# Patient Record
Sex: FEMALE | Race: WHITE | NOT HISPANIC OR LATINO | Employment: FULL TIME | URBAN - METROPOLITAN AREA
[De-identification: names, ages, dates, MRNs, and addresses within clinical notes are randomized per-mention and may not be internally consistent; named-entity substitution may affect disease eponyms.]

---

## 2017-01-07 ENCOUNTER — HOSPITAL ENCOUNTER (OUTPATIENT)
Dept: MAMMOGRAPHY | Facility: HOSPITAL | Age: 52
Discharge: HOME | End: 2017-01-07
Payer: COMMERCIAL

## 2017-01-07 DIAGNOSIS — Z12.31 ENCOUNTER FOR SCREENING MAMMOGRAM FOR MALIGNANT NEOPLASM OF BREAST: ICD-10-CM

## 2017-01-07 PROCEDURE — G0202 SCR MAMMO BI INCL CAD: HCPCS

## 2017-01-24 ENCOUNTER — ALLSCRIPTS OFFICE VISIT (OUTPATIENT)
Dept: OTHER | Facility: OTHER | Age: 52
End: 2017-01-24

## 2017-04-05 ENCOUNTER — ALLSCRIPTS OFFICE VISIT (OUTPATIENT)
Dept: OTHER | Facility: OTHER | Age: 52
End: 2017-04-05

## 2017-08-17 ENCOUNTER — OFFICE VISIT (OUTPATIENT)
Dept: URGENT CARE | Age: 52
End: 2017-08-17
Payer: COMMERCIAL

## 2017-08-17 PROCEDURE — 99213 OFFICE O/P EST LOW 20 MIN: CPT | Performed by: FAMILY MEDICINE

## 2017-08-21 ENCOUNTER — ALLSCRIPTS OFFICE VISIT (OUTPATIENT)
Dept: OTHER | Facility: OTHER | Age: 52
End: 2017-08-21

## 2017-09-18 ENCOUNTER — ALLSCRIPTS OFFICE VISIT (OUTPATIENT)
Dept: OTHER | Facility: OTHER | Age: 52
End: 2017-09-18

## 2017-11-09 ENCOUNTER — ALLSCRIPTS OFFICE VISIT (OUTPATIENT)
Dept: OTHER | Facility: OTHER | Age: 52
End: 2017-11-09

## 2017-12-19 ENCOUNTER — GENERIC CONVERSION - ENCOUNTER (OUTPATIENT)
Dept: OTHER | Facility: OTHER | Age: 52
End: 2017-12-19

## 2017-12-19 ENCOUNTER — ALLSCRIPTS OFFICE VISIT (OUTPATIENT)
Dept: OTHER | Facility: OTHER | Age: 52
End: 2017-12-19

## 2017-12-19 DIAGNOSIS — E78.2 MIXED HYPERLIPIDEMIA: ICD-10-CM

## 2017-12-19 DIAGNOSIS — Z11.59 ENCOUNTER FOR SCREENING FOR OTHER VIRAL DISEASES (CODE): ICD-10-CM

## 2017-12-26 ENCOUNTER — LAB CONVERSION - ENCOUNTER (OUTPATIENT)
Dept: OTHER | Facility: OTHER | Age: 52
End: 2017-12-26

## 2017-12-26 LAB
A/G RATIO (HISTORICAL): 1.9 (CALC) (ref 1–2.5)
ALBUMIN SERPL BCP-MCNC: 4 G/DL (ref 3.6–5.1)
ALP SERPL-CCNC: 96 U/L (ref 33–130)
ALT SERPL W P-5'-P-CCNC: 11 U/L (ref 6–29)
AST SERPL W P-5'-P-CCNC: 17 U/L (ref 10–35)
BILIRUB SERPL-MCNC: 0.5 MG/DL (ref 0.2–1.2)
BUN SERPL-MCNC: 15 MG/DL (ref 7–25)
BUN/CREA RATIO (HISTORICAL): NORMAL (CALC) (ref 6–22)
CALCIUM SERPL-MCNC: 9.4 MG/DL (ref 8.6–10.4)
CHLORIDE SERPL-SCNC: 106 MMOL/L (ref 98–110)
CHOLEST SERPL-MCNC: 186 MG/DL
CHOLEST/HDLC SERPL: 2.4 (CALC)
CO2 SERPL-SCNC: 30 MMOL/L (ref 20–31)
CREAT SERPL-MCNC: 0.9 MG/DL (ref 0.5–1.05)
DEPRECATED RDW RBC AUTO: 12 % (ref 11–15)
EGFR AFRICAN AMERICAN (HISTORICAL): 84 ML/MIN/1.73M2
EGFR-AMERICAN CALC (HISTORICAL): 72 ML/MIN/1.73M2
GAMMA GLOBULIN (HISTORICAL): 2.1 G/DL (CALC) (ref 1.9–3.7)
GLUCOSE (HISTORICAL): 90 MG/DL (ref 65–99)
HCT VFR BLD AUTO: 39.7 % (ref 35–45)
HDLC SERPL-MCNC: 78 MG/DL
HEPATITIS C ANTIBODY (HISTORICAL): NORMAL
HGB BLD-MCNC: 13 G/DL (ref 11.7–15.5)
LDL CHOLESTEROL (HISTORICAL): 91 MG/DL (CALC)
MCH RBC QN AUTO: 29.7 PG (ref 27–33)
MCHC RBC AUTO-ENTMCNC: 32.7 G/DL (ref 32–36)
MCV RBC AUTO: 90.8 FL (ref 80–100)
NON-HDL-CHOL (CHOL-HDL) (HISTORICAL): 108 MG/DL (CALC)
PLATELET # BLD AUTO: 303 THOUSAND/UL (ref 140–400)
PMV BLD AUTO: 11.2 FL (ref 7.5–12.5)
POTASSIUM SERPL-SCNC: 4.5 MMOL/L (ref 3.5–5.3)
RBC # BLD AUTO: 4.37 MILLION/UL (ref 3.8–5.1)
SIGNAL TO CUT-OFF (HISTORICAL): 0.02
SODIUM SERPL-SCNC: 142 MMOL/L (ref 135–146)
TOTAL PROTEIN (HISTORICAL): 6.1 G/DL (ref 6.1–8.1)
TRIGL SERPL-MCNC: 78 MG/DL
WBC # BLD AUTO: 5.2 THOUSAND/UL (ref 3.8–10.8)

## 2017-12-28 ENCOUNTER — GENERIC CONVERSION - ENCOUNTER (OUTPATIENT)
Dept: OTHER | Facility: OTHER | Age: 52
End: 2017-12-28

## 2018-01-08 ENCOUNTER — ALLSCRIPTS OFFICE VISIT (OUTPATIENT)
Dept: OTHER | Facility: OTHER | Age: 53
End: 2018-01-08

## 2018-01-08 PROCEDURE — G0145 SCR C/V CYTO,THINLAYER,RESCR: HCPCS | Performed by: FAMILY MEDICINE

## 2018-01-09 ENCOUNTER — LAB REQUISITION (OUTPATIENT)
Dept: LAB | Facility: HOSPITAL | Age: 53
End: 2018-01-09
Payer: COMMERCIAL

## 2018-01-09 DIAGNOSIS — Z01.419 ENCOUNTER FOR GYNECOLOGICAL EXAMINATION WITHOUT ABNORMAL FINDING: ICD-10-CM

## 2018-01-09 NOTE — PROGRESS NOTES
Assessment   1  Visit for routine gyn exam (V72 31) (Z01 419)   2  Encounter for screening mammogram for malignant neoplasm of breast (V76 12)     (Z12 31)   3  Acute bronchitis, unspecified organism (466 0) (J20 9)    Plan    · Benzonatate 200 MG Oral Capsule; TAKE 1 CAPSULE 3 TIMES DAILY AS    NEEDED   Rx By: Shelli Tomas; Dispense: 10 Days ; #:30 Capsule; Refill: 0;For: Acute bronchitis, unspecified organism; RASHAUN = N; Verified Transmission to [facility] #[id number]; Last Updated By: System, SureScripts; 1/8/2018 9:30:48 AM   · Cefuroxime Axetil 500 MG Oral Tablet; TAKE 1 TABLET EVERY 12 HOURS DAILY   Rx By: Shelli Tomas; Dispense: 10 Days ; #:20 Tablet; Refill: 0;For: Acute bronchitis, unspecified organism; RASHAUN = N; Verified Transmission to [facility] #[id number]; Last Updated By: System, SureScripts; 1/8/2018 9:30:49 AM   · MAMMO SCREENING BILATERAL W 3D & CAD; Status:Hold For - Scheduling; Requested    HGB:52BEZ9933; Perform:[facility]; AGS:62YXI4331; Last Updated By:Tahmina Edge; 1/8/2018 9:27:29 AM;Ordered;For:Encounter for screening mammogram for malignant neoplasm of breast; Ordered By:Tahmina Edge;   · (1) THIN PREP PAP WITH IMAGING; Status: In Progress - Specimen/Data Collected;      Done: 69MIE8708   [address] [place] [address] Lab In Office Collection; YWH:25MMX7863; Last Updated By:Tahmina Edge; 1/8/2018 9:29:43 AM;Ordered; For:Visit for routine gyn exam; Ordered By:Tahmina Edge; Maturation index required? : No  : postmenopausal  HPV? : if ASCUS    Discussion/Summary   health maintenance visit Currently, she eats an adequate diet and has an adequate exercise regimen  Pap test with reflex HPV testing was done today Breast cancer screening: monthly self breast exam was advised and mammogram has been ordered  Colorectal cancer screening: colorectal cancer screening is current   Advice and education were given regarding weight bearing exercise, calcium supplements and reproductive health  Patient discussion: discussed with the patient  Patient is to take Tessalon and antibiotic for her bronchitis  She will follow-up if she worsens  Mammogram is ordered Pap with reflex HPV if ASCUS is done  The patent has the current Barriers: None  Patient is able to Self-Care  Possible side effects of new medications were reviewed with the patient/guardian today  The treatment plan was reviewed with the patient/guardian  The patient/guardian understands and agrees with the treatment plan       Self Referrals: No      Chief Complaint   Pt in office today for a pap and breast exam       History of Present Illness   HPI: 23-year-old female in today for her gyn exam  Her last Pap smear was in 2015  She is also due for mammogram  Patient has a cough and chest congestion for over a week now  She has noted a fever off and on during this time also  GYN HM, Adult Female [facility] [place]: The patient is being seen for a gynecology evaluation  The last health maintenance visit was 2 year(s) ago  Social History: She is   Work status: working full time  The patient is a former cigarette smoker  She reports occasional alcohol use  She has never used illicit drugs  General Health: The patient's health since the last visit is described as good  She has regular dental visits  -- She complains of vision problems  Vision care includes wearing soft contact lenses-- and-- having regular eye examinations  Lifestyle:  She consumes a diverse and healthy diet  -- She does not have any weight concerns  -- She exercises regularly  -- She does not use tobacco -- She consumes alcohol -- She denies drug use        Reproductive health: the patient is postmenopausal     Screening:      Review of Systems   no pelvic pain,-- no pelvic pressure,-- no postmenopausal bleeding,-- no dysuria,-- no change in urinary frequency,-- no feelings of urinary urgency,-- urine not cloudy-- and-- no urinary loss of control  Constitutional: as noted in HPI       ENT: as noted in HPI  Cardiovascular: no complaints of slow or fast heart rate, no chest pain, no palpitations, no leg claudication or lower extremity edema  Respiratory: as noted in HPI  Breasts: no complaints of breast pain, breast lump or nipple discharge  Gastrointestinal: no complaints of abdominal pain, no constipation, no nausea or diarrhea, no vomiting, no bloody stools  Genitourinary: no complaints of dysuria, no incontinence, no pelvic pain, no dysmenorrhea, no vaginal discharge or abnormal vaginal bleeding  Musculoskeletal: no complaints of arthralgia, no myalgia, no joint swelling or stiffness, no limb pain or swelling  Integumentary: no complaints of skin rash or lesion, no itching or dry skin, no skin wounds  Neurological: no complaints of headache, no confusion, no numbness or tingling, no dizziness or fainting  ROS reviewed  OB History   Pregnancy History (Brief):      Prior pregnancies: : 0  Para: Active Problems   1  History of Anxiety (300 00) (F41 9)   2  Cervical spinal stenosis (723 0) (M48 02)   3  Degenerative disc disease, lumbar (722 52) (M51 36)   4  Depression (311) (F32 9)   5  Hyperlipidemia, mixed (272 2) (E78 2)   6  Lumbar radiculopathy (724 4) (M54 16)   7   Sacroiliitis (720 2) (M46 1)    Past Medical History    · History of Abdominal pain, generalized (789 07) (R10 84)   · History of Abnormal abdominal CT scan (793 6) (R93 5)   · History of Acute frontal sinusitis, recurrence not specified (461 1) (J01 10)   · History of Acute rhinosinusitis (461 9) (J01 90)   · History of Acute serous otitis media, recurrence not specified, unspecified laterality    (381 01) (H65 00)   · History of Anxiety (300 00) (F41 9)   · History of Constipation, unspecified constipation type (564 00) (K59 00)   · History of Encounter for preventive health examination (V70 0) (Z00 00)   · History of Gastroesophageal reflux disease without esophagitis (530 81) (K21 9)   · History of Herpetic lesions (054 9) (B00 9)   · History of back pain (V13 59) (Z87 39)   · History of dizziness (V13 89) (V12 532)   · History of epigastric pain (V12 79) (Z87 19)   · History of gastroesophageal reflux (GERD) (V12 79) (Z87 19)   · History of Helicobacter pylori infection (V12 09) (Z86 19)   · History of Leg pain, diffuse, right (729 5) (M79 604)   · History of Menopausal syndrome (hot flashes) (627 2) (N95 1)   · History of Motion sickness, initial encounter (994 6) (T75 3XXA)   · History of Need for diphtheria-tetanus-pertussis (Tdap) vaccine (V06 1) (Z23)   · History of Need for hepatitis C screening test (V73 89) (Z11 59)   · History of Nerve pain (729 2) (M79 2)   · History of Other infective chronic otitis externa of left ear (380 16) (H60 392)   · History of Post-op pain (338 18) (G89 18)   · History of Postoperative examination (V67 00) (Z09)   · History of Pre-op evaluation (V72 84) (Z01 818)   · History of Preoperative testing (V72 84) (Z01 818)   · History of Right lumbar radiculopathy (724 4) (M54 16)   · History of Right-sided low back pain with right-sided sciatica (724 3) (M54 41)   · History of Squamous blepharitis of right lower eyelid (373 02) (H01 022)   · History of Visit for screening mammogram (V76 12) (Z12 31)     The active problems and past medical history were reviewed and updated today  Surgical History    · History of Appendectomy   · History of Cholecystectomy Laparoscopic   · History of Knee Surgery   · History of Lower Back Surgery     The surgical history was reviewed and updated today         Family History    · Denied: Family history of substance abuse   · Family history of Alcoholism (303 90) (F10 20)   · Family history of Back disorder   · Denied: Family history of mental disorder   · Family history of Cancer (199 1) (C80 1)   · Family history of Diabetes (250 00) (E11 9)   · Family history of Hypertension (401 9) (I10)     The family history was reviewed and updated today  Social History    · Consumes alcohol occasionally (V49 89) (Z78 9)   · Dental care, regularly   · Former smoker (V15 82) (S37 201)   · Lives with spouse   ·    · No drug use   · Unemployed (V62 0) (Z56 0)  The social history was reviewed and is unchanged  Current Meds    1  Allegra Allergy 180 MG Oral Tablet; take 1 tablet daily as needed Recorded   2  Atorvastatin Calcium 10 MG Oral Tablet; Take 1 tablet daily; Therapy: 29TLI4383 to ([phone])  Requested for: 91MVF7819; Last     Rx:02Hjz0298 Ordered   3  FLUoxetine HCl - 20 MG Oral Capsule; TAKE 3 CAPSULES BY MOUTH EVERY DAY      Requested for: 55ZZP4681; Last Rx:49Sup2561 Ordered   4  Gabapentin 300 MG Oral Capsule; TAKE ONE CAPSULE BY MOUTH TWICE DAILY; Therapy: 18MGK9152 to (Evaluate:36Pzk9080)  Requested for: 10Oyk0077; Last     Rx:00Isq9002 Ordered   5  Prempro 0 3-1 5 MG Oral Tablet; Take 1 tablet daily; Therapy: 84EGA1623 to (Last Rx:27Odi9148)  Requested for: 29Wkt6730 Ordered   6  PriLOSEC OTC 20 MG Oral Tablet Delayed Release; Take 1 tablet daily Recorded    Allergies   1  Deltasone TABS   2  erythromycin    Vitals    Recorded: 53SYZ8492 09:06AM   Heart Rate 70   Systolic 120   Diastolic 84   Height 5 ft    Weight 149 lb    BMI Calculated 29 1   BSA Calculated 1 65   O2 Saturation 97     Physical Exam        Constitutional      General appearance: No acute distress, well appearing and well nourished  Neck      Neck: Normal, supple, trachea midline, no masses  Thyroid: Normal, no thyromegaly  Pulmonary      Respiratory effort: No increased work of breathing or signs of respiratory distress  Auscultation of lungs: Abnormal  -- Scattered wheezes  Cardiovascular      Auscultation of heart: Normal rate and rhythm, normal S1 and S2, no murmurs         Peripheral vascular exam: Normal pulses Throughout  Genitourinary      External genitalia: Normal and no lesions appreciated  Vagina: Normal, no lesions or dryness appreciated  Urethra: Normal        Urethral meatus: Normal        Bladder: Normal, soft, non-tender and no prolapse or masses appreciated  Cervix: Normal, no palpable masses  Uterus: Normal, non-tender, not enlarged, and no palpable masses  Adnexa/parametria: Normal, non-tender and no fullness or masses appreciated  Anus, perineum, and rectum: Normal sphincter tone, no masses, and no prolapse  Chest      Breasts: Normal and no dimpling or skin changes noted  Abdomen      Abdomen: Normal, non-tender, and no organomegaly noted  Liver and spleen: No hepatomegaly or splenomegaly  Examination for hernias: No hernias appreciated  Stool sample for occult blood: Negative  Lymphatic      Palpation of lymph nodes in neck, axillae, groin and/or other locations: No lymphadenopathy or masses noted  Skin      Skin and subcutaneous tissue: Normal skin turgor and no rashes         Palpation of skin and subcutaneous tissue: Normal        Psychiatric      Orientation to person, place, and time: Normal        Mood and affect: Normal        Signatures    Electronically signed by : BRAXTON Rowley; Jan 8 2018  9:33AM EST                       (Author)     Electronically signed by : KAMERON Greenberg ; Jan 8 2018 12:37PM EST

## 2018-01-10 NOTE — MISCELLANEOUS
Message   Recorded as Task   Date: 07/07/2016 09:48 AM, Created By: Chelsie Guardado   Task Name: Call Back   Assigned To: Susan Plummer   Regarding Patient: She Prather, Status: Active   Comment:    Susan Plummer - 07 Jul 2016 9:48 AM     TASK CREATED  Returned pts call  She has already spoken with [facility] and questions answered  Denies N/V c/o minimal pain  She will call with any further concerns or questions          Signatures   Electronically signed by : Janet Matthews, ; Jul 7 2016  9:49AM EST                       (Author)

## 2018-01-11 LAB
LAB AP GYN PRIMARY INTERPRETATION: NORMAL
Lab: NORMAL
PATH INTERP SPEC-IMP: NORMAL

## 2018-01-12 NOTE — MISCELLANEOUS
Message  S/w pt on telephone  Verified allergies/pharmacy with pt and went over pre-op instructions  Pt currently denies taking any blood thinning medications  Answered any questions the pt may have had at this time        Signatures   Electronically signed by : Jamie Clarke RN; Jul 5 2016 10:20AM EST                       (Author)

## 2018-01-13 VITALS
WEIGHT: 140 LBS | HEART RATE: 74 BPM | TEMPERATURE: 98.1 F | HEIGHT: 61 IN | OXYGEN SATURATION: 98 % | DIASTOLIC BLOOD PRESSURE: 90 MMHG | SYSTOLIC BLOOD PRESSURE: 120 MMHG | BODY MASS INDEX: 26.43 KG/M2

## 2018-01-13 NOTE — MISCELLANEOUS
Message  Return to work or school:        Please excuse patient from March 23rd until April 11th due to illness  Thang Antony DO        Signatures   Electronically signed by : Thang Antony DO; Mar 28 2016  4:26PM EST                       (Author)

## 2018-01-14 VITALS
DIASTOLIC BLOOD PRESSURE: 72 MMHG | BODY MASS INDEX: 27.4 KG/M2 | RESPIRATION RATE: 14 BRPM | HEART RATE: 70 BPM | SYSTOLIC BLOOD PRESSURE: 118 MMHG | WEIGHT: 145.01 LBS | TEMPERATURE: 99.1 F | OXYGEN SATURATION: 98 %

## 2018-01-14 VITALS
OXYGEN SATURATION: 98 % | SYSTOLIC BLOOD PRESSURE: 124 MMHG | DIASTOLIC BLOOD PRESSURE: 90 MMHG | HEIGHT: 61 IN | BODY MASS INDEX: 27 KG/M2 | TEMPERATURE: 98.5 F | HEART RATE: 61 BPM | WEIGHT: 143 LBS

## 2018-01-14 NOTE — MISCELLANEOUS
Message  Return to work or school:   Kaci Francis is under my professional care   She was seen in my office on 03/23/2016   She is able to return to work on  03/31/2016            Signatures   Electronically signed by : Vale Pereira DO; Mar 23 2016 11:52AM EST                       (Author)

## 2018-01-15 VITALS
SYSTOLIC BLOOD PRESSURE: 132 MMHG | HEIGHT: 61 IN | TEMPERATURE: 97.7 F | WEIGHT: 142 LBS | DIASTOLIC BLOOD PRESSURE: 90 MMHG | BODY MASS INDEX: 26.81 KG/M2 | HEART RATE: 74 BPM | OXYGEN SATURATION: 99 %

## 2018-01-15 NOTE — MISCELLANEOUS
Assessment    1  Right-sided low back pain with right-sided sciatica (724 3) (M54 41)    Plan  Right-sided low back pain with right-sided sciatica    · 1 Jose Hernandez MD, Lisa Gibson (Anesthesiology) Physician Referral  Consult  Status: Active   Requested for: 26VPJ5781   Ordered; For: Right-sided low back pain with right-sided sciatica; Ordered By: Luz Rivas Performed:  Due: 57PDK5733  Care Summary provided  : Yes    Discussion/Summary  Counseling Documentation With Imm: The patient, patient's family was counseled regarding diagnostic results, instructions for management, prognosis  Chief Complaint  Chief Complaint Free Text Note Form: HERNIATED DISK      History of Present Illness  TCM Communication [facility]: The patient is being contacted for follow-up after hospitalization and 3/12/16-3/17/16  She was hospitalized at [facility]  The date of admission: 3/12/16, date of discharge: 3/17/16  She was discharged to home  She scheduled a follow up appointment  Communication performed and completed by      Review of Systems  Complete-Female:   Constitutional: No fever, no chills, feels well, no tiredness, no recent weight gain or weight loss  Eyes: No complaints of eye pain, no red eyes, no eyesight problems, no discharge, no dry eyes, no itching of eyes  ENT: no complaints of earache, no loss of hearing, no nose bleeds, no nasal discharge, no sore throat, no hoarseness  Cardiovascular: No complaints of slow heart rate, no fast heart rate, no chest pain, no palpitations, no leg claudication, no lower extremity edema  Respiratory: No complaints of shortness of breath, no wheezing, no cough, no SOB on exertion, no orthopnea, no PND  Gastrointestinal: No complaints of abdominal pain, no constipation, no nausea or vomiting, no diarrhea, no bloody stools  Genitourinary: No complaints of dysuria, no incontinence, no pelvic pain, no dysmenorrhea, no vaginal discharge or bleeding  Musculoskeletal: back and right leg pain  Integumentary: No complaints of skin rash or lesions, no itching, no skin wounds, no breast pain or lump  Neurological: No complaints of headache, no confusion, no convulsions, no numbness, no dizziness or fainting, no tingling, no limb weakness, no difficulty walking  Psychiatric: Not suicidal, no sleep disturbance, no anxiety or depression, no change in personality, no emotional problems  Endocrine: No complaints of proptosis, no hot flashes, no muscle weakness, no deepening of the voice, no feelings of weakness  Hematologic/Lymphatic: No complaints of swollen glands, no swollen glands in the neck, does not bleed easily, does not bruise easily  Active Problems     1  Abdominal pain, generalized (789 07) (R10 84)   2  Abnormal abdominal CT scan (793 6) (R93 5)   3  Acid reflux (530 81) (K21 9)   4  Anxiety (300 00) (F41 9)   5  Back pain (724 5) (M54 9)   6  Constipation, unspecified constipation type (564 00) (K59 00)   7  Depression (311) (F32 9)   8  Encounter for preventive health examination (V70 0) (Z00 00)   9  Epigastric pain (789 06) (R10 13)   10  Helicobacter pylori (H  pylori) infection (041 86) (A04 8)   11  Herpetic lesions (054 9) (B00 9)   12  Hyperlipidemia, mixed (272 2) (E78 2)   13  Menopausal syndrome (hot flashes) (627 2) (N95 1)   14  Visit for routine gyn exam (V72 31) (Z01 419)   15  Visit for screening mammogram (V76 12) (Z12 31)    Right-sided low back pain with right-sided sciatica (724 3) (M54 41)          Surgical History    1  History of Appendectomy   2  History of Cholecystectomy Laparoscopic   3  History of Knee Surgery    Family History    1  Family history of Alcoholism (303 90) (F10 20)    2  Family history of Cancer (199 1) (C80 1)    3  Family history of Diabetes (250 00) (E11 9)   4   Family history of Hypertension (401 9) (I10)    Social History    · Consumes alcohol occasionally (V49 89) (Z78 9)   · Former smoker (V15 82) (Z87 979)   · No drug use    Current Meds   1  Acetaminophen-Codeine #3 300-30 MG Oral Tablet; TAKE 1 TABLET EVERY 4 TO 6   HOURS AS NEEDED FOR PAIN;   Therapy: 26OSE9990 to (Evaluate:12Mar2016); Last Rx:08Mar2016 Ordered   2  Allegra Allergy 180 MG Oral Tablet; take 1 tablet daily as needed Recorded   3  Atorvastatin Calcium 10 MG Oral Tablet; Take 1 tablet daily; Therapy: 31VQW5512 to (Evaluate:10Nov2015)  Requested for: 70LLB9186; Last   Rx:64Ooa4823 Ordered   4  FLUoxetine HCl - 40 MG Oral Capsule; TAKE 1 CAPSULE Daily  Requested for:   56TVU4202; Last Rx:91Wuj0898 Ordered   5  Gabapentin 300 MG Oral Capsule; take 1 capsule 3 times a day; Therapy: 30JVG9549 to (Evaluate:14Apr2016)  Requested for: 99VME0364; Last   Rx:15Mar2016 Ordered   6  Lactulose 10 GM/15ML Oral Solution; TAKE 5 ML BY MOUTH DAILY; Therapy: 71ZOR9172 to (Evaluate:26Jun2015)  Requested for: 11NSY9154; Last   Rx:09Jun2015 Ordered   7  Ondansetron 4 MG Oral Tablet Dispersible; Therapy: 45GDV0809 to Recorded   8  Prempro 0 3-1 5 MG Oral Tablet; Take 1 tablet daily; Therapy: 28WEK4841 to (Last Rx:25Abq3069)  Requested for: 18Nxt3901 Ordered   9  PriLOSEC OTC 20 MG Oral Tablet Delayed Release; Take 1 tablet daily Recorded   10  Pylera 140-125-125 MG Oral Capsule; 3 tabs by mouth 4 times a day p c  and at bedtime    x10 days; Therapy: 03FVG7123 to (Last Rx:15Zbc9597)  Requested for: 96Hig7737 Ordered   11  ValACYclovir HCl - 1 GM Oral Tablet; take 1 tablet by mouth daily; Therapy: 44FSH8591 to ([facility])  Requested for: 72Bbr1042; Last    Rx:89Hmn5746 Ordered    Allergies    1  erythromycin    Physical Exam    Constitutional   General appearance: No acute distress, well appearing and well nourished  Pulmonary   Auscultation of lungs: Clear to auscultation  Cardiovascular   Auscultation of heart: Normal rate and rhythm, normal S1 and S2, without murmurs      Examination of extremities for edema and/or varicosities: Normal     Abdomen   Abdomen: Non-tender, no masses  Musculoskeletal   Gait and station: Normal     Inspection/palpation of joints, bones, and muscles: Abnormal   tenderness over the right lumbar paraspinal muscles  Health Management  Constipation, unspecified constipation type   COLONOSCOPY; every 10 years; Last 22LZQ9381; Next Due: 13QLR9602;  Active    Signatures   Electronically signed by : Tiffany Richardson DO; Mar 23 2016 11:39AM EST                       (Author)

## 2018-01-15 NOTE — PROGRESS NOTES
Chief Complaint  Pt presents to the office for routine follow up after undergoing L3/4 foraminotomy  History of Present Illness  HPI: Pt presents for her 2 week post-op visit  She is in the company of her   She reports to be doing very well  Medrol Dose Junior prescribed for post-op nerve pain down R leg was very effective  She reports that her pre-op symptoms have resolved  She c/o some residual incision pain but no longer requires prescription pain meds  She denies any drainage or other s/s of infection  No c/o bowel or bladder  She is compliant with activity limitations  Active Problems    1  Abdominal pain, generalized (789 07) (R10 84)   2  Abnormal abdominal CT scan (793 6) (R93 5)   3  Acid reflux (530 81) (K21 9)   4  Anxiety (300 00) (F41 9)   5  Back pain (724 5) (M54 9)   6  Cervical spinal stenosis (723 0) (M48 02)   7  Constipation, unspecified constipation type (564 00) (K59 00)   8  Degenerative disc disease, lumbar (722 52) (M51 36)   9  Depression (311) (F32 9)   10  Encounter for preventive health examination (V70 0) (Z00 00)   11  Epigastric pain (789 06) (R10 13)   12  Helicobacter pylori (H  pylori) infection (041 86) (A04 8)   13  Herpetic lesions (054 9) (B00 9)   14  Hyperlipidemia, mixed (272 2) (E78 2)   15  Leg pain, diffuse, right (729 5) (M79 604)   16  Lumbar radiculopathy (724 4) (M54 16)   17  Menopausal syndrome (hot flashes) (627 2) (N95 1)   18  Need for diphtheria-tetanus-pertussis (Tdap) vaccine (V06 1) (Z23)   19  Nerve pain (729 2) (M79 2)   20  Post-op pain (338 18) (G89 18)   21  Pre-op evaluation (V72 84) (Z01 818)   22  Preoperative testing (V72 84) (Z01 818)   23  Right lumbar radiculopathy (724 4) (M54 16)   24  Right-sided low back pain with right-sided sciatica (724 3) (M54 41)   25  Sacroiliitis (720 2) (M46 1)   26  Visit for routine gyn exam (V72 31) (Z01 419)   27  Visit for screening mammogram (V76 12) (Z12 31)    Current Meds   1  Acetaminophen-Codeine #3 300-30 MG Oral Tablet; take 1 tablet every 4 to 6 hours as   needed for pain; Therapy: 92LEC5568 to (Evaluate:11Jun2016)  Requested for: 97ZKY8325; Last   Rx:06Jun2016 Ordered   2  Allegra Allergy 180 MG Oral Tablet; take 1 tablet daily as needed Recorded   3  Atorvastatin Calcium 10 MG Oral Tablet; Take 1 tablet daily; Therapy: 07IFG5472 to (Evaluate:96Acm4294)  Requested for: 61JZK0549; Last   Rx:87Fyu2562 Ordered   4  FLUoxetine HCl - 40 MG Oral Capsule; TAKE 1 CAPSULE Daily  Requested for:   94EKV5232; Last Rx:11Gex4528 Ordered   5  MethylPREDNISolone 4 MG Oral Tablet Therapy Pack; Take as directed on pack; Therapy: 09IXH0087 to (Last Rx:08Pbq0213)  Requested for: 32ULX3007 Ordered   6  Oxycodone-Acetaminophen 5-325 MG Oral Tablet; TAKE 1 TABLET EVERY 4 HOURS   AS NEEDED FOR PAIN;   Therapy: 19EVM0316 to (Evaluate:02Eyj5587); Last Rx:20Nzt9733 Ordered   7  Prempro 0 3-1 5 MG Oral Tablet; Take 1 tablet daily; Therapy: 41HDA3746 to (Last Rx:41Umv2884)  Requested for: 47VFT4351 Ordered   8  PriLOSEC OTC 20 MG Oral Tablet Delayed Release; Take 1 tablet daily Recorded   9  ValACYclovir HCl - 1 GM Oral Tablet; take 1 tablet by mouth daily; Therapy: 62EGA4421 to (Evaluate:11Aug2016)  Requested for: 27Qoj6553; Last   Rx:97Kcz2582 Ordered    Allergies    1  erythromycin    Procedure  The wound was located on the (Midline lumbar)  Wound Exam: well healed with no sign of infection, Incision is clean, dry, and intact  There was mild erythema around the wound edges  Procedure Note:   Patient Status:  the patient tolerated the procedure well  Complications:  there were no complications  Assessment    1  Postoperative examination (V67 00) (Z09)    Plan   Follow-up Visit in 4 Weeks Evaluation and Treatment  Follow-up  Status: Complete - Retrospective By Protocol Authorization  Done: 13AJN0990  Ordered;    For: Postoperative examination;  Ordered By: Mariah Rowe  Performed:   Due: 42KYR5582; Last Updated By: Nelly Torres; 7/21/2016 2:13:02 PM     Discussion/Summary    Incision care reviewed  Wash area with soap and water and pat dry  No creams, lotions, or ointments / do no immerse in water such as a tub, hot tub, or pool  Watch for s/s of infection such as increased redness, drainage, gapping of incision, chills, or fever greater than 101 and call the office or present to the ED  No twisting, turning , or bending at the waist  Limit lifting  increase ambulation as tolerated  F/U as previously scheduled  Future Appointments    Date/Time Provider Specialty Site   08/18/2016 10:00 AM KAMERON Shirley   Neurosurgery [address] [place] [address] [facility]     Signatures   Electronically signed by : Vandana Rondon, ; Jul 21 2016  1:58PM EST                       (Author)    Electronically signed by : KAMERON Grullon ; Jul 21 2016  3:06PM EST                       (Author)

## 2018-01-15 NOTE — RESULT NOTES
Verified Results  * CT ABDOMEN PELVIS WO CONTRAST [id number] 05:34PM Jero Martinez     Test Name Result Flag Reference   CT ABDOMEN PELVIS WO CONTRAST (Report)     CT ABDOMEN AND PELVIS WITHOUT IV CONTRAST     INDICATION: Follow-up evaluation of mesenteric stranding     COMPARISON: July 14, 2015 and Alondra 10, 2015     TECHNIQUE: CT examination of the abdomen and pelvis was performed without intravenous contrast  Examination was performed utilizing techniques to minimize radiation dose, including the use of dose reduction software  Axial, sagittal, and coronal    reformatted images were submitted for interpretation  Intravenous contrast was not administered as part of this examination  Enteric contrast was administered  FINDINGS:     ABDOMEN     LOWER CHEST: There is no acute or significant abnormality in the base of the chest      LIVER/BILIARY TREE: There is a round 17 mm hypodense focus in the lower portion of the right hepatic lobe  This was present on prior studies and appears stable since June , 2015  GALLBLADDER: Cholecystectomy     SPLEEN: Unremarkable  PANCREAS: Unremarkable  ADRENAL GLANDS: Unremarkable  KIDNEYS/URETERS: Unremarkable  No hydronephrosis  STOMACH AND BOWEL: Unremarkable  APPENDIX: No findings to suggest appendicitis  ABDOMINOPELVIC CAVITY: There is persistent fatty stranding within the mesentery  Compared with the prior study there has been no significant change  Numerous small lymph nodes are present in this region which are also stable in appearance  There is    no dominant mass  VESSELS: Unremarkable for patient's age  PELVIS     REPRODUCTIVE ORGANS: Unremarkable for patient's age  URINARY BLADDER: Unremarkable  ABDOMINAL WALL/INGUINAL REGIONS: Unremarkable  OSSEOUS STRUCTURES: No acute fracture or destructive osseous lesion         IMPRESSION:     No significant interval change in the appearance of the mesenteric fatty stranding and numerous small lymph nodes  The stability suggests that it is probably benign in etiology               Signed by:   Harsh Suarez MD   1/15/16

## 2018-01-15 NOTE — RESULT NOTES
Message   Recorded as Task   Date: 04/27/2016 09:00 AM, Created By: Gorman Burkitt   Task Name: Miscellaneous   Assigned To: [facility],Team   Regarding Patient: Miriam Perez, Status: Active   CommentMiguel Pi - 27 Apr 2016 9:00 AM     TASK CREATED  Patient left a message with the answering service to please call her regarding her injection 4/27/16 @ 10:45 AM   She wants to know if the pre-cert was obtained and if she should come for her injection  Please call her @ [phone]  Thank you  Eirc Potts - 21 Jun 2016 9:30 AM     TASK REPLIED TO: Previously Assigned To [facility] clinical,Team  Pt had procedure on 5-19-16  Signatures   Electronically signed by :  Waverly Bence, ; Jun 21 2016  9:30AM EST                       (Author)

## 2018-01-16 NOTE — MISCELLANEOUS
Message   Recorded as Task   Date: 07/07/2016 05:22 PM, Created By: Amy Acuna   Task Name: Medical Complaint Callback   Assigned To: Susan Plummer   Regarding Patient: Cb Ferguson, Status: Active   Comment:    Susan Plummer - 07 Jul 2016 5:22 PM     TASK CREATED  Spoke with pt's  who reports that she is experiencing quite a bit of pain  She was ordered Tramadol 50 mg 1 q 6hr prn and admits to taking 2 for her pain which she described as 10/10  It was discussed that post-op pain is not unusual and sometimes they can experience pain that is different than prior to surgery  Pt will be called in the AM to assess pain and at that time if needed will discuss with surgeon for possible change in med  Susan Plummer - 08 Jul 2016 8:58 AM     TASK EDITED  spoke with pt who reports pain persists  Previously discussed this situation with HOANG NIELSON an offered her Percocet 5/325 1 q 4-6 hrs prn  She stated she has percocet at home previously ordered by PCP and has started taking that which offers her more relief  She has no one to come for a new script until Tues and feels she has enough at home until then  will print a script on 7/12 for p/u at this office  Susan Plummer - 11 Jul 2016 9:21 AM     TASK EDITED  Pt reports terrible nerve pain down her legs satrting at her buttocks  Percocet is not helping this pain  Discussed with KDM and medrol dose meryl sent to phar  Pt will still p/u percocet script tomorrow in the office          Signatures   Electronically signed by : Britni Russell, ; Jul 12 2016  8:07AM EST                       (Author)

## 2018-01-16 NOTE — MISCELLANEOUS
Message   Recorded as Task   Date: 06/16/2016 11:40 AM, Created By: Siena Moore   Task Name: Follow Up   Assigned To: [facility],Team   Regarding Patient: Alan Storey, Status: In Progress   Comment:    Gustavo Mclean - 16 Jun 2016 11:40 AM     TASK CREATED  F/U  Pt s/p REID & R SI JOINT INJECTION performed by Dr Deirdre Nunez on 6/9/16  Pt scheduled for POVS on 7/18/16  Pt report 70% improvement post injection with no S/S of infection  pt reports an increased ability to perform ADL's  Pt states she will be in for her f/u [place] Mon - 16 Jun 2016 3:59 PM     TASK REPLIED TO: Previously Assigned To [facility],Team  ok md aware   Gustavo Mclean - 17 Jun 2016 10:20 AM     TASK IN PROGRESS        Active Problems    1  Abdominal pain, generalized (789 07) (R10 84)   2  Abnormal abdominal CT scan (793 6) (R93 5)   3  Acid reflux (530 81) (K21 9)   4  Anxiety (300 00) (F41 9)   5  Back pain (724 5) (M54 9)   6  Cervical spinal stenosis (723 0) (M48 02)   7  Constipation, unspecified constipation type (564 00) (K59 00)   8  Degenerative disc disease, lumbar (722 52) (M51 36)   9  Depression (311) (F32 9)   10  Encounter for preventive health examination (V70 0) (Z00 00)   11  Epigastric pain (789 06) (R10 13)   12  Helicobacter pylori (H  pylori) infection (041 86) (A04 8)   13  Herpetic lesions (054 9) (B00 9)   14  Hyperlipidemia, mixed (272 2) (E78 2)   15  Leg pain, diffuse, right (729 5) (M79 604)   16  Lumbar radiculopathy (724 4) (M54 16)   17  Menopausal syndrome (hot flashes) (627 2) (N95 1)   18  Preoperative testing (V72 84) (Z01 818)   19  Right lumbar radiculopathy (724 4) (M54 16)   20  Right-sided low back pain with right-sided sciatica (724 3) (M54 41)   21  Sacroiliitis (720 2) (M46 1)   22  Visit for routine gyn exam (V72 31) (Z01 419)   23  Visit for screening mammogram (V76 12) (Z12 31)    Current Meds   1   Acetaminophen-Codeine #3 300-30 MG Oral Tablet; take 1 tablet every 4 to 6 hours as   needed for pain; Therapy: 62SGP3000 to (Evaluate:11Jun2016)  Requested for: 96WSG9463; Last   Rx:06Jun2016 Ordered   2  Advil 200 MG Oral Capsule; TAKE 1 CAPSULE EVERY 4 TO 6 HOURS; Therapy: (Recorded:05May2016) to Recorded   3  Allegra Allergy 180 MG Oral Tablet (Fexofenadine HCl); take 1 tablet daily as needed   Recorded   4  Atorvastatin Calcium 10 MG Oral Tablet; Take 1 tablet daily; Therapy: 50QEF2096 to (Evaluate:10Nov2015)  Requested for: 56JJA8578; Last   Rx:38Adu7097 Ordered   5  FLUoxetine HCl - 40 MG Oral Capsule; TAKE 1 CAPSULE Daily  Requested for:   29EJQ7371; Last Rx:23Lqr9668 Ordered   6  Gabapentin 300 MG Oral Capsule; take 1 capsule 3 times a day; Therapy: 99PFY5196 to (Evaluate:11May2016)  Requested for: 59Dao3704; Last   Rx:31Zli6975 Ordered   7  Prempro 0 3-1 5 MG Oral Tablet; Take 1 tablet daily; Therapy: 53ZAA3493 to (Last Rx:13Jul2015)  Requested for: 32Aqt9508 Ordered   8  PriLOSEC OTC 20 MG Oral Tablet Delayed Release; Take 1 tablet daily Recorded   9  Tylenol 500 MG CAPS; TAKE 2 CAPSULE  PRN; Therapy: (Recorded:05May2016) to Recorded   10  ValACYclovir HCl - 1 GM Oral Tablet; take 1 tablet by mouth daily;     Therapy: 48HRP9629 to (Evaluate:03Jul2016)  Requested for: 30VIM9089; Last    NU:91TCY9587 Ordered    Allergies    1  erythromycin    Signatures   Electronically signed by : Rhonda Archuleta, ; Jun 17 2016 10:21AM EST                       (Author)

## 2018-01-16 NOTE — MISCELLANEOUS
Message  Return to work or school:   Pierce Brunner is under my professional care  She was seen in my office on   She is able to return to work on  11/10/17       Cathy Chavarria, [address]        Signatures   Electronically signed by : AMAURI Mcgraw; Nov 9 2017  9:12AM EST                       (Author)

## 2018-01-17 NOTE — MISCELLANEOUS
Message   Recorded as Task   Date: 05/16/2016 09:35 AM, Created By: Pérez Banks   Task Name: Call Back   Assigned To: Susan Plummer   Regarding Patient: Abbey Jin, Status: Active   Comment:    Susan Plummer - 16 May 2016 9:35 AM     TASK CREATED  Pt reports that last week she noted discoloration that resembles bruising, coolness to touch and pain in her R leg  She had a doppler done by PCP and reports no clot noted  Discoloration is from hip to knee, and pain is not new  She denies any recent trauma  Reviewed with Ghada Moralez and suggested pt continue f/u with PCP for further eval   [pt stated an understanding          Signatures   Electronically signed by : Ronak Bauer, ; May 16 2016  9:36AM EST                       (Author)

## 2018-01-17 NOTE — MISCELLANEOUS
Message   Recorded as Task   Date: 07/21/2016 02:02 PM, Created By: Juliet Campbell   Task Name: Miscellaneous   Assigned To: Susan Plummer   Regarding Patient: Dunia Womack, Status: Active   Comment:    Susan Plummer - 21 Jul 2016 2:02 PM     TASK CREATED  This pt was seen today for her 2 week check  She is doing well  Her pre-op symptoms have resolved  She has tenderness at incision site  No longer taking prescription pain meds  May she start PT for core and lower extremity strengthening? Thank You   Merary Lugo - 21 Jul 2016 3:09 PM     TASK REPLIED TO: Previously Assigned To Kai Lorenzana  i'd give it another 4 weeks  I have had the odd patient who developed recurrent herniation/radiculopathy w early PT   Susan Plummer - 21 Jul 2016 3:59 PM     TASK REPLIED TO: Previously Assigned To Juliet Campbell  Pt notified and stated an understanding          Signatures   Electronically signed by : Amrit Minaya, ; Jul 21 2016  3:59PM EST                       (Author)

## 2018-01-18 NOTE — PROGRESS NOTES
Assessment    1  Encounter for preventive health examination (V70 0) (Z00 00)   2  Lumbar radiculopathy (724 4) (M54 16)   3  Hyperlipidemia, mixed (272 2) (E78 2)    Plan  Health Maintenance    · Follow-up visit in 1 year Evaluation and Treatment  Follow-up  Status: Hold For -  Scheduling  Requested for: 04VNE0618  Hyperlipidemia, mixed    · (1) COMPREHENSIVE METABOLIC PANEL; Status:Active; Requested for:26Ruy3737;    · (1) LIPID PANEL, FASTING; Status:Active; Requested for:80Mxe9243;   Lumbar radiculopathy    · Gabapentin 300 MG Oral Capsule; TAKE ONE CAPSULE BY MOUTH TWICE  DAILY  Motion sickness, initial encounter    · Transderm-Scop (1 5 MG) 1 MG/3DAYS Transdermal Patch 72 Hour; APPLY 1  PATCH EVERY 3 DAYS  PMH: Visit for screening mammogram    · * MAMMO SCREENING BILATERAL W CAD; Status:Complete - Retrospective By  Protocol Authorization;   Done: 11AQK8749 12:00AM  Visit for screening mammogram    · * MAMMO SCREENING BILATERAL W CAD; Status:Hold For - Scheduling; Requested  for:48Pfq0294;     Discussion/Summary  health maintenance visit Currently, she eats a healthy diet  cervical cancer screening is current Breast cancer screening: mammogram has been ordered  Colorectal cancer screening: colorectal cancer screening is current  Osteoporosis screening: bone mineral density testing is not indicated  Advice and education were given regarding calcium supplements and vitamin D supplements  Self Referrals: No      Chief Complaint  Pt  in office today for a check up  She is going on a cruise and is requesting sea sick patches  History of Present Illness  HM, Adult Female: The patient is being seen for a health maintenance evaluation  General Health: The patient's health since the last visit is described as good  She has regular dental visits  She denies vision problems  She denies hearing loss  Immunizations status: up to date  Lifestyle:  She consumes a diverse and healthy diet   She does not have any weight concerns  She exercises regularly  She does not use tobacco  She denies alcohol use  She denies drug use  Reproductive health:  she reports normal menses  Screening: cancer screening reviewed and current  metabolic screening reviewed and current  risk screening reviewed and current  Review of Systems    Constitutional: No fever, no chills, feels well, no tiredness, no recent weight gain or weight loss  Eyes: No complaints of eye pain, no red eyes, no eyesight problems, no discharge, no dry eyes, no itching of eyes  ENT: no complaints of earache, no loss of hearing, no nose bleeds, no nasal discharge, no sore throat, no hoarseness  Cardiovascular: No complaints of slow heart rate, no fast heart rate, no chest pain, no palpitations, no leg claudication, no lower extremity edema  Respiratory: No complaints of shortness of breath, no wheezing, no cough, no SOB on exertion, no orthopnea, no PND  Gastrointestinal: No complaints of abdominal pain, no constipation, no nausea or vomiting, no diarrhea, no bloody stools  Genitourinary: No complaints of dysuria, no incontinence, no pelvic pain, no dysmenorrhea, no vaginal discharge or bleeding  Musculoskeletal: No complaints of arthralgias, no myalgias, no joint swelling or stiffness, no limb pain or swelling  Integumentary: No complaints of skin rash or lesions, no itching, no skin wounds, no breast pain or lump  Neurological: right leg shooting pain  Psychiatric: Not suicidal, no sleep disturbance, no anxiety or depression, no change in personality, no emotional problems  Endocrine: No complaints of proptosis, no hot flashes, no muscle weakness, no deepening of the voice, no feelings of weakness  Hematologic/Lymphatic: No complaints of swollen glands, no swollen glands in the neck, does not bleed easily, does not bruise easily  Active Problems    1  Abdominal pain, generalized (809 11) (R19 10)   2   Abnormal abdominal CT scan (793  6) (R93 5)   3  Acid reflux (530 81) (K21 9)   4  Anxiety (300 00) (F41 9)   5  Back pain (724 5) (M54 9)   6  Cervical spinal stenosis (723 0) (M48 02)   7  Constipation, unspecified constipation type (564 00) (K59 00)   8  Degenerative disc disease, lumbar (722 52) (M51 36)   9  Depression (311) (F32 9)   10  Encounter for preventive health examination (V70 0) (Z00 00)   11  Epigastric pain (789 06) (R10 13)   12  Helicobacter pylori (H  pylori) infection (041 86) (A04 8)   13  Herpetic lesions (054 9) (B00 9)   14  Hyperlipidemia, mixed (272 2) (E78 2)   15  Leg pain, diffuse, right (729 5) (M79 604)   16  Lumbar radiculopathy (724 4) (M54 16)   17  Menopausal syndrome (hot flashes) (627 2) (N95 1)   18  Need for diphtheria-tetanus-pertussis (Tdap) vaccine (V06 1) (Z23)   19  Nerve pain (729 2) (M79 2)   20  Post-op pain (338 18) (G89 18)   21  Postoperative examination (V67 00) (Z09)   22  Pre-op evaluation (V72 84) (Z01 818)   23  Preoperative testing (V72 84) (Z01 818)   24  Right lumbar radiculopathy (724 4) (M54 16)   25  Right-sided low back pain with right-sided sciatica (724 3) (M54 41)   26  Sacroiliitis (720 2) (M46 1)   27   Visit for routine gyn exam (V72 31) (Z01 419)    Past Medical History    · Anxiety (300 00) (F41 9)   · History of Gastroesophageal reflux disease without esophagitis (530 81) (K21 9)   · History of Visit for screening mammogram (V76 12) (Z12 31)    Surgical History    · History of Appendectomy   · History of Cholecystectomy Laparoscopic   · History of Knee Surgery   · History of Lower Back Surgery    Family History  Mother    · Denied: Family history of substance abuse  Father    · Family history of Alcoholism (303 90) (F10 20)   · Family history of Back disorder   · Denied: Family history of mental disorder  Grandmother    · Family history of Cancer (199 1) (C80 1)  Paternal Grandmother    · Family history of Diabetes (250 00) (E11 9)   · Family history of Hypertension (401  9) (I10)    Social History    · Consumes alcohol occasionally (V49 89) (Z78 9)   · Dental care, regularly   · Former smoker (V15 82) (B23 995)   · Lives with spouse   ·    · No drug use   · Unemployed (V62 0) (Z56 0)    Current Meds   1  Allegra Allergy 180 MG Oral Tablet; take 1 tablet daily as needed Recorded   2  Atorvastatin Calcium 10 MG Oral Tablet; Take 1 tablet daily; Therapy: 60UVA8037 to (Evaluate:78Joe1609)  Requested for: 88MAW5238; Last   Rx:77Obt9876 Ordered   3  FLUoxetine HCl - 40 MG Oral Capsule; TAKE 1 CAPSULE Daily  Requested for:   31GVH4331; Last Rx:32Lct2420 Ordered   4  Prempro 0 3-1 5 MG Oral Tablet; Take 1 tablet daily; Therapy: 60MUG2374 to (Last Rx:57Heb1753)  Requested for: 16IFC1763 Ordered   5  PriLOSEC OTC 20 MG Oral Tablet Delayed Release; Take 1 tablet daily Recorded   6  ValACYclovir HCl - 1 GM Oral Tablet; take 1 tablet by mouth daily; Therapy: 77USD3446 to (Evaluate:13Mar2017)  Requested for: 38Ygi7177; Last   Rx:80Rzn8692 Ordered    Allergies    1  erythromycin    Vitals   Recorded: 18XUZ0270 09:51AM   Heart Rate 82   Systolic 589   Diastolic 82   Height 5 ft 1 in   Weight 147 lb    BMI Calculated 27 78   BSA Calculated 1 65   O2 Saturation 98     Physical Exam    Constitutional   General appearance: No acute distress, well appearing and well nourished  Eyes   Conjunctiva and lids: No swelling, erythema or discharge  Pupils and irises: Equal, round, reactive to light  Ophthalmoscopic examination: Normal fundi and optic discs  Ears, Nose, Mouth, and Throat   External inspection of ears and nose: Normal     Otoscopic examination: Tympanic membranes translucent with normal light reflex  Canals patent without erythema  Hearing: Normal     Nasal mucosa, septum, and turbinates: Normal without edema or erythema  Lips, teeth, and gums: Normal, good dentition  Oropharynx: Normal with no erythema, edema, exudate or lesions      Neck   Neck: Supple, symmetric, trachea midline, no masses  Thyroid: Normal, no thyromegaly  Pulmonary   Respiratory effort: No increased work of breathing or signs of respiratory distress  Percussion of chest: Normal     Palpation of chest: Normal     Auscultation of lungs: Clear to auscultation  Cardiovascular   Palpation of heart: Normal PMI, no thrills  Auscultation of heart: Normal rate and rhythm, normal S1 and S2, no murmurs  Carotid pulses: 2+ bilaterally  Abdominal aorta: Normal     Femoral pulses: 2+ bilaterally  Pedal pulses: 2+ bilaterally  Examination of extremities for edema and/or varicosities: Normal     Chest   Breasts: Normal, no dimpling or skin changes appreciated  Palpation of breasts and axillae: Normal, no masses palpated  Abdomen   Abdomen: Non-tender, no masses  Liver and spleen: No hepatomegaly or splenomegaly  Lymphatic   Palpation of lymph nodes in neck: No lymphadenopathy  Musculoskeletal   Gait and station: Normal     Digits and nails: Normal without clubbing or cyanosis  Joints, bones, and muscles: Normal     Range of motion: Normal     Stability: Normal     Muscle strength/tone: Normal     Skin   Skin and subcutaneous tissue: Normal without rashes or lesions  Palpation of skin and subcutaneous tissue: Normal turgor  Neurologic   Cranial nerves: Cranial nerves II-XII intact  Reflexes: 2+ and symmetric  Sensation: No sensory loss  Psychiatric   Judgment and insight: Normal     Orientation to person, place, and time: Normal     Recent and remote memory: Intact      Mood and affect: Normal        Results/Data  * MAMMO SCREENING BILATERAL W CAD [id number] 12:00AM Benjamín Obando     Test Name Result Flag Reference   MAMMO SCREENING BILATERAL W CAD 01/14/2015       Summary / No summary entered :      No summary entered  Documents attached :      Brittany Monke [place]: [id number] - Image Encounter - Benjamín Obando - (Family      Medicine) (Result Document)    Health Management  Constipation, unspecified constipation type   COLONOSCOPY; every 10 years; Last 29NLB4567; Next Due: 18OLP1236;  Active    Signatures   Electronically signed by : Madie Aponte DO; Dec 19 2016 10:18AM EST                       (Author)

## 2018-01-22 VITALS
WEIGHT: 149 LBS | HEART RATE: 70 BPM | OXYGEN SATURATION: 97 % | HEIGHT: 60 IN | SYSTOLIC BLOOD PRESSURE: 116 MMHG | BODY MASS INDEX: 29.25 KG/M2 | DIASTOLIC BLOOD PRESSURE: 84 MMHG

## 2018-01-22 VITALS
DIASTOLIC BLOOD PRESSURE: 70 MMHG | BODY MASS INDEX: 27.56 KG/M2 | OXYGEN SATURATION: 98 % | HEIGHT: 61 IN | HEART RATE: 70 BPM | WEIGHT: 146 LBS | SYSTOLIC BLOOD PRESSURE: 100 MMHG | TEMPERATURE: 97.8 F

## 2018-01-23 NOTE — RESULT NOTES
Verified Results  (1) LIPID PANEL, FASTING 79Ldp3270 08:36AM [facility]     Test Name Result Flag Reference   CHOLESTEROL, TOTAL 186 mg/dL  <200   HDL CHOLESTEROL 78 mg/dL  >21   TRIGLICERIDES 78 mg/dL  <624   LDL-CHOLESTEROL 91 mg/dL (calc)     Reference range: <100     Desirable range <100 mg/dL for patients with CHD or  diabetes and <70 mg/dL for diabetic patients with  known heart disease  LDL-C is now calculated using the Nicholas-Serrano   calculation, which is a validated novel method providing   better accuracy than the Friedewald equation in the   estimation of LDL-C  Jess Jordan  Zeina Padilla  1477;756(85): 1090-6365   (http://[employer]/faq/FTL355)   CHOL/HDLC RATIO 2 4 (calc)  <5 0   NON HDL CHOLESTEROL 108 mg/dL (calc)  <130   For patients with diabetes plus 1 major ASCVD risk   factor, treating to a non-HDL-C goal of <100 mg/dL   (LDL-C of <70 mg/dL) is considered a therapeutic   option  (1) COMPREHENSIVE METABOLIC PANEL 55LDP4103 19:87PM [facility]     Test Name Result Flag Reference   GLUCOSE 90 mg/dL  65-99   Fasting reference interval   UREA NITROGEN (BUN) 15 mg/dL  7-25   CREATININE 0 90 mg/dL  0 50-1 05   For patients >52years of age, the reference limit  for Creatinine is approximately 13% higher for people  identified as -American  eGFR NON-AFR   AMERICAN 72 mL/min/1 73m2  > OR = 60   eGFR AFRICAN AMERICAN 84 mL/min/1 73m2  > OR = 60   BUN/CREATININE RATIO   7-01   NOT APPLICABLE (calc)   SODIUM 142 mmol/L  135-146   POTASSIUM 4 5 mmol/L  3 5-5 3   CHLORIDE 106 mmol/L     CARBON DIOXIDE 30 mmol/L  20-31   CALCIUM 9 4 mg/dL  8 6-10 4   PROTEIN, TOTAL 6 1 g/dL  6 1-8 1   ALBUMIN 4 0 g/dL  3 6-5 1   GLOBULIN 2 1 g/dL (calc)  1 9-3 7   ALBUMIN/GLOBULIN RATIO 1 9 (calc)  1 0-2 5   BILIRUBIN, TOTAL 0 5 mg/dL  0 2-1 2   ALKALINE PHOSPHATASE 96 U/L     AST 17 U/L  10-35   ALT 11 U/L  6-29     (Q) CBC (H/H, RBC, INDICES, WBC, PLT) [id number] 08:36AM Maninder [place]     Test Name Result Flag Reference   WHITE BLOOD CELL COUNT 5 2 Thousand/uL  3 8-10 8   RED BLOOD CELL COUNT 4 37 Million/uL  3 80-5 10   HEMOGLOBIN 13 0 g/dL  11 7-15 5   HEMATOCRIT 39 7 %  35 0-45 0   MCV 90 8 fL  80 0-100 0   MCH 29 7 pg  27 0-33 0   MCHC 32 7 g/dL  32 0-36 0   RDW 12 0 %  11 0-15 0   PLATELET COUNT 953 Thousand/uL  140-400   MPV 11 2 fL  7 5-12 5     (Q) HEPATITIS C ANTIBODY [id number] 08:36AM Rudy Edge   REPORT COMMENT:  FASTING:YES     Test Name Result Flag Reference   HEPATITIS C ANTIBODY NON-REACTIVE  NON-REACTIVE   SIGNAL TO CUT-OFF 0 02  <1 00

## 2018-01-23 NOTE — PROGRESS NOTES
Assessment    1  Encounter for preventive health examination (V70 0) (Z00 00)   2  Hyperlipidemia, mixed (272 2) (E78 2)   3  Squamous blepharitis of right lower eyelid (373 02) (H01 022)    Plan  Hyperlipidemia, mixed    · (1) CBC/ PLT (NO DIFF); Status:Active; Requested for:50Jcs0919;    · (1) COMPREHENSIVE METABOLIC PANEL; Status:Active; Requested for:89Hls6327;    · (1) LIPID PANEL, FASTING; Status:Active; Requested for:12Uhv5719;   Need for hepatitis C screening test    · (1) HEP C ANTIBODY; Status:Active; Requested for:76Deg5430;   PMH: Anxiety    · From  FLUoxetine HCl - 40 MG Oral Capsule TAKE 1 CAPSULE Daily To  FLUoxetine HCl - 20 MG Oral Capsule TAKE 3 CAPSULES BY MOUTH EVERY DAY  Squamous blepharitis of right lower eyelid    · Bacitra-Neomycin-Polymyxin-HC 1 % Ophthalmic Ointment; APPLY A THIN FILM  TO AFFECTED EYE(S) EVERY 3 TO 4 HOURS AS DIRECTED    Discussion/Summary  health maintenance visit Currently, she eats a healthy diet  next cervical cancer screening is due this year Breast cancer screening: mammogram is current  Colorectal cancer screening: colorectal cancer screening is current  Osteoporosis screening: bone mineral density testing is not indicated  Screening lab work includes hemoglobin, glucose and lipid profile  Advice and education were given regarding aerobic exercise, weight bearing exercise and cardiovascular risk reduction  Patient discussion: discussed with the patient  Hepatitis C Screening: the patient was counseled on Hepatitis C screening  The patient agrees to Hepatitis C screening  Given contact information for [facility] for further evaluation of her right eye  The patient was counseled regarding  Possible side effects of new medications were reviewed with the patient/guardian today  The treatment plan was reviewed with the patient/guardian  The patient/guardian understands and agrees with the treatment plan      History of Present Illness  HM, Adult Female:  The patient is being seen for a health maintenance evaluation  General Health: The patient's health since the last visit is described as good  She has regular dental visits  She denies vision problems  She denies hearing loss  Immunizations status: up to date  Lifestyle:  She consumes a diverse and healthy diet  She does not have any weight concerns  She exercises regularly  She does not use tobacco  She denies alcohol use  She denies drug use  Reproductive health:  she reports normal menses  Screening: cancer screening reviewed and current  metabolic screening reviewed and current  risk screening reviewed and current  Review of Systems    Constitutional: No fever, no chills, feels well, no tiredness, no recent weight gain or weight loss  Eyes: itching of the eyes  ENT: no complaints of earache, no loss of hearing, no nose bleeds, no nasal discharge, no sore throat, no hoarseness  Cardiovascular: No complaints of slow heart rate, no fast heart rate, no chest pain, no palpitations, no leg claudication, no lower extremity edema  Respiratory: No complaints of shortness of breath, no wheezing, no cough, no SOB on exertion, no orthopnea, no PND  Gastrointestinal: No complaints of abdominal pain, no constipation, no nausea or vomiting, no diarrhea, no bloody stools  Genitourinary: No complaints of dysuria, no incontinence, no pelvic pain, no dysmenorrhea, no vaginal discharge or bleeding  Musculoskeletal: No complaints of arthralgias, no myalgias, no joint swelling or stiffness, no limb pain or swelling  Integumentary: No complaints of skin rash or lesions, no itching, no skin wounds, no breast pain or lump  Neurological: No complaints of headache, no confusion, no convulsions, no numbness, no dizziness or fainting, no tingling, no limb weakness, no difficulty walking     Psychiatric: Not suicidal, no sleep disturbance, no anxiety or depression, no change in personality, no emotional problems  Endocrine: No complaints of proptosis, no hot flashes, no muscle weakness, no deepening of the voice, no feelings of weakness  Hematologic/Lymphatic: No complaints of swollen glands, no swollen glands in the neck, does not bleed easily, does not bruise easily  Active Problems    1  Acute rhinosinusitis (461 9) (J01 90)   2  History of Anxiety (300 00) (F41 9)   3  Cervical spinal stenosis (723 0) (M48 02)   4  Degenerative disc disease, lumbar (722 52) (M51 36)   5  Depression (311) (F32 9)   6  Encounter for preventive health examination (V70 0) (Z00 00)   7  Hyperlipidemia, mixed (272 2) (E78 2)   8  Lumbar radiculopathy (724 4) (M54 16)   9   Sacroiliitis (720 2) (M46 1)    Past Medical History    · History of Abdominal pain, generalized (789 07) (R10 84)   · History of Abnormal abdominal CT scan (793 6) (R93 5)   · History of Acute frontal sinusitis, recurrence not specified (461 1) (J01 10)   · History of Acute serous otitis media, recurrence not specified, unspecified laterality  (381 01) (H65 00)   · History of Anxiety (300 00) (F41 9)   · History of Constipation, unspecified constipation type (564 00) (K59 00)   · History of Gastroesophageal reflux disease without esophagitis (530 81) (K21 9)   · History of Herpetic lesions (054 9) (B00 9)   · History of back pain (V13 59) (Z87 39)   · History of dizziness (V13 89) (Q85 533)   · History of epigastric pain (V12 79) (Z87 19)   · History of gastroesophageal reflux (GERD) (V12 79) (Z87 19)   · History of Helicobacter pylori infection (V12 09) (Z86 19)   · History of screening mammography (V15 89) (Z92 89)   · History of Leg pain, diffuse, right (729 5) (M79 604)   · History of Menopausal syndrome (hot flashes) (627 2) (N95 1)   · History of Motion sickness, initial encounter (994 6) (T75 3XXA)   · History of Need for diphtheria-tetanus-pertussis (Tdap) vaccine (V06 1) (Z23)   · History of Nerve pain (729 2) (M79 2)   · History of Other infective chronic otitis externa of left ear (380 16) (H60 392)   · History of Post-op pain (338 18) (G89 18)   · History of Postoperative examination (V67 00) (Z09)   · History of Pre-op evaluation (V72 84) (Z01 818)   · History of Preoperative testing (V72 84) (Z01 818)   · History of Right lumbar radiculopathy (724 4) (M54 16)   · History of Right-sided low back pain with right-sided sciatica (724 3) (M54 41)   · History of Visit for routine gyn exam (V72 31) (Z01 419)   · History of Visit for screening mammogram (V76 12) (Z12 31)    Surgical History    · History of Appendectomy   · History of Cholecystectomy Laparoscopic   · History of Knee Surgery   · History of Lower Back Surgery    Family History  Mother    · Denied: Family history of substance abuse  Father    · Family history of Alcoholism (303 90) (F10 20)   · Family history of Back disorder   · Denied: Family history of mental disorder  Grandmother    · Family history of Cancer (199 1) (C80 1)  Paternal Grandmother    · Family history of Diabetes (250 00) (E11 9)   · Family history of Hypertension (401 9) (I10)    Social History    · Consumes alcohol occasionally (V49 89) (Z78 9)   · Dental care, regularly   · Former smoker (V15 82) (H54 046)   · Lives with spouse   ·    · No drug use   · Unemployed (V62 0) (Z56 0)    Current Meds   1  Allegra Allergy 180 MG Oral Tablet (Fexofenadine HCl); take 1 tablet daily as needed   Recorded   2  Atorvastatin Calcium 10 MG Oral Tablet; Take 1 tablet daily; Therapy: 28OBX4833 to ([id number])  Requested for: 97QOV1310; Last   Rx:29Psl6330 Ordered   3  FLUoxetine HCl - 40 MG Oral Capsule; TAKE 1 CAPSULE Daily  Requested for:   58Kkb2631; Last Rx:89Xra1870 Ordered   4  Fluticasone Propionate 50 MCG/ACT Nasal Suspension; USE 2 SPRAYS IN EACH   NOSTRIL ONCE DAILY; Therapy: 82Pmf8872 to (Last Rx:95Wqh7207) Ordered   5  Gabapentin 300 MG Oral Capsule; TAKE ONE CAPSULE BY MOUTH TWICE DAILY;    Therapy: 58IYF7376 to (Evaluate:51Maj5062)  Requested for: 87Gvz7520; Last   Rx:44Hct5036 Ordered   6  Prempro 0 3-1 5 MG Oral Tablet; Take 1 tablet daily; Therapy: 47SYT8922 to (Last Rx:06Bvd2502)  Requested for: 52Wpx7151 Ordered   7  PriLOSEC OTC 20 MG Oral Tablet Delayed Release; Take 1 tablet daily Recorded    Allergies    1  Deltasone TABS   2  erythromycin    Vitals   Recorded: 08CFD7282 09:11AM   Heart Rate 73   Systolic 863   Diastolic 60   Height 5 ft    Weight 149 lb    BMI Calculated 29 1   BSA Calculated 1 65   O2 Saturation 99     Physical Exam    Constitutional   General appearance: No acute distress, well appearing and well nourished  Eyes   Conjunctiva and lids: No swelling, erythema or discharge  Pupils and irises: Equal, round, reactive to light  Ophthalmoscopic examination: Normal fundi and optic discs  Ears, Nose, Mouth, and Throat   External inspection of ears and nose: Normal     Otoscopic examination: Tympanic membranes translucent with normal light reflex  Canals patent without erythema  Hearing: Normal     Nasal mucosa, septum, and turbinates: Normal without edema or erythema  Lips, teeth, and gums: Normal, good dentition  Oropharynx: Normal with no erythema, edema, exudate or lesions  Neck   Neck: Supple, symmetric, trachea midline, no masses  Thyroid: Normal, no thyromegaly  Pulmonary   Respiratory effort: No increased work of breathing or signs of respiratory distress  Percussion of chest: Normal     Palpation of chest: Normal     Auscultation of lungs: Clear to auscultation  Cardiovascular   Palpation of heart: Normal PMI, no thrills  Auscultation of heart: Normal rate and rhythm, normal S1 and S2, no murmurs  Carotid pulses: 2+ bilaterally  Abdominal aorta: Normal     Femoral pulses: 2+ bilaterally  Pedal pulses: 2+ bilaterally      Examination of extremities for edema and/or varicosities: Normal     Chest   Breasts: Normal, no dimpling or skin changes appreciated  Palpation of breasts and axillae: Normal, no masses palpated  Abdomen   Abdomen: Non-tender, no masses  Liver and spleen: No hepatomegaly or splenomegaly  Lymphatic   Palpation of lymph nodes in neck: No lymphadenopathy  Palpation of lymph nodes in axillae: No lymphadenopathy  Palpation of lymph nodes in groin: No lymphadenopathy  Palpation of lymph nodes in other areas: No lymphadenopathy  Musculoskeletal   Gait and station: Normal     Digits and nails: Normal without clubbing or cyanosis  Joints, bones, and muscles: Normal     Range of motion: Normal     Stability: Normal     Muscle strength/tone: Normal     Skin   Skin and subcutaneous tissue: Normal without rashes or lesions  Palpation of skin and subcutaneous tissue: Normal turgor  Neurologic   Cranial nerves: Cranial nerves II-XII intact  Reflexes: 2+ and symmetric  Sensation: No sensory loss  Psychiatric   Judgment and insight: Normal     Orientation to person, place, and time: Normal     Recent and remote memory: Intact  Mood and affect: Normal        Health Management  History of Constipation, unspecified constipation type   COLONOSCOPY; every 10 years; Last 45EKV2685; Next Due: 97ZBN2725;  Active    Signatures   Electronically signed by : Sean Pratt DO; Dec 19 2017  9:31AM EST                       (Author)

## 2018-01-23 NOTE — MISCELLANEOUS
Message  Return to work or school:   Alfredo Coronel is under my professional care  She was seen in my office on   She is able to return to work on  01/09/18       BRAXTON Paul        Signatures   Electronically signed by : BRAXTON Paul; Jan 8 2018  9:54AM EST                       (Author)

## 2018-01-24 VITALS
BODY MASS INDEX: 29.25 KG/M2 | DIASTOLIC BLOOD PRESSURE: 60 MMHG | SYSTOLIC BLOOD PRESSURE: 102 MMHG | OXYGEN SATURATION: 99 % | HEART RATE: 73 BPM | WEIGHT: 149 LBS | HEIGHT: 60 IN

## 2018-02-02 DIAGNOSIS — B00.9 HERPES INFECTION: Primary | ICD-10-CM

## 2018-02-02 RX ORDER — VALACYCLOVIR HYDROCHLORIDE 1 G/1
TABLET, FILM COATED ORAL
Qty: 30 TABLET | Refills: 5 | Status: SHIPPED | OUTPATIENT
Start: 2018-02-02 | End: 2018-06-05 | Stop reason: SDUPTHER

## 2018-06-05 DIAGNOSIS — N95.1 POST MENOPAUSAL SYNDROME: ICD-10-CM

## 2018-06-05 DIAGNOSIS — F41.9 ANXIETY: Primary | ICD-10-CM

## 2018-06-05 DIAGNOSIS — M54.16 LUMBAR RADICULOPATHY: ICD-10-CM

## 2018-06-05 DIAGNOSIS — B00.9 HERPES INFECTION: ICD-10-CM

## 2018-06-05 RX ORDER — GABAPENTIN 300 MG/1
300 CAPSULE ORAL 2 TIMES DAILY
Qty: 60 CAPSULE | Refills: 5 | Status: SHIPPED | OUTPATIENT
Start: 2018-06-05 | End: 2021-04-15 | Stop reason: ALTCHOICE

## 2018-06-05 RX ORDER — GABAPENTIN 300 MG/1
1 CAPSULE ORAL 2 TIMES DAILY
COMMUNITY
Start: 2016-12-19 | End: 2018-06-05 | Stop reason: SDUPTHER

## 2018-06-05 RX ORDER — VALACYCLOVIR HYDROCHLORIDE 1 G/1
1000 TABLET, FILM COATED ORAL DAILY
Qty: 30 TABLET | Refills: 5 | Status: SHIPPED | OUTPATIENT
Start: 2018-06-05 | End: 2019-04-13 | Stop reason: SDUPTHER

## 2018-06-05 RX ORDER — FLUOXETINE HYDROCHLORIDE 40 MG/1
40 CAPSULE ORAL DAILY
Qty: 30 CAPSULE | Refills: 5 | Status: SHIPPED | OUTPATIENT
Start: 2018-06-05 | End: 2019-04-12 | Stop reason: SDUPTHER

## 2018-06-05 NOTE — TELEPHONE ENCOUNTER
rf  fluxetine #270  valcyclovir   prempro -asking for generic  Gabapentin 300 mg  Twice day   [facility]

## 2018-06-21 ENCOUNTER — OFFICE VISIT (OUTPATIENT)
Dept: URGENT CARE | Facility: MEDICAL CENTER | Age: 53
End: 2018-06-21
Payer: COMMERCIAL

## 2018-06-21 ENCOUNTER — APPOINTMENT (OUTPATIENT)
Dept: RADIOLOGY | Facility: MEDICAL CENTER | Age: 53
End: 2018-06-21
Attending: PHYSICIAN ASSISTANT
Payer: COMMERCIAL

## 2018-06-21 VITALS
OXYGEN SATURATION: 99 % | BODY MASS INDEX: 30.15 KG/M2 | DIASTOLIC BLOOD PRESSURE: 80 MMHG | TEMPERATURE: 98.5 F | RESPIRATION RATE: 20 BRPM | SYSTOLIC BLOOD PRESSURE: 130 MMHG | HEART RATE: 70 BPM | WEIGHT: 154.4 LBS

## 2018-06-21 DIAGNOSIS — S00.33XA CONTUSION OF NOSE, INITIAL ENCOUNTER: ICD-10-CM

## 2018-06-21 DIAGNOSIS — S00.33XA CONTUSION OF NOSE, INITIAL ENCOUNTER: Primary | ICD-10-CM

## 2018-06-21 PROCEDURE — 99203 OFFICE O/P NEW LOW 30 MIN: CPT | Performed by: PHYSICIAN ASSISTANT

## 2018-06-21 PROCEDURE — 70160 X-RAY EXAM OF NASAL BONES: CPT

## 2018-06-22 NOTE — PATIENT INSTRUCTIONS
1  ICE to area 10-15min 3-4x daily  2  Motrin as needed for pain/swelling  3  Follow up with ENT if pain persists

## 2018-06-22 NOTE — PROGRESS NOTES
[facility]        NAME: Dick Nuñez is a 45 y o  female  : 1965    MRN: [id number]  DATE: 2018  TIME: 8:18 PM    Assessment and Plan   Contusion of nose, initial encounter [S00 33XA]  1  Contusion of nose, initial encounter  XR nasal bones         Patient Instructions     1  ICE to area 10-15min 3-4x daily  2  Motrin as needed for pain/swelling  3  Follow up with ENT if pain persists  Chief Complaint     Chief Complaint   Patient presents with    Facial Injury         History of Present Illness       Patient is a 49-year-old female who presents with pain and swelling of her nose after incident that occurred earlier today  She states she was bending to  an optic when she struck her nose off a wooden board  Patient states she had immediate pain in her nose, she had a small amount of bleeding on the tip of the nose but nothing internal   Patient does complain of a headache but denies dizziness, sensitivity to light or sound or difficulty with concentration/memory  Review of Systems   Review of Systems   Constitutional: Negative  HENT: Positive for congestion  Negative for postnasal drip, rhinorrhea, sinus pain and sinus pressure  Skin: Positive for wound  Current Medications       Current Outpatient Prescriptions:     estrogen, conjugated,-medroxyprogesterone (PREMPRO) 0 3-1 5 MG per tablet, Take 1 tablet by mouth daily, Disp: 30 tablet, Rfl: 5    FLUoxetine (PROzac) 40 MG capsule, Take 1 capsule (40 mg total) by mouth daily, Disp: 30 capsule, Rfl: 5    gabapentin (NEURONTIN) 300 mg capsule, Take 1 capsule (300 mg total) by mouth 2 (two) times a day, Disp: 60 capsule, Rfl: 5    valACYclovir (VALTREX) 1,000 mg tablet, Take 1 tablet (1,000 mg total) by mouth daily, Disp: 30 tablet, Rfl: 5    atorvastatin (LIPITOR) 10 mg tablet, Take by mouth daily Indications: Pt unsure of dosage  , Disp: , Rfl:     UNKNOWN TO PATIENT, Indications: Prembro 0 3-1 5   , Disp: , Rfl:     valGANciclovir (VALCYTE) 450 mg tablet, Take 1,000 mg by mouth daily  , Disp: , Rfl:     Current Allergies     Allergies as of 06/21/2018 - Reviewed 06/21/2018   Allergen Reaction Noted    Erythromycin  03/13/2016            The following portions of the patient's history were reviewed and updated as appropriate: allergies, current medications, past family history, past medical history, past social history, past surgical history and problem list      Past Medical History:   Diagnosis Date    Sciatic leg pain        Past Surgical History:   Procedure Laterality Date    APPENDECTOMY      CHOLECYSTECTOMY      KNEE ARTHROSCOPY      torn menicus    MA LAMNOTMY INCL W/DCMPRSN NRV ROOT 1 INTRSPC LUMBR Right 7/6/2016    Procedure: L3/4 FORAMINOTOMY;  Surgeon: Billy Soto MD;  Location: [facility];  Service: Neurosurgery    TONSILLECTOMY         Family History   Problem Relation Age of Onset    No Known Problems Mother     No Known Problems Father          Medications have been verified  Objective   /80 (BP Location: Right arm, Patient Position: Sitting, Cuff Size: Standard)   Pulse 70   Temp 98 5 °F (36 9 °C) (Temporal)   Resp 20   Wt 70 kg (154 lb 6 4 oz)   SpO2 99%   BMI 30 15 kg/m²        Physical Exam     Physical Exam   Constitutional: She appears well-developed and well-nourished  No distress  HENT:   Head: Normocephalic and atraumatic  Right Ear: Tympanic membrane normal    Left Ear: Tympanic membrane normal    Nose: Mucosal edema present  No septal deviation or nasal septal hematoma  No epistaxis  Cardiovascular: Normal rate, regular rhythm and normal heart sounds  No murmur heard    Pulmonary/Chest: Effort normal and breath sounds normal

## 2019-04-12 DIAGNOSIS — F41.9 ANXIETY: ICD-10-CM

## 2019-04-12 RX ORDER — FLUOXETINE HYDROCHLORIDE 40 MG/1
CAPSULE ORAL
Qty: 30 CAPSULE | Refills: 5 | Status: SHIPPED | OUTPATIENT
Start: 2019-04-12 | End: 2019-11-18 | Stop reason: SDUPTHER

## 2019-04-13 ENCOUNTER — TELEPHONE (OUTPATIENT)
Dept: FAMILY MEDICINE CLINIC | Facility: CLINIC | Age: 54
End: 2019-04-13

## 2019-04-13 DIAGNOSIS — B00.9 HERPES INFECTION: ICD-10-CM

## 2019-04-13 RX ORDER — VALACYCLOVIR HYDROCHLORIDE 1 G/1
1000 TABLET, FILM COATED ORAL DAILY
Qty: 30 TABLET | Refills: 5 | Status: SHIPPED | OUTPATIENT
Start: 2019-04-13 | End: 2019-11-18 | Stop reason: SDUPTHER

## 2019-06-27 ENCOUNTER — APPOINTMENT (EMERGENCY)
Dept: CT IMAGING | Facility: HOSPITAL | Age: 54
End: 2019-06-27
Payer: COMMERCIAL

## 2019-06-27 ENCOUNTER — HOSPITAL ENCOUNTER (EMERGENCY)
Facility: HOSPITAL | Age: 54
Discharge: HOME | End: 2019-06-27
Attending: EMERGENCY MEDICINE | Admitting: EMERGENCY MEDICINE
Payer: COMMERCIAL

## 2019-06-27 ENCOUNTER — TELEPHONE (OUTPATIENT)
Dept: FAMILY MEDICINE CLINIC | Facility: CLINIC | Age: 54
End: 2019-06-27

## 2019-06-27 VITALS
TEMPERATURE: 97.6 F | HEART RATE: 72 BPM | DIASTOLIC BLOOD PRESSURE: 65 MMHG | BODY MASS INDEX: 29.29 KG/M2 | WEIGHT: 150 LBS | RESPIRATION RATE: 20 BRPM | OXYGEN SATURATION: 96 % | SYSTOLIC BLOOD PRESSURE: 139 MMHG

## 2019-06-27 DIAGNOSIS — R10.9 LEFT FLANK PAIN: Primary | ICD-10-CM

## 2019-06-27 DIAGNOSIS — L03.213 PERIORBITAL CELLULITIS OF RIGHT EYE: ICD-10-CM

## 2019-06-27 DIAGNOSIS — H10.9 RIGHT CONJUNCTIVITIS: ICD-10-CM

## 2019-06-27 LAB
ALBUMIN SERPL BCP-MCNC: 3.9 G/DL (ref 3.5–5)
ALP SERPL-CCNC: 132 U/L (ref 46–116)
ALT SERPL W P-5'-P-CCNC: 20 U/L (ref 12–78)
ANION GAP SERPL CALCULATED.3IONS-SCNC: 6 MMOL/L (ref 4–13)
AST SERPL W P-5'-P-CCNC: 16 U/L (ref 5–45)
BACTERIA UR QL AUTO: NORMAL /HPF
BASOPHILS # BLD AUTO: 0.02 THOUSANDS/ΜL (ref 0–0.1)
BASOPHILS NFR BLD AUTO: 0 % (ref 0–1)
BILIRUB SERPL-MCNC: 0.2 MG/DL (ref 0.2–1)
BILIRUB UR QL STRIP: NEGATIVE
BUN SERPL-MCNC: 13 MG/DL (ref 5–25)
CALCIUM SERPL-MCNC: 9.8 MG/DL (ref 8.3–10.1)
CHLORIDE SERPL-SCNC: 104 MMOL/L (ref 100–108)
CLARITY UR: CLEAR
CO2 SERPL-SCNC: 30 MMOL/L (ref 21–32)
COLOR UR: YELLOW
CREAT SERPL-MCNC: 0.96 MG/DL (ref 0.6–1.3)
EOSINOPHIL # BLD AUTO: 0.14 THOUSAND/ΜL (ref 0–0.61)
EOSINOPHIL NFR BLD AUTO: 2 % (ref 0–6)
ERYTHROCYTE [DISTWIDTH] IN BLOOD BY AUTOMATED COUNT: 12.9 % (ref 11.6–15.1)
GFR SERPL CREATININE-BSD FRML MDRD: 67 ML/MIN/1.73SQ M
GLUCOSE SERPL-MCNC: 87 MG/DL (ref 65–140)
GLUCOSE UR STRIP-MCNC: NEGATIVE MG/DL
HCT VFR BLD AUTO: 39.6 % (ref 34.8–46.1)
HGB BLD-MCNC: 13.3 G/DL (ref 11.5–15.4)
HGB UR QL STRIP.AUTO: NEGATIVE
KETONES UR STRIP-MCNC: NEGATIVE MG/DL
LEUKOCYTE ESTERASE UR QL STRIP: ABNORMAL
LIPASE SERPL-CCNC: 132 U/L (ref 73–393)
LYMPHOCYTES # BLD AUTO: 1.7 THOUSANDS/ΜL (ref 0.6–4.47)
LYMPHOCYTES NFR BLD AUTO: 27 % (ref 14–44)
MCH RBC QN AUTO: 29.8 PG (ref 26.8–34.3)
MCHC RBC AUTO-ENTMCNC: 33.6 G/DL (ref 31.4–37.4)
MCV RBC AUTO: 89 FL (ref 82–98)
MONOCYTES # BLD AUTO: 0.6 THOUSAND/ΜL (ref 0.17–1.22)
MONOCYTES NFR BLD AUTO: 10 % (ref 4–12)
NEUTROPHILS # BLD AUTO: 3.88 THOUSANDS/ΜL (ref 1.85–7.62)
NEUTS SEG NFR BLD AUTO: 61 % (ref 43–75)
NITRITE UR QL STRIP: NEGATIVE
NON-SQ EPI CELLS URNS QL MICRO: NORMAL /HPF
PH UR STRIP.AUTO: 6.5 [PH]
PLATELET # BLD AUTO: 272 THOUSANDS/UL (ref 149–390)
PMV BLD AUTO: 11.7 FL (ref 8.9–12.7)
POTASSIUM SERPL-SCNC: 3.5 MMOL/L (ref 3.5–5.3)
PROT SERPL-MCNC: 7.2 G/DL (ref 6.4–8.2)
PROT UR STRIP-MCNC: NEGATIVE MG/DL
RBC # BLD AUTO: 4.46 MILLION/UL (ref 3.81–5.12)
RBC #/AREA URNS AUTO: NORMAL /HPF
SODIUM SERPL-SCNC: 140 MMOL/L (ref 136–145)
SP GR UR STRIP.AUTO: 1.01 (ref 1–1.03)
UROBILINOGEN UR QL STRIP.AUTO: 0.2 E.U./DL
WBC # BLD AUTO: 6.34 THOUSAND/UL (ref 4.31–10.16)
WBC #/AREA URNS AUTO: NORMAL /HPF

## 2019-06-27 PROCEDURE — 80053 COMPREHEN METABOLIC PANEL: CPT | Performed by: EMERGENCY MEDICINE

## 2019-06-27 PROCEDURE — 99284 EMERGENCY DEPT VISIT MOD MDM: CPT | Performed by: PHYSICIAN ASSISTANT

## 2019-06-27 PROCEDURE — 96375 TX/PRO/DX INJ NEW DRUG ADDON: CPT

## 2019-06-27 PROCEDURE — 81001 URINALYSIS AUTO W/SCOPE: CPT | Performed by: EMERGENCY MEDICINE

## 2019-06-27 PROCEDURE — 99284 EMERGENCY DEPT VISIT MOD MDM: CPT

## 2019-06-27 PROCEDURE — 74176 CT ABD & PELVIS W/O CONTRAST: CPT

## 2019-06-27 PROCEDURE — 96374 THER/PROPH/DIAG INJ IV PUSH: CPT

## 2019-06-27 PROCEDURE — 85025 COMPLETE CBC W/AUTO DIFF WBC: CPT | Performed by: EMERGENCY MEDICINE

## 2019-06-27 PROCEDURE — 36415 COLL VENOUS BLD VENIPUNCTURE: CPT

## 2019-06-27 PROCEDURE — 83690 ASSAY OF LIPASE: CPT | Performed by: EMERGENCY MEDICINE

## 2019-06-27 PROCEDURE — 96361 HYDRATE IV INFUSION ADD-ON: CPT

## 2019-06-27 RX ORDER — SUCRALFATE 1 G/1
1 TABLET ORAL 4 TIMES DAILY
Qty: 20 TABLET | Refills: 0 | Status: SHIPPED | OUTPATIENT
Start: 2019-06-27 | End: 2021-04-15 | Stop reason: ALTCHOICE

## 2019-06-27 RX ORDER — KETOROLAC TROMETHAMINE 30 MG/ML
15 INJECTION, SOLUTION INTRAMUSCULAR; INTRAVENOUS ONCE
Status: COMPLETED | OUTPATIENT
Start: 2019-06-27 | End: 2019-06-27

## 2019-06-27 RX ORDER — MORPHINE SULFATE 4 MG/ML
4 INJECTION, SOLUTION INTRAMUSCULAR; INTRAVENOUS ONCE
Status: COMPLETED | OUTPATIENT
Start: 2019-06-27 | End: 2019-06-27

## 2019-06-27 RX ORDER — CEPHALEXIN 500 MG/1
500 CAPSULE ORAL 4 TIMES DAILY
Qty: 28 CAPSULE | Refills: 0 | Status: SHIPPED | OUTPATIENT
Start: 2019-06-27 | End: 2019-07-04

## 2019-06-27 RX ORDER — LIDOCAINE HYDROCHLORIDE 20 MG/ML
15 SOLUTION OROPHARYNGEAL ONCE
Status: COMPLETED | OUTPATIENT
Start: 2019-06-27 | End: 2019-06-27

## 2019-06-27 RX ORDER — ONDANSETRON 2 MG/ML
4 INJECTION INTRAMUSCULAR; INTRAVENOUS ONCE
Status: COMPLETED | OUTPATIENT
Start: 2019-06-27 | End: 2019-06-27

## 2019-06-27 RX ORDER — GENTAMICIN SULFATE 3 MG/ML
1 SOLUTION/ DROPS OPHTHALMIC
Qty: 5 ML | Refills: 0 | Status: SHIPPED | OUTPATIENT
Start: 2019-06-27 | End: 2019-08-19 | Stop reason: ALTCHOICE

## 2019-06-27 RX ORDER — SUCRALFATE ORAL 1 G/10ML
1000 SUSPENSION ORAL ONCE
Status: COMPLETED | OUTPATIENT
Start: 2019-06-27 | End: 2019-06-27

## 2019-06-27 RX ADMIN — ONDANSETRON 4 MG: 2 INJECTION INTRAMUSCULAR; INTRAVENOUS at 15:35

## 2019-06-27 RX ADMIN — SODIUM CHLORIDE 1000 ML: 0.9 INJECTION, SOLUTION INTRAVENOUS at 15:20

## 2019-06-27 RX ADMIN — SUCRALFATE 1000 MG: 1 SUSPENSION ORAL at 17:12

## 2019-06-27 RX ADMIN — LIDOCAINE HYDROCHLORIDE 15 ML: 20 SOLUTION ORAL; TOPICAL at 17:12

## 2019-06-27 RX ADMIN — KETOROLAC TROMETHAMINE 15 MG: 30 INJECTION, SOLUTION INTRAMUSCULAR at 15:35

## 2019-06-27 RX ADMIN — MORPHINE SULFATE 4 MG: 4 INJECTION INTRAVENOUS at 17:11

## 2019-06-28 ENCOUNTER — OFFICE VISIT (OUTPATIENT)
Dept: FAMILY MEDICINE CLINIC | Facility: CLINIC | Age: 54
End: 2019-06-28
Payer: COMMERCIAL

## 2019-06-28 VITALS
WEIGHT: 150 LBS | SYSTOLIC BLOOD PRESSURE: 134 MMHG | HEART RATE: 64 BPM | TEMPERATURE: 97.6 F | DIASTOLIC BLOOD PRESSURE: 70 MMHG | BODY MASS INDEX: 29.29 KG/M2 | OXYGEN SATURATION: 99 %

## 2019-06-28 DIAGNOSIS — R74.8 ELEVATED LIVER ENZYMES: ICD-10-CM

## 2019-06-28 DIAGNOSIS — Z13.220 SCREENING FOR LIPID DISORDERS: ICD-10-CM

## 2019-06-28 DIAGNOSIS — R10.11 RUQ PAIN: ICD-10-CM

## 2019-06-28 DIAGNOSIS — R10.13 EPIGASTRIC PAIN: Primary | ICD-10-CM

## 2019-06-28 DIAGNOSIS — R10.12 LEFT UPPER QUADRANT PAIN: ICD-10-CM

## 2019-06-28 DIAGNOSIS — Z13.1 SCREENING FOR DIABETES MELLITUS: ICD-10-CM

## 2019-06-28 PROCEDURE — 99213 OFFICE O/P EST LOW 20 MIN: CPT | Performed by: PHYSICIAN ASSISTANT

## 2019-06-28 PROCEDURE — 1036F TOBACCO NON-USER: CPT | Performed by: PHYSICIAN ASSISTANT

## 2019-07-01 DIAGNOSIS — R74.8 ELEVATED LIVER ENZYMES: ICD-10-CM

## 2019-07-01 DIAGNOSIS — R10.12 LEFT UPPER QUADRANT PAIN: Primary | ICD-10-CM

## 2019-07-01 DIAGNOSIS — R10.11 RUQ PAIN: ICD-10-CM

## 2019-07-08 ENCOUNTER — HOSPITAL ENCOUNTER (OUTPATIENT)
Dept: CT IMAGING | Facility: HOSPITAL | Age: 54
Discharge: HOME | End: 2019-07-08
Payer: COMMERCIAL

## 2019-07-08 DIAGNOSIS — R10.11 RUQ PAIN: ICD-10-CM

## 2019-07-08 DIAGNOSIS — R74.8 ELEVATED LIVER ENZYMES: ICD-10-CM

## 2019-07-08 DIAGNOSIS — R10.12 LEFT UPPER QUADRANT PAIN: ICD-10-CM

## 2019-07-08 PROCEDURE — 74177 CT ABD & PELVIS W/CONTRAST: CPT

## 2019-07-08 RX ADMIN — IOHEXOL 100 ML: 350 INJECTION, SOLUTION INTRAVENOUS at 15:58

## 2019-07-09 LAB
CHOLEST SERPL-MCNC: 297 MG/DL (ref 100–199)
GLUCOSE SERPL-MCNC: 85 MG/DL (ref 65–99)
HDLC SERPL-MCNC: 62 MG/DL
LABCORP COMMENT: NORMAL
LDLC SERPL CALC-MCNC: 205 MG/DL (ref 0–99)
SL AMB VLDL CHOLESTEROL CALC: 30 MG/DL (ref 5–40)
TRIGL SERPL-MCNC: 151 MG/DL (ref 0–149)

## 2019-07-10 ENCOUNTER — TELEPHONE (OUTPATIENT)
Dept: FAMILY MEDICINE CLINIC | Facility: CLINIC | Age: 54
End: 2019-07-10

## 2019-07-10 NOTE — TELEPHONE ENCOUNTER
Patient has no acute problems seen on her CT scan of the abdomen and pelvis  She again has an area that shows the mesenteric root fat in the abdomen with scattered nonenlarged lymph nodes which is suggestive of chronic panniculitis  This is the bottom of the omentum that seems to be chronically inflamed  We can try some prednisone and see if that decreases some of the inflammation and pain

## 2019-07-10 NOTE — TELEPHONE ENCOUNTER
----- Message from Elizabeth Veloz PA-C sent at 7/10/2019 10:39 AM EDT -----  Regarding: prednisone   Tell patient I did not send the prednisone  The area of the chart I was in did not have her allergies and I see she is allergic to it  I will have to continue to see what I can use to help her pain if she is still having it

## 2019-07-10 NOTE — TELEPHONE ENCOUNTER
Spoke with pt  I am sorry Rebekah  But terminology used in CT results message is a little confusing  Would you mind to give me a more explicit explanation so I can tell the pt? Thank you

## 2019-07-11 NOTE — TELEPHONE ENCOUNTER
Spoke with pt and gave info  And wants to know if there is any other type of inflammatory she can take

## 2019-07-11 NOTE — TELEPHONE ENCOUNTER
It is a chronic inflammation of the omentum where it is attached to the body  It has lymph nodes in the area so it looks like it is a chronic problem  The usual treatment for this is to try prednisone  Patient is allergic to prednisone so we cannot do that  Would have patient continue with ibuprofen or Aleve

## 2019-07-15 ENCOUNTER — OFFICE VISIT (OUTPATIENT)
Dept: FAMILY MEDICINE CLINIC | Facility: CLINIC | Age: 54
End: 2019-07-15
Payer: COMMERCIAL

## 2019-07-15 VITALS
HEIGHT: 61 IN | TEMPERATURE: 98.1 F | SYSTOLIC BLOOD PRESSURE: 122 MMHG | DIASTOLIC BLOOD PRESSURE: 78 MMHG | OXYGEN SATURATION: 98 % | WEIGHT: 149 LBS | BODY MASS INDEX: 28.13 KG/M2 | HEART RATE: 71 BPM

## 2019-07-15 DIAGNOSIS — Z00.00 ANNUAL PHYSICAL EXAM: Primary | ICD-10-CM

## 2019-07-15 DIAGNOSIS — K65.4 MESENTERIC PANNICULITIS (HCC): ICD-10-CM

## 2019-07-15 DIAGNOSIS — E78.2 HYPERLIPIDEMIA, MIXED: ICD-10-CM

## 2019-07-15 PROCEDURE — 99396 PREV VISIT EST AGE 40-64: CPT | Performed by: PHYSICIAN ASSISTANT

## 2019-07-15 RX ORDER — KETOROLAC TROMETHAMINE 10 MG/1
10 TABLET, FILM COATED ORAL EVERY 6 HOURS PRN
Qty: 20 TABLET | Refills: 0 | Status: SHIPPED | OUTPATIENT
Start: 2019-07-15 | End: 2021-04-15 | Stop reason: ALTCHOICE

## 2019-07-15 NOTE — PROGRESS NOTES
ADULT ANNUAL PHYSICAL  [facility] - Zayda Mauricio [id number]    NAME: Linda L Booker  AGE: 62 y o  SEX: female  : 1965     DATE: 7/15/2019     Assessment and Plan:     Problem List Items Addressed This Visit        Other    Hyperlipidemia, mixed    Relevant Orders    Lipid Panel with Direct LDL reflex    Comprehensive metabolic panel    Annual physical exam - Primary    Mesenteric panniculitis ([address] )    Relevant Medications    ketorolac (TORADOL) 10 mg tablet          Immunizations and preventive care screenings were discussed with patient today  Appropriate education was printed on patient's after visit summary  Counseling:  · Injury prevention: discussed safety/seat belts, safety helmets, smoke detectors, carbon dioxide detectors, and smoking near bedding or upholstery  Return in about 6 months (around 1/15/2020) for Recheck  Chief Complaint:     Chief Complaint   Patient presents with    Physical Exam     patient needs form completed     Results     need to discuss blood test and CT scan results    Back Pain     mild pain in lower back since last office visit      History of Present Illness:     Adult Annual Physical   Patient here for a comprehensive physical exam  The patient reports problems - pain still present as before, not as bad  Diet and Physical Activity  · Diet/Nutrition: well balanced diet  · Exercise: no formal exercise  Depression Screening  PHQ-9 Depression Screening    PHQ-9:    Frequency of the following problems over the past two weeks:            General Health  · Sleep: sleeps well  · Hearing: requires use of hearing aids  · Vision: goes for regular eye exams  · Dental: regular dental visits  /GYN Health  · Patient is: postmenopausal  · Last menstrual period: NA  · Contraceptive method: none     Review of Systems:     Review of Systems   Constitutional: Negative for activity change, appetite change, chills and fever  HENT: Negative for ear pain, mouth sores, sinus pressure, sneezing, sore throat, tinnitus and trouble swallowing  Eyes: Negative for pain and redness  Respiratory: Negative for cough, chest tightness and shortness of breath  Cardiovascular: Negative for chest pain, palpitations and leg swelling  Gastrointestinal: Positive for abdominal pain  Negative for constipation, diarrhea, nausea and vomiting  Endocrine: Negative for cold intolerance and heat intolerance  Genitourinary: Negative for decreased urine volume, difficulty urinating, dysuria, flank pain, pelvic pain and urgency  Musculoskeletal: Positive for arthralgias and back pain  Skin: Negative for rash  Neurological: Negative for dizziness, tremors, syncope, light-headedness and headaches  Hematological: Negative for adenopathy  Does not bruise/bleed easily  Psychiatric/Behavioral: Positive for dysphoric mood  Negative for decreased concentration, self-injury, sleep disturbance and suicidal ideas  The patient is not nervous/anxious         Past Medical History:     Past Medical History:   Diagnosis Date    Anxiety 06/20/2016    Last assessed    Chronic infective otitis externa of left ear 09/18/2017    Last assessed    GERD (gastroesophageal reflux disease)     Right lumbar radiculopathy 05/05/2016    Last assessed    Sciatic leg pain       Past Surgical History:     Past Surgical History:   Procedure Laterality Date    APPENDECTOMY      CHOLECYSTECTOMY      KNEE ARTHROSCOPY      torn menicus    MI LAMNOTMY INCL W/DCMPRSN NRV ROOT 1 INTRSPC LUMBR Right 7/6/2016    Procedure: L3/4 FORAMINOTOMY;  Surgeon: Alec Rae MD;  Location: [facility];  Service: Neurosurgery    TONSILLECTOMY        Social History:     Social History     Socioeconomic History    Marital status: /Civil Union     Spouse name: None    Number of children: None    Years of education: None    Highest education level: None Occupational History    None   Social Needs    Financial resource strain: None    Food insecurity:     Worry: None     Inability: None    Transportation needs:     Medical: None     Non-medical: None   Tobacco Use    Smoking status: Never Smoker    Smokeless tobacco: Never Used    Tobacco comment: Per allscripts - former smoker   Substance and Sexual Activity    Alcohol use: Yes    Drug use: No    Sexual activity: None   Lifestyle    Physical activity:     Days per week: None     Minutes per session: None    Stress: None   Relationships    Social connections:     Talks on phone: None     Gets together: None     Attends Temple service: None     Active member of club or organization: None     Attends meetings of clubs or organizations: None     Relationship status: None    Intimate partner violence:     Fear of current or ex partner: None     Emotionally abused: None     Physically abused: None     Forced sexual activity: None   Other Topics Concern    None   Social History Narrative    Dental care, reg    Lives with spouse      Family History:     Family History   Problem Relation Age of Onset    No Known Problems Mother     Alcohol abuse Father     Diabetes Paternal Grandmother     Hypertension Paternal Grandmother     Cancer Family       Current Medications:     Current Outpatient Medications   Medication Sig Dispense Refill    atorvastatin (LIPITOR) 10 mg tablet Take by mouth daily Indications: Pt unsure of dosage        estrogen, conjugated,-medroxyprogesterone (PREMPRO) 0 3-1 5 MG per tablet Take 1 tablet by mouth daily 30 tablet 5    FLUoxetine (PROzac) 40 MG capsule TAKE 1 CAPSULE BY MOUTH EVERY DAY 30 capsule 5    gabapentin (NEURONTIN) 300 mg capsule Take 1 capsule (300 mg total) by mouth 2 (two) times a day 60 capsule 5    gentamicin (GARAMYCIN) 0 3 % ophthalmic solution Administer 1 drop to the right eye every 4 (four) hours while awake 5 mL 0    ketorolac (TORADOL) 10 mg tablet Take 1 tablet (10 mg total) by mouth every 6 (six) hours as needed for moderate pain 20 tablet 0    sucralfate (CARAFATE) 1 g tablet Take 1 tablet (1 g total) by mouth 4 (four) times a day 20 tablet 0    UNKNOWN TO PATIENT Indications: Prembro 0 3-1 5         valACYclovir (VALTREX) 1,000 mg tablet Take 1 tablet (1,000 mg total) by mouth daily 30 tablet 5     No current facility-administered medications for this visit  Allergies: Allergies   Allergen Reactions    Erythromycin     Prednisone      Annotation - [id number]: reaction within 2 hours: shaking, sweats, near syncope      Physical Exam:     /78   Pulse 71   Temp 98 1 °F (36 7 °C)   Ht 5' 1" (1 549 m)   Wt 67 6 kg (149 lb)   SpO2 98%   BMI 28 15 kg/m²     Physical Exam   Constitutional: She is oriented to person, place, and time  She appears well-developed and well-nourished  HENT:   Head: Normocephalic  Right Ear: Tympanic membrane, external ear and ear canal normal    Left Ear: Tympanic membrane, external ear and ear canal normal    Nose: Nose normal    Mouth/Throat: Uvula is midline, oropharynx is clear and moist and mucous membranes are normal    Eyes: Conjunctivae and EOM are normal    Ptosis right upper lid     Neck: Normal range of motion  Carotid bruit is not present  No thyromegaly present  Cardiovascular: Normal rate, regular rhythm, normal heart sounds and intact distal pulses  Pulmonary/Chest: Effort normal and breath sounds normal    Abdominal: Soft  Bowel sounds are normal  She exhibits no distension and no mass  There is no hepatosplenomegaly  There is tenderness in the epigastric area and left upper quadrant  There is no CVA tenderness  Musculoskeletal: She exhibits no edema or tenderness  Lymphadenopathy:     She has no cervical adenopathy  Neurological: She is alert and oriented to person, place, and time  Skin: Skin is warm and dry  Psychiatric: She has a normal mood and affect   Her behavior is normal  Judgment and thought content normal    Nursing note and vitals reviewed  Ilda Ballesteros, CECILE ROSAS [address] BMI Counseling: Body mass index is 28 15 kg/m²  Discussed the patient's BMI with her  The BMI is above average  BMI counseling and education was provided to the patient  Nutrition recommendations include reducing portion sizes, 3-5 servings of fruits/vegetables daily, reducing fast food intake, decreasing soda and/or juice intake, moderation in carbohydrate intake, increasing intake of lean protein, reducing intake of saturated fat and trans fat and reducing intake of cholesterol  Exercise recommendations include exercising 3-5 times per week

## 2019-07-15 NOTE — PATIENT INSTRUCTIONS
Wellness Visit for Adults   AMBULATORY CARE:   A wellness visit  is when you see your healthcare provider to get screened for health problems  You can also get advice on how to stay healthy  Write down your questions so you remember to ask them  Ask your healthcare provider how often you should have a wellness visit  What happens at a wellness visit:  Your healthcare provider will ask about your health, and your family history of health problems  This includes high blood pressure, heart disease, and cancer  He or she will ask if you have symptoms that concern you, if you smoke, and about your mood  You may also be asked about your intake of medicines, supplements, food, and alcohol  Any of the following may be done:  · Your weight  will be checked  Your height may also be checked so your body mass index (BMI) can be calculated  Your BMI shows if you are at a healthy weight  · Your blood pressure  and heart rate will be checked  Your temperature may also be checked  · Blood and urine tests  may be done  Blood tests may be done to check your cholesterol levels  Abnormal cholesterol levels increase your risk for heart disease and stroke  You may also need a blood or urine test to check for diabetes if you are at increased risk  Urine tests may be done to look for signs of an infection or kidney disease  · A physical exam  includes checking your heartbeat and lungs with a stethoscope  Your healthcare provider may also check your skin to look for sun damage  · Screening tests  may be recommended  A screening test is done to check for diseases that may not cause symptoms  The screening tests you may need depend on your age, gender, family history, and lifestyle habits  For example, colorectal screening may be recommended if you are 48years old or older  Screening tests you need if you are a woman:   · A Pap smear  is used to screen for cervical cancer   Pap smears are usually done every 3 to 5 years depending on your age  You may need them more often if you have had abnormal Pap smear test results in the past  Ask your healthcare provider how often you should have a Pap smear  · A mammogram  is an x-ray of your breasts to screen for breast cancer  Experts recommend mammograms every 2 years starting at age 48 years  You may need a mammogram at age 52 years or younger if you have an increased risk for breast cancer  Talk to your healthcare provider about when you should start having mammograms and how often you need them  Vaccines you may need:   · Get an influenza vaccine  every year  The influenza vaccine protects you from the flu  Several types of viruses cause the flu  The viruses change over time, so new vaccines are made each year  · Get a tetanus-diphtheria (Td) booster vaccine  every 10 years  This vaccine protects you against tetanus and diphtheria  Tetanus is a severe infection that may cause painful muscle spasms and lockjaw  Diphtheria is a severe bacterial infection that causes a thick covering in the back of your mouth and throat  · Get a human papillomavirus (HPV) vaccine  if you are female and aged 23 to 32 or male 23 to 24 and never received it  This vaccine protects you from HPV infection  HPV is the most common infection spread by sexual contact  HPV may also cause vaginal, penile, and anal cancers  · Get a pneumococcal vaccine  if you are aged 72 years or older  The pneumococcal vaccine is an injection given to protect you from pneumococcal disease  Pneumococcal disease is an infection caused by pneumococcal bacteria  The infection may cause pneumonia, meningitis, or an ear infection  · Get a shingles vaccine  if you are aged 61 or older, even if you have had shingles before  The shingles vaccine is an injection to protect you from the varicella-zoster virus  This is the same virus that causes chickenpox   Shingles is a painful rash that develops in people who had chickenpox or have been exposed to the virus  How to eat healthy:  My Plate is a model for planning healthy meals  It shows the types and amounts of foods that should go on your plate  Fruits and vegetables make up about half of your plate, and grains and protein make up the other half  A serving of dairy is included on the side of your plate  The amount of calories and serving sizes you need depends on your age, gender, weight, and height  Examples of healthy foods are listed below:  · Eat a variety of vegetables  such as dark green, red, and orange vegetables  You can also include canned vegetables low in sodium (salt) and frozen vegetables without added butter or sauces  · Eat a variety of fresh fruits , canned fruit in 100% juice, frozen fruit, and dried fruit  · Include whole grains  At least half of the grains you eat should be whole grains  Examples include whole-wheat bread, wheat pasta, brown rice, and whole-grain cereals such as oatmeal     · Eat a variety of protein foods such as seafood (fish and shellfish), lean meat, and poultry without skin (turkey and chicken)  Examples of lean meats include pork leg, shoulder, or tenderloin, and beef round, sirloin, tenderloin, and extra lean ground beef  Other protein foods include eggs and egg substitutes, beans, peas, soy products, nuts, and seeds  · Choose low-fat dairy products such as skim or 1% milk or low-fat yogurt, cheese, and cottage cheese  · Limit unhealthy fats  such as butter, hard margarine, and shortening  Exercise:  Exercise at least 30 minutes per day on most days of the week  Some examples of exercise include walking, biking, dancing, and swimming  You can also fit in more physical activity by taking the stairs instead of the elevator or parking farther away from stores  Include muscle strengthening activities 2 days each week  Regular exercise provides many health benefits   It helps you manage your weight, and decreases your risk for type 2 diabetes, heart disease, stroke, and high blood pressure  Exercise can also help improve your mood  Ask your healthcare provider about the best exercise plan for you  General health and safety guidelines:   · Do not smoke  Nicotine and other chemicals in cigarettes and cigars can cause lung damage  Ask your healthcare provider for information if you currently smoke and need help to quit  E-cigarettes or smokeless tobacco still contain nicotine  Talk to your healthcare provider before you use these products  · Limit alcohol  A drink of alcohol is 12 ounces of beer, 5 ounces of wine, or 1½ ounces of liquor  · Lose weight, if needed  Being overweight increases your risk of certain health conditions  These include heart disease, high blood pressure, type 2 diabetes, and certain types of cancer  · Protect your skin  Do not sunbathe or use tanning beds  Use sunscreen with a SPF 15 or higher  Apply sunscreen at least 15 minutes before you go outside  Reapply sunscreen every 2 hours  Wear protective clothing, hats, and sunglasses when you are outside  · Drive safely  Always wear your seatbelt  Make sure everyone in your car wears a seatbelt  A seatbelt can save your life if you are in an accident  Do not use your cell phone when you are driving  This could distract you and cause an accident  Pull over if you need to make a call or send a text message  · Practice safe sex  Use latex condoms if are sexually active and have more than one partner  Your healthcare provider may recommend screening tests for sexually transmitted infections (STIs)  · Wear helmets, lifejackets, and protective gear  Always wear a helmet when you ride a bike or motorcycle, go skiing, or play sports that could cause a head injury  Wear protective equipment when you play sports  Wear a lifejacket when you are on a boat or doing water sports    © 2017 [employer] Information is for End User's use only and may not be sold, redistributed or otherwise used for commercial purposes  All illustrations and images included in CareNotes® are the copyrighted property of [employer]  or [employer]  The above information is an  only  It is not intended as medical advice for individual conditions or treatments  Talk to your doctor, nurse or pharmacist before following any medical regimen to see if it is safe and effective for you  Cholesterol and Your Health   AMBULATORY CARE:   Cholesterol  is a waxy, fat-like substance  Cholesterol is made by your body, but also comes from certain foods you eat  Your body uses cholesterol to make hormones and new cells  Your body also uses cholesterol to protect nerves  Cholesterol comes from foods such as meat and dairy products  Your total cholesterol level is made up by LDL cholesterol, HDL cholesterol, and triglycerides:  · LDL cholesterol  is called bad cholesterol  because it forms plaque in your arteries  As plaque builds up, your arteries become narrow, and less blood flows through  When plaque decreases blood flow to your heart, you may have chest pain  If plaque completely blocks an artery that bring blood to your heart, you may have a heart attack  Plaque can break off and form blood clots  Blood clots may block arteries in your brain and cause a stroke  · HDL cholesterol  is called good cholesterol  because it helps remove LDL cholesterol from your arteries  It does this by attaching to LDL cholesterol and carrying it to your liver  Your liver breaks down LDL cholesterol so your body can get rid of it  High levels of HDL cholesterol can help prevent a heart attack and stroke  Low levels of HDL cholesterol can increase your risk for heart disease, heart attack, and stroke  · Triglycerides  are a type of fat that store energy from foods you eat  High levels of triglycerides also cause plaque buildup   This can increase your risk for a heart attack or stroke  If your triglyceride level is high, your LDL cholesterol level may also be high  How food affects your cholesterol levels:   · Unhealthy fats  increase LDL cholesterol and triglyceride levels in your blood  They are found in foods high in cholesterol, saturated fat, and trans fat:     ¨ Cholesterol  is found in eggs, dairy, and meat  ¨ Saturated fat  is found in butter, cheese, ice cream, whole milk, and coconut oil  Saturated fat is also found in meat, such as sausage, hot dogs, and bologna  ¨ Trans fat  is found in liquid oils and is used in fried and baked foods  Foods that contain trans fats include chips, crackers, muffins, sweet rolls, microwave popcorn, and cookies  · Healthy fats,  also called unsaturated fats, help lower LDL cholesterol and triglyceride levels  Healthy fats include the following:     ¨ Monounsaturated fats  are found in foods such as olive oil, canola oil, avocado, nuts, and olives  ¨ Polyunsaturated fats,  such as omega 3 fats, are found in fish, such as salmon, trout, and tuna  They can also be found in plant foods such as flaxseed, walnuts, and soybeans  Other things that affect your cholesterol levels:   · Smoking cigarettes    · Being overweight or obese     · Drinking large amounts of alcohol    · Not enough exercise or no exercise    · Certain genes passed from your parents to you  What you need to know about having your cholesterol levels checked: Adults 21to 39years of age should have their cholesterol levels checked every 4 to 6 years  Adults 45 years and older should have their cholesterol checked every 1 to 2 years  You may need your cholesterol checked more often, or at a younger age, if you have risk factors for heart disease  You may also need to have your cholesterol checked more often if you have other health conditions, such as diabetes  Blood tests are used to check cholesterol levels   Blood tests measure your levels of triglycerides, LDL cholesterol, and HDL cholesterol  Cholesterol level goals: Your cholesterol level goal may depend on your risk for heart disease  It may also depend on your age and other health conditions  Ask your healthcare provider if the following goals are right for you:  · Your total cholesterol level  should be less than 200 mg/dL  This number may also depend on your HDL and LDL cholesterol goals  · Your LDL cholesterol level  should be less than 130 mg/dL  · Your HDL cholesterol level  should be 60 mg/dL or higher  · Your triglyceride level  should be less than 150 mg/dL  Treatment for high cholesterol:  Treatment for high cholesterol will also decrease your risk of heart disease, heart attack, and stroke  Treatment may include any of the following:  · Medicines  may be given to lower your LDL cholesterol, triglyceride levels, or total cholesterol level  You may need medicines to lower your cholesterol if any of the following is true:     ¨ You have a history of stroke, TIA, unstable angina, or a heart attack    ¨ Your LDL cholesterol level is 190 mg/dL or higher    ¨ You are age 36to 76years of age, have diabetes, and your LDL cholesterol is 70 mg/dL or higher    ¨ You are age 36to 76years of age, have risk factors for heart disease, and your LDL cholesterol is 70 mg/dL or higher    · Lifestyle changes  include changes to your diet, exercise, weight loss, and quitting smoking  It also includes decreasing the amount of alcohol you drink  · Supplements  include fish oil, red yeast rice, and garlic  Fish oil may help lower your triglyceride and LDL cholesterol levels  It may also increase your HDL cholesterol level  Red yeast rice may help decrease your total cholesterol level and LDL cholesterol level  Garlic may help lower your total cholesterol level  Do not take these supplements without talking to your healthcare provider     Nutrition to help lower your cholesterol levels:  A registered dietitian can help you create a healthy eating plan  Read food labels and choose foods low in saturated fat, trans fats, and cholesterol  · Decrease the total amount of fat you eat  Choose lean meats, fat-free or 1% fat milk, and low-fat dairy products, such as yogurt and cheese  Try to limit or avoid red meats  Limit or do not eat fried foods or baked goods such as cookies  · Replace unhealthy fats with healthy fats  Cook foods in olive oil or canola oil  Choose soft margarines that are low in saturated fat and trans fat  Seeds, nuts, and avocados are other examples of healthy fats  · Eat foods with omega-3 fats  Examples include salmon, tuna, mackerel, walnuts, and flaxseed  Eat fish 2 times per week  Children and pregnant women should not eat fish that have high levels of mercury, such as shark, swordfish, and rani mackerel  · Increase the amount of plant-based foods you eat  Plant-based foods are low in cholesterol and fat  Eating more of these foods may help lower your cholesterol and help you lose weight  Examples of plant-based foods includes fruits, vegetables, legumes, and whole grains  Replace milk that contains dairy with almond, soy, or coconut milk  Eat beans and foods with soy for protein instead of meat  Ask your healthcare provider or dietitian for more information on plant-based foods  · Increase the amount of fiber you eat  High-fiber foods can help lower your LDL cholesterol  You should eat between 20 and 30 grams of fiber each day  Eat at least 5 servings of fruits and vegetables each day  Other examples of high-fiber foods include whole-grain or whole-wheat breads, pastas, or cereals, and brown rice  Eat 3 ounces of whole-grain foods each day  Increase fiber slowly  You may have abdominal discomfort, bloating, and gas if you add fiber to your diet too quickly  Lifestyle changes you can make to help lower your cholesterol levels:   · Maintain a healthy weight  Ask your healthcare provider how much you should weigh  Ask him or her to help you create a weight loss plan if you are overweight  Weight loss can decrease your total cholesterol and triglyceride levels  · Exercise regularly  Exercise can help lower your total cholesterol level and maintain a healthy weight  Exercise can also help increase your HDL cholesterol level  Work with your healthcare provider to create an exercise program that is right for you  Get at least 30 minutes of moderate exercise most days of the week  Examples of exercise include brisk walking, swimming, or biking  · Do not smoke  Nicotine and other chemicals in cigarettes and cigars can damage your lungs, heart, and blood vessels  They can also raise your triglyceride levels  Ask your healthcare provider for information if you currently smoke and need help to quit  E-cigarettes or smokeless tobacco still contain nicotine  Talk to your healthcare provider before you use these products  · Limit or do not drink alcohol  Alcohol can increase your triglyceride levels  Ask your healthcare provider if it is safe for you to drink alcohol  Also ask how much is safe for you to drink each day  © 2017 [facility] Information is for End User's use only and may not be sold, redistributed or otherwise used for commercial purposes  All illustrations and images included in CareNotes® are the copyrighted property of [employer]  or [employer]ipe Duron  The above information is an  only  It is not intended as medical advice for individual conditions or treatments  Talk to your doctor, nurse or pharmacist before following any medical regimen to see if it is safe and effective for you

## 2019-08-19 ENCOUNTER — OFFICE VISIT (OUTPATIENT)
Dept: FAMILY MEDICINE CLINIC | Facility: CLINIC | Age: 54
End: 2019-08-19
Payer: COMMERCIAL

## 2019-08-19 VITALS
SYSTOLIC BLOOD PRESSURE: 124 MMHG | WEIGHT: 146 LBS | HEART RATE: 66 BPM | HEIGHT: 61 IN | TEMPERATURE: 98.2 F | DIASTOLIC BLOOD PRESSURE: 82 MMHG | BODY MASS INDEX: 27.56 KG/M2 | OXYGEN SATURATION: 94 %

## 2019-08-19 DIAGNOSIS — H10.33 ACUTE CONJUNCTIVITIS OF BOTH EYES, UNSPECIFIED ACUTE CONJUNCTIVITIS TYPE: Primary | ICD-10-CM

## 2019-08-19 PROCEDURE — 1036F TOBACCO NON-USER: CPT | Performed by: PHYSICIAN ASSISTANT

## 2019-08-19 PROCEDURE — 99213 OFFICE O/P EST LOW 20 MIN: CPT | Performed by: PHYSICIAN ASSISTANT

## 2019-08-19 PROCEDURE — 3008F BODY MASS INDEX DOCD: CPT | Performed by: PHYSICIAN ASSISTANT

## 2019-08-19 RX ORDER — CIPROFLOXACIN HYDROCHLORIDE 3.5 MG/ML
2 SOLUTION/ DROPS TOPICAL 4 TIMES DAILY
Qty: 5 ML | Refills: 0 | Status: SHIPPED | OUTPATIENT
Start: 2019-08-19 | End: 2019-09-03 | Stop reason: SDUPTHER

## 2019-08-19 NOTE — PROGRESS NOTES
Assessment/Plan:       Diagnoses and all orders for this visit:    Acute conjunctivitis of both eyes, unspecified acute conjunctivitis type  -     ciprofloxacin (CILOXAN) 0 3 % ophthalmic solution; Administer 2 drops to both eyes 4 (four) times a day for 7 days        Subjective:      Patient ID: Nazario Garcia is a 62 y o  female  Conjunctivitis    The current episode started 2 days ago  The onset was sudden  The problem occurs frequently  The problem has been gradually worsening  The problem is moderate  Nothing relieves the symptoms  Nothing aggravates the symptoms  Associated symptoms include eye itching, photophobia, eye discharge, eye pain and eye redness  Pertinent negatives include no orthopnea, no fever, no abdominal pain, no constipation, no diarrhea, no nausea, no vomiting, no congestion, no ear discharge, no ear pain, no headaches, no hearing loss, no mouth sores, no rhinorrhea, no sore throat, no stridor, no swollen glands, no muscle aches, no neck pain, no neck stiffness, no cough, no URI, no wheezing, no rash and no diaper rash  The eye pain is mild  Both eyes are affected  The eye pain is not associated with movement  The eyelid exhibits redness  The following portions of the patient's history were reviewed and updated as appropriate:   She has a past medical history of Anxiety (06/20/2016), Chronic infective otitis externa of left ear (09/18/2017), GERD (gastroesophageal reflux disease), Right lumbar radiculopathy (05/05/2016), and Sciatic leg pain  ,  does not have any pertinent problems on file  ,   has a past surgical history that includes Appendectomy; Cholecystectomy; Tonsillectomy; Knee arthroscopy; and pr lamnotmy incl w/dcmprsn nrv root 1 intrspc lumbr (Right, 7/6/2016)  ,  family history includes Alcohol abuse in her father; Cancer in her family; Diabetes in her paternal grandmother; Hypertension in her paternal grandmother; No Known Problems in her mother  ,   reports that she has never smoked  She has never used smokeless tobacco  She reports that she drinks alcohol  She reports that she does not use drugs  ,  is allergic to erythromycin and prednisone     Current Outpatient Medications   Medication Sig Dispense Refill    atorvastatin (LIPITOR) 10 mg tablet Take by mouth daily Indications: Pt unsure of dosage   estrogen, conjugated,-medroxyprogesterone (PREMPRO) 0 3-1 5 MG per tablet Take 1 tablet by mouth daily 30 tablet 5    FLUoxetine (PROzac) 40 MG capsule TAKE 1 CAPSULE BY MOUTH EVERY DAY 30 capsule 5    gabapentin (NEURONTIN) 300 mg capsule Take 1 capsule (300 mg total) by mouth 2 (two) times a day 60 capsule 5    ketorolac (TORADOL) 10 mg tablet Take 1 tablet (10 mg total) by mouth every 6 (six) hours as needed for moderate pain 20 tablet 0    sucralfate (CARAFATE) 1 g tablet Take 1 tablet (1 g total) by mouth 4 (four) times a day 20 tablet 0    UNKNOWN TO PATIENT Indications: Prembro 0 3-1 5         valACYclovir (VALTREX) 1,000 mg tablet Take 1 tablet (1,000 mg total) by mouth daily 30 tablet 5    ciprofloxacin (CILOXAN) 0 3 % ophthalmic solution Administer 2 drops to both eyes 4 (four) times a day for 7 days 5 mL 0     No current facility-administered medications for this visit  Review of Systems   Constitutional: Negative for activity change, appetite change, chills and fever  HENT: Negative for congestion, ear discharge, ear pain, hearing loss, mouth sores, rhinorrhea and sore throat  Eyes: Positive for photophobia, pain, discharge, redness and itching  Respiratory: Negative for cough, wheezing and stridor  Cardiovascular: Negative for orthopnea  Gastrointestinal: Negative for abdominal pain, constipation, diarrhea, nausea and vomiting  Musculoskeletal: Negative for neck pain  Skin: Negative for rash  Allergic/Immunologic: Negative for environmental allergies  Neurological: Negative for headaches           Objective:  Vitals:    08/19/19 1536   BP: 124/82   Pulse: 66   Temp: 98 2 °F (36 8 °C)   SpO2: 94%   Weight: 66 2 kg (146 lb)   Height: 5' 1" (1 549 m)     Body mass index is 27 59 kg/m²  Physical Exam   Constitutional: She is oriented to person, place, and time  She appears well-developed and well-nourished  HENT:   Head: Normocephalic  Eyes: Right eye exhibits discharge  Left eye exhibits no discharge  Right conjunctiva is injected  Left conjunctiva is injected  Neurological: She is oriented to person, place, and time  Skin: Skin is warm and dry  Psychiatric: She has a normal mood and affect  Her behavior is normal  Judgment and thought content normal    Nursing note and vitals reviewed

## 2019-09-03 DIAGNOSIS — H10.33 ACUTE CONJUNCTIVITIS OF BOTH EYES, UNSPECIFIED ACUTE CONJUNCTIVITIS TYPE: ICD-10-CM

## 2019-09-03 RX ORDER — CIPROFLOXACIN HYDROCHLORIDE 3.5 MG/ML
2 SOLUTION/ DROPS TOPICAL 4 TIMES DAILY
Qty: 5 ML | Refills: 0 | Status: SHIPPED | OUTPATIENT
Start: 2019-09-03 | End: 2019-09-10

## 2019-10-23 ENCOUNTER — OFFICE VISIT (OUTPATIENT)
Dept: FAMILY MEDICINE CLINIC | Facility: CLINIC | Age: 54
End: 2019-10-23
Payer: COMMERCIAL

## 2019-10-23 VITALS
BODY MASS INDEX: 27 KG/M2 | HEIGHT: 61 IN | TEMPERATURE: 98.9 F | OXYGEN SATURATION: 98 % | WEIGHT: 143 LBS | HEART RATE: 73 BPM

## 2019-10-23 DIAGNOSIS — H10.9 CONJUNCTIVITIS OF RIGHT EYE, UNSPECIFIED CONJUNCTIVITIS TYPE: Primary | ICD-10-CM

## 2019-10-23 PROCEDURE — 3008F BODY MASS INDEX DOCD: CPT | Performed by: FAMILY MEDICINE

## 2019-10-23 PROCEDURE — 99213 OFFICE O/P EST LOW 20 MIN: CPT | Performed by: FAMILY MEDICINE

## 2019-10-23 RX ORDER — TOBRAMYCIN 3 MG/ML
1 SOLUTION/ DROPS OPHTHALMIC
Qty: 1 BOTTLE | Refills: 0 | Status: SHIPPED | OUTPATIENT
Start: 2019-10-23 | End: 2021-04-15 | Stop reason: ALTCHOICE

## 2019-10-23 NOTE — PROGRESS NOTES
Assessment/Plan:     Chronic Problems:  No problem-specific Assessment & Plan notes found for this encounter  Visit Diagnosis:  Diagnoses and all orders for this visit:    Conjunctivitis of right eye, unspecified conjunctivitis type  -     tobramycin (TOBREX) 0 3 % SOLN; Administer 1 drop into the left eye every 4 (four) hours while awake    discussed plan of care , rec warm moist soaks to eye , tid ,   instructe don theuse of driops , contacts to remain ou t  Any changes or failure to resolve = opth eval   Pt agrees and understands       Subjective:    Patient ID: Rosalie Haley is a 62 y o  female  Right eye 2 day   Discharge , sensation of something in conrner of the eye , clogged ducted? Neg pain , neg vison changes   Has been wearing contacts , no changes   Neg sinus , fever , chills , ear   Has had issue in the past   Last appt with opth ? The following portions of the patient's history were reviewed and updated as appropriate: allergies, current medications, past family history, past medical history, past social history, past surgical history and problem list     Review of Systems   Eyes: Positive for discharge and redness  All other systems reviewed and are negative          Pulse 73   Temp 98 9 °F (37 2 °C)   Ht 5' 1" (1 549 m)   Wt 64 9 kg (143 lb)   SpO2 98%   BMI 27 02 kg/m²   Social History     Socioeconomic History    Marital status: /Civil Union     Spouse name: Not on file    Number of children: Not on file    Years of education: Not on file    Highest education level: Not on file   Occupational History    Not on file   Social Needs    Financial resource strain: Not on file    Food insecurity:     Worry: Not on file     Inability: Not on file    Transportation needs:     Medical: Not on file     Non-medical: Not on file   Tobacco Use    Smoking status: Never Smoker    Smokeless tobacco: Never Used    Tobacco comment: Per allscripts - former smoker   Substance and Sexual Activity    Alcohol use: Yes    Drug use: No    Sexual activity: Not on file   Lifestyle    Physical activity:     Days per week: Not on file     Minutes per session: Not on file    Stress: Not on file   Relationships    Social connections:     Talks on phone: Not on file     Gets together: Not on file     Attends Che[employer] service: Not on file     Active member of club or organization: Not on file     Attends meetings of clubs or organizations: Not on file     Relationship status: Not on file    Intimate partner violence:     Fear of current or ex partner: Not on file     Emotionally abused: Not on file     Physically abused: Not on file     Forced sexual activity: Not on file   Other Topics Concern    Not on file   Social History Narrative    Dental care, reg    Lives with spouse     Past Medical History:   Diagnosis Date    Anxiety 06/20/2016    Last assessed    Chronic infective otitis externa of left ear 09/18/2017    Last assessed    GERD (gastroesophageal reflux disease)     Right lumbar radiculopathy 05/05/2016    Last assessed    Sciatic leg pain      Family History   Problem Relation Age of Onset    No Known Problems Mother     Alcohol abuse Father     Diabetes Paternal Grandmother     Hypertension Paternal Grandmother     Cancer Family      Past Surgical History:   Procedure Laterality Date    APPENDECTOMY      CHOLECYSTECTOMY      KNEE ARTHROSCOPY      torn menicus    WI LAMNOTMY INCL W/DCMPRSN NRV ROOT 1 INTRSPC LUMBR Right 7/6/2016    Procedure: L3/4 FORAMINOTOMY;  Surgeon: Faviola Elam MD;  Location: [facility];  Service: Neurosurgery    TONSILLECTOMY         Current Outpatient Medications:     atorvastatin (LIPITOR) 10 mg tablet, Take by mouth daily Indications: Pt unsure of dosage  , Disp: , Rfl:     estrogen, conjugated,-medroxyprogesterone (PREMPRO) 0 3-1 5 MG per tablet, Take 1 tablet by mouth daily, Disp: 30 tablet, Rfl: 5    FLUoxetine (PROzac) 40 MG capsule, TAKE 1 CAPSULE BY MOUTH EVERY DAY, Disp: 30 capsule, Rfl: 5    gabapentin (NEURONTIN) 300 mg capsule, Take 1 capsule (300 mg total) by mouth 2 (two) times a day, Disp: 60 capsule, Rfl: 5    ketorolac (TORADOL) 10 mg tablet, Take 1 tablet (10 mg total) by mouth every 6 (six) hours as needed for moderate pain, Disp: 20 tablet, Rfl: 0    sucralfate (CARAFATE) 1 g tablet, Take 1 tablet (1 g total) by mouth 4 (four) times a day, Disp: 20 tablet, Rfl: 0    tobramycin (TOBREX) 0 3 % SOLN, Administer 1 drop into the left eye every 4 (four) hours while awake, Disp: 1 Bottle, Rfl: 0    UNKNOWN TO PATIENT, Indications: Prembro 0 3-1 5   , Disp: , Rfl:     valACYclovir (VALTREX) 1,000 mg tablet, Take 1 tablet (1,000 mg total) by mouth daily, Disp: 30 tablet, Rfl: 5    Allergies   Allergen Reactions    Erythromycin     Prednisone      Annotation - [id number]: reaction within 2 hours: shaking, sweats, near syncope          Lab Review   not applicable     Imaging: No results found  Objective:     Physical Exam   Constitutional: She appears well-developed and well-nourished  No distress  HENT:   Head: Normocephalic and atraumatic  Right Ear: External ear normal    Left Ear: External ear normal    Eyes: Pupils are equal, round, and reactive to light  Left eye exhibits hordeolum  Cardiovascular: Normal rate  Pulmonary/Chest: Effort normal          There are no Patient Instructions on file for this visit  AMAURI Vicente    Portions of the record may have been created with voice recognition software  Occasional wrong word or "sound a like" substitutions may have occurred due to the inherent limitations of voice recognition software  Read the chart carefully and recognize, using context, where substitutions have occurred

## 2019-11-18 DIAGNOSIS — F41.9 ANXIETY: ICD-10-CM

## 2019-11-18 DIAGNOSIS — B00.9 HERPES INFECTION: ICD-10-CM

## 2019-11-18 RX ORDER — VALACYCLOVIR HYDROCHLORIDE 1 G/1
1000 TABLET, FILM COATED ORAL DAILY
Qty: 90 TABLET | Refills: 5 | Status: SHIPPED | OUTPATIENT
Start: 2019-11-18 | End: 2020-06-15 | Stop reason: SDUPTHER

## 2019-11-18 RX ORDER — FLUOXETINE HYDROCHLORIDE 40 MG/1
40 CAPSULE ORAL DAILY
Qty: 90 CAPSULE | Refills: 5 | Status: SHIPPED | OUTPATIENT
Start: 2019-11-18 | End: 2020-06-15 | Stop reason: SDUPTHER

## 2020-01-14 ENCOUNTER — OFFICE VISIT (OUTPATIENT)
Dept: FAMILY MEDICINE CLINIC | Facility: CLINIC | Age: 55
End: 2020-01-14
Payer: COMMERCIAL

## 2020-01-14 VITALS
WEIGHT: 150 LBS | TEMPERATURE: 98.3 F | HEART RATE: 82 BPM | OXYGEN SATURATION: 96 % | BODY MASS INDEX: 28.34 KG/M2 | DIASTOLIC BLOOD PRESSURE: 86 MMHG | SYSTOLIC BLOOD PRESSURE: 126 MMHG

## 2020-01-14 DIAGNOSIS — J01.40 ACUTE PANSINUSITIS, RECURRENCE NOT SPECIFIED: Primary | ICD-10-CM

## 2020-01-14 DIAGNOSIS — R11.2 SEVERE NAUSEA AND VOMITING: ICD-10-CM

## 2020-01-14 DIAGNOSIS — H65.112 ACUTE MUCOID OTITIS MEDIA OF LEFT EAR: ICD-10-CM

## 2020-01-14 PROBLEM — H65.192 ACUTE MUCOID OTITIS MEDIA OF LEFT EAR: Status: ACTIVE | Noted: 2020-01-14

## 2020-01-14 PROCEDURE — 1036F TOBACCO NON-USER: CPT | Performed by: PHYSICIAN ASSISTANT

## 2020-01-14 PROCEDURE — 99213 OFFICE O/P EST LOW 20 MIN: CPT | Performed by: PHYSICIAN ASSISTANT

## 2020-01-14 RX ORDER — ONDANSETRON HYDROCHLORIDE 8 MG/1
8 TABLET, FILM COATED ORAL EVERY 8 HOURS PRN
Qty: 20 TABLET | Refills: 0 | Status: SHIPPED | OUTPATIENT
Start: 2020-01-14 | End: 2021-04-15 | Stop reason: ALTCHOICE

## 2020-01-14 RX ORDER — AMOXICILLIN AND CLAVULANATE POTASSIUM 875; 125 MG/1; MG/1
1 TABLET, FILM COATED ORAL EVERY 12 HOURS SCHEDULED
Qty: 20 TABLET | Refills: 0 | Status: SHIPPED | OUTPATIENT
Start: 2020-01-14 | End: 2020-01-24

## 2020-01-14 NOTE — PROGRESS NOTES
Assessment/Plan:  BMI Counseling: Body mass index is 28 34 kg/m²  The BMI is above normal  Nutrition recommendations include decreasing portion sizes, encouraging healthy choices of fruits and vegetables, consuming healthier snacks, limiting drinks that contain sugar, moderation in carbohydrate intake, increasing intake of lean protein, reducing intake of saturated and trans fat and reducing intake of cholesterol  Exercise recommendations include exercising 3-5 times per week and strength training exercises  No pharmacotherapy was ordered  Diagnoses and all orders for this visit:    Acute pansinusitis, recurrence not specified  -     amoxicillin-clavulanate (AUGMENTIN) 875-125 mg per tablet; Take 1 tablet by mouth every 12 (twelve) hours for 10 days    Acute mucoid otitis media of left ear  -     amoxicillin-clavulanate (AUGMENTIN) 875-125 mg per tablet; Take 1 tablet by mouth every 12 (twelve) hours for 10 days    Severe nausea and vomiting  -     ondansetron (ZOFRAN) 8 mg tablet; Take 1 tablet (8 mg total) by mouth every 8 (eight) hours as needed for nausea or vomiting            Subjective:      Patient ID: Yara Sims is a 62 y o  female  Headache    This is a new problem  The current episode started in the past 7 days  The problem occurs constantly  The problem has been unchanged  The pain is located in the frontal region  The pain radiates to the face  The quality of the pain is described as throbbing  The pain is severe  Associated symptoms include anorexia, back pain, ear pain, nausea, sinus pressure, vomiting and weakness  Pertinent negatives include no abdominal pain, blurred vision, coughing, dizziness, eye pain, eye redness, eye watering, fever, neck pain, rhinorrhea or sore throat  Fatigue   Associated symptoms include anorexia, chills, congestion, fatigue, headaches, nausea, vomiting and weakness   Pertinent negatives include no abdominal pain, chest pain, coughing, fever, neck pain or sore throat  The following portions of the patient's history were reviewed and updated as appropriate:   She has a past medical history of Anxiety (06/20/2016), Chronic infective otitis externa of left ear (09/18/2017), GERD (gastroesophageal reflux disease), Right lumbar radiculopathy (05/05/2016), and Sciatic leg pain  ,  does not have any pertinent problems on file  ,   has a past surgical history that includes Appendectomy; Cholecystectomy; Tonsillectomy; Knee arthroscopy; and pr lamnotmy incl w/dcmprsn nrv root 1 intrspc lumbr (Right, 7/6/2016)  ,  family history includes Alcohol abuse in her father; Cancer in her family; Diabetes in her paternal grandmother; Hypertension in her paternal grandmother; No Known Problems in her mother  ,   reports that she has never smoked  She has never used smokeless tobacco  She reports that she drinks alcohol  She reports that she does not use drugs  ,  is allergic to erythromycin and prednisone     Current Outpatient Medications   Medication Sig Dispense Refill    atorvastatin (LIPITOR) 10 mg tablet Take by mouth daily Indications: Pt unsure of dosage        estrogen, conjugated,-medroxyprogesterone (PREMPRO) 0 3-1 5 MG per tablet Take 1 tablet by mouth daily 30 tablet 5    FLUoxetine (PROzac) 40 MG capsule Take 1 capsule (40 mg total) by mouth daily 90 capsule 5    gabapentin (NEURONTIN) 300 mg capsule Take 1 capsule (300 mg total) by mouth 2 (two) times a day 60 capsule 5    ketorolac (TORADOL) 10 mg tablet Take 1 tablet (10 mg total) by mouth every 6 (six) hours as needed for moderate pain 20 tablet 0    sucralfate (CARAFATE) 1 g tablet Take 1 tablet (1 g total) by mouth 4 (four) times a day 20 tablet 0    tobramycin (TOBREX) 0 3 % SOLN Administer 1 drop into the left eye every 4 (four) hours while awake 1 Bottle 0    UNKNOWN TO PATIENT Indications: Prembro 0 3-1 5         valACYclovir (VALTREX) 1,000 mg tablet Take 1 tablet (1,000 mg total) by mouth daily 90 tablet 5    amoxicillin-clavulanate (AUGMENTIN) 875-125 mg per tablet Take 1 tablet by mouth every 12 (twelve) hours for 10 days 20 tablet 0    ondansetron (ZOFRAN) 8 mg tablet Take 1 tablet (8 mg total) by mouth every 8 (eight) hours as needed for nausea or vomiting 20 tablet 0     No current facility-administered medications for this visit  Review of Systems   Constitutional: Positive for activity change, appetite change, chills and fatigue  Negative for fever  HENT: Positive for congestion, ear pain, sinus pressure and sinus pain  Negative for postnasal drip, rhinorrhea, sore throat and trouble swallowing  Eyes: Negative for blurred vision, pain and redness  Respiratory: Negative for cough, chest tightness and shortness of breath  Cardiovascular: Negative for chest pain  Gastrointestinal: Positive for anorexia, nausea and vomiting  Negative for abdominal pain and diarrhea  Genitourinary: Negative for dysuria  Musculoskeletal: Positive for back pain  Negative for neck pain  Neurological: Positive for weakness and headaches  Negative for dizziness and light-headedness  Objective:  Vitals:    01/14/20 1359   BP: 126/86   Pulse: 82   Temp: 98 3 °F (36 8 °C)   SpO2: 96%   Weight: 68 kg (150 lb)     Body mass index is 28 34 kg/m²  Physical Exam   Constitutional: She is oriented to person, place, and time  Vital signs are normal  She appears well-developed and well-nourished  She appears ill  HENT:   Head: Normocephalic  Right Ear: Tympanic membrane, external ear and ear canal normal    Left Ear: External ear and ear canal normal  Tympanic membrane is erythematous  A middle ear effusion is present  Nose: Mucosal edema present  Right sinus exhibits maxillary sinus tenderness and frontal sinus tenderness  Left sinus exhibits maxillary sinus tenderness and frontal sinus tenderness     Mouth/Throat: Uvula is midline and oropharynx is clear and moist    Eyes: Conjunctivae are normal    Cardiovascular: Normal rate, regular rhythm and normal heart sounds  No extrasystoles are present  Pulmonary/Chest: Effort normal and breath sounds normal    Musculoskeletal: She exhibits no edema  Lymphadenopathy:     She has no cervical adenopathy  Neurological: She is alert and oriented to person, place, and time  Skin: Skin is warm and dry  Psychiatric: She has a normal mood and affect  Her behavior is normal    Nursing note and vitals reviewed

## 2020-04-03 ENCOUNTER — TELEMEDICINE (OUTPATIENT)
Dept: FAMILY MEDICINE CLINIC | Facility: CLINIC | Age: 55
End: 2020-04-03
Payer: COMMERCIAL

## 2020-04-03 DIAGNOSIS — J06.9 ACUTE URI: Primary | ICD-10-CM

## 2020-04-03 PROCEDURE — 99213 OFFICE O/P EST LOW 20 MIN: CPT | Performed by: FAMILY MEDICINE

## 2020-04-03 RX ORDER — AZITHROMYCIN 250 MG/1
TABLET, FILM COATED ORAL
Qty: 6 TABLET | Refills: 0 | Status: SHIPPED | OUTPATIENT
Start: 2020-04-03 | End: 2020-04-08

## 2020-06-15 DIAGNOSIS — B00.9 HERPES INFECTION: ICD-10-CM

## 2020-06-15 DIAGNOSIS — F41.9 ANXIETY: ICD-10-CM

## 2020-06-15 RX ORDER — FLUOXETINE HYDROCHLORIDE 40 MG/1
40 CAPSULE ORAL DAILY
Qty: 90 CAPSULE | Refills: 5 | Status: SHIPPED | OUTPATIENT
Start: 2020-06-15 | End: 2021-04-15 | Stop reason: DRUGHIGH

## 2020-06-15 RX ORDER — VALACYCLOVIR HYDROCHLORIDE 1 G/1
1000 TABLET, FILM COATED ORAL DAILY
Qty: 90 TABLET | Refills: 5 | Status: SHIPPED | OUTPATIENT
Start: 2020-06-15 | End: 2021-10-05 | Stop reason: SDUPTHER

## 2021-03-09 ENCOUNTER — TELEPHONE (OUTPATIENT)
Dept: FAMILY MEDICINE CLINIC | Facility: CLINIC | Age: 56
End: 2021-03-09

## 2021-03-09 NOTE — TELEPHONE ENCOUNTER
LM for pt RCB  Pt is due for an appt, her last appt was virtual in 04/2020  Please offer her an appt  Thank you

## 2021-04-15 ENCOUNTER — OFFICE VISIT (OUTPATIENT)
Dept: FAMILY MEDICINE CLINIC | Facility: CLINIC | Age: 56
End: 2021-04-15
Payer: COMMERCIAL

## 2021-04-15 VITALS
BODY MASS INDEX: 27.38 KG/M2 | OXYGEN SATURATION: 100 % | HEART RATE: 66 BPM | SYSTOLIC BLOOD PRESSURE: 124 MMHG | RESPIRATION RATE: 16 BRPM | TEMPERATURE: 98.9 F | WEIGHT: 145 LBS | HEIGHT: 61 IN | DIASTOLIC BLOOD PRESSURE: 74 MMHG

## 2021-04-15 DIAGNOSIS — Z00.00 ANNUAL PHYSICAL EXAM: Primary | ICD-10-CM

## 2021-04-15 DIAGNOSIS — Z12.31 ENCOUNTER FOR SCREENING MAMMOGRAM FOR BREAST CANCER: ICD-10-CM

## 2021-04-15 DIAGNOSIS — E78.2 HYPERLIPIDEMIA, MIXED: ICD-10-CM

## 2021-04-15 DIAGNOSIS — N95.1 POST MENOPAUSAL SYNDROME: ICD-10-CM

## 2021-04-15 DIAGNOSIS — F33.1 MODERATE EPISODE OF RECURRENT MAJOR DEPRESSIVE DISORDER (HCC): ICD-10-CM

## 2021-04-15 DIAGNOSIS — Z12.31 ENCOUNTER FOR SCREENING MAMMOGRAM FOR MALIGNANT NEOPLASM OF BREAST: ICD-10-CM

## 2021-04-15 PROBLEM — R10.11 RUQ PAIN: Status: RESOLVED | Noted: 2019-06-28 | Resolved: 2021-04-15

## 2021-04-15 PROBLEM — R11.2 SEVERE NAUSEA AND VOMITING: Status: RESOLVED | Noted: 2020-01-14 | Resolved: 2021-04-15

## 2021-04-15 PROBLEM — H65.112 ACUTE MUCOID OTITIS MEDIA OF LEFT EAR: Status: RESOLVED | Noted: 2020-01-14 | Resolved: 2021-04-15

## 2021-04-15 PROBLEM — J06.9 ACUTE URI: Status: RESOLVED | Noted: 2020-04-03 | Resolved: 2021-04-15

## 2021-04-15 PROBLEM — J01.40 ACUTE PANSINUSITIS: Status: RESOLVED | Noted: 2020-01-14 | Resolved: 2021-04-15

## 2021-04-15 PROBLEM — R10.13 EPIGASTRIC PAIN: Status: RESOLVED | Noted: 2019-06-28 | Resolved: 2021-04-15

## 2021-04-15 PROBLEM — H65.192 ACUTE MUCOID OTITIS MEDIA OF LEFT EAR: Status: RESOLVED | Noted: 2020-01-14 | Resolved: 2021-04-15

## 2021-04-15 PROBLEM — R10.12 LEFT UPPER QUADRANT PAIN: Status: RESOLVED | Noted: 2019-06-28 | Resolved: 2021-04-15

## 2021-04-15 PROCEDURE — 3725F SCREEN DEPRESSION PERFORMED: CPT | Performed by: NURSE PRACTITIONER

## 2021-04-15 PROCEDURE — 99396 PREV VISIT EST AGE 40-64: CPT | Performed by: NURSE PRACTITIONER

## 2021-04-15 PROCEDURE — 3008F BODY MASS INDEX DOCD: CPT | Performed by: NURSE PRACTITIONER

## 2021-04-15 PROCEDURE — 1036F TOBACCO NON-USER: CPT | Performed by: NURSE PRACTITIONER

## 2021-04-15 RX ORDER — ESTROGEN,CON/M-PROGEST ACET 0.3-1.5MG
1 TABLET ORAL DAILY
Qty: 30 TABLET | Refills: 0 | Status: SHIPPED | OUTPATIENT
Start: 2021-04-15 | End: 2021-05-10

## 2021-04-15 RX ORDER — FLUOXETINE HYDROCHLORIDE 60 MG/1
60 TABLET, FILM COATED ORAL; ORAL DAILY
Qty: 30 TABLET | Refills: 2 | Status: SHIPPED | OUTPATIENT
Start: 2021-04-15 | End: 2021-04-20

## 2021-04-15 NOTE — ASSESSMENT & PLAN NOTE
Patient was previously on atorvastatin but not currently  Will obtain lipid panel and call with results  Counseled the importance of beginning a heart healthy diet and daily physical activity

## 2021-04-15 NOTE — ASSESSMENT & PLAN NOTE
Will increase fluoxetine to 60 mg daily  Counseled the importance of beginning daily physical activity  Follow-up in 1 month if symptoms have not improved or sooner if symptoms worsen

## 2021-04-15 NOTE — PATIENT INSTRUCTIONS
Wellness Visit for Adults   AMBULATORY CARE:   A wellness visit  is when you see your healthcare provider to get screened for health problems  Your healthcare provider will also give you advice on how to stay healthy  Write down your questions so you remember to ask them  Ask your healthcare provider how often you should have a wellness visit  What happens at a wellness visit:  Your healthcare provider will ask about your health, and your family history of health problems  This includes high blood pressure, heart disease, and cancer  He or she will ask if you have symptoms that concern you, if you smoke, and about your mood  You may also be asked about your intake of medicines, supplements, food, and alcohol  Any of the following may be done:  · Your weight  will be checked  Your height may also be checked so your body mass index (BMI) can be calculated  Your BMI shows if you are at a healthy weight  · Your blood pressure  and heart rate will be checked  Your temperature may also be checked  · Blood and urine tests  may be done  Blood tests may be done to check your cholesterol levels  Abnormal cholesterol levels increase your risk for heart disease and stroke  You may also need a blood or urine test to check for diabetes if you are at increased risk  Urine tests may be done to look for signs of an infection or kidney disease  · A physical exam  includes checking your heartbeat and lungs with a stethoscope  Your healthcare provider may also check your skin to look for sun damage  · Screening tests  may be recommended  A screening test is done to check for diseases that may not cause symptoms  The screening tests you may need depend on your age, gender, family history, and lifestyle habits  For example, colorectal screening may be recommended if you are 48years old or older  Screening tests you need if you are a woman:   · A Pap smear  is used to screen for cervical cancer   Pap smears are usually done every 3 to 5 years depending on your age  You may need them more often if you have had abnormal Pap smear test results in the past  Ask your healthcare provider how often you should have a Pap smear  · A mammogram  is an x-ray of your breasts to screen for breast cancer  Experts recommend mammograms every 2 years starting at age 48 years  You may need a mammogram at age 52 years or younger if you have an increased risk for breast cancer  Talk to your healthcare provider about when you should start having mammograms and how often you need them  Vaccines you may need:   · Get an influenza vaccine  every year  The influenza vaccine protects you from the flu  Several types of viruses cause the flu  The viruses change over time, so new vaccines are made each year  · Get a tetanus-diphtheria (Td) booster vaccine  every 10 years  This vaccine protects you against tetanus and diphtheria  Tetanus is a severe infection that may cause painful muscle spasms and lockjaw  Diphtheria is a severe bacterial infection that causes a thick covering in the back of your mouth and throat  · Get a human papillomavirus (HPV) vaccine  if you are female and aged 23 to 32 or male 23 to 24 and never received it  This vaccine protects you from HPV infection  HPV is the most common infection spread by sexual contact  HPV may also cause vaginal, penile, and anal cancers  · Get a pneumococcal vaccine  if you are aged 72 years or older  The pneumococcal vaccine is an injection given to protect you from pneumococcal disease  Pneumococcal disease is an infection caused by pneumococcal bacteria  The infection may cause pneumonia, meningitis, or an ear infection  · Get a shingles vaccine  if you are 60 or older, even if you have had shingles before  The shingles vaccine is an injection to protect you from the varicella-zoster virus  This is the same virus that causes chickenpox   Shingles is a painful rash that develops in people who had chickenpox or have been exposed to the virus  How to eat healthy:  My Plate is a model for planning healthy meals  It shows the types and amounts of foods that should go on your plate  Fruits and vegetables make up about half of your plate, and grains and protein make up the other half  A serving of dairy is included on the side of your plate  The amount of calories and serving sizes you need depends on your age, gender, weight, and height  Examples of healthy foods are listed below:  · Eat a variety of vegetables  such as dark green, red, and orange vegetables  You can also include canned vegetables low in sodium (salt) and frozen vegetables without added butter or sauces  · Eat a variety of fresh fruits , canned fruit in 100% juice, frozen fruit, and dried fruit  · Include whole grains  At least half of the grains you eat should be whole grains  Examples include whole-wheat bread, wheat pasta, brown rice, and whole-grain cereals such as oatmeal     · Eat a variety of protein foods such as seafood (fish and shellfish), lean meat, and poultry without skin (turkey and chicken)  Examples of lean meats include pork leg, shoulder, or tenderloin, and beef round, sirloin, tenderloin, and extra lean ground beef  Other protein foods include eggs and egg substitutes, beans, peas, soy products, nuts, and seeds  · Choose low-fat dairy products such as skim or 1% milk or low-fat yogurt, cheese, and cottage cheese  · Limit unhealthy fats  such as butter, hard margarine, and shortening  Exercise:  Exercise at least 30 minutes per day on most days of the week  Some examples of exercise include walking, biking, dancing, and swimming  You can also fit in more physical activity by taking the stairs instead of the elevator or parking farther away from stores  Include muscle strengthening activities 2 days each week  Regular exercise provides many health benefits   It helps you manage your weight, and decreases your risk for type 2 diabetes, heart disease, stroke, and high blood pressure  Exercise can also help improve your mood  Ask your healthcare provider about the best exercise plan for you  General health and safety guidelines:   · Do not smoke  Nicotine and other chemicals in cigarettes and cigars can cause lung damage  Ask your healthcare provider for information if you currently smoke and need help to quit  E-cigarettes or smokeless tobacco still contain nicotine  Talk to your healthcare provider before you use these products  · Limit alcohol  A drink of alcohol is 12 ounces of beer, 5 ounces of wine, or 1½ ounces of liquor  · Lose weight, if needed  Being overweight increases your risk of certain health conditions  These include heart disease, high blood pressure, type 2 diabetes, and certain types of cancer  · Protect your skin  Do not sunbathe or use tanning beds  Use sunscreen with a SPF 15 or higher  Apply sunscreen at least 15 minutes before you go outside  Reapply sunscreen every 2 hours  Wear protective clothing, hats, and sunglasses when you are outside  · Drive safely  Always wear your seatbelt  Make sure everyone in your car wears a seatbelt  A seatbelt can save your life if you are in an accident  Do not use your cell phone when you are driving  This could distract you and cause an accident  Pull over if you need to make a call or send a text message  · Practice safe sex  Use latex condoms if are sexually active and have more than one partner  Your healthcare provider may recommend screening tests for sexually transmitted infections (STIs)  · Wear helmets, lifejackets, and protective gear  Always wear a helmet when you ride a bike or motorcycle, go skiing, or play sports that could cause a head injury  Wear protective equipment when you play sports  Wear a lifejacket when you are on a boat or doing water sports      © Copyright [employer] 2020 Information is for End User's use only and may not be sold, redistributed or otherwise used for commercial purposes  All illustrations and images included in CareNotes® are the copyrighted property of [employer]  or Iker Ramirez  The above information is an  only  It is not intended as medical advice for individual conditions or treatments  Talk to your doctor, nurse or pharmacist before following any medical regimen to see if it is safe and effective for you  Weight Management   AMBULATORY CARE:   Why it is important to manage your weight:  Being overweight increases your risk of health conditions such as heart disease, high blood pressure, type 2 diabetes, and certain types of cancer  It can also increase your risk for osteoarthritis, sleep apnea, and other respiratory problems  Aim for a slow, steady weight loss  Even a small amount of weight loss can lower your risk of health problems  How to lose weight safely:  A safe and healthy way to lose weight is to eat fewer calories and get regular exercise  · You can lose up about 1 pound a week by decreasing the number of calories you eat by 500 calories each day  You can decrease calories by eating smaller portion sizes or by cutting out high-calorie foods  Read labels to find out how many calories are in the foods you eat  · You can also burn calories with exercise such as walking, swimming, or biking  You will be more likely to keep weight off if you make these changes part of your lifestyle  Exercise at least 30 minutes per day on most days of the week  You can also fit in more physical activity by taking the stairs instead of the elevator or parking farther away from stores  Ask your healthcare provider about the best exercise plan for you  Healthy meal plan for weight management:  A healthy meal plan includes a variety of foods, contains fewer calories, and helps you stay healthy   A healthy meal plan includes the following:     · Eat whole-grain foods more often  A healthy meal plan should contain fiber  Fiber is the part of grains, fruits, and vegetables that is not broken down by your body  Whole-grain foods are healthy and provide extra fiber in your diet  Some examples of whole-grain foods are whole-wheat breads and pastas, oatmeal, brown rice, and bulgur  · Eat a variety of vegetables every day  Include dark, leafy greens such as spinach, kale, ryann greens, and mustard greens  Eat yellow and orange vegetables such as carrots, sweet potatoes, and winter squash  · Eat a variety of fruits every day  Choose fresh or canned fruit (canned in its own juice or light syrup) instead of juice  Fruit juice has very little or no fiber  · Eat low-fat dairy foods  Drink fat-free (skim) milk or 1% milk  Eat fat-free yogurt and low-fat cottage cheese  Try low-fat cheeses such as mozzarella and other reduced-fat cheeses  · Choose meat and other protein foods that are low in fat  Choose beans or other legumes such as split peas or lentils  Choose fish, skinless poultry (chicken or turkey), or lean cuts of red meat (beef or pork)  Before you cook meat or poultry, cut off any visible fat  · Use less fat and oil  Try baking foods instead of frying them  Add less fat, such as margarine, sour cream, regular salad dressing and mayonnaise to foods  Eat fewer high-fat foods  Some examples of high-fat foods include french fries, doughnuts, ice cream, and cakes  · Eat fewer sweets  Limit foods and drinks that are high in sugar  This includes candy, cookies, regular soda, and sweetened drinks  Ways to decrease calories:   · Eat smaller portions  ? Use a small plate with smaller servings  ? Do not eat second helpings  ? When you eat at a restaurant, ask for a box and place half of your meal in the box before you eat  ? Share an entrée with someone else  · Replace high-calorie snacks with healthy, low-calorie snacks  ? Choose fresh fruit, vegetables, fat-free rice cakes, or air-popped popcorn instead of potato chips, nuts, or chocolate  ? Choose water or calorie-free drinks instead of soda or sweetened drinks  · Do not shop for groceries when you are hungry  You may be more likely to make unhealthy food choices  Take a grocery list of healthy foods and shop after you have eaten  · Eat regular meals  Do not skip meals  Skipping meals can lead to overeating later in the day  This can make it harder for you to lose weight  Eat a healthy snack in place of a meal if you do not have time to eat a regular meal  Talk with a dietitian to help you create a meal plan and schedule that is right for you  Other things to consider as you try to lose weight:   · Be aware of situations that may give you the urge to overeat, such as eating while watching television  Find ways to avoid these situations  For example, read a book, go for a walk, or do crafts  · Meet with a weight loss support group or friends who are also trying to lose weight  This may help you stay motivated to continue working on your weight loss goals  © Copyright [address] Information is for End User's use only and may not be sold, redistributed or otherwise used for commercial purposes  All illustrations and images included in CareNotes® are the copyrighted property of [employer]  or [facility] Erica Maldonado   The above information is an  only  It is not intended as medical advice for individual conditions or treatments  Talk to your doctor, nurse or pharmacist before following any medical regimen to see if it is safe and effective for you  Heart Healthy Diet   AMBULATORY CARE:   A heart healthy diet  is an eating plan low in unhealthy fats and sodium (salt)  The plan is high in healthy fats and fiber  A heart healthy diet helps improve your cholesterol levels and lowers your risk for heart disease and stroke   A dietitian will teach you how to read and understand food labels  Heart healthy diet guidelines to follow:   · Choose foods that contain healthy fats  ? Unsaturated fats  include monounsaturated and polyunsaturated fats  Unsaturated fat is found in foods such as soybean, canola, olive, corn, and safflower oils  It is also found in soft tub margarine that is made with liquid vegetable oil  ? Omega-3 fat  is found in certain fish, such as salmon, tuna, and trout, and in walnuts and flaxseed  Eat fish high in omega-3 fats at least 2 times a week  · Get 20 to 30 grams of fiber each day  Fruits, vegetables, whole-grain foods, and legumes (cooked beans) are good sources of fiber  · Limit or do not have unhealthy fats  ? Cholesterol  is found in animal foods, such as eggs and lobster, and in dairy products made from whole milk  Limit cholesterol to less than 200 mg each day  ? Saturated fat  is found in meats, such as pollock and hamburger  It is also found in chicken or turkey skin, whole milk, and butter  Limit saturated fat to less than 7% of your total daily calories  ? Trans fat  is found in packaged foods, such as potato chips and cookies  It is also in hard margarine, some fried foods, and shortening  Do not eat foods that contain trans fats  · Limit sodium as directed  You may be told to limit sodium to 2,000 to 2,300 mg each day  Choose low-sodium or no-salt-added foods  Add little or no salt to food you prepare  Use herbs and spices in place of salt  Include the following in your heart healthy plan:  Ask your dietitian or healthcare provider how many servings to have from each of the following food groups:  · Grains:      ? Whole-wheat breads, cereals, and pastas, and brown rice    ? Low-fat, low-sodium crackers and chips    · Vegetables:      ? Broccoli, green beans, green peas, and spinach    ? Collards, kale, and lima beans    ?  Carrots, sweet potatoes, tomatoes, and peppers    ? Canned vegetables with no salt added    · Fruits:      ? Bananas, peaches, pears, and pineapple    ? Grapes, raisins, and dates    ? Oranges, tangerines, grapefruit, orange juice, and grapefruit juice    ? Apricots, mangoes, melons, and papaya    ? Raspberries and strawberries    ? Canned fruit with no added sugar    · Low-fat dairy:      ? Nonfat (skim) milk, 1% milk, and low-fat almond, cashew, or soy milks fortified with calcium    ? Low-fat cheese, regular or frozen yogurt, and cottage cheese    · Meats and proteins:      ? Lean cuts of beef and pork (loin, leg, round), skinless chicken and turkey    ? Legumes, soy products, egg whites, or nuts    Limit or do not include the following in your heart healthy plan:   · Unhealthy fats and oils:      ? Whole or 2% milk, cream cheese, sour cream, or cheese    ? High-fat cuts of beef (T-bone steaks, ribs), chicken or turkey with skin, and organ meats such as liver    ? Butter, stick margarine, shortening, and cooking oils such as coconut or palm oil    · Foods and liquids high in sodium:      ? Packaged foods, such as frozen dinners, cookies, macaroni and cheese, and cereals with more than 300 mg of sodium per serving    ? Vegetables with added sodium, such as instant potatoes, vegetables with added sauces, or regular canned vegetables    ? Cured or smoked meats, such as hot dogs, pollock, and sausage    ? High-sodium ketchup, barbecue sauce, salad dressing, pickles, olives, soy sauce, or miso    · Foods and liquids high in sugar:      ? Candy, cake, cookies, pies, or doughnuts    ? Soft drinks (soda), sports drinks, or sweetened tea    ? Canned or dry mixes for cakes, soups, sauces, or gravies    Other healthy heart guidelines:   · Do not smoke  Nicotine and other chemicals in cigarettes and cigars can cause lung and heart damage  Ask your healthcare provider for information if you currently smoke and need help to quit   E-cigarettes or smokeless tobacco still contain nicotine  Talk to your healthcare provider before you use these products  · Limit or do not drink alcohol as directed  Alcohol can damage your heart and raise your blood pressure  Your healthcare provider may give you specific daily and weekly limits  The general recommended limit is 1 drink a day for women 21 or older and for men 72 or older  Do not have more than 3 drinks in a day or 7 in a week  The recommended limit is 2 drinks a day for men 24to 59years of age  Do not have more than 4 drinks in a day or 14 in a week  A drink of alcohol is 12 ounces of beer, 5 ounces of wine, or 1½ ounces of liquor  · Exercise regularly  Exercise can help you maintain a healthy weight and improve your blood pressure and cholesterol levels  Regular exercise can also decrease your risk for heart problems  Ask your healthcare provider about the best exercise plan for you  Do not start an exercise program without asking your healthcare provider  Follow up with your doctor or cardiologist as directed:  Write down your questions so you remember to ask them during your visits  © Copyright [address] Information is for End User's use only and may not be sold, redistributed or otherwise used for commercial purposes  All illustrations and images included in CareNotes® are the copyrighted property of [employer]  or [address] [place] [address]  The above information is an  only  It is not intended as medical advice for individual conditions or treatments  Talk to your doctor, nurse or pharmacist before following any medical regimen to see if it is safe and effective for you

## 2021-04-15 NOTE — PROGRESS NOTES
[facility] [address] [facility]    NAME: Linda Celeste  AGE: 60 y o  SEX: female  : 1965     DATE: 4/15/2021     Assessment and Plan:     Problem List Items Addressed This Visit        Other    Depression     Will increase fluoxetine to 60 mg daily  Counseled the importance of beginning daily physical activity  Follow-up in 1 month if symptoms have not improved or sooner if symptoms worsen  Relevant Medications    FLUoxetine (PROzac) 60 MG TABS    Hyperlipidemia, mixed     Patient was previously on atorvastatin but not currently  Will obtain lipid panel and call with results  Counseled the importance of beginning a heart healthy diet and daily physical activity  Annual physical exam - Primary     To make an appointment for a Pap test   Advised to keep upcoming appointments for eye exam and dental cleaning  Other Visit Diagnoses     Encounter for screening mammogram for breast cancer        Relevant Orders    Mammo screening bilateral w 3d & cad    Post menopausal syndrome        Relevant Medications    estrogen, conjugated,-medroxyprogesterone (Prempro) 0 3-1 5 MG per tablet    Other Relevant Orders    Ambulatory referral to Gynecology    Encounter for screening mammogram for malignant neoplasm of breast        Relevant Orders    Mammo screening bilateral w 3d & cad    BMI 27 0-27 9,adult        Relevant Orders    CBC and differential    Comprehensive metabolic panel    Hemoglobin A1C    Lipid Panel with Direct LDL reflex    TSH, 3rd generation with Free T4 reflex          Immunizations and preventive care screenings were discussed with patient today  Appropriate education was printed on patient's after visit summary  Counseling:  Alcohol/drug use: discussed moderation in alcohol intake, the recommendations for healthy alcohol use, and avoidance of illicit drug use    Dental Health: discussed importance of regular tooth brushing, flossing, and dental visits  Injury prevention: discussed safety/seat belts, safety helmets, smoke detectors, carbon dioxide detectors, and smoking near bedding or upholstery  Sexual health: discussed sexually transmitted diseases, partner selection, use of condoms, avoidance of unintended pregnancy, and contraceptive alternatives  · Exercise: the importance of regular exercise/physical activity was discussed  Recommend exercise 3-5 times per week for at least 30 minutes  No follow-ups on file  Chief Complaint:     Chief Complaint   Patient presents with    Physical Exam     Need PAP      History of Present Illness:     Adult Annual Physical   Patient here for a comprehensive physical exam  The patient reports that worsening up depression  John Stark feels anxious, on edge, has difficulty sleeping, and decreased motivation  She has been taking her fluoxetine 40 mg daily  She attributes this to her 's chronic health issues as well as work stress  Diet and Physical Activity  · Diet/Nutrition: poor diet  · Exercise: no formal exercise  Depression Screening  PHQ-9 Depression Screening    PHQ-9:   Frequency of the following problems over the past two weeks:      Little interest or pleasure in doing things: 2 - more than half the days  Feeling down, depressed, or hopeless: 2 - more than half the days  Trouble falling or staying asleep, or sleeping too much: 3 - nearly every day  Feeling tired or having little energy: 2 - more than half the days  Poor appetite or overeatin - more than half the days  Feeling bad about yourself - or that you are a failure or have let yourself or your family down: 3 - nearly every day  Trouble concentrating on things, such as reading the newspaper or watching television: 3 - nearly every day  Moving or speaking so slowly that other people could have noticed   Or the opposite - being so fidgety or restless that you have been moving around a lot more than usual: 0 - not at all  Thoughts that you would be better off dead, or of hurting yourself in some way: 0 - not at all  PHQ-2 Score: 4  PHQ-9 Score: 17       General Health  · Sleep: sleeps well and gets more than 8 hours of sleep on average  · Hearing: decreased - bilateral and requires use of hearing aids  · Vision: most recent eye exam <1 year ago and wears contacts  · Dental: no dental visits for >1 year  /GYN Health  · Patient is: postmenopausal  · Last menstrual period: LMP 10-15 years ago  · Contraceptive method: none  Review of Systems:     Review of Systems   Constitutional: Positive for fatigue  HENT: Positive for hearing loss  Eyes: Negative  Respiratory: Negative  Cardiovascular: Negative  Gastrointestinal: Positive for constipation (chronic-takes 2 senna daily )  Endocrine:        Hot flashes    Genitourinary: Negative  Musculoskeletal: Negative  Skin: Negative  Allergic/Immunologic: Negative  Neurological: Negative  Hematological: Negative  Psychiatric/Behavioral: Positive for dysphoric mood and sleep disturbance  The patient is nervous/anxious         Past Medical History:     Past Medical History:   Diagnosis Date    Anxiety 06/20/2016    Last assessed    Chronic infective otitis externa of left ear 09/18/2017    Last assessed    GERD (gastroesophageal reflux disease)     Right lumbar radiculopathy 05/05/2016    Last assessed    Sciatic leg pain       Past Surgical History:     Past Surgical History:   Procedure Laterality Date    APPENDECTOMY      CHOLECYSTECTOMY      KNEE ARTHROSCOPY      torn menicus    [place] LAMNOTMY INCL W/DCMPRSN NRV ROOT 1 INTRSPC LUMBR Right 7/6/2016    Procedure: L3/4 FORAMINOTOMY;  Surgeon: Chanelle Rodriguez MD;  [facility];  Service: Neurosurgery    TONSILLECTOMY        Social History:        Social History     Socioeconomic History    Marital status: /Civil Union     Spouse name: None    Number of children: None    Years of education: None    Highest education level: None   Occupational History    None   Social Needs    Financial resource strain: None    Food insecurity     Worry: None     Inability: None    Transportation needs     Medical: None     Non-medical: None   Tobacco Use    Smoking status: Never Smoker    Smokeless tobacco: Never Used    Tobacco comment: Per allscripts - former smoker   Substance and Sexual Activity    Alcohol use: Yes    Drug use: No    Sexual activity: None   Lifestyle    Physical activity     Days per week: None     Minutes per session: None    Stress: None   Relationships    Social connections     Talks on phone: None     Gets together: None     Attends Congregational service: None     Active member of club or organization: None     Attends meetings of clubs or organizations: None     Relationship status: None    Intimate partner violence     Fear of current or ex partner: None     Emotionally abused: None     Physically abused: None     Forced sexual activity: None   Other Topics Concern    None   Social History Narrative    Dental care, reg    Lives with spouse      Family History:     Family History   Problem Relation Age of Onset    No Known Problems Mother     Alcohol abuse Father     Diabetes Paternal Grandmother     Hypertension Paternal Grandmother     Cancer Family       Current Medications:     Current Outpatient Medications   Medication Sig Dispense Refill    estrogen, conjugated,-medroxyprogesterone (Prempro) 0 3-1 5 MG per tablet Take 1 tablet by mouth daily 30 tablet 0    valACYclovir (VALTREX) 1,000 mg tablet Take 1 tablet (1,000 mg total) by mouth daily 90 tablet 5    FLUoxetine (PROzac) 60 MG TABS Take 1 tablet (60 mg total) by mouth daily 30 tablet 2     No current facility-administered medications for this visit  Allergies:      Allergies   Allergen Reactions    Erythromycin     Prednisone Annotation - [id number]: reaction within 2 hours: shaking, sweats, near syncope      Physical Exam:     /74   Pulse 66   Temp 98 9 °F (37 2 °C) (Tympanic)   Resp 16   Ht 5' 1" (1 549 m)   Wt 65 8 kg (145 lb)   SpO2 100%   BMI 27 40 kg/m²     Physical Exam  Vitals signs and nursing note reviewed  Constitutional:       General: She is not in acute distress  Appearance: Normal appearance  She is well-developed  She is not ill-appearing, toxic-appearing or diaphoretic  HENT:      Head: Normocephalic and atraumatic  Right Ear: Tympanic membrane, ear canal and external ear normal  Decreased hearing noted  Left Ear: Tympanic membrane, ear canal and external ear normal  Decreased hearing noted  Ears:      Comments: B/L hearing aids      Nose: Nose normal       Mouth/Throat:      Mouth: Mucous membranes are moist       Pharynx: Oropharynx is clear  No oropharyngeal exudate  Eyes:      Conjunctiva/sclera: Conjunctivae normal       Pupils: Pupils are equal, round, and reactive to light  Neck:      Musculoskeletal: Normal range of motion and neck supple  Thyroid: No thyromegaly  Cardiovascular:      Rate and Rhythm: Normal rate and regular rhythm  Heart sounds: Normal heart sounds  No murmur  Pulmonary:      Effort: Pulmonary effort is normal  No respiratory distress  Breath sounds: Normal breath sounds  No stridor  No wheezing or rales  Chest:      Chest wall: No tenderness  Abdominal:      General: Abdomen is flat  Bowel sounds are normal  There is no distension  Palpations: Abdomen is soft  There is no mass  Tenderness: There is no abdominal tenderness  There is no guarding or rebound  Hernia: No hernia is present  Musculoskeletal: Normal range of motion  Skin:     Capillary Refill: Capillary refill takes less than 2 seconds  Neurological:      General: No focal deficit present        Mental Status: She is alert and oriented to person, place, and time  Mental status is at baseline  Cranial Nerves: No cranial nerve deficit  Psychiatric:         Mood and Affect: Mood normal          Behavior: Behavior normal          Thought Content: Thought content normal          Judgment: Judgment normal           AMAURI Cordon [place] [address] [facility]      BMI Counseling: Body mass index is 27 4 kg/m²  The BMI is above normal  Nutrition recommendations include reducing portion sizes, decreasing overall calorie intake, 3-5 servings of fruits/vegetables daily, reducing fast food intake, consuming healthier snacks, decreasing soda and/or juice intake, moderation in carbohydrate intake, increasing intake of lean protein, reducing intake of saturated fat and trans fat and reducing intake of cholesterol  Exercise recommendations include exercising 3-5 times per week

## 2021-04-15 NOTE — ASSESSMENT & PLAN NOTE
To make an appointment for a Pap test   Advised to keep upcoming appointments for eye exam and dental cleaning

## 2021-04-20 DIAGNOSIS — F33.1 MODERATE EPISODE OF RECURRENT MAJOR DEPRESSIVE DISORDER (HCC): Primary | ICD-10-CM

## 2021-04-20 RX ORDER — FLUOXETINE 20 MG/1
TABLET, FILM COATED ORAL
Qty: 120 TABLET | Refills: 1 | Status: SHIPPED | OUTPATIENT
Start: 2021-04-20 | End: 2021-10-05 | Stop reason: SDUPTHER

## 2021-04-24 ENCOUNTER — APPOINTMENT (OUTPATIENT)
Dept: LAB | Facility: HOSPITAL | Age: 56
End: 2021-04-24
Payer: COMMERCIAL

## 2021-04-24 LAB
ALBUMIN SERPL BCP-MCNC: 3.6 G/DL (ref 3.5–5)
ALP SERPL-CCNC: 121 U/L (ref 46–116)
ALT SERPL W P-5'-P-CCNC: 20 U/L (ref 12–78)
ANION GAP SERPL CALCULATED.3IONS-SCNC: 7 MMOL/L (ref 4–13)
AST SERPL W P-5'-P-CCNC: 16 U/L (ref 5–45)
BASOPHILS # BLD AUTO: 0.04 THOUSANDS/ΜL (ref 0–0.1)
BASOPHILS NFR BLD AUTO: 1 % (ref 0–1)
BILIRUB SERPL-MCNC: 0.45 MG/DL (ref 0.2–1)
BUN SERPL-MCNC: 15 MG/DL (ref 5–25)
CALCIUM SERPL-MCNC: 8.7 MG/DL (ref 8.3–10.1)
CHLORIDE SERPL-SCNC: 107 MMOL/L (ref 100–108)
CHOLEST SERPL-MCNC: 255 MG/DL (ref 50–200)
CO2 SERPL-SCNC: 29 MMOL/L (ref 21–32)
CREAT SERPL-MCNC: 0.78 MG/DL (ref 0.6–1.3)
EOSINOPHIL # BLD AUTO: 0.12 THOUSAND/ΜL (ref 0–0.61)
EOSINOPHIL NFR BLD AUTO: 2 % (ref 0–6)
ERYTHROCYTE [DISTWIDTH] IN BLOOD BY AUTOMATED COUNT: 13 % (ref 11.6–15.1)
EST. AVERAGE GLUCOSE BLD GHB EST-MCNC: 103 MG/DL
GFR SERPL CREATININE-BSD FRML MDRD: 85 ML/MIN/1.73SQ M
GLUCOSE P FAST SERPL-MCNC: 90 MG/DL (ref 65–99)
HBA1C MFR BLD: 5.2 %
HCT VFR BLD AUTO: 42 % (ref 34.8–46.1)
HDLC SERPL-MCNC: 66 MG/DL
HGB BLD-MCNC: 13.6 G/DL (ref 11.5–15.4)
IMM GRANULOCYTES # BLD AUTO: 0.01 THOUSAND/UL (ref 0–0.2)
IMM GRANULOCYTES NFR BLD AUTO: 0 % (ref 0–2)
LDLC SERPL CALC-MCNC: 172 MG/DL (ref 0–100)
LYMPHOCYTES # BLD AUTO: 1.31 THOUSANDS/ΜL (ref 0.6–4.47)
LYMPHOCYTES NFR BLD AUTO: 26 % (ref 14–44)
MCH RBC QN AUTO: 29.4 PG (ref 26.8–34.3)
MCHC RBC AUTO-ENTMCNC: 32.4 G/DL (ref 31.4–37.4)
MCV RBC AUTO: 91 FL (ref 82–98)
MONOCYTES # BLD AUTO: 0.47 THOUSAND/ΜL (ref 0.17–1.22)
MONOCYTES NFR BLD AUTO: 9 % (ref 4–12)
NEUTROPHILS # BLD AUTO: 3.12 THOUSANDS/ΜL (ref 1.85–7.62)
NEUTS SEG NFR BLD AUTO: 62 % (ref 43–75)
NRBC BLD AUTO-RTO: 0 /100 WBCS
PLATELET # BLD AUTO: 317 THOUSANDS/UL (ref 149–390)
PMV BLD AUTO: 10.7 FL (ref 8.9–12.7)
POTASSIUM SERPL-SCNC: 4.1 MMOL/L (ref 3.5–5.3)
PROT SERPL-MCNC: 6.9 G/DL (ref 6.4–8.2)
RBC # BLD AUTO: 4.62 MILLION/UL (ref 3.81–5.12)
SODIUM SERPL-SCNC: 143 MMOL/L (ref 136–145)
TRIGL SERPL-MCNC: 83 MG/DL
TSH SERPL DL<=0.05 MIU/L-ACNC: 3.02 UIU/ML (ref 0.36–3.74)
WBC # BLD AUTO: 5.07 THOUSAND/UL (ref 4.31–10.16)

## 2021-04-24 PROCEDURE — 80061 LIPID PANEL: CPT

## 2021-04-24 PROCEDURE — 83036 HEMOGLOBIN GLYCOSYLATED A1C: CPT

## 2021-04-24 PROCEDURE — 85025 COMPLETE CBC W/AUTO DIFF WBC: CPT

## 2021-04-24 PROCEDURE — 80053 COMPREHEN METABOLIC PANEL: CPT

## 2021-04-24 PROCEDURE — 36415 COLL VENOUS BLD VENIPUNCTURE: CPT

## 2021-04-24 PROCEDURE — 84443 ASSAY THYROID STIM HORMONE: CPT

## 2021-04-26 DIAGNOSIS — E78.2 HYPERLIPIDEMIA, MIXED: Primary | ICD-10-CM

## 2021-05-06 ENCOUNTER — TELEMEDICINE (OUTPATIENT)
Dept: FAMILY MEDICINE CLINIC | Facility: CLINIC | Age: 56
End: 2021-05-06
Payer: COMMERCIAL

## 2021-05-06 VITALS — TEMPERATURE: 98.7 F

## 2021-05-06 DIAGNOSIS — B34.9 VIRAL INFECTION, UNSPECIFIED: Primary | ICD-10-CM

## 2021-05-06 DIAGNOSIS — N95.1 POST MENOPAUSAL SYNDROME: ICD-10-CM

## 2021-05-06 PROCEDURE — 1036F TOBACCO NON-USER: CPT | Performed by: NURSE PRACTITIONER

## 2021-05-06 PROCEDURE — 99213 OFFICE O/P EST LOW 20 MIN: CPT | Performed by: NURSE PRACTITIONER

## 2021-05-06 PROCEDURE — U0005 INFEC AGEN DETEC AMPLI PROBE: HCPCS | Performed by: NURSE PRACTITIONER

## 2021-05-06 PROCEDURE — U0003 INFECTIOUS AGENT DETECTION BY NUCLEIC ACID (DNA OR RNA); SEVERE ACUTE RESPIRATORY SYNDROME CORONAVIRUS 2 (SARS-COV-2) (CORONAVIRUS DISEASE [COVID-19]), AMPLIFIED PROBE TECHNIQUE, MAKING USE OF HIGH THROUGHPUT TECHNOLOGIES AS DESCRIBED BY CMS-2020-01-R: HCPCS | Performed by: NURSE PRACTITIONER

## 2021-05-06 RX ORDER — ONDANSETRON 4 MG/1
4 TABLET, FILM COATED ORAL EVERY 8 HOURS PRN
Qty: 6 TABLET | Refills: 0 | Status: SHIPPED | OUTPATIENT
Start: 2021-05-06

## 2021-05-06 NOTE — PROGRESS NOTES
COVID-19 Outpatient Progress Note    Assessment/Plan:    Problem List Items Addressed This Visit     None      Visit Diagnoses     Viral infection, unspecified    -  Primary    Relevant Medications    ondansetron (ZOFRAN) 4 mg tablet    Other Relevant Orders    Novel Coronavirus (Covid-19),PCR [facility] - Collected in Office         Disposition:     After clarifying the patient's history, my suspicion for COVID-19 infection is very low  I recommended the patient to come to our office to perform PCR testing for COVID-19  Advised patient to come to office for COVID test however my clinical suspicion is low  To begin ondansetron 4 mg every 8 hours as needed for nausea and vomiting  Counseled the importance of staying well hydrated and beginning bland diet  Follow-up if no improvement in 1 week or sooner if symptoms worsen  I have spent 8 minutes directly with the patient  Greater than 50% of this time was spent in counseling/coordination of care regarding: prognosis, risks and benefits of treatment options, instructions for management, patient and family education, importance of treatment compliance, risk factor reductions and impressions  Encounter provider AMAURI Blank    Provider located at [facility] [address]  [place] [address] [address] [phone]    Recent Visits  No visits were found meeting these conditions  Showing recent visits within past 7 days and meeting all other requirements     Today's Visits  Date Type Provider Dept   05/06/21 Telemedicine AMAURI Blank P[address] 8 today's visits and meeting all other requirements     Future Appointments  No visits were found meeting these conditions     Showing future appointments within next 150 days and meeting all other requirements      This virtual check-in was done via [employer] and patient was informed that this is a secure, HIPAA-compliant platform  She agrees to proceed  Patient agrees to participate in a virtual check in via telephone or video visit instead of presenting to the office to address urgent/immediate medical needs  Patient is aware this is a billable service  After connecting through [employer], the patient was identified by name and date of birth  Federica Ronquillo was informed that this was a telemedicine visit and that the exam was being conducted confidentially over secure lines  My office door was closed  No one else was in the room  Federica Ronquillo acknowledged consent and understanding of privacy and security of the telemedicine visit  I informed the patient that I have reviewed her record in Epic and presented the opportunity for her to ask any questions regarding the visit today  The patient agreed to participate  Subjective:   Federica Ronquillo is a 60 y o  female who is concerned about COVID-19  Patient's symptoms include malaise, nausea, vomiting, myalgias and headache  Patient denies fever, chills, fatigue, congestion, rhinorrhea, sore throat, anosmia, loss of taste, cough, shortness of breath, chest tightness, abdominal pain and diarrhea  Date of symptom onset: 5/5/2021    Exposure:   Contact with a person who is under investigation (PUI) for or who is positive for COVID-19 within the last 14 days?: No    Linda received her 2nd COVID shot yesterday  Around 6:00 p m  Last evening she developed a headache, chills, nausea, and vomiting  Denies abdominal pain, fever, cough, shortness of breath, wheezing or sick contacts      No results found for: Lore Amezquita, [address] [facility], [place] [address]  Past Medical History:   Diagnosis Date    Anxiety 06/20/2016    Last assessed    Chronic infective otitis externa of left ear 09/18/2017    Last assessed    GERD (gastroesophageal reflux disease)     Right lumbar radiculopathy 05/05/2016    Last assessed    Sciatic leg pain      Past Surgical History: Procedure Laterality Date    APPENDECTOMY      CHOLECYSTECTOMY      KNEE ARTHROSCOPY      torn menicus    [place] LAMNOTMY INCL W/DCMPRSN NRV ROOT 1 INTRSPC LUMBR Right 7/6/2016    Procedure: L3/4 FORAMINOTOMY;  Surgeon: Janine Pearson MD;  [facility];  Service: Neurosurgery    TONSILLECTOMY       Current Outpatient Medications   Medication Sig Dispense Refill    estrogen, conjugated,-medroxyprogesterone (Prempro) 0 3-1 5 MG per tablet Take 1 tablet by mouth daily 30 tablet 0    FLUoxetine (PROzac) 20 MG tablet Take 3 tabs daily for a total of 60 mg daily 120 tablet 1    ondansetron (ZOFRAN) 4 mg tablet Take 1 tablet (4 mg total) by mouth every 8 (eight) hours as needed for nausea or vomiting 6 tablet 0    valACYclovir (VALTREX) 1,000 mg tablet Take 1 tablet (1,000 mg total) by mouth daily 90 tablet 5     No current facility-administered medications for this visit  Allergies   Allergen Reactions    Erythromycin     Prednisone      Annotation - [id number]: reaction within 2 hours: shaking, sweats, near syncope       Review of Systems   Constitutional: Negative for chills, fatigue and fever  HENT: Negative for congestion, rhinorrhea and sore throat  Respiratory: Negative for cough, chest tightness and shortness of breath  Gastrointestinal: Positive for nausea and vomiting  Negative for abdominal pain and diarrhea  Musculoskeletal: Positive for myalgias  Neurological: Positive for headaches  Objective:    Vitals:    05/06/21 1143   Temp: 98 7 °F (37 1 °C)       Physical Exam  Constitutional:       General: She is not in acute distress  Appearance: Normal appearance  She is ill-appearing  She is not toxic-appearing or diaphoretic  HENT:      Head: Normocephalic and atraumatic  Eyes:      Conjunctiva/sclera: Conjunctivae normal    Neck:      Musculoskeletal: Neck supple  Pulmonary:      Effort: Pulmonary effort is normal    Neurological:      General: No focal deficit present  Mental Status: She is alert and oriented to person, place, and time  Psychiatric:         Mood and Affect: Mood normal          Behavior: Behavior normal          Thought Content: Thought content normal          Judgment: Judgment normal        VIRTUAL VISIT DISCLAIMER    Linda Celeste acknowledges that she has consented to an online visit or consultation  She understands that the online visit is based solely on information provided by her, and that, in the absence of a face-to-face physical evaluation by the physician, the diagnosis she receives is both limited and provisional in terms of accuracy and completeness  This is not intended to replace a full medical face-to-face evaluation by the physician  Faby Martinez understands and accepts these terms

## 2021-05-08 LAB — SARS-COV-2 RNA RESP QL NAA+PROBE: NEGATIVE

## 2021-05-10 ENCOUNTER — TELEPHONE (OUTPATIENT)
Dept: FAMILY MEDICINE CLINIC | Facility: CLINIC | Age: 56
End: 2021-05-10

## 2021-05-10 RX ORDER — ESTROGEN,CON/M-PROGEST ACET 0.3-1.5MG
TABLET ORAL
Qty: 28 TABLET | Refills: 1 | Status: SHIPPED | OUTPATIENT
Start: 2021-05-10 | End: 2022-01-27

## 2021-05-10 NOTE — TELEPHONE ENCOUNTER
Nelida Person and her job need more clarification on what you mean by her covid result when you say she needs to continue social distancing procedures  Do you want her to still quarantine or just maintain 6 feet from people when she is out and working?

## 2021-05-10 NOTE — RESULT ENCOUNTER NOTE
Please call Tom Chu and let her know that her COVID-19 swab was negative  To continue social distancing procedures

## 2021-05-17 ENCOUNTER — HOSPITAL ENCOUNTER (OUTPATIENT)
Dept: MAMMOGRAPHY | Facility: CLINIC | Age: 56
Discharge: HOME | End: 2021-05-17
Payer: COMMERCIAL

## 2021-05-17 VITALS — HEIGHT: 61 IN | BODY MASS INDEX: 27.38 KG/M2 | WEIGHT: 145 LBS

## 2021-05-17 DIAGNOSIS — Z12.31 ENCOUNTER FOR SCREENING MAMMOGRAM FOR MALIGNANT NEOPLASM OF BREAST: ICD-10-CM

## 2021-05-17 PROCEDURE — 77067 SCR MAMMO BI INCL CAD: CPT

## 2021-05-17 PROCEDURE — 77063 BREAST TOMOSYNTHESIS BI: CPT

## 2021-05-18 ENCOUNTER — TELEPHONE (OUTPATIENT)
Dept: FAMILY MEDICINE CLINIC | Facility: CLINIC | Age: 56
End: 2021-05-18

## 2021-05-18 NOTE — TELEPHONE ENCOUNTER
----- Message from [id number] sent at 5/18/2021  9:20 AM EDT -----  Please call patient to make aware that mammogram was normal, to repeat in 1 year

## 2021-08-11 DIAGNOSIS — F33.1 MODERATE EPISODE OF RECURRENT MAJOR DEPRESSIVE DISORDER (HCC): Primary | ICD-10-CM

## 2021-08-11 RX ORDER — FLUOXETINE HYDROCHLORIDE 40 MG/1
CAPSULE ORAL
Qty: 90 CAPSULE | Refills: 3 | Status: SHIPPED | OUTPATIENT
Start: 2021-08-11 | End: 2021-09-20

## 2021-09-15 ENCOUNTER — APPOINTMENT (OUTPATIENT)
Dept: MRI IMAGING | Facility: HOSPITAL | Age: 56
End: 2021-09-15
Payer: COMMERCIAL

## 2021-09-15 ENCOUNTER — APPOINTMENT (EMERGENCY)
Dept: RADIOLOGY | Facility: HOSPITAL | Age: 56
End: 2021-09-15
Payer: COMMERCIAL

## 2021-09-15 ENCOUNTER — APPOINTMENT (EMERGENCY)
Dept: CT IMAGING | Facility: HOSPITAL | Age: 56
End: 2021-09-15
Payer: COMMERCIAL

## 2021-09-15 ENCOUNTER — HOSPITAL ENCOUNTER (OUTPATIENT)
Facility: HOSPITAL | Age: 56
Setting detail: OBSERVATION
Discharge: HOME | End: 2021-09-16
Attending: EMERGENCY MEDICINE | Admitting: INTERNAL MEDICINE
Payer: COMMERCIAL

## 2021-09-15 DIAGNOSIS — R42 VERTIGO: Primary | ICD-10-CM

## 2021-09-15 PROBLEM — R74.8 ELEVATED ALKALINE PHOSPHATASE LEVEL: Status: ACTIVE | Noted: 2021-09-15

## 2021-09-15 LAB
ALBUMIN SERPL BCP-MCNC: 3.4 G/DL (ref 3.5–5)
ALP SERPL-CCNC: 134 U/L (ref 46–116)
ALT SERPL W P-5'-P-CCNC: 14 U/L (ref 12–78)
ANION GAP SERPL CALCULATED.3IONS-SCNC: 6 MMOL/L (ref 4–13)
APTT PPP: 27 SECONDS (ref 23–37)
AST SERPL W P-5'-P-CCNC: 12 U/L (ref 5–45)
ATRIAL RATE: 59 BPM
BACTERIA UR QL AUTO: NORMAL /HPF
BASOPHILS # BLD AUTO: 0.04 THOUSANDS/ΜL (ref 0–0.1)
BASOPHILS NFR BLD AUTO: 1 % (ref 0–1)
BILIRUB SERPL-MCNC: 0.3 MG/DL (ref 0.2–1)
BILIRUB UR QL STRIP: NEGATIVE
BUN SERPL-MCNC: 11 MG/DL (ref 5–25)
CALCIUM ALBUM COR SERPL-MCNC: 8.8 MG/DL (ref 8.3–10.1)
CALCIUM SERPL-MCNC: 8.3 MG/DL (ref 8.3–10.1)
CHLORIDE SERPL-SCNC: 107 MMOL/L (ref 100–108)
CLARITY UR: CLEAR
CO2 SERPL-SCNC: 30 MMOL/L (ref 21–32)
COLOR UR: ABNORMAL
CREAT SERPL-MCNC: 0.82 MG/DL (ref 0.6–1.3)
EOSINOPHIL # BLD AUTO: 0.07 THOUSAND/ΜL (ref 0–0.61)
EOSINOPHIL NFR BLD AUTO: 1 % (ref 0–6)
ERYTHROCYTE [DISTWIDTH] IN BLOOD BY AUTOMATED COUNT: 13.2 % (ref 11.6–15.1)
GFR SERPL CREATININE-BSD FRML MDRD: 79 ML/MIN/1.73SQ M
GLUCOSE SERPL-MCNC: 67 MG/DL (ref 65–140)
GLUCOSE UR STRIP-MCNC: NEGATIVE MG/DL
HCT VFR BLD AUTO: 40 % (ref 34.8–46.1)
HGB BLD-MCNC: 13.1 G/DL (ref 11.5–15.4)
HGB UR QL STRIP.AUTO: ABNORMAL
IMM GRANULOCYTES # BLD AUTO: 0.02 THOUSAND/UL (ref 0–0.2)
IMM GRANULOCYTES NFR BLD AUTO: 0 % (ref 0–2)
INR PPP: 1 (ref 0.84–1.19)
KETONES UR STRIP-MCNC: NEGATIVE MG/DL
LEUKOCYTE ESTERASE UR QL STRIP: NEGATIVE
LYMPHOCYTES # BLD AUTO: 1.54 THOUSANDS/ΜL (ref 0.6–4.47)
LYMPHOCYTES NFR BLD AUTO: 23 % (ref 14–44)
MCH RBC QN AUTO: 30.6 PG (ref 26.8–34.3)
MCHC RBC AUTO-ENTMCNC: 32.8 G/DL (ref 31.4–37.4)
MCV RBC AUTO: 94 FL (ref 82–98)
MONOCYTES # BLD AUTO: 0.67 THOUSAND/ΜL (ref 0.17–1.22)
MONOCYTES NFR BLD AUTO: 10 % (ref 4–12)
NEUTROPHILS # BLD AUTO: 4.35 THOUSANDS/ΜL (ref 1.85–7.62)
NEUTS SEG NFR BLD AUTO: 65 % (ref 43–75)
NITRITE UR QL STRIP: NEGATIVE
NON-SQ EPI CELLS URNS QL MICRO: NORMAL /HPF
NRBC BLD AUTO-RTO: 0 /100 WBCS
P AXIS: 73 DEGREES
PH UR STRIP.AUTO: 7 [PH]
PLATELET # BLD AUTO: 298 THOUSANDS/UL (ref 149–390)
PMV BLD AUTO: 10 FL (ref 8.9–12.7)
POTASSIUM SERPL-SCNC: 3.8 MMOL/L (ref 3.5–5.3)
PR INTERVAL: 138 MS
PROT SERPL-MCNC: 6.6 G/DL (ref 6.4–8.2)
PROT UR STRIP-MCNC: NEGATIVE MG/DL
PROTHROMBIN TIME: 12.7 SECONDS (ref 11.6–14.5)
QRS AXIS: 91 DEGREES
QRSD INTERVAL: 86 MS
QT INTERVAL: 416 MS
QTC INTERVAL: 411 MS
RBC # BLD AUTO: 4.28 MILLION/UL (ref 3.81–5.12)
RBC #/AREA URNS AUTO: NORMAL /HPF
SODIUM SERPL-SCNC: 143 MMOL/L (ref 136–145)
SP GR UR STRIP.AUTO: <=1.005 (ref 1–1.03)
T WAVE AXIS: 83 DEGREES
TROPONIN I SERPL-MCNC: <0.02 NG/ML
UROBILINOGEN UR QL STRIP.AUTO: 0.2 E.U./DL
VENTRICULAR RATE: 59 BPM
WBC # BLD AUTO: 6.69 THOUSAND/UL (ref 4.31–10.16)
WBC #/AREA URNS AUTO: NORMAL /HPF

## 2021-09-15 PROCEDURE — 81001 URINALYSIS AUTO W/SCOPE: CPT | Performed by: PHYSICIAN ASSISTANT

## 2021-09-15 PROCEDURE — 85730 THROMBOPLASTIN TIME PARTIAL: CPT | Performed by: PHYSICIAN ASSISTANT

## 2021-09-15 PROCEDURE — 70496 CT ANGIOGRAPHY HEAD: CPT

## 2021-09-15 PROCEDURE — 80053 COMPREHEN METABOLIC PANEL: CPT | Performed by: PHYSICIAN ASSISTANT

## 2021-09-15 PROCEDURE — 85610 PROTHROMBIN TIME: CPT | Performed by: PHYSICIAN ASSISTANT

## 2021-09-15 PROCEDURE — 93005 ELECTROCARDIOGRAM TRACING: CPT

## 2021-09-15 PROCEDURE — 84484 ASSAY OF TROPONIN QUANT: CPT | Performed by: PHYSICIAN ASSISTANT

## 2021-09-15 PROCEDURE — 85025 COMPLETE CBC W/AUTO DIFF WBC: CPT | Performed by: PHYSICIAN ASSISTANT

## 2021-09-15 PROCEDURE — 96360 HYDRATION IV INFUSION INIT: CPT

## 2021-09-15 PROCEDURE — G1004 CDSM NDSC: HCPCS

## 2021-09-15 PROCEDURE — 71045 X-RAY EXAM CHEST 1 VIEW: CPT

## 2021-09-15 PROCEDURE — 99285 EMERGENCY DEPT VISIT HI MDM: CPT | Performed by: NURSE PRACTITIONER

## 2021-09-15 PROCEDURE — 99285 EMERGENCY DEPT VISIT HI MDM: CPT

## 2021-09-15 PROCEDURE — 36415 COLL VENOUS BLD VENIPUNCTURE: CPT | Performed by: PHYSICIAN ASSISTANT

## 2021-09-15 PROCEDURE — 70498 CT ANGIOGRAPHY NECK: CPT

## 2021-09-15 PROCEDURE — 93010 ELECTROCARDIOGRAM REPORT: CPT | Performed by: INTERNAL MEDICINE

## 2021-09-15 PROCEDURE — 99220 PR INITIAL OBSERVATION CARE/DAY 70 MINUTES: CPT | Performed by: STUDENT IN AN ORGANIZED HEALTH CARE EDUCATION/TRAINING PROGRAM

## 2021-09-15 PROCEDURE — 70551 MRI BRAIN STEM W/O DYE: CPT

## 2021-09-15 PROCEDURE — 99285 EMERGENCY DEPT VISIT HI MDM: CPT | Performed by: PHYSICIAN ASSISTANT

## 2021-09-15 RX ORDER — ATORVASTATIN CALCIUM 10 MG/1
10 TABLET, FILM COATED ORAL
Status: DISCONTINUED | OUTPATIENT
Start: 2021-09-15 | End: 2021-09-16 | Stop reason: HOSPADM

## 2021-09-15 RX ORDER — MECLIZINE HYDROCHLORIDE 25 MG/1
25 TABLET ORAL ONCE
Status: COMPLETED | OUTPATIENT
Start: 2021-09-15 | End: 2021-09-15

## 2021-09-15 RX ORDER — VALACYCLOVIR HYDROCHLORIDE 500 MG/1
1000 TABLET, FILM COATED ORAL DAILY
Status: DISCONTINUED | OUTPATIENT
Start: 2021-09-15 | End: 2021-09-16 | Stop reason: HOSPADM

## 2021-09-15 RX ORDER — MECLIZINE HYDROCHLORIDE 25 MG/1
25 TABLET ORAL EVERY 8 HOURS SCHEDULED
Status: DISCONTINUED | OUTPATIENT
Start: 2021-09-15 | End: 2021-09-16 | Stop reason: HOSPADM

## 2021-09-15 RX ORDER — SODIUM CHLORIDE, SODIUM LACTATE, POTASSIUM CHLORIDE, CALCIUM CHLORIDE 600; 310; 30; 20 MG/100ML; MG/100ML; MG/100ML; MG/100ML
100 INJECTION, SOLUTION INTRAVENOUS CONTINUOUS
Status: DISCONTINUED | OUTPATIENT
Start: 2021-09-15 | End: 2021-09-16 | Stop reason: HOSPADM

## 2021-09-15 RX ORDER — ONDANSETRON 2 MG/ML
4 INJECTION INTRAMUSCULAR; INTRAVENOUS EVERY 6 HOURS PRN
Status: DISCONTINUED | OUTPATIENT
Start: 2021-09-15 | End: 2021-09-16 | Stop reason: HOSPADM

## 2021-09-15 RX ORDER — ACETAMINOPHEN 325 MG/1
650 TABLET ORAL EVERY 6 HOURS PRN
Status: DISCONTINUED | OUTPATIENT
Start: 2021-09-15 | End: 2021-09-16 | Stop reason: HOSPADM

## 2021-09-15 RX ADMIN — MECLIZINE HYDROCHLORIDE 25 MG: 25 TABLET ORAL at 11:28

## 2021-09-15 RX ADMIN — SODIUM CHLORIDE 1000 ML: 0.9 INJECTION, SOLUTION INTRAVENOUS at 13:37

## 2021-09-15 RX ADMIN — IOHEXOL 100 ML: 350 INJECTION, SOLUTION INTRAVENOUS at 10:16

## 2021-09-15 RX ADMIN — MECLIZINE HYDROCHLORIDE 25 MG: 25 TABLET ORAL at 22:21

## 2021-09-15 RX ADMIN — SODIUM CHLORIDE, SODIUM LACTATE, POTASSIUM CHLORIDE, AND CALCIUM CHLORIDE 100 ML/HR: .6; .31; .03; .02 INJECTION, SOLUTION INTRAVENOUS at 18:45

## 2021-09-15 RX ADMIN — VALACYCLOVIR HYDROCHLORIDE 1000 MG: 500 TABLET, FILM COATED ORAL at 18:45

## 2021-09-15 RX ADMIN — ATORVASTATIN CALCIUM 10 MG: 10 TABLET, FILM COATED ORAL at 18:47

## 2021-09-15 NOTE — ASSESSMENT & PLAN NOTE
Present admission history of depression  Denies suicidal, homicidal ideation  Resume home dose of Prozac 20 mg b i d   Per Patient

## 2021-09-15 NOTE — ED NOTES
Pt reports persistent dizziness upon sitting up, slight nausea  Vital signs stable, provider made aware  Will continue to monitor        Dagmar Jain RN  09/15/21 0595

## 2021-09-15 NOTE — ASSESSMENT & PLAN NOTE
Noted with chronically elevated ALP around 130's range  Patient denies any abdominal pain, right upper quadrant symptoms  Recommended Outpatient follow-up with primary care provider

## 2021-09-15 NOTE — ED PROVIDER NOTES
History  Chief Complaint   Patient presents with    Dizziness     x's 3 days, feels like shes drunk per pt      Patient is a 25-year-old female presents emergency department complaints of dizziness that has been present for last 3 days  States her symptoms have been getting progressively worse  Patient states that she feels as though she is drunk  She states that she feels very unsteady while walking  She states that she does feel nauseous with no vomiting  She denies any fever, chills, chest pain, shortness of breath, abdominal pain  Patient states she has not had vertigo before  Prior to Admission Medications   Prescriptions Last Dose Informant Patient Reported? Taking?    FLUoxetine (PROzac) 20 MG tablet   No No   Sig: Take 3 tabs daily for a total of 60 mg daily   FLUoxetine (PROzac) 40 MG capsule   No No   Sig: TAKE 1 CAPSULE BY MOUTH EVERY DAY   Prempro 0 3-1 5 MG per tablet   No No   Sig: TAKE 1 TABLET BY MOUTH EVERY DAY   ondansetron (ZOFRAN) 4 mg tablet   No No   Sig: Take 1 tablet (4 mg total) by mouth every 8 (eight) hours as needed for nausea or vomiting   valACYclovir (VALTREX) 1,000 mg tablet   No No   Sig: Take 1 tablet (1,000 mg total) by mouth daily      Facility-Administered Medications: None       Past Medical History:   Diagnosis Date    Anxiety 06/20/2016    Last assessed    Chronic infective otitis externa of left ear 09/18/2017    Last assessed    GERD (gastroesophageal reflux disease)     Right lumbar radiculopathy 05/05/2016    Last assessed    Sciatic leg pain        Past Surgical History:   Procedure Laterality Date    APPENDECTOMY      CHOLECYSTECTOMY      KNEE ARTHROSCOPY      torn menicus    GA LAMNOTMY INCL W/DCMPRSN NRV ROOT 1 INTRSPC LUMBR Right 7/6/2016    Procedure: L3/4 FORAMINOTOMY;  Surgeon: Darlin Anthony MD;  Location[facility];  Service: Neurosurgery    TONSILLECTOMY         Family History   Problem Relation Age of Onset    No Known Problems Mother  Alcohol abuse Father     Diabetes Paternal Grandmother     Hypertension Paternal Grandmother     Cancer Family     Breast cancer Maternal Aunt      I have reviewed and agree with the history as documented  E-Cigarette/Vaping     E-Cigarette/Vaping Substances     Social History     Tobacco Use    Smoking status: Never Smoker    Smokeless tobacco: Never Used    Tobacco comment: Per allscripts - former smoker   Substance Use Topics    Alcohol use: Yes    Drug use: No       Review of Systems   Constitutional: Negative for fever  Respiratory: Negative for shortness of breath  Cardiovascular: Negative for chest pain  Gastrointestinal: Positive for nausea  Negative for vomiting  Neurological: Positive for dizziness and light-headedness  All other systems reviewed and are negative  Physical Exam  Physical Exam  Vitals reviewed  Constitutional:       Appearance: Normal appearance  HENT:      Head: Normocephalic and atraumatic  Right Ear: Tympanic membrane, ear canal and external ear normal       Left Ear: Tympanic membrane, ear canal and external ear normal       Nose: Nose normal       Mouth/Throat:      Mouth: Mucous membranes are moist    Eyes:      General: No visual field deficit  Extraocular Movements: Extraocular movements intact  Conjunctiva/sclera: Conjunctivae normal       Pupils: Pupils are equal, round, and reactive to light  Cardiovascular:      Rate and Rhythm: Normal rate and regular rhythm  Pulses: Normal pulses  Heart sounds: Normal heart sounds  Pulmonary:      Effort: Pulmonary effort is normal       Breath sounds: Normal breath sounds  Abdominal:      General: Bowel sounds are normal       Palpations: Abdomen is soft  Skin:     General: Skin is warm  Capillary Refill: Capillary refill takes less than 2 seconds  Neurological:      General: No focal deficit present        Mental Status: She is alert and oriented to person, place, and time       GCS: GCS eye subscore is 4  GCS verbal subscore is 5  GCS motor subscore is 6  Cranial Nerves: Cranial nerves are intact  No cranial nerve deficit or facial asymmetry  Sensory: No sensory deficit  Motor: Motor function is intact  No weakness  Coordination: Romberg sign negative  Coordination abnormal  Finger-Nose-Finger Test normal       Gait: Gait abnormal and tandem walk abnormal    Psychiatric:         Mood and Affect: Mood normal          Behavior: Behavior normal          Thought Content:  Thought content normal          Judgment: Judgment normal          Vital Signs  ED Triage Vitals [09/15/21 0848]   Temperature Pulse Respirations Blood Pressure SpO2   98 1 °F (36 7 °C) 62 18 130/68 100 %      Temp Source Heart Rate Source Patient Position - Orthostatic VS BP Location FiO2 (%)   Oral Monitor Sitting Left arm --      Pain Score       --           Vitals:    09/15/21 0930 09/15/21 1030 09/15/21 1145 09/15/21 1337   BP: 132/63 147/58 140/62 139/68   Pulse: 57 57 59 57   Patient Position - Orthostatic VS: Lying Lying Lying Lying         Visual Acuity  Visual Acuity      Most Recent Value   L Pupil Size (mm)  3   R Pupil Size (mm)  4          ED Medications  Medications   sodium chloride 0 9 % bolus 1,000 mL (1,000 mL Intravenous New Bag 9/15/21 1337)   iohexol (OMNIPAQUE) 350 MG/ML injection (SINGLE-DOSE) 100 mL (100 mL Intravenous Given 9/15/21 1016)   meclizine (ANTIVERT) tablet 25 mg (25 mg Oral Given 9/15/21 1128)       Diagnostic Studies  Results Reviewed     Procedure Component Value Units Date/Time    Urine Microscopic [[id number]]  (Normal) Collected: 09/15/21 1134    Lab Status: Final result Specimen: Urine, Clean Catch Updated: 09/15/21 1156     RBC, UA 0-1 /hpf      WBC, UA None Seen /hpf      Epithelial Cells None Seen /hpf      Bacteria, UA None Seen /hpf     UA w Reflex to Microscopic w Reflex to Culture [[id number]]  (Abnormal) Collected: 09/15/21 1134    Lab Status: Final result Specimen: Urine, Clean Catch Updated: 09/15/21 1147     Color, UA Light Yellow     Clarity, UA Clear     Specific Gravity, UA <=1 005     pH, UA 7 0     Leukocytes, UA Negative     Nitrite, UA Negative     Protein, UA Negative mg/dl      Glucose, UA Negative mg/dl      Ketones, UA Negative mg/dl      Urobilinogen, UA 0 2 E U /dl      Bilirubin, UA Negative     Blood, UA Trace-Intact    Troponin I [[id number]]  (Normal) Collected: 09/15/21 0903    Lab Status: Final result Specimen: Blood from Arm, Right Updated: 09/15/21 0929     Troponin I <0 02 ng/mL     Comprehensive metabolic panel [[id number]]  (Abnormal) Collected: 09/15/21 0903    Lab Status: Final result Specimen: Blood from Arm, Right Updated: 09/15/21 0926     Sodium 143 mmol/L      Potassium 3 8 mmol/L      Chloride 107 mmol/L      CO2 30 mmol/L      ANION GAP 6 mmol/L      BUN 11 mg/dL      Creatinine 0 82 mg/dL      Glucose 67 mg/dL      Calcium 8 3 mg/dL      Corrected Calcium 8 8 mg/dL      AST 12 U/L      ALT 14 U/L      Alkaline Phosphatase 134 U/L      Total Protein 6 6 g/dL      Albumin 3 4 g/dL      Total Bilirubin 0 30 mg/dL      eGFR 79 ml/min/1 73sq m     Narrative:      Cassi guidelines for Chronic Kidney Disease (CKD):     Stage 1 with normal or high GFR (GFR > 90 mL/min/1 73 square meters)    Stage 2 Mild CKD (GFR = 60-89 mL/min/1 73 square meters)    Stage 3A Moderate CKD (GFR = 45-59 mL/min/1 73 square meters)    Stage 3B Moderate CKD (GFR = 30-44 mL/min/1 73 square meters)    Stage 4 Severe CKD (GFR = 15-29 mL/min/1 73 square meters)    Stage 5 End Stage CKD (GFR <15 mL/min/1 73 square meters)  Note: GFR calculation is accurate only with a steady state creatinine    Protime-INR [[id number]]  (Normal) Collected: 09/15/21 0903    Lab Status: Final result Specimen: Blood from Arm, Right Updated: 09/15/21 0922     Protime 12 7 seconds      INR 1 00    APTT [[id number]]  (Normal) Collected: 09/15/21 2441    Lab Status: Final result Specimen: Blood from Arm, Right Updated: 09/15/21 0922     PTT 27 seconds     CBC and differential [[id number]] Collected: 09/15/21 0903    Lab Status: Final result Specimen: Blood from Arm, Right Updated: 09/15/21 0910     WBC 6 69 Thousand/uL      RBC 4 28 Million/uL      Hemoglobin 13 1 g/dL      Hematocrit 40 0 %      MCV 94 fL      MCH 30 6 pg      MCHC 32 8 g/dL      RDW 13 2 %      MPV 10 0 fL      Platelets 482 Thousands/uL      nRBC 0 /100 WBCs      Neutrophils Relative 65 %      Immat GRANS % 0 %      Lymphocytes Relative 23 %      Monocytes Relative 10 %      Eosinophils Relative 1 %      Basophils Relative 1 %      Neutrophils Absolute 4 35 Thousands/µL      Immature Grans Absolute 0 02 Thousand/uL      Lymphocytes Absolute 1 54 Thousands/µL      Monocytes Absolute 0 67 Thousand/µL      Eosinophils Absolute 0 07 Thousand/µL      Basophils Absolute 0 04 Thousands/µL                  CTA head and neck with and without contrast   Final Result by Viviane Shea MD (09/15 1106)      No mass effect, acute intracranial hemorrhage or CT evidence for large acute vascular distribution infarct  No evidence for high-grade stenosis, focal occlusion or vascular aneurysm of the cervical or intracranial vasculature  Workstation performed: [id number]         XR chest 1 view portable   Final Result by Sylvester Agustin MD (23/14 9559)      No acute cardiopulmonary disease                    Workstation performed: [id number]                    Procedures  ECG 12 Lead Documentation Only    Date/Time: 9/15/2021 2:59 PM  Performed by: Griselda Carl, PA-C  Authorized by: Griselda Carl, PA-C     Indications / Diagnosis:  Dizziness  ECG reviewed by me, the ED Provider: yes    Patient location:  ED  Previous ECG:     Previous ECG:  Unavailable  Interpretation:     Interpretation: normal    Rate:     ECG rate:  59    ECG rate assessment: normal    Rhythm:     Rhythm: sinus rhythm               ED Course                             SBIRT 22yo+      Most Recent Value   SBIRT (24 yo +)   In order to provide better care to our patients, we are screening all of our patients for alcohol and drug use  Would it be okay to ask you these screening questions? Yes Filed at: 09/15/2021 0913   Initial Alcohol Screen: US AUDIT-C    1  How often do you have a drink containing alcohol? 1 Filed at: 09/15/2021 0913   2  How many drinks containing alcohol do you have on a typical day you are drinking? 0 Filed at: 09/15/2021 0913   3a  Male UNDER 65: How often do you have five or more drinks on one occasion? 0 Filed at: 09/15/2021 0913   3b  FEMALE Any Age, or MALE 65+: How often do you have 4 or more drinks on one occassion? 0 Filed at: 09/15/2021 0913   Audit-C Score  1 Filed at: 09/15/2021 6032   ABIGAIL: How many times in the past year have you    Used an illegal drug or used a prescription medication for non-medical reasons? Never Filed at: 09/15/2021 0913                    MDM  Number of Diagnoses or Management Options  Vertigo  Diagnosis management comments: Patient is a 53-year-old female presents emergency department complaints of dizziness that has been present for last 3 days  States her symptoms have been getting progressively worse  Patient states that she feels as though she is drunk  She states that she feels very unsteady while walking  She states that she does feel nauseous with no vomiting  She denies any fever, chills, chest pain, shortness of breath, abdominal pain  Patient states she has not had vertigo before  On examination, patient does have difficulty with ambulation  She uses assistants to help balance herself while walking  She is not able to do normal tandem walking without assistance  There is no focal neuro deficit noted  Remainder of exam is unremarkable  Lab evaluations performed is unremarkable    CTA head was obtained does not show any acute abnormality  Patient was evaluated by Neurology  She had received IV fluids and meclizine  She was also re-evaluated with serial exams with no improvement after treatment  Patient will be admitted to the hospital for observation  Amount and/or Complexity of Data Reviewed  Clinical lab tests: ordered and reviewed  Tests in the radiology section of CPT®: reviewed and ordered  Review and summarize past medical records: yes  Discuss the patient with other providers: yes  Independent visualization of images, tracings, or specimens: yes    Risk of Complications, Morbidity, and/or Mortality  Presenting problems: moderate  Diagnostic procedures: moderate  Management options: moderate    Patient Progress  Patient progress: stable      Disposition  Final diagnoses:   Vertigo     Time reflects when diagnosis was documented in both MDM as applicable and the Disposition within this note     Time User Action Codes Description Comment    9/15/2021 12:33 PM Vikas Mcnamara Add [R42] Vertigo       ED Disposition     ED Disposition Condition Date/Time Comment    Admit Stable Wed Sep 15, 2021 1439 Case was discussed with SIMON and the patient's admission status was agreed to be Admission Status: observation status to the service of Dr Li Ferrell  Follow-up Information    None         Patient's Medications   Discharge Prescriptions    No medications on file     No discharge procedures on file      PDMP Review     None          ED Provider  Electronically Signed by           Janna Yoo PA-C  09/15/21 3229

## 2021-09-15 NOTE — ED NOTES
Pt ambulatory to restroom with steady gait for urine sample        Eunice Boateng, RN  09/15/21 2018

## 2021-09-15 NOTE — ASSESSMENT & PLAN NOTE
09/16/2021:  New to this examiner for follow-up exam is Yue Abraham, a 49-year-old female with history as outlined in the HPI who presents to [facility] on 09/15 with ongoing dizziness x2 days  CTA head and neck unremarkable  Blood pressure in the emergency department 130/68  Patient reports intermittent room spinning sensation, somewhat more of a vertiginous sensation than a true lightheaded or dizziness, that has been coming and going over the past 2 days  Patient reports that has affected her ability to focus her vision, as well as ambulate  Patient most recently had 25 mg Meclizine this morning at 5:00 a m  Camille Amado She does report some improvement, although she also reports that she is sleepy now though  Plan:  - Continue meclizine 25 mg q 8 hours scheduled, we have discussed that the patient can continue this medication at home p r n  For the next several days  - she has no further complaints of nausea at this time  - ensure adequate hydration, discussed outpatient hydration  - PT/OT, she may benefit from vestibular outpatient PT   - from a neurologic point of view this patient is stable for discharge

## 2021-09-15 NOTE — ASSESSMENT & PLAN NOTE
60year old female past medical history of depression, cervical spinal stenosis, present [facility] Emergency room complaining of dizziness for 2-3  days of duration  Exacerbated by leaning forward, changing position and looking  around quickly, associated with some photophobia, headache  Denies any recent upper respiratory infection  Denies fever, chills  Today's at reviewed and grossly unremarkable for any acute finding except   UA negative for UTI  Chest x-ray report noted negative for acute finding  CTA head and neck report noted negative for acute finding  Patient received overall meclizine 25 mg in ED without much improvement of symptoms  Currently on my encounter patient appears comfortable not in distress  Complaining of having persistent symptoms  Continue telemetry monitoring  Orthostatic vitals monitoring  Continue meclizine 25 mg q 8 hours scheduled, IV antiemetic p r n  IV  cc an hour  Tylenol p r n  For headache  Follow-up with brain MRI  Continue supportive care  Neurology following  Patient and  at bedside agreeable with above plan

## 2021-09-15 NOTE — CONSULTS
Consultation - Neurology   Mariama Castellon 60 y o  female MRN: [id number]  Unit/Bed#: ED 28 Encounter: [id number]      Assessment/Plan     Dizziness    Felice Ray is a 57-year-old female with history as outlined in the HPI who presents to [facility] on 09/15 with ongoing dizziness x2 days  CTA head and neck unremarkable  Blood pressure in the emergency department 130/68  Patient reports intermittent room spinning sensation that has been coming and going over the past 2 days  Patient reports that has affected her ability to focus her vision, as well as ambulate  Patient is status post 1 dose of 25 mg p o  Meclizine without much improvement  Plan:  - Continue meclizine 25 mg q 8 hours scheduled  - Can treat nausea with p r n  Zofran 4 mg IV q 8 hours  - ensure adequate hydration, IV fluids recommended  - PT/OT  - Ongoing fall precautions  - If no improvement of symptoms with meclizine can consider MRI brain  - Continue to monitor neuro examination, please contact Neurology with any changes in exam      Recommendations for outpatient neurological follow up have yet to be determined  History of Present Illness     Reason for Consult / Principal Problem:  Dizziness  HPI: Mariama Castellon is a 60 y o  female with anxiety, GERD, right lumbar radiculopathy, hyperlipidemia, who presents to [facility] on 09/15 with ongoing dizziness x2 days  Patient reports she 1st noticed dizziness with room spinning sensation approximately 2 days ago, her gait felt off, and she was holding onto things in order to ambulate  She also feels like at times she is unable to use her eyes to focus  Earlier this morning when she was at work she bent over and again became very dizzy, she subsequently presented to the emergency department for further workup  Patient was treated with 25 mg p o  Meclizine in the ED with no improvement of symptoms    CTA head and neck was negative for acute intracranial findings, high-grade stenosis, focal occlusion or aneurysm  Blood pressure on arrival 130/68  Patient reports mild headache over her left eye-  rating it a 3/10  Patient reports she is a nonsmoker, and rarely uses alcohol  Patient resides with her   Denies sick contacts  Consult to neurology  Consult performed by: [place] S[place]in, CRNP  Consult ordered by: Bahman Burroughs PA-C          Review of Systems   HENT: Negative for dental problem  Respiratory: Negative for chest tightness and shortness of breath  Cardiovascular: Negative for chest pain, palpitations and leg swelling  Genitourinary: Negative for difficulty urinating  Musculoskeletal: Positive for gait problem  Neurological: Positive for dizziness and headaches  Negative for tremors, seizures, syncope, facial asymmetry, speech difficulty, weakness, light-headedness and numbness  Psychiatric/Behavioral: The patient is nervous/anxious          Historical Information   Past Medical History:   Diagnosis Date    Anxiety 06/20/2016    Last assessed    Chronic infective otitis externa of left ear 09/18/2017    Last assessed    GERD (gastroesophageal reflux disease)     Right lumbar radiculopathy 05/05/2016    Last assessed    Sciatic leg pain      Past Surgical History:   Procedure Laterality Date    APPENDECTOMY      CHOLECYSTECTOMY      KNEE ARTHROSCOPY      torn menicus    MA LAMNOTMY INCL W/DCMPRSN NRV ROOT 1 INTRSPC LUMBR Right 7/6/2016    Procedure: L3/4 FORAMINOTOMY;  Surgeon: Renu Jacobs MD;  [facility];  Service: Neurosurgery    TONSILLECTOMY       Social History   Social History     Substance and Sexual Activity   Alcohol Use Yes     Social History     Substance and Sexual Activity   Drug Use No     E-Cigarette/Vaping     E-Cigarette/Vaping Substances     Social History     Tobacco Use   Smoking Status Never Smoker   Smokeless Tobacco Never Used   Tobacco Comment    Per allscripts - former smoker     Family History:   Family History Problem Relation Age of Onset    No Known Problems Mother     Alcohol abuse Father     Diabetes Paternal Grandmother     Hypertension Paternal Grandmother     Cancer Family     Breast cancer Maternal Aunt        Review of previous medical records was  completed  Meds/Allergies   all current active meds have been reviewed, current meds:   Current Facility-Administered Medications   Medication Dose Route Frequency    sodium chloride 0 9 % bolus 1,000 mL  1,000 mL Intravenous Once    and PTA meds:   Prior to Admission Medications   Prescriptions Last Dose Informant Patient Reported? Taking? FLUoxetine (PROzac) 20 MG tablet   No No   Sig: Take 3 tabs daily for a total of 60 mg daily   FLUoxetine (PROzac) 40 MG capsule   No No   Sig: TAKE 1 CAPSULE BY MOUTH EVERY DAY   Prempro 0 3-1 5 MG per tablet   No No   Sig: TAKE 1 TABLET BY MOUTH EVERY DAY   ondansetron (ZOFRAN) 4 mg tablet   No No   Sig: Take 1 tablet (4 mg total) by mouth every 8 (eight) hours as needed for nausea or vomiting   valACYclovir (VALTREX) 1,000 mg tablet   No No   Sig: Take 1 tablet (1,000 mg total) by mouth daily      Facility-Administered Medications: None       Allergies   Allergen Reactions    Erythromycin     Prednisone      Annotation - [id number]: reaction within 2 hours: shaking, sweats, near syncope       Objective   Vitals:Blood pressure 139/68, pulse 57, temperature 98 1 °F (36 7 °C), temperature source Oral, resp  rate 16, height 5' 1" (1 549 m), weight 68 kg (150 lb), SpO2 98 %  ,Body mass index is 28 34 kg/m²  No intake or output data in the 24 hours ending 09/15/21 1520    Invasive Devices: Invasive Devices     Peripheral Intravenous Line            Peripheral IV 09/15/21 Right Antecubital <1 day                Physical Exam  Vitals and nursing note reviewed  Constitutional:       Appearance: Normal appearance  HENT:      Head: Normocephalic        Right Ear: External ear normal       Left Ear: External ear normal  Nose: Nose normal       Mouth/Throat:      Mouth: Mucous membranes are moist    Eyes:      General: No scleral icterus  Extraocular Movements: Extraocular movements intact and EOM normal       Conjunctiva/sclera: Conjunctivae normal       Pupils: Pupils are equal, round, and reactive to light  Pulmonary:      Effort: Pulmonary effort is normal  No respiratory distress  Abdominal:      Tenderness: There is no abdominal tenderness  Skin:     General: Skin is warm and dry  Neurological:      General: No focal deficit present  Mental Status: She is alert and oriented to person, place, and time  Deep Tendon Reflexes: Strength normal       Reflex Scores:       Bicep reflexes are 2+ on the right side and 2+ on the left side  Brachioradialis reflexes are 2+ on the right side and 2+ on the left side  Patellar reflexes are 2+ on the right side and 2+ on the left side  Psychiatric:         Speech: Speech normal        Neurologic Exam     Mental Status   Oriented to person, place, and time  Speech: speech is normal   Level of consciousness: alert    Cranial Nerves     CN II   Visual fields full to confrontation  Visual acuity: (Wears contacts at baseline)    CN III, IV, VI   Pupils are equal, round, and reactive to light  Extraocular motions are normal    Right pupil: Size: 3 mm  Shape: regular  Left pupil: Size: 3 mm  Shape: regular  Nystagmus: left   Nystagmus type: horizontal    CN V   Facial sensation intact  CN VII   Facial expression full, symmetric  CN VIII   Hearing: intact    CN XI   CN XI normal      CN XII   CN XII normal    ptosis on right eye lid ( reports this is chronic)  Evidence of left horizontal nystagmus with modified [place]-Hallpike  Motor Exam   Muscle bulk: normal  Overall muscle tone: normal  Right arm pronator drift: absent  Left arm pronator drift: absent    Strength   Strength 5/5 throughout       Sensory Exam   Light touch normal    Pinprick normal      Gait, Coordination, and Reflexes     Tremor   Resting tremor: absent  Intention tremor: absent    Reflexes   Right brachioradialis: 2+  Left brachioradialis: 2+  Right biceps: 2+  Left biceps: 2+  Right patellar: 2+  Left patellar: 2+Patient reports that she feel off balance with walking       Lab Results:   I have personally reviewed pertinent reports  , CBC:   Results from last 7 days   Lab Units 09/15/21  0903   WBC Thousand/uL 6 69   RBC Million/uL 4 28   HEMOGLOBIN g/dL 13 1   HEMATOCRIT % 40 0   MCV fL 94   PLATELETS Thousands/uL 298   , BMP/CMP:   Results from last 7 days   Lab Units 09/15/21  0903   SODIUM mmol/L 143   POTASSIUM mmol/L 3 8   CHLORIDE mmol/L 107   CO2 mmol/L 30   BUN mg/dL 11   CREATININE mg/dL 0 82   CALCIUM mg/dL 8 3   AST U/L 12   ALT U/L 14   ALK PHOS U/L 134*   EGFR ml/min/1 73sq m 79   , Vitamin B12:   , HgBA1C:   , TSH:   , Coagulation:   Results from last 7 days   Lab Units 09/15/21  0903   INR  1 00   , Lipid Profile:   , Ammonia:   , Urinalysis:   Results from last 7 days   Lab Units 09/15/21  1134   COLOR UA  Light Yellow   CLARITY UA  Clear   SPEC GRAV UA  <=1 005   PH UA  7 0   LEUKOCYTES UA  Negative   NITRITE UA  Negative   GLUCOSE UA mg/dl Negative   KETONES UA mg/dl Negative   BILIRUBIN UA  Negative   BLOOD UA  Trace-Intact*       Imaging Studies: I have personally reviewed pertinent reports  and I have personally reviewed pertinent films in PACS  EKG, Pathology, and Other Studies: I have personally reviewed pertinent reports  and I have personally reviewed pertinent films in PACS  VTE Prophylaxis:  N/A, patient observed in the emergency department    Code Status: Prior    Counseling / Coordination of Care  Total time spent today 50 minutes  Greater than 50% of total time was spent with the patient and/or family counseling and/or coordination of care  A description of the counseling/coordination of care:  Patient was seen and evaluated    Discussed with attending  Chart reviewed thoroughly including laboratory and imaging studies    Plan of care discussed with patient and primary team

## 2021-09-15 NOTE — LETTER
[address]  [address] [facility] [address]  Dept: [phone]    September 16, 2021     Patient: Sabiha Olsen   YOB: 1965   Date of Visit: 9/15/2021       To Whom it May Concern:    Kena House is under my professional care  She was seen in the hospital from 9/15/2021   to 09/16/21  She may return to work on Monday September 20th without limitations  If you have any questions or concerns, please don't hesitate to call           Sincerely,          Selwyn Said, [address]

## 2021-09-15 NOTE — H&P
[facility] 1965, 60 [facility]  female MRN: [id number]  Unit/Bed#: ED 28 Encounter: [id number]  Primary Care Provider: AMAURI Irene   Date and time admitted to hospital: 9/15/2021  8:53 AM    * Dizziness  Assessment & Plan  60year old female past medical history of depression, cervical spinal stenosis, present [facility] Emergency room complaining of dizziness for 2-3  days of duration  Exacerbated by leaning forward, changing position and looking  around quickly, associated with some photophobia, headache  Denies any recent upper respiratory infection  Denies fever, chills  Today's at reviewed and grossly unremarkable for any acute finding except   UA negative for UTI  Chest x-ray report noted negative for acute finding  CTA head and neck report noted negative for acute finding  Patient received overall meclizine 25 mg in ED without much improvement of symptoms  Currently on my encounter patient appears comfortable not in distress  Complaining of having persistent symptoms  Continue telemetry monitoring  Orthostatic vitals monitoring  Continue meclizine 25 mg q 8 hours scheduled, IV antiemetic p r n  IV  cc an hour  Tylenol p r n  For headache  Follow-up with brain MRI  Continue supportive care  Neurology following  Patient and  at bedside agreeable with above plan  Elevated alkaline phosphatase level  Assessment & Plan  Noted with chronically elevated ALP around 130's range  Patient denies any abdominal pain, right upper quadrant symptoms  Recommended Outpatient follow-up with primary care provider  Hyperlipidemia, mixed  Assessment & Plan  Outpatient lipid profile noted with elevated cholesterol, elevated LDL  Patient was previously on Lipitor but currently not on it  Will resume Lipitor 10 mg daily  Recommended healthy diet, counseled on lifestyle modification exercise    Recommended close follow-up with primary care provider  Patient is agreeable with above plan  Depression  Assessment & Plan  Present admission history of depression  Denies suicidal, homicidal ideation  Resume home dose of Prozac 20 mg b i d  Per Patient      VTE Pharmacologic Prophylaxis: VTE Score: 2 Low Risk (Score 0-2) - Encourage Ambulation  Code Status: Level 1 - Full Code   Discussion with family: Updated  () at bedside  Anticipated Length of Stay: Patient will be admitted on an observation basis with an anticipated length of stay of less than 2 midnights secondary to Dizziness  Total Time for Visit, including Counseling / Coordination of Care: 60 minutes Greater than 50% of this total time spent on direct patient counseling and coordination of care  Chief Complaint:  Dizziness    History of Present Illness:  Simon Becerra is a 60 y o  female with a PMH of depression, degenerative disc disease, patient does have history of bilateral hearing loss secondary to work related loud noise exposure and currently has hearing aids, patient does also report having chronic hx of mild tinnitus,  who presents with complaining of dizziness for 2-3 days duration  Reports that her symptoms started approximately 2 days ago but does not remember exactly what she was doing at the time  Reports that yesterday she had prolonged episode of dizziness and again this morning when she was at work she leaned forward to tie her shoe lace and subsequently had episode of feeling dizzy  Associated with some nausea without vomiting  Reports that her symptoms usually gets better with resting but this time is still feeling having dizziness even after getting oral meclizine in ED  Does report that symptoms exacerbated with certain movement  She denies any upper respiratory symptoms, denies any recent illness  Denies any sick contact, denies any recent travel history, denies any ear pain or ear discharge, fever, chills  Denies smoking  Drinks alcohol socially  Denies any substance use  Lives with   Level 1 full code  Review of Systems:  Review of Systems   Constitutional: Negative for chills, diaphoresis, fatigue and fever  HENT: Negative for congestion  Eyes: Positive for photophobia and visual disturbance  Respiratory: Negative for cough and shortness of breath  Cardiovascular: Negative for chest pain, palpitations and leg swelling  Gastrointestinal: Positive for nausea  Negative for abdominal pain, diarrhea and vomiting  Endocrine: Negative for polyuria  Genitourinary: Negative for dysuria  Musculoskeletal: Negative for neck pain and neck stiffness  Neurological: Positive for dizziness and headaches  Negative for tremors, syncope, facial asymmetry, speech difficulty and numbness  Psychiatric/Behavioral: Negative for agitation  Past Medical and Surgical History:   Past Medical History:   Diagnosis Date    Anxiety 06/20/2016    Last assessed    Chronic infective otitis externa of left ear 09/18/2017    Last assessed    GERD (gastroesophageal reflux disease)     Right lumbar radiculopathy 05/05/2016    Last assessed    Sciatic leg pain        Past Surgical History:   Procedure Laterality Date    APPENDECTOMY      CHOLECYSTECTOMY      KNEE ARTHROSCOPY      torn menicus    OH LAMNOTMY INCL W/DCMPRSN NRV ROOT 1 INTRSPC LUMBR Right 7/6/2016    Procedure: L3/4 FORAMINOTOMY;  Surgeon: Renu Jacobs MD;  [facility];  Service: Neurosurgery    TONSILLECTOMY         Meds/Allergies:  Prior to Admission medications    Medication Sig Start Date End Date Taking?  Authorizing Provider   FLUoxetine (PROzac) 20 MG tablet Take 3 tabs daily for a total of 60 mg daily 4/20/21   AMAURI Woodard   FLUoxetine (PROzac) 40 MG capsule TAKE 1 CAPSULE BY MOUTH EVERY DAY 8/11/21   Naye Edge PA-C   ondansetron (ZOFRAN) 4 mg tablet Take 1 tablet (4 mg total) by mouth every 8 (eight) hours as needed for nausea or vomiting 5/6/21   AMAURI Phelan   Prempro 0 3-1 5 MG per tablet TAKE 1 TABLET BY MOUTH EVERY DAY 5/10/21   AMAURI Phelan   valACYclovir (VALTREX) 1,000 mg tablet Take 1 tablet (1,000 mg total) by mouth daily 6/15/20 7/15/21  [place] CECILE Stauffer     I have reviewed home medications with patient personally  Allergies: Allergies   Allergen Reactions    Erythromycin     Prednisone      Annotation - [id number]: reaction within 2 hours: shaking, sweats, near syncope       Social History:  Marital Status: /Civil Union   Patient Pre-hospital Living Situation: With spouse  Patient Pre-hospital Level of Mobility: walks  Patient Pre-hospital Diet Restrictions:  None reported  Substance Use History:   Social History     Substance and Sexual Activity   Alcohol Use Yes     Social History     Tobacco Use   Smoking Status Never Smoker   Smokeless Tobacco Never Used   Tobacco Comment    Per allscripts - former smoker     Social History     Substance and Sexual Activity   Drug Use No       Family History:  Family History   Problem Relation Age of Onset    No Known Problems Mother     Alcohol abuse Father     Diabetes Paternal Grandmother     Hypertension Paternal Grandmother     Cancer Family     Breast cancer Maternal Aunt        Physical Exam:     Vitals:   Blood Pressure: 139/68 (09/15/21 1337)  Pulse: 57 (09/15/21 1337)  Temperature: 98 1 °F (36 7 °C) (09/15/21 0848)  Temp Source: Oral (09/15/21 0848)  Respirations: 16 (09/15/21 1337)  Height: 5' 1" (154 9 cm) (09/15/21 0848)  Weight - Scale: 68 kg (150 lb) (09/15/21 0848)  SpO2: 98 % (09/15/21 1145)    Physical Exam  Constitutional:       General: She is not in acute distress  Appearance: Normal appearance  She is not ill-appearing  HENT:      Head: Normocephalic and atraumatic  Eyes:      Pupils: Pupils are equal, round, and reactive to light  Comments: No nystagmus noted     Cardiovascular:      Rate and Rhythm: Normal rate and regular rhythm  Pulses: Normal pulses  Heart sounds: Normal heart sounds  Pulmonary:      Effort: Pulmonary effort is normal  No respiratory distress  Breath sounds: Normal breath sounds  No stridor  No wheezing or rhonchi  Abdominal:      General: Bowel sounds are normal  There is no distension  Palpations: Abdomen is soft  Tenderness: There is no abdominal tenderness  Musculoskeletal:         General: No swelling  Normal range of motion  Cervical back: Normal range of motion and neck supple  No rigidity  Skin:     Findings: No rash  Neurological:      General: No focal deficit present  Mental Status: She is alert and oriented to person, place, and time  Mental status is at baseline  Comments: No gross focal motor neurological finding on exam    Psychiatric:         Behavior: Behavior normal          Additional Data:     Lab Results:  Results from last 7 days   Lab Units 09/15/21  0903   WBC Thousand/uL 6 69   HEMOGLOBIN g/dL 13 1   HEMATOCRIT % 40 0   PLATELETS Thousands/uL 298   NEUTROS PCT % 65   LYMPHS PCT % 23   MONOS PCT % 10   EOS PCT % 1     Results from last 7 days   Lab Units 09/15/21  0903   SODIUM mmol/L 143   POTASSIUM mmol/L 3 8   CHLORIDE mmol/L 107   CO2 mmol/L 30   BUN mg/dL 11   CREATININE mg/dL 0 82   ANION GAP mmol/L 6   CALCIUM mg/dL 8 3   ALBUMIN g/dL 3 4*   TOTAL BILIRUBIN mg/dL 0 30   ALK PHOS U/L 134*   ALT U/L 14   AST U/L 12   GLUCOSE RANDOM mg/dL 67     Results from last 7 days   Lab Units 09/15/21  0903   INR  1 00                   Imaging: Reviewed radiology reports from this admission including: CT head  CTA head and neck with and without contrast   Final Result by Ja Ramey MD (09/15 1106)      No mass effect, acute intracranial hemorrhage or CT evidence for large acute vascular distribution infarct  No evidence for high-grade stenosis, focal occlusion or vascular aneurysm of the cervical or intracranial vasculature  Workstation performed: [id number]         XR chest 1 view portable   Final Result by Gautam Lau MD (02/04 4384)      No acute cardiopulmonary disease  Workstation performed: [id number]         MRI inpatient order    (Results Pending)       EKG and Other Studies Reviewed on Admission:   · EKG: Sinus Bradycardia  HR 59     ** Please Note: This note has been constructed using a voice recognition system   **

## 2021-09-15 NOTE — ASSESSMENT & PLAN NOTE
Outpatient lipid profile noted with elevated cholesterol, elevated LDL  Patient was previously on Lipitor but currently not on it  Will resume Lipitor 10 mg daily  Recommended healthy diet, counseled on lifestyle modification exercise  Recommended close follow-up with primary care provider  Patient is agreeable with above plan

## 2021-09-16 ENCOUNTER — TELEPHONE (OUTPATIENT)
Dept: FAMILY MEDICINE CLINIC | Facility: CLINIC | Age: 56
End: 2021-09-16

## 2021-09-16 VITALS
HEART RATE: 68 BPM | OXYGEN SATURATION: 98 % | RESPIRATION RATE: 19 BRPM | HEIGHT: 61 IN | DIASTOLIC BLOOD PRESSURE: 75 MMHG | BODY MASS INDEX: 28.32 KG/M2 | TEMPERATURE: 98.4 F | SYSTOLIC BLOOD PRESSURE: 127 MMHG | WEIGHT: 150 LBS

## 2021-09-16 LAB
ALBUMIN SERPL BCP-MCNC: 3.1 G/DL (ref 3.5–5)
ALP SERPL-CCNC: 122 U/L (ref 46–116)
ALT SERPL W P-5'-P-CCNC: 16 U/L (ref 12–78)
ANION GAP SERPL CALCULATED.3IONS-SCNC: 5 MMOL/L (ref 4–13)
AST SERPL W P-5'-P-CCNC: 14 U/L (ref 5–45)
BILIRUB SERPL-MCNC: 0.29 MG/DL (ref 0.2–1)
BUN SERPL-MCNC: 13 MG/DL (ref 5–25)
CALCIUM ALBUM COR SERPL-MCNC: 9.5 MG/DL (ref 8.3–10.1)
CALCIUM SERPL-MCNC: 8.8 MG/DL (ref 8.3–10.1)
CHLORIDE SERPL-SCNC: 109 MMOL/L (ref 100–108)
CO2 SERPL-SCNC: 30 MMOL/L (ref 21–32)
CREAT SERPL-MCNC: 0.76 MG/DL (ref 0.6–1.3)
ERYTHROCYTE [SEDIMENTATION RATE] IN BLOOD: 8 MM/HOUR (ref 0–29)
GFR SERPL CREATININE-BSD FRML MDRD: 87 ML/MIN/1.73SQ M
GLUCOSE SERPL-MCNC: 93 MG/DL (ref 65–140)
POTASSIUM SERPL-SCNC: 4.8 MMOL/L (ref 3.5–5.3)
PROT SERPL-MCNC: 6 G/DL (ref 6.4–8.2)
SODIUM SERPL-SCNC: 144 MMOL/L (ref 136–145)

## 2021-09-16 PROCEDURE — 86038 ANTINUCLEAR ANTIBODIES: CPT | Performed by: PSYCHIATRY & NEUROLOGY

## 2021-09-16 PROCEDURE — 80053 COMPREHEN METABOLIC PANEL: CPT | Performed by: STUDENT IN AN ORGANIZED HEALTH CARE EDUCATION/TRAINING PROGRAM

## 2021-09-16 PROCEDURE — 99217 PR OBSERVATION CARE DISCHARGE MANAGEMENT: CPT | Performed by: NURSE PRACTITIONER

## 2021-09-16 PROCEDURE — 86618 LYME DISEASE ANTIBODY: CPT | Performed by: PSYCHIATRY & NEUROLOGY

## 2021-09-16 PROCEDURE — 85652 RBC SED RATE AUTOMATED: CPT | Performed by: PSYCHIATRY & NEUROLOGY

## 2021-09-16 PROCEDURE — 99213 OFFICE O/P EST LOW 20 MIN: CPT | Performed by: NURSE PRACTITIONER

## 2021-09-16 RX ORDER — MECLIZINE HYDROCHLORIDE 25 MG/1
25 TABLET ORAL EVERY 8 HOURS SCHEDULED
Qty: 30 TABLET | Refills: 0 | Status: SHIPPED | OUTPATIENT
Start: 2021-09-16 | End: 2022-01-27

## 2021-09-16 RX ADMIN — MECLIZINE HYDROCHLORIDE 25 MG: 25 TABLET ORAL at 05:33

## 2021-09-16 RX ADMIN — SODIUM CHLORIDE, SODIUM LACTATE, POTASSIUM CHLORIDE, AND CALCIUM CHLORIDE 100 ML/HR: .6; .31; .03; .02 INJECTION, SOLUTION INTRAVENOUS at 06:51

## 2021-09-16 RX ADMIN — VALACYCLOVIR HYDROCHLORIDE 1000 MG: 500 TABLET, FILM COATED ORAL at 09:08

## 2021-09-16 NOTE — DISCHARGE INSTR - AVS FIRST PAGE
Dear Florinda Parsons,     It was our pleasure to care for you here at [facility]  It is our hope that we were always able to exceed the expected standards for your care during your stay  You were hospitalized due to vertigo  You were cared for on the 4th  floor by AMAURI Cruz under the service of Teresita Dempsey MD with the [facility] who covers for your primary care physician (PCP), AMAURI Dunne, while you were hospitalized  If you have any questions or concerns related to this hospitalization, you may contact us at [phone]  For follow up as well as any medication refills, we recommend that you follow up with your primary care physician  A registered nurse will reach out to you by phone within a few days after your discharge to answer any additional questions that you may have after going home  However, at this time we provide for you here, the most important instructions / recommendations at discharge:     · Notable Medication Adjustments -   · Start taking her meclizine every 8 hours for dizziness can begin to wean down in the next 48 hours to twice daily once daily and then as needed pending her symptoms  · Testing Required after Discharge -   · None  · Important follow up information -   · You can follow up with [facility] for something called vestibular rehab if here dizziness persists  · Other Instructions -   · Please move slowly and carefully to prevent any additional falls or injuries  · Please review this entire after visit summary as additional general instructions including medication list, appointments, activity, diet, any pertinent wound care, and other additional recommendations from your care team that may be provided for you        Sincerely,     Yifan Pelletier, [facility]

## 2021-09-16 NOTE — PLAN OF CARE
Problem: SAFETY ADULT  Goal: Maintain or return to baseline ADL function  Description: INTERVENTIONS:  -  Assess patient's ability to carry out ADLs; assess patient's baseline for ADL function and identify physical deficits which impact ability to perform ADLs (bathing, care of mouth/teeth, toileting, grooming, dressing, etc )  - Assess/evaluate cause of self-care deficits   - Assess range of motion  - Assess patient's mobility; develop plan if impaired  - Assess patient's need for assistive devices and provide as appropriate  - Encourage maximum independence but intervene and supervise when necessary  - Involve family in performance of ADLs  - Assess for home care needs following discharge   - Consider OT consult to assist with ADL evaluation and planning for discharge  - Provide patient education as appropriate  Outcome: Progressing     Problem: DISCHARGE PLANNING  Goal: Discharge to home or other facility with appropriate resources  Description: INTERVENTIONS:  - Identify barriers to discharge w/patient and caregiver  - Arrange for needed discharge resources and transportation as appropriate  - Identify discharge learning needs (meds, wound care, etc )  - Arrange for interpretive services to assist at discharge as needed  - Refer to Case Management Department for coordinating discharge planning if the patient needs post-hospital services based on physician/advanced practitioner order or complex needs related to functional status, cognitive ability, or social support system  Outcome: Progressing     Problem: Knowledge Deficit  Goal: Patient/family/caregiver demonstrates understanding of disease process, treatment plan, medications, and discharge instructions  Description: Complete learning assessment and assess knowledge base    Interventions:  - Provide teaching at level of understanding  - Provide teaching via preferred learning methods  Outcome: Progressing

## 2021-09-16 NOTE — UTILIZATION REVIEW
Initial Clinical Review    Admission: Date/Time/Statement:   Admission Orders (From admission, onward)     Ordered        09/15/21 1440  Place in Observation  Once                   Orders Placed This Encounter   Procedures    Place in Observation     Standing Status:   Standing     Number of Occurrences:   1     Order Specific Question:   Level of Care     Answer:   Med Surg [16]     ED Arrival Information     Expected Arrival Acuity    - 9/15/2021 08:41 Urgent         Means of arrival Escorted by Service Admission type    Walk-In Self Hospitalist Urgent         Arrival complaint    DIZZINESS        Chief Complaint   Patient presents with    Dizziness     x's 3 days, feels like shes drunk per pt        Initial Presentation: 59 yo fem w/hx degen disc dz, depression, bilat hearing loss w/hearing aids, mild tinnitus to ED from home admitted under obs due to dizziness  Presented with a few days of intermittent dizziness, with more prolonged episodes  Felt it when leaning over to tie shoe, and also became nauseated  Usually resolves, but this time has persisted  Worse w/certain movements  No other sx  Exam unremarkable, no nystagmus  CTA head & CXR nothing acute  EKG sinus isreal  Tele, meclizine, orthostatics in progress  Neuro consulted, analgesics available       Date: 9/16    ED Triage Vitals   Temperature Pulse Respirations Blood Pressure SpO2   09/15/21 0848 09/15/21 0848 09/15/21 0848 09/15/21 0848 09/15/21 0848   98 1 °F (36 7 °C) 62 18 130/68 100 %      Temp Source Heart Rate Source Patient Position - Orthostatic VS BP Location FiO2 (%)   09/15/21 0848 09/15/21 0848 09/15/21 0848 09/15/21 0848 --   Oral Monitor Sitting Left arm       Pain Score       09/15/21 1949       No Pain          Wt Readings from Last 1 Encounters:   09/15/21 68 kg (150 lb)     Additional Vital Signs:   Date/Time  Temp  Pulse  Resp  BP  MAP (mmHg)  SpO2  O2 Device  Patient Position - Orthostatic VS   09/16/21 03:07:27  97 9 °F (36 6 °C) 61  18  115/69  84  97 %  --  --   09/16/21 0100  --  --  --  --  --  --  None (Room air)  --   09/15/21 22:16:21  --  70  18  120/67  85  96 %  --  Standing   09/15/21 22:14:56  --  63  18  121/64  83  95 %  --  Sitting   09/15/21 22:13:21  --  61  18  122/64  83  94 %  --  Lying   09/15/21 16:54:32  98 6 °F (37 °C)  64  --  121/68  86  97 %  None (Room air)  --   09/15/21 1337  --  57  16  139/68  --  --  None (Room air)  Lying   09/15/21 1145  --  59  16  140/62  89  98 %  None (Room air)  Lying   09/15/21 1030  --  57  17  147/58  93  98 %  None (Room air)  Lying   09/15/21 1000  --  --  --  --  --  --  None (Room air)  --   09/15/21 0930  --  57  16  132/63  91  98 %  None (Room air)  Lying   09/15/21 0905  --  61  17  138/68  --  100 %  None (Room air)  Lying       Pertinent Labs/Diagnostic Test Results:     ,  MRI brain wo contrast   Final Result by Guido Jacobo MD (09/16 6512)      1  No acute ischemic disease  2   No CP angle mass  Unremarkable noncontrast evaluation of bilateral IACs  Please note that subtle pathologies/tiny lesions cannot be excluded without IV contrast       3   Stable nonspecific mild white matter change  Main differential considerations include complex migraine disease versus precocious microangiopathy if there is cerebrovascular risk factor  Post inflammatory or demyelinating etiologies are less favored    differential considerations  Workstation performed: [id number]         CTA head and neck with and without contrast   Final Result by Flori Lucas MD (09/15 0869)      No mass effect, acute intracranial hemorrhage or CT evidence for large acute vascular distribution infarct  No evidence for high-grade stenosis, focal occlusion or vascular aneurysm of the cervical or intracranial vasculature  Workstation performed: [id number]         XR chest 1 view portable   Final Result by Rosendo Duran MD (63/50 3538)      No acute cardiopulmonary disease  Workstation performed: [id number]           9/15 EKG sinus isrela    Results from last 7 days   Lab Units 09/15/21  0903   WBC Thousand/uL 6 69   HEMOGLOBIN g/dL 13 1   HEMATOCRIT % 40 0   PLATELETS Thousands/uL 298   NEUTROS ABS Thousands/µL 4 35         Results from last 7 days   Lab Units 09/16/21  0457 09/15/21  0903   SODIUM mmol/L 144 143   POTASSIUM mmol/L 4 8 3 8   CHLORIDE mmol/L 109* 107   CO2 mmol/L 30 30   ANION GAP mmol/L 5 6   BUN mg/dL 13 11   CREATININE mg/dL 0 76 0 82   EGFR ml/min/1 73sq m 87 79   CALCIUM mg/dL 8 8 8 3     Results from last 7 days   Lab Units 09/16/21  0457 09/15/21  0903   AST U/L 14 12   ALT U/L 16 14   ALK PHOS U/L 122* 134*   TOTAL PROTEIN g/dL 6 0* 6 6   ALBUMIN g/dL 3 1* 3 4*   TOTAL BILIRUBIN mg/dL 0 29 0 30         Results from last 7 days   Lab Units 09/16/21  0457 09/15/21  0903   GLUCOSE RANDOM mg/dL 93 67             No results found for: BETA-HYDROXYBUTYRATE                   Results from last 7 days   Lab Units 09/15/21  0903   TROPONIN I ng/mL <0 02         Results from last 7 days   Lab Units 09/15/21  0903   PROTIME seconds 12 7   INR  1 00   PTT seconds 27                                                 Results from last 7 days   Lab Units 09/15/21  1134   CLARITY UA  Clear   COLOR UA  Light Yellow   SPEC GRAV UA  <=1 005   PH UA  7 0   GLUCOSE UA mg/dl Negative   KETONES UA mg/dl Negative   BLOOD UA  Trace-Intact*   PROTEIN UA mg/dl Negative   NITRITE UA  Negative   BILIRUBIN UA  Negative   UROBILINOGEN UA E U /dl 0 2   LEUKOCYTES UA  Negative   WBC UA /hpf None Seen   RBC UA /hpf 0-1   BACTERIA UA /hpf None Seen   EPITHELIAL CELLS WET PREP /hpf None Seen                                               ED Treatment:   Medication Administration from 09/15/2021 0841 to 09/15/2021 1632       Date/Time Order Dose Route Action Action by Comments     09/15/2021 1016 iohexol (OMNIPAQUE) 350 MG/ML injection (SINGLE-DOSE) 100 mL 100 mL Intravenous Given Linda Rad Virgil      09/15/2021 1128 meclizine (ANTIVERT) tablet 25 mg 25 mg Oral Given Asaf Gusman RN      09/15/2021 1337 sodium chloride 0 9 % bolus 1,000 mL 1,000 mL Intravenous New Bag Yvette Ingram RN         Past Medical History:   Diagnosis Date    Anxiety 06/20/2016    Last assessed    Chronic infective otitis externa of left ear 09/18/2017    Last assessed    GERD (gastroesophageal reflux disease)     Right lumbar radiculopathy 05/05/2016    Last assessed    Sciatic leg pain      Present on Admission:   Dizziness   Depression   Hyperlipidemia, mixed      Admitting Diagnosis: Dizziness [R42]  Vertigo [R42]  Age/Sex: 60 y o  female  Admission Orders:  Scheduled Medications:  atorvastatin, 10 mg, Oral, Daily With Dinner  meclizine, 25 mg, Oral, Q8H DIANDRA  valACYclovir, 1,000 mg, Oral, Daily      Continuous IV Infusions:  lactated ringers, 100 mL/hr, Intravenous, Continuous      PRN Meds:  acetaminophen, 650 mg, Oral, Q6H PRN  ondansetron, 4 mg, Intravenous, Q6H PRN        IP CONSULT TO NEUROLOGY    Network Utilization Review Department  ATTENTION: Please call with any questions or concerns to [phone] and carefully listen to the prompts so that you are directed to the right person  All voicemails are confidential   Shoshana Marc all requests for admission clinical reviews, approved or denied determinations and any other requests to dedicated fax number below belonging to the campus where the patient is receiving treatment   List of dedicated fax numbers for the Facilities:  [address] DENIALS (Administrative/Medical Necessity) [phone]   [address] (Maternity/NICU/Pediatrics) 261 [facility] [facility] [address] [place] [address] [address] [phone]

## 2021-09-16 NOTE — ASSESSMENT & PLAN NOTE
complaining of dizziness for 2-3  days of duration  Exacerbated by leaning forward, changing position and looking  around quickly, associated with some photophobia, headache  Denies any recent upper respiratory infection  Denies fever, chills  UA negative for UTI  Chest x-ray report noted negative for acute finding  CTA head and neck report noted negative for acute finding  brain MRI No acute ischemic disease  Patient received overall meclizine 25 mg in ED without much improvement of symptoms  No ectopy on telemetry-discontinued  Orthostatic vitals monitoring  Continue meclizine 25 mg q 8 hours scheduled, IV antiemetic p r n  IV  cc an hour  Tylenol p r n  For headache    Neurology consulted-likely vertigo   - Continue meclizine 25 mg q 8 hours scheduled   - ensure adequate hydration,

## 2021-09-16 NOTE — DISCHARGE INSTRUCTIONS
Dizziness, Ambulatory Care   GENERAL INFORMATION:   Dizziness  is a term used to describe a feeling of being off balance or unsteady  Common causes of dizziness are an inner ear fluid imbalance or a lack of oxygen in your blood  Your dizziness may be acute (lasts 3 days or less) or chronic (lasts longer than 3 days)  You may have dizzy spells that last from seconds to a few hours  Common symptoms related to dizziness include the following:   · A feeling that your surroundings are moving even though you are standing still    · Ringing in your ears or hearing loss     · Feeling faint or lightheaded     · Weakness or unsteadiness     · Double vision or eye movements you cannot control    · Nausea or vomiting     · Confusion  Seek immediate care for the following symptoms:   · You have a headache that does not go away with medicine  · You have shaking chills and a fever  · You vomit over and over with no relief  · Your vomit or bowel movements are red or black  · You have pain in your chest, back, or abdomen  · You have numbness, especially in your face, arms, or legs  · You have trouble moving your arms or legs  Treatment for dizziness  depends on the cause of your dizziness  You may need medicines to decrease your dizziness or nausea  You may need to be admitted to the hospital for treatment  Manage your symptoms:  Get up slowly from sitting or lying down  Do not drive or operate machinery when you are dizzy  Follow up with your healthcare provider as directed:  Write down your questions so you remember to ask them during your visits  CARE AGREEMENT:   You have the right to help plan your care  Learn about your health condition and how it may be treated  Discuss treatment options with your caregivers to decide what care you want to receive  You always have the right to refuse treatment  The above information is an  only   It is not intended as medical advice for individual conditions or treatments  Talk to your doctor, nurse or pharmacist before following any medical regimen to see if it is safe and effective for you  © 2014 [address] is for End User's use only and may not be sold, redistributed or otherwise used for commercial purposes  All illustrations and images included in CareNotes® are the copyrighted property of A D A M , Inc  or Felipe Duron

## 2021-09-16 NOTE — PROGRESS NOTES
[address] [facility]  Neurology Progress Note - Fernando Pugh 1965, 60 y o  female   MRN: [id number] Unit/Bed#: -01 Encounter: [id number]    * Vertigo  Assessment & Plan  09/16/2021:  New to this examiner for follow-up exam is Олег Haddad, a 78-year-old female with history as outlined in the HPI who presents to [facility] on 09/15 with ongoing dizziness x2 days  CTA head and neck unremarkable  Blood pressure in the emergency department 130/68  Patient reports intermittent room spinning sensation, somewhat more of a vertiginous sensation than a true lightheaded or dizziness, that has been coming and going over the past 2 days  Patient reports that has affected her ability to focus her vision, as well as ambulate  Patient most recently had 25 mg Meclizine this morning at 5:00 a m  Camila Mort She does report some improvement, although she also reports that she is sleepy now though  Plan:  - Continue meclizine 25 mg q 8 hours scheduled, we have discussed that the patient can continue this medication at home p r n  For the next several days  - she has no further complaints of nausea at this time  - ensure adequate hydration, discussed outpatient hydration  - PT/OT, she may benefit from vestibular outpatient PT   - from a neurologic point of view this patient is stable for discharge  This patient can be seen by Neurology in follow-up in our [facility] office location by any of our providers  Her appointment is non urgent she can be seen in 1-2 months  Subjective/Objective     Subjective:  He yes that vertigo feels somewhat better but I am a little tired I think after I take that medicine  ROS:  The patient reports that she feels generally somewhat improved but she does report fatigued today  She reports that she did sleep well last night  The remainder of her query, all specific, was negative  The staff nurse reports no adverse events      Current Facility-Administered Medications Medication Dose Route Frequency    acetaminophen  650 mg Oral Q6H PRN    atorvastatin  10 mg Oral Daily With Dinner    lactated ringers  100 mL/hr Intravenous Continuous    meclizine  25 mg Oral Q8H [facility]    ondansetron  4 mg Intravenous Q6H PRN    valACYclovir  1,000 mg Oral Daily       acetaminophen    ondansetron    Vitals: Blood pressure 127/75, pulse 68, temperature 98 4 °F (36 9 °C), resp  rate 19, height 5' 1" (1 549 m), weight 68 kg (150 lb), SpO2 98 %  ,Body mass index is 28 34 kg/m²  Physical Exam:     Jessi Betancourt seen in:  In bed  She reports she had just been out of bed to the bathroom  General appearance: alert,   Neck, Lungs, Heart, & abdomen: WNL  Extremities: atraumatic, no cyanosis or edema    Neurologic:   Mental status: Alert, oriented, thought content appropriate, no aphasia or dysarthria  CN:  Benign cranial nerve exam, EOM's I, Gaze conjugate  Non lateralizing sensory & motor exam, (PP not tested on face)  Reminder CNVIII-XII normal    Motor: full power age appropriate x 4 limbs  Sensory: grossly intact  X 4 limbs, PP not tested  Cerebellar: no ataxia or past pointing w pronation from a modified Romberg position  Finger taps age appropriate  Gait:  Staff nurse reports that the patient just came back from the bathroom with a fairly steady gait   DTR's: Age appropriate,  Plantars: downgoing       Lab Results:   I have personally reviewed pertinent reports    , CBC:   Results from last 7 days   Lab Units 09/15/21  0903   WBC Thousand/uL 6 69   RBC Million/uL 4 28   HEMOGLOBIN g/dL 13 1   HEMATOCRIT % 40 0   MCV fL 94   PLATELETS Thousands/uL 298   , BMP/CMP:   Results from last 7 days   Lab Units 09/16/21  0457 09/15/21  0903   SODIUM mmol/L 144 143   POTASSIUM mmol/L 4 8 3 8   CHLORIDE mmol/L 109* 107   CO2 mmol/L 30 30   BUN mg/dL 13 11   CREATININE mg/dL 0 76 0 82   CALCIUM mg/dL 8 8 8 3   AST U/L 14 12   ALT U/L 16 14   ALK PHOS U/L 122* 134*   EGFR ml/min/1 73sq m 87 79   , Vitamin B12:   , HgBA1C:   , TSH:   , Coagulation:   Results from last 7 days   Lab Units 09/15/21  0903   INR  1 00   , Lipid Profile:        Imaging Studies: I have personally reviewed pertinent films in PACS and Her MRI was reviewed personally and also discussed with the patient  Is negative at this time this was reviewed with her  Counseling / Coordination of Care  Total time spent today Twenty-five minutes  Greater than 50% of total time was spent with the patient and / or family counseling and / or coordination of care  A description of the counseling / coordination of care: All of the above was discussed and reviewed with this patient at the bedside  Films were reviewed personally by me in her medical record was also reviewed

## 2021-09-16 NOTE — ASSESSMENT & PLAN NOTE
Outpatient lipid profile noted with elevated cholesterol, elevated LDL  Patient was previously on Lipitor but currently not on it  Will resume Lipitor 10 mg daily  Recommended healthy diet, counseled on lifestyle modification exercise  Recommended close follow-up with primary care provider

## 2021-09-16 NOTE — DISCHARGE SUMMARY
[facility]  Discharge- River Celeste 1965, 60 y o  female MRN: [id number]  Unit/Bed#: -01 Encounter: [id number]  Primary Care Provider: AMAURI Jauregui   Date and time admitted to hospital: 9/15/2021  8:53 AM    * Vertigo  Assessment & Plan  complaining of dizziness for 2-3  days of duration  Exacerbated by leaning forward, changing position and looking  around quickly, associated with some photophobia, headache  Denies any recent upper respiratory infection  Denies fever, chills  UA negative for UTI  Chest x-ray report noted negative for acute finding  CTA head and neck report noted negative for acute finding  brain MRI No acute ischemic disease  Patient received overall meclizine 25 mg in ED without much improvement of symptoms  No ectopy on telemetry-discontinued  Orthostatic vitals monitoring  Continue meclizine 25 mg q 8 hours scheduled, IV antiemetic p r n  IV  cc an hour  Tylenol p r n  For headache  Neurology consulted-likely vertigo   - Continue meclizine 25 mg q 8 hours scheduled   - ensure adequate hydration,    Elevated alkaline phosphatase level  Assessment & Plan  Noted with chronically elevated ALP around 130's range  Patient denies any abdominal pain, right upper quadrant symptoms  Recommended Outpatient follow-up with primary care provider  Hyperlipidemia, mixed  Assessment & Plan  Outpatient lipid profile noted with elevated cholesterol, elevated LDL  Patient was previously on Lipitor but currently not on it  Will resume Lipitor 10 mg daily  Recommended healthy diet, counseled on lifestyle modification exercise  Recommended close follow-up with primary care provider  Depression  Assessment & Plan  Continue home dose of Prozac 20 mg b i d   Per Patient        Medical Problems     Resolved Problems  Date Reviewed: 9/15/2021    None              Discharging Physician / Practitioner: AMAURI Torres  PCP: Guille Jacobs CRNP  Admission Date:   Admission Orders (From admission, onward)     Ordered        09/15/21 1440  Place in Observation  Once                   Discharge Date: 09/16/21    Consultations During Hospital Stay:  ·  IP CONSULT TO NEUROLOGY    Procedures Performed:   · imaging    Significant Findings / Test Results:      MRI brain wo contrast   Final Result by Leonela Michaels MD (09/16 2207)      1  No acute ischemic disease  2   No CP angle mass  Unremarkable noncontrast evaluation of bilateral IACs  Please note that subtle pathologies/tiny lesions cannot be excluded without IV contrast       3   Stable nonspecific mild white matter change  Main differential considerations include complex migraine disease versus precocious microangiopathy if there is cerebrovascular risk factor  Post inflammatory or demyelinating etiologies are less favored    differential considerations  Workstation performed: [id number]         CTA head and neck with and without contrast   Final Result by Lucia Crowder MD (09/15 1047)      No mass effect, acute intracranial hemorrhage or CT evidence for large acute vascular distribution infarct  No evidence for high-grade stenosis, focal occlusion or vascular aneurysm of the cervical or intracranial vasculature  Workstation performed: [id number]         XR chest 1 view portable   Final Result by Debby Zapata MD (53/80 9549)      No acute cardiopulmonary disease                    Workstation performed: [id number]         ·   ·     Incidental Findings:   · none     Test Results Pending at Discharge (will require follow up):   · none     Outpatient Tests Requested:  · none    Complications:  none    Reason for Admission:    Chief Complaint   Patient presents with    Dizziness     x's 3 days, feels like shes drunk per pt          Hospital Course:   Omer Collier is a 60 y o  female patient who originally presented to the hospital on 9/15/2021 due to dizziness after further evaluation patient was found to have vertigo she was evaluated by Neurology no further workup per interventions to be performed inpatient patient's dizziness improved with IV fluids and meclizine encouraged patient to take her meclizine as prescribed and follow up with her primary care provider      Please see above list of diagnoses and related plan for additional information  Condition at Discharge: stable    Discharge Day Visit / Exam:   Subjective:  Patient is feeling better is eager for discharge  Vitals: Blood Pressure: 127/75 (09/16/21 1001)  Pulse: 68 (09/16/21 0845)  Temperature: 98 4 °F (36 9 °C) (09/16/21 0845)  Temp Source: Oral (09/15/21 0848)  Respirations: 19 (09/16/21 0845)  Height: 5' 1" (154 9 cm) (09/15/21 0848)  Weight - Scale: 68 kg (150 lb) (09/15/21 0848)  SpO2: 98 % (09/16/21 0845)  Exam:   Physical Exam  Vitals and nursing note reviewed  Constitutional:       General: She is not in acute distress  Cardiovascular:      Rate and Rhythm: Normal rate  Pulmonary:      Effort: Pulmonary effort is normal    Abdominal:      Palpations: Abdomen is soft  Musculoskeletal:         General: Normal range of motion  Skin:     General: Skin is warm and dry  Neurological:      Mental Status: Mental status is at baseline  Psychiatric:         Mood and Affect: Mood normal          Thought Content: Thought content normal          Judgment: Judgment normal         Discussion with Family: Patient declined call to   Discharge instructions/Information to patient and family:   See after visit summary for information provided to patient and family  Provisions for Follow-Up Care:  See after visit summary for information related to follow-up care and any pertinent home health orders  Disposition:   Home    Planned Readmission:  No     Discharge Statement:  I spent 45 minutes discharging the patient  This time was spent on the day of discharge   I had direct contact with the patient on the day of discharge  Greater than 50% of the total time was spent examining patient, answering all patient questions, arranging and discussing plan of care with patient as well as directly providing post-discharge instructions  Additional time then spent on discharge activities  Discharge Medications:  See after visit summary for reconciled discharge medications provided to patient and/or family        **Please Note: This note may have been constructed using a voice recognition system**

## 2021-09-17 LAB — RYE IGE QN: NEGATIVE

## 2021-09-18 LAB — B BURGDOR IGG+IGM SER-ACNC: 11

## 2021-09-20 ENCOUNTER — TELEPHONE (OUTPATIENT)
Dept: NEUROLOGY | Facility: CLINIC | Age: 56
End: 2021-09-20

## 2021-09-20 ENCOUNTER — OFFICE VISIT (OUTPATIENT)
Dept: FAMILY MEDICINE CLINIC | Facility: CLINIC | Age: 56
End: 2021-09-20
Payer: COMMERCIAL

## 2021-09-20 VITALS
DIASTOLIC BLOOD PRESSURE: 72 MMHG | BODY MASS INDEX: 26.81 KG/M2 | HEIGHT: 61 IN | OXYGEN SATURATION: 98 % | TEMPERATURE: 97.5 F | SYSTOLIC BLOOD PRESSURE: 116 MMHG | WEIGHT: 142 LBS | HEART RATE: 68 BPM

## 2021-09-20 DIAGNOSIS — F33.9 DEPRESSION, RECURRENT (HCC): ICD-10-CM

## 2021-09-20 DIAGNOSIS — R42 VERTIGO: ICD-10-CM

## 2021-09-20 DIAGNOSIS — Z23 NEED FOR INFLUENZA VACCINATION: Primary | ICD-10-CM

## 2021-09-20 DIAGNOSIS — E78.2 HYPERLIPIDEMIA, MIXED: ICD-10-CM

## 2021-09-20 PROCEDURE — 90471 IMMUNIZATION ADMIN: CPT

## 2021-09-20 PROCEDURE — 1111F DSCHRG MED/CURRENT MED MERGE: CPT | Performed by: NURSE PRACTITIONER

## 2021-09-20 PROCEDURE — 99214 OFFICE O/P EST MOD 30 MIN: CPT | Performed by: NURSE PRACTITIONER

## 2021-09-20 PROCEDURE — 1036F TOBACCO NON-USER: CPT | Performed by: NURSE PRACTITIONER

## 2021-09-20 PROCEDURE — 90682 RIV4 VACC RECOMBINANT DNA IM: CPT

## 2021-09-20 PROCEDURE — 3008F BODY MASS INDEX DOCD: CPT | Performed by: NURSE PRACTITIONER

## 2021-09-20 RX ORDER — ATORVASTATIN CALCIUM 10 MG/1
10 TABLET, FILM COATED ORAL DAILY
Qty: 90 TABLET | Refills: 0 | Status: SHIPPED | OUTPATIENT
Start: 2021-09-20 | End: 2022-08-09

## 2021-09-20 NOTE — ASSESSMENT & PLAN NOTE
To begin Flonase 1 spray every 12 hours and continue meclizine as needed  Counseled on staying well hydrated and limiting foods high in sodium  Provided referral to ENT

## 2021-09-20 NOTE — PROGRESS NOTES
Assessment/Plan:    Vertigo  To begin Flonase 1 spray every 12 hours and continue meclizine as needed  Counseled on staying well hydrated and limiting foods high in sodium  Provided referral to ENT  Hyperlipidemia, mixed    To resume Lipitor 10 mg  Counseled the importance of increasing dietary fiber, consuming a heart healthy diet, and beginning daily physical activity  Will repeat lipid panel in 3 months  Depression, recurrent ([facility] [address] )    Stable  To continue Prozac 20 mg every 12 hours  Diagnoses and all orders for this visit:    Hyperlipidemia, mixed  -     atorvastatin (LIPITOR) 10 mg tablet; Take 1 tablet (10 mg total) by mouth daily  -     Comprehensive metabolic panel; Future  -     Lipid Panel with Direct LDL reflex; Future    Vertigo  -     Ambulatory Referral to Otolaryngology; Future    Depression, recurrent ([facility])          Subjective:      Patient ID: Sharan Judd is a 60 y o  female  Eulogiovalerio Aragonnolberto presents for hospital follow-up  She was admitted to [address] [place] [address] on 09/15/2021 after presenting with dizziness  Her symptoms are worsened by leaning forward or changing positions quickly  A CTA of her head and neck showed no acute finding  A MRI of her brain showed no acute ischemic disease  Her symptoms improved with meclizine  Neurology was consulted and was felt symptoms were most likely due to vertigo  In regards to her depression, her symptoms on stable on her current dose of Prozac  She was advised to resume Lipitor for her hyperlipidemia        The following portions of the patient's history were reviewed and updated as appropriate:   She   Patient Active Problem List    Diagnosis Date Noted    Vertigo 09/15/2021    Elevated alkaline phosphatase level 09/15/2021    Annual physical exam 07/15/2019    Mesent[facility] panniculitis ([facility] ) 07/15/2019    Elevated liver enzymes 06/28/2019    Cervical spinal stenosis 06/07/2016    Sacroiliitis ([facility] [address] ) 06/07/2016    Degenerative disc disease, lumbar 05/05/2016    Depression, recurrent ([facility] [address] ) 03/15/2016    Hyperlipidemia, mixed 01/26/2015     Current Outpatient Medications   Medication Sig Dispense Refill    atorvastatin (LIPITOR) 10 mg tablet Take 1 tablet (10 mg total) by mouth daily 90 tablet 0    FLUoxetine (PROzac) 20 MG tablet Take 3 tabs daily for a total of 60 mg daily 120 tablet 1    meclizine (ANTIVERT) 25 mg tablet Take 1 tablet (25 mg total) by mouth every 8 (eight) hours 30 tablet 0    ondansetron (ZOFRAN) 4 mg tablet Take 1 tablet (4 mg total) by mouth every 8 (eight) hours as needed for nausea or vomiting 6 tablet 0    Prempro 0 3-1 5 MG per tablet TAKE 1 TABLET BY MOUTH EVERY DAY 28 tablet 1    valACYclovir (VALTREX) 1,000 mg tablet Take 1 tablet (1,000 mg total) by mouth daily 90 tablet 5     No current facility-administered medications for this visit  She is allergic to prednisone and erythromycin       Review of Systems   Constitutional: Negative  HENT:        Itchy ears  Decrease in hearing   Respiratory: Negative  Cardiovascular: Negative  Gastrointestinal: Negative  Musculoskeletal: Negative  Skin: Negative  Allergic/Immunologic: Positive for environmental allergies  Neurological: Positive for dizziness  Negative for seizures, syncope and facial asymmetry  Psychiatric/Behavioral: Negative  /72   Pulse 68   Temp 97 5 °F (36 4 °C)   Ht 5' 1" (1 549 m)   Wt 64 4 kg (142 lb)   SpO2 98%   BMI 26 83 kg/m²     Objective:     Physical Exam  Vitals and nursing note reviewed  Constitutional:       General: She is not in acute distress  Appearance: Normal appearance  She is well-developed  She is not ill-appearing or diaphoretic  HENT:      Head: Normocephalic and atraumatic  Right Ear: Ear canal and external ear normal  A middle ear effusion is present  Left Ear: Ear canal and external ear normal  A middle ear effusion is present  Nose: Rhinorrhea present  Mouth/Throat:      Mouth: Mucous membranes are moist       Pharynx: Oropharynx is clear  Eyes:      Extraocular Movements: Extraocular movements intact  Conjunctiva/sclera: Conjunctivae normal       Pupils: Pupils are equal, round, and reactive to light  Cardiovascular:      Rate and Rhythm: Normal rate and regular rhythm  Heart sounds: Normal heart sounds  No murmur heard  Pulmonary:      Effort: Pulmonary effort is normal  No respiratory distress  Breath sounds: Normal breath sounds  No wheezing or rales  Chest:      Chest wall: No tenderness  Musculoskeletal:      Cervical back: Neck supple  Neurological:      General: No focal deficit present  Mental Status: She is alert and oriented to person, place, and time  Cranial Nerves: No cranial nerve deficit or facial asymmetry  Sensory: No sensory deficit  Motor: Motor function is intact  No weakness  Coordination: Coordination is intact  Coordination normal       Gait: Tandem walk abnormal    Psychiatric:         Mood and Affect: Mood normal          Behavior: Behavior normal          Thought Content:  Thought content normal          Judgment: Judgment normal

## 2021-09-20 NOTE — TELEPHONE ENCOUNTER
1ST ATTEMPT LMOM for pt to call in to sched HFU appt  SLMO/Dizziness/BC    NOTE FROM CHART:  Reason for Consult / Principal Problem:  Dizziness  This patient can be seen by Neurology in follow-up in our [facility] office location by any of our providers  Her appointment is non urgent she can be seen in 1-2 months

## 2021-09-20 NOTE — PATIENT INSTRUCTIONS
To begin flonase 1 spray every 12 hours  Will provide referral to ENT  Limit sodium in diet  Increase water intake  Continue meclizine 25 mg every 8 hours  Resume Lipitor  Will repeat labs in 3 months  Begin a heart healthy diet

## 2021-09-22 NOTE — TELEPHONE ENCOUNTER
3RD ATTEMPT LMOM for pt to call in to sched HFU appt    Mailed letter to pt's home       SLMO/Dizziness/BC

## 2021-10-05 DIAGNOSIS — F33.1 MODERATE EPISODE OF RECURRENT MAJOR DEPRESSIVE DISORDER (HCC): ICD-10-CM

## 2021-10-05 DIAGNOSIS — B00.9 HERPES INFECTION: ICD-10-CM

## 2021-10-05 RX ORDER — FLUOXETINE 20 MG/1
TABLET, FILM COATED ORAL
Qty: 120 TABLET | Refills: 1 | Status: SHIPPED | OUTPATIENT
Start: 2021-10-05 | End: 2021-10-13 | Stop reason: SDUPTHER

## 2021-10-05 RX ORDER — VALACYCLOVIR HYDROCHLORIDE 1 G/1
1000 TABLET, FILM COATED ORAL DAILY
Qty: 90 TABLET | Refills: 5 | Status: SHIPPED | OUTPATIENT
Start: 2021-10-05 | End: 2021-10-13 | Stop reason: SDUPTHER

## 2021-10-13 DIAGNOSIS — F33.1 MODERATE EPISODE OF RECURRENT MAJOR DEPRESSIVE DISORDER (HCC): ICD-10-CM

## 2021-10-13 DIAGNOSIS — B00.9 HERPES INFECTION: ICD-10-CM

## 2021-10-14 RX ORDER — FLUOXETINE 20 MG/1
TABLET, FILM COATED ORAL
Qty: 120 TABLET | Refills: 1 | Status: SHIPPED | OUTPATIENT
Start: 2021-10-14 | End: 2021-11-09

## 2021-10-14 RX ORDER — VALACYCLOVIR HYDROCHLORIDE 1 G/1
1000 TABLET, FILM COATED ORAL DAILY
Qty: 30 TABLET | Refills: 0 | Status: SHIPPED | OUTPATIENT
Start: 2021-10-14 | End: 2021-11-09

## 2021-11-09 DIAGNOSIS — B00.9 HERPES INFECTION: ICD-10-CM

## 2021-11-09 DIAGNOSIS — F33.1 MODERATE EPISODE OF RECURRENT MAJOR DEPRESSIVE DISORDER (HCC): ICD-10-CM

## 2021-11-09 RX ORDER — FLUOXETINE 20 MG/1
TABLET, FILM COATED ORAL
Qty: 90 TABLET | Refills: 2 | Status: SHIPPED | OUTPATIENT
Start: 2021-11-09 | End: 2022-02-01

## 2021-11-09 RX ORDER — VALACYCLOVIR HYDROCHLORIDE 1 G/1
1000 TABLET, FILM COATED ORAL DAILY
Qty: 90 TABLET | Refills: 0 | Status: SHIPPED | OUTPATIENT
Start: 2021-11-09 | End: 2022-02-01

## 2022-01-27 ENCOUNTER — OFFICE VISIT (OUTPATIENT)
Dept: FAMILY MEDICINE CLINIC | Facility: CLINIC | Age: 57
End: 2022-01-27
Payer: COMMERCIAL

## 2022-01-27 ENCOUNTER — HOSPITAL ENCOUNTER (OUTPATIENT)
Dept: RADIOLOGY | Facility: HOSPITAL | Age: 57
Discharge: HOME | End: 2022-01-27

## 2022-01-27 VITALS
OXYGEN SATURATION: 99 % | RESPIRATION RATE: 16 BRPM | HEIGHT: 61 IN | BODY MASS INDEX: 27.45 KG/M2 | WEIGHT: 145.4 LBS | TEMPERATURE: 98.8 F | DIASTOLIC BLOOD PRESSURE: 74 MMHG | SYSTOLIC BLOOD PRESSURE: 134 MMHG | HEART RATE: 67 BPM

## 2022-01-27 DIAGNOSIS — F33.9 DEPRESSION, RECURRENT (HCC): ICD-10-CM

## 2022-01-27 DIAGNOSIS — M94.0 ACUTE COSTOCHONDRITIS: Primary | ICD-10-CM

## 2022-01-27 DIAGNOSIS — M94.0 ACUTE COSTOCHONDRITIS: ICD-10-CM

## 2022-01-27 PROBLEM — R42 VERTIGO: Status: RESOLVED | Noted: 2021-09-15 | Resolved: 2022-01-27

## 2022-01-27 PROBLEM — Z00.00 ANNUAL PHYSICAL EXAM: Status: RESOLVED | Noted: 2019-07-15 | Resolved: 2022-01-27

## 2022-01-27 PROCEDURE — 71046 X-RAY EXAM CHEST 2 VIEWS: CPT

## 2022-01-27 PROCEDURE — 99214 OFFICE O/P EST MOD 30 MIN: CPT | Performed by: NURSE PRACTITIONER

## 2022-01-27 RX ORDER — NAPROXEN 500 MG/1
500 TABLET ORAL 2 TIMES DAILY WITH MEALS
Qty: 20 TABLET | Refills: 0 | Status: SHIPPED | OUTPATIENT
Start: 2022-01-27 | End: 2022-03-21 | Stop reason: SDUPTHER

## 2022-01-27 NOTE — PROGRESS NOTES
Assessment/Plan:    Acute costochondritis   To begin naproxen 500 mg every 12 hours as needed, to take with food  Follow-up if no improvement in 1 week or sooner if symptoms worsen  Depression, recurrent ([facility] )  Stable  To continue fluoxetine 60 mg daily  Diagnoses and all orders for this visit:    Acute costochondritis  -     naproxen (Naprosyn) 500 mg tablet; Take 1 tablet (500 mg total) by mouth 2 (two) times a day with meals  -     XR chest pa & lateral; Future    Depression, recurrent (HCC)          Subjective:      Patient ID: Luisa Orta is a 60 y o  female  [facility] presents reporting right sided rib pain for the past week  Prior to this, she had been experiencing body aches and a cough  She tested herself for COVID twice which was negative  A couple days after her cough improved, her rib pain started  Taking a deep breath or applying pressure to the area will cause her symptoms code to occur  Denies shortness of breath, fever, or productive cough  She has been taking Tylenol with minimal relief        The following portions of the patient's history were reviewed and updated as appropriate: She   Patient Active Problem List    Diagnosis Date Noted    Acute costochondritis 01/27/2022    Elevated alkaline phosphatase level 09/15/2021    Mesenteric panniculitis ([place] [address] ) 07/15/2019    Elevated liver enzymes 06/28/2019    Cervical spinal stenosis 06/07/2016    Sacroiliitis ([facility] ) 06/07/2016    Degenerative disc disease, lumbar 05/05/2016    Depression, recurrent ([facility] ) 03/15/2016    Hyperlipidemia, mixed 01/26/2015     Current Outpatient Medications   Medication Sig Dispense Refill    atorvastatin (LIPITOR) 10 mg tablet Take 1 tablet (10 mg total) by mouth daily 90 tablet 0    FLUoxetine (PROzac) 20 MG tablet TAKE 3 TABLETS BY MOUTH EVERY DAY 90 tablet 2    ondansetron (ZOFRAN) 4 mg tablet Take 1 tablet (4 mg total) by mouth every 8 (eight) hours as needed for nausea or vomiting 6 tablet 0    valACYclovir (VALTREX) 1,000 mg tablet Take 1 tablet (1,000 mg total) by mouth daily 90 tablet 0    naproxen (Naprosyn) 500 mg tablet Take 1 tablet (500 mg total) by mouth 2 (two) times a day with meals 20 tablet 0     No current facility-administered medications for this visit  She is allergic to prednisone and erythromycin       Review of Systems   Constitutional: Negative  HENT: Negative  Respiratory:        Rib pain   Cardiovascular: Negative  Gastrointestinal: Negative  Neurological: Negative  Psychiatric/Behavioral: Negative  /74   Pulse 67   Temp 98 8 °F (37 1 °C) (Tympanic)   Resp 16   Ht 5' 1" (1 549 m)   Wt 66 kg (145 lb 6 4 oz)   SpO2 99%   BMI 27 47 kg/m²     Objective:     Physical Exam  Vitals and nursing note reviewed  Constitutional:       General: She is not in acute distress  Appearance: She is well-developed and normal weight  She is not ill-appearing, toxic-appearing or diaphoretic  HENT:      Head: Normocephalic and atraumatic  Eyes:      Conjunctiva/sclera: Conjunctivae normal    Cardiovascular:      Rate and Rhythm: Normal rate and regular rhythm  Heart sounds: Normal heart sounds  No murmur heard  Pulmonary:      Effort: Pulmonary effort is normal  No respiratory distress  Breath sounds: Normal breath sounds  No wheezing or rales  Chest:      Chest wall: No tenderness  Musculoskeletal:        Arms:       Cervical back: Neck supple  Neurological:      General: No focal deficit present  Mental Status: She is alert and oriented to person, place, and time  Psychiatric:         Mood and Affect: Mood normal          Behavior: Behavior normal          Thought Content:  Thought content normal          Judgment: Judgment normal

## 2022-01-27 NOTE — ASSESSMENT & PLAN NOTE
To begin naproxen 500 mg every 12 hours as needed, to take with food  Follow-up if no improvement in 1 week or sooner if symptoms worsen

## 2022-01-31 DIAGNOSIS — B00.9 HERPES INFECTION: ICD-10-CM

## 2022-02-01 DIAGNOSIS — F33.1 MODERATE EPISODE OF RECURRENT MAJOR DEPRESSIVE DISORDER (HCC): ICD-10-CM

## 2022-02-01 RX ORDER — VALACYCLOVIR HYDROCHLORIDE 1 G/1
1000 TABLET, FILM COATED ORAL DAILY
Qty: 90 TABLET | Refills: 0 | Status: SHIPPED | OUTPATIENT
Start: 2022-02-01 | End: 2022-05-10

## 2022-02-01 RX ORDER — FLUOXETINE 20 MG/1
TABLET, FILM COATED ORAL
Qty: 270 TABLET | Refills: 0 | Status: SHIPPED | OUTPATIENT
Start: 2022-02-01 | End: 2022-05-05

## 2022-03-21 ENCOUNTER — OFFICE VISIT (OUTPATIENT)
Dept: FAMILY MEDICINE CLINIC | Facility: CLINIC | Age: 57
End: 2022-03-21
Payer: COMMERCIAL

## 2022-03-21 VITALS
SYSTOLIC BLOOD PRESSURE: 102 MMHG | HEIGHT: 61 IN | OXYGEN SATURATION: 98 % | TEMPERATURE: 97 F | BODY MASS INDEX: 27.38 KG/M2 | DIASTOLIC BLOOD PRESSURE: 78 MMHG | HEART RATE: 61 BPM | WEIGHT: 145 LBS

## 2022-03-21 DIAGNOSIS — M26.621 ARTHRALGIA OF RIGHT TEMPOROMANDIBULAR JOINT: Primary | ICD-10-CM

## 2022-03-21 DIAGNOSIS — F33.9 DEPRESSION, RECURRENT (HCC): ICD-10-CM

## 2022-03-21 DIAGNOSIS — H57.89 IRRITATION OF RIGHT EYE: ICD-10-CM

## 2022-03-21 PROBLEM — M94.0 ACUTE COSTOCHONDRITIS: Status: RESOLVED | Noted: 2022-01-27 | Resolved: 2022-03-21

## 2022-03-21 PROCEDURE — 1036F TOBACCO NON-USER: CPT | Performed by: NURSE PRACTITIONER

## 2022-03-21 PROCEDURE — 99214 OFFICE O/P EST MOD 30 MIN: CPT | Performed by: NURSE PRACTITIONER

## 2022-03-21 PROCEDURE — 3008F BODY MASS INDEX DOCD: CPT | Performed by: NURSE PRACTITIONER

## 2022-03-21 RX ORDER — NAPROXEN 500 MG/1
500 TABLET ORAL 2 TIMES DAILY WITH MEALS
Qty: 20 TABLET | Refills: 0 | Status: SHIPPED | OUTPATIENT
Start: 2022-03-21 | End: 2022-08-09

## 2022-03-21 NOTE — ASSESSMENT & PLAN NOTE
Reviewed diagnosis with patient  To begin naproxen 500 mg as needed  Advised to begin soft diet and avoid chewing gum  To keep upcoming appointment with dentist on Wednesday to ensure there is no additional dental issue  Discussed referral to Oral maxillary surgeon

## 2022-03-21 NOTE — PROGRESS NOTES
Assessment/Plan:    Depression, recurrent (HCC)  Stable  To continue current dose of fluoxetine  Arthralgia of right temporomandibular joint  Reviewed diagnosis with patient  To begin naproxen 500 mg as needed  Advised to begin soft diet and avoid chewing gum  To keep upcoming appointment with dentist on Wednesday to ensure there is no additional dental issue  Discussed referral to Oral maxillary surgeon  Diagnoses and all orders for this visit:    Arthralgia of right temporomandibular joint  -     naproxen (Naprosyn) 500 mg tablet; Take 1 tablet (500 mg total) by mouth 2 (two) times a day with meals    Depression, recurrent (HCC)    Irritation of right eye  Comments:  Begin tobramycin  Apply cool compress for additional comfort  Follow-up if no improvement in 2 days or sooner if symptoms worsen  Orders:  -     tobramycin (TOBREX) 0 3 % OINT; Administer 0 5 inches to the right eye 4 (four) times a day          Subjective:      Patient ID: Rena Husain is a 60 y o  female  [facility] presents reporting right ear pain for the past 3 days  She is unsure if she is having a dental issue  She reports to cracking in her jaw when opening it fully  She has been eating a soft diet and applying heat which has been helpful  She has a dentist appointment on Wednesday  Denies fever, broken teeth, sensitivity to sweetness, hot, or cold  She also reports discomfort in her right inner eye with some discharge  Denies change in vision, trauma to eye, or deep eye pain        The following portions of the patient's history were reviewed and updated as appropriate: She   Patient Active Problem List    Diagnosis Date Noted    Arthralgia of right temporomandibular joint 03/21/2022    Elevated alkaline phosphatase level 09/15/2021    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    Elevated liver enzymes 06/28/2019    Cervical spinal stenosis 06/07/2016    Sacroiliitis ([facility] ) 06/07/2016    Degenerative disc disease, lumbar 05/05/2016    Depression, recurrent ([facility] ) 03/15/2016    Hyperlipidemia, mixed 01/26/2015     Current Outpatient Medications   Medication Sig Dispense Refill    atorvastatin (LIPITOR) 10 mg tablet Take 1 tablet (10 mg total) by mouth daily 90 tablet 0    FLUoxetine (PROzac) 20 MG tablet TAKE 3 TABLETS BY MOUTH EVERY  tablet 0    naproxen (Naprosyn) 500 mg tablet Take 1 tablet (500 mg total) by mouth 2 (two) times a day with meals 20 tablet 0    ondansetron (ZOFRAN) 4 mg tablet Take 1 tablet (4 mg total) by mouth every 8 (eight) hours as needed for nausea or vomiting 6 tablet 0    tobramycin (TOBREX) 0 3 % OINT Administer 0 5 inches to the right eye 4 (four) times a day 3 5 g 0    valACYclovir (VALTREX) 1,000 mg tablet TAKE 1 TABLET (1,000 MG TOTAL) BY MOUTH DAILY 90 tablet 0     No current facility-administered medications for this visit  She is allergic to prednisone and erythromycin       Review of Systems   Constitutional: Negative  HENT: Positive for ear pain (Right)  Presence cracking when opening jaw   Eyes: Positive for pain, discharge and itching  Respiratory: Negative  Cardiovascular: Negative  Gastrointestinal: Negative  Neurological: Negative  Psychiatric/Behavioral: Negative  /78   Pulse 61   Temp (!) 97 °F (36 1 °C)   Ht 5' 1" (1 549 m)   Wt 65 8 kg (145 lb)   SpO2 98%   BMI 27 40 kg/m²     Objective:     Physical Exam  Vitals and nursing note reviewed  Constitutional:       General: She is not in acute distress  Appearance: Normal appearance  She is well-developed  She is not ill-appearing, toxic-appearing or diaphoretic  HENT:      Head: Normocephalic and atraumatic  Jaw: Tenderness (At right TMJ) present  Right Ear: Hearing, tympanic membrane, ear canal and external ear normal  No mastoid tenderness  Left Ear: Hearing, tympanic membrane, ear canal and external ear normal  No mastoid tenderness        Nose: Nose normal       Mouth/Throat:      Lips: Pink  Mouth: Mucous membranes are moist       Pharynx: Oropharynx is clear  Eyes:      Conjunctiva/sclera: Conjunctivae normal      Cardiovascular:      Rate and Rhythm: Normal rate and regular rhythm  Heart sounds: Normal heart sounds  No murmur heard  Pulmonary:      Effort: Pulmonary effort is normal  No respiratory distress  Breath sounds: Normal breath sounds  No wheezing or rales  Chest:      Chest wall: No tenderness  Musculoskeletal:      Cervical back: Neck supple  Lymphadenopathy:      Cervical: No cervical adenopathy  Neurological:      General: No focal deficit present  Mental Status: She is alert and oriented to person, place, and time  Psychiatric:         Mood and Affect: Mood normal          Behavior: Behavior normal          Thought Content: Thought content normal          Judgment: Judgment normal            BMI Counseling: Body mass index is 27 4 kg/m²  The BMI is above normal  Nutrition recommendations include decreasing overall calorie intake, 3-5 servings of fruits/vegetables daily, reducing fast food intake, consuming healthier snacks, decreasing soda and/or juice intake and moderation in carbohydrate intake  Exercise recommendations include exercising 3-5 times per week

## 2022-05-05 DIAGNOSIS — F33.1 MODERATE EPISODE OF RECURRENT MAJOR DEPRESSIVE DISORDER (HCC): ICD-10-CM

## 2022-05-05 RX ORDER — FLUOXETINE 20 MG/1
TABLET, FILM COATED ORAL
Qty: 270 TABLET | Refills: 0 | Status: SHIPPED | OUTPATIENT
Start: 2022-05-05 | End: 2022-08-03

## 2022-05-06 DIAGNOSIS — B00.9 HERPES INFECTION: ICD-10-CM

## 2022-05-10 RX ORDER — VALACYCLOVIR HYDROCHLORIDE 1 G/1
TABLET, FILM COATED ORAL
Qty: 90 TABLET | Refills: 0 | Status: SHIPPED | OUTPATIENT
Start: 2022-05-10 | End: 2022-08-08

## 2022-08-03 ENCOUNTER — HOSPITAL ENCOUNTER (EMERGENCY)
Facility: HOSPITAL | Age: 57
Discharge: HOME | End: 2022-08-03
Attending: EMERGENCY MEDICINE
Payer: COMMERCIAL

## 2022-08-03 ENCOUNTER — APPOINTMENT (EMERGENCY)
Dept: RADIOLOGY | Facility: HOSPITAL | Age: 57
End: 2022-08-03
Payer: COMMERCIAL

## 2022-08-03 VITALS
SYSTOLIC BLOOD PRESSURE: 158 MMHG | RESPIRATION RATE: 15 BRPM | TEMPERATURE: 98 F | DIASTOLIC BLOOD PRESSURE: 70 MMHG | OXYGEN SATURATION: 98 % | HEART RATE: 56 BPM

## 2022-08-03 DIAGNOSIS — F33.1 MODERATE EPISODE OF RECURRENT MAJOR DEPRESSIVE DISORDER (HCC): ICD-10-CM

## 2022-08-03 DIAGNOSIS — M54.50 ACUTE LOW BACK PAIN: Primary | ICD-10-CM

## 2022-08-03 LAB
BACTERIA UR QL AUTO: ABNORMAL /HPF
BILIRUB UR QL STRIP: NEGATIVE
CLARITY UR: CLEAR
COLOR UR: YELLOW
GLUCOSE UR STRIP-MCNC: NEGATIVE MG/DL
HGB UR QL STRIP.AUTO: ABNORMAL
KETONES UR STRIP-MCNC: NEGATIVE MG/DL
LEUKOCYTE ESTERASE UR QL STRIP: NEGATIVE
MUCOUS THREADS UR QL AUTO: ABNORMAL
NITRITE UR QL STRIP: NEGATIVE
NON-SQ EPI CELLS URNS QL MICRO: ABNORMAL /HPF
PH UR STRIP.AUTO: 6.5 [PH]
PROT UR STRIP-MCNC: NEGATIVE MG/DL
RBC #/AREA URNS AUTO: ABNORMAL /HPF
SP GR UR STRIP.AUTO: 1.02 (ref 1–1.03)
UROBILINOGEN UR QL STRIP.AUTO: 0.2 E.U./DL
WBC #/AREA URNS AUTO: ABNORMAL /HPF

## 2022-08-03 PROCEDURE — 99284 EMERGENCY DEPT VISIT MOD MDM: CPT | Performed by: PHYSICIAN ASSISTANT

## 2022-08-03 PROCEDURE — 72100 X-RAY EXAM L-S SPINE 2/3 VWS: CPT

## 2022-08-03 PROCEDURE — 99283 EMERGENCY DEPT VISIT LOW MDM: CPT

## 2022-08-03 PROCEDURE — 81001 URINALYSIS AUTO W/SCOPE: CPT | Performed by: EMERGENCY MEDICINE

## 2022-08-03 RX ORDER — FLUOXETINE 20 MG/1
TABLET, FILM COATED ORAL
Qty: 270 TABLET | Refills: 0 | Status: SHIPPED | OUTPATIENT
Start: 2022-08-03 | End: 2022-10-30

## 2022-08-03 RX ORDER — METHOCARBAMOL 750 MG/1
750 TABLET, FILM COATED ORAL 4 TIMES DAILY
Qty: 40 TABLET | Refills: 0 | Status: SHIPPED | OUTPATIENT
Start: 2022-08-03 | End: 2022-08-09

## 2022-08-03 RX ORDER — OXYCODONE HYDROCHLORIDE AND ACETAMINOPHEN 5; 325 MG/1; MG/1
1 TABLET ORAL EVERY 6 HOURS PRN
Qty: 12 TABLET | Refills: 0 | Status: SHIPPED | OUTPATIENT
Start: 2022-08-03 | End: 2022-08-06

## 2022-08-03 RX ORDER — IBUPROFEN 400 MG/1
400 TABLET ORAL EVERY 6 HOURS PRN
Qty: 30 TABLET | Refills: 0 | Status: SHIPPED | OUTPATIENT
Start: 2022-08-03 | End: 2022-08-08

## 2022-08-03 NOTE — ED PROVIDER NOTES
History  Chief Complaint   Patient presents with    Back Pain     R lower back pain, denies inj      59 y o  female presents to the Emergency Department with chief complaint of back pain  Onset of symptoms is reported as 3 days ago  Location of symptoms is reported as right lower back  Quality of symptoms is reported as sharp tight pain  Severity of symptoms is reported as moderate-severe  Associated symptoms:  Denies urinary retention  Denies bowel or bladder incontinence  Denies abdominal pain  Denies fevers  denies lower extremity paralysis, paraesthesias or weakness  Denies dysuria, urinary frequency or hematuria  Denies weight loss or night sweats  Modifiers: Movement, bending and twisting exacerbate pain  Rest partially relieves pain  Context:  Patient reports no acute fall or trauma  Denies prior history of IVDA, prolonged steroid use or immunocompromised state  Prior history of right L3/4 laminectomy in 2016  Review of past visit history via [employer] demonstrates patient last seen in the emergency department on 9/15/21 for vertigo    Past medical history reviewed remarkable for sciatica, anxiety, gastroesophageal reflux disease and right lumbar radiculopathy  Past surgical history remarkable for appendectomy, cholecystectomy, tonsillectomy, knee arthroscopy and right L3-L4 are number laminectomy  Social history reviewed  Reformed smoker  No current drug or alcohol use  History provided by:  Patient   used: No    Back Pain  Associated symptoms: no abdominal pain, no chest pain, no dysuria, no fever, no headaches, no numbness, no pelvic pain and no weakness        Prior to Admission Medications   Prescriptions Last Dose Informant Patient Reported? Taking?    FLUoxetine (PROzac) 20 MG tablet   No No   Sig: TAKE 3 TABLETS BY MOUTH EVERY DAY   atorvastatin (LIPITOR) 10 mg tablet   No No   Sig: Take 1 tablet (10 mg total) by mouth daily   naproxen (Naprosyn) 500 mg tablet No No   Sig: Take 1 tablet (500 mg total) by mouth 2 (two) times a day with meals   ondansetron (ZOFRAN) 4 mg tablet   No No   Sig: Take 1 tablet (4 mg total) by mouth every 8 (eight) hours as needed for nausea or vomiting   tobramycin (TOBREX) 0 3 % OINT   No No   Sig: Administer 0 5 inches to the right eye 4 (four) times a day   valACYclovir (VALTREX) 1,000 mg tablet   No No   Sig: TAKE 1 TABLET BY MOUTH EVERY DAY      Facility-Administered Medications: None       Past Medical History:   Diagnosis Date    Anxiety 06/20/2016    Last assessed    Chronic infective otitis externa of left ear 09/18/2017    Last assessed    GERD (gastroesophageal reflux disease)     Right lumbar radiculopathy 05/05/2016    Last assessed    Sciatic leg pain        Past Surgical History:   Procedure Laterality Date    APPENDECTOMY      CHOLECYSTECTOMY      KNEE ARTHROSCOPY      torn menicus    AK LAMNOTMY INCL W/DCMPRSN NRV ROOT 1 INTRSPC LUMBR Right 7/6/2016    Procedure: L3/4 FORAMINOTOMY;  Surgeon: Darryl Dooley MD;  [facility];  Service: Neurosurgery    TONSILLECTOMY         Family History   Problem Relation Age of Onset    No Known Problems Mother     Alcohol abuse Father     Diabetes Paternal Grandmother     Hypertension Paternal Grandmother     Cancer Family     Breast cancer Maternal Aunt      I have reviewed and agree with the history as documented  E-Cigarette/Vaping    E-Cigarette Use Never User      E-Cigarette/Vaping Substances     Social History     Tobacco Use    Smoking status: Never Smoker    Smokeless tobacco: Never Used    Tobacco comment: Per allscripts - former smoker   Vaping Use    Vaping Use: Never used   Substance Use Topics    Alcohol use: Not Currently    Drug use: No       Review of Systems   Constitutional: Negative for activity change, appetite change, chills, diaphoresis, fatigue, fever and unexpected weight change     HENT: Negative for congestion, dental problem, drooling, ear discharge, ear pain, facial swelling, hearing loss, mouth sores, nosebleeds, postnasal drip, rhinorrhea, sinus pressure, sinus pain, sneezing, sore throat, tinnitus, trouble swallowing and voice change  Eyes: Negative for photophobia, pain, discharge, redness, itching and visual disturbance  Respiratory: Negative for apnea, cough, choking, chest tightness, shortness of breath, wheezing and stridor  Cardiovascular: Negative for chest pain, palpitations and leg swelling  Gastrointestinal: Negative for abdominal distention, abdominal pain, anal bleeding, blood in stool, constipation, diarrhea, nausea, rectal pain and vomiting  Endocrine: Negative for cold intolerance, heat intolerance, polydipsia, polyphagia and polyuria  Genitourinary: Negative for decreased urine volume, difficulty urinating, dyspareunia, dysuria, enuresis, flank pain, frequency, hematuria, menstrual problem, pelvic pain, urgency, vaginal bleeding, vaginal discharge and vaginal pain  Musculoskeletal: Positive for back pain  Negative for arthralgias, gait problem, joint swelling, myalgias, neck pain and neck stiffness  Skin: Negative for color change, pallor, rash and wound  Allergic/Immunologic: Negative for environmental allergies, food allergies and immunocompromised state  Neurological: Negative for dizziness, tremors, seizures, syncope, facial asymmetry, speech difficulty, weakness, light-headedness, numbness and headaches  Hematological: Negative for adenopathy  Does not bruise/bleed easily  Psychiatric/Behavioral: Negative for agitation, confusion, hallucinations, self-injury and suicidal ideas  The patient is not hyperactive  All other systems reviewed and are negative  Physical Exam  Physical Exam  Vitals and nursing note reviewed  Constitutional:       General: She is not in acute distress  Appearance: Normal appearance  She is well-developed  She is not ill-appearing        Comments: BP 158/70   Pulse 56   Temp 98 °F (36 7 °C)   Resp 15   SpO2 98%      HENT:      Head: Normocephalic and atraumatic  Right Ear: External ear normal       Left Ear: External ear normal       Nose: Nose normal       Mouth/Throat:      Mouth: Mucous membranes are moist    Eyes:      General: No scleral icterus  Right eye: No discharge  Left eye: No discharge  Extraocular Movements: Extraocular movements intact  Conjunctiva/sclera: Conjunctivae normal    Cardiovascular:      Rate and Rhythm: Normal rate  Pulses: Normal pulses  Pulmonary:      Effort: Pulmonary effort is normal  No respiratory distress  Abdominal:      Tenderness: There is no abdominal tenderness  There is no right CVA tenderness, left CVA tenderness, guarding or rebound  Musculoskeletal:         General: No swelling, tenderness, deformity or signs of injury  Normal range of motion  Cervical back: Normal range of motion and neck supple  No rigidity or tenderness  No muscular tenderness  Comments: There is no midline thoracic or lumbar spinal tenderness to palpation  No bony step offs or deformities on palpation  There is right lumbar paraspinal muscle tenderness to palpation  Positive straight leg raise test on the right  No saddle anesthesia  Nontender over the costovertebral angle bilaterally  Bilateral lower extremities: The patient is neurovascularly intact in the superficial and deep peroneal, sural, tibial, and saphenous nerve distributions there is normal sensation and good capillary refill within the toes  Strength 5/5 normal to bilateral lower extremities  FROM throughout BLE  No posterior calf pain or palpable cords  Skin:     Capillary Refill: Capillary refill takes less than 2 seconds  Coloration: Skin is not jaundiced or pale  Findings: No erythema or rash  Neurological:      General: No focal deficit present        Mental Status: She is alert and oriented to person, place, and time  Mental status is at baseline  Cranial Nerves: No cranial nerve deficit  Sensory: No sensory deficit  Motor: No weakness  Gait: Gait normal    Psychiatric:         Mood and Affect: Mood normal          Behavior: Behavior normal          Thought Content:  Thought content normal          Judgment: Judgment normal          Vital Signs  ED Triage Vitals [08/03/22 1121]   Temperature Pulse Respirations Blood Pressure SpO2   98 °F (36 7 °C) 75 18 126/60 99 %      Temp src Heart Rate Source Patient Position - Orthostatic VS BP Location FiO2 (%)   -- -- -- -- --      Pain Score       --           Vitals:    08/03/22 1121 08/03/22 1340   BP: 126/60 158/70   Pulse: 75 56         Visual Acuity      ED Medications  Medications - No data to display    Diagnostic Studies  Results Reviewed     Procedure Component Value Units Date/Time    Urine Microscopic [[id number]]  (Abnormal) Collected: 08/03/22 1225    Lab Status: Final result Specimen: Urine, Clean Catch Updated: 08/03/22 1244     RBC, UA 1-2 /hpf      WBC, UA None Seen /hpf      Epithelial Cells None Seen /hpf      Bacteria, UA None Seen /hpf      MUCUS THREADS Moderate    UA w Reflex to Microscopic w Reflex to Culture [[id number]]  (Abnormal) Collected: 08/03/22 1225    Lab Status: Final result Specimen: Urine, Clean Catch Updated: 08/03/22 1236     Color, UA Yellow     Clarity, UA Clear     Specific [place], UA 1 020     pH, UA 6 5     Leukocytes, UA Negative     Nitrite, UA Negative     Protein, UA Negative mg/dl      Glucose, UA Negative mg/dl      Ketones, UA Negative mg/dl      Urobilinogen, UA 0 2 E U /dl      Bilirubin, UA Negative     Occult Blood, UA Trace-Intact                 XR lumbar spine 2 or 3 views   Final Result by Kirstin De Dios MD (08/03 1405)      Progressive multilevel degenerative spondylosis   Persistent levoscoliosis   No acute lumbar spinal abnormalities      Workstation performed: [id number] Procedures  Procedures         ED Course                                             MDM  Number of Diagnoses or Management Options  Acute low back pain: new and requires workup  Diagnosis management comments: MDM:  ddx includes but is not limited to:  Myofascial pain, diskogenic pain, radicular pain, muscle spasm, vertebral compression fracture, spinal stenosis, spondylosis, cancer, osteoporosis, OA, RA, consider but doubt epidural abscess, cauda equina  ED plan:  UA to rule out UTI/pyelonephritis  Xray to evaluate for fracture/metastasis given age and history    MDM:  Patient meets Back pain low risk criteria, no fever chills or weight loss, no neurological deficit, no history of cancer, no history of injecting drugs, pain improved with rest     ED results  ED results:   I have reviewed the patient's vital signs, nursing notes, and other relevant ancillary testing/information  I have had a detailed discussion with the patient regarding the historical points, examination findings, and any diagnostic results      Xray images lumbar spine independently visualized and interpreted by me - no acute vertebral fracture or severe subluxation  Degenerative changes noted  Tomy Lucio UA negative for nitrites and leukocytes, no UTI  Trace blood  ED coarse:  Discussed all results with patient at bedside  Discussed diagnosis of right low back pain - musculoskeletal   No fracture  No UTI  Discussed with patient discharge plan of care including rest, use of ice, avoidance of lifting greater than 5 lbs and no bending or twisting  Discussed outpatient use of NSAIDS, and prescribed medications  Instructed regarding follow up with primary care physician in 3-5 days and outpatient neurologist/orthopedist/spine specialist in 5-7 days for further evaluation    Verbally reviewed with patient reasons to return to ED including but not limited to urinary retention, bowel or bladder incontinence, fevers of 100 4 F or higher, lower extremity weakness/paralysis, worsening pain or any other worsening or worrisome symptoms  Patient verbalized understanding and agreement of same  Standard narcotic precautions given  Amount and/or Complexity of Data Reviewed  Clinical lab tests: ordered and reviewed  Tests in the radiology section of CPT®: ordered and reviewed  Discussion of test results with the performing providers: yes  Review and summarize past medical records: yes  Independent visualization of images, tracings, or specimens: yes    Risk of Complications, Morbidity, and/or Mortality  General comments: Disclaimers:    I have reasonably determine that electronically prescribing a controlled substance would be impractical for the patient to obtain the controlled substance prescribed by electronic prescription or would cause an untimely delay resulting in an adverse impact on the patient's medical condition        Patient was seen during the outbreak of the corona virus epidemic   Resources are limited due to the severity of patient illnesses associated with virus   Testing is also limited at this time   Discussed with patient at the time of this evaluation   Due to the fact that limited resources are available -treatment options are limited  Patient Progress  Patient progress: stable      Disposition  Final diagnoses:   Acute low back pain     Time reflects when diagnosis was documented in both MDM as applicable and the Disposition within this note     Time User Action Codes Description Comment    8/3/2022  1:10 PM Sh[facility] Add [M54 50] Acute low back pain       ED Disposition     ED Disposition   Discharge    Condition   Stable    Date/Time   Wed Aug 3, 2022  1:57 PM    Comment   Linda Celeste discharge to home/self care                 Follow-up Information     Follow up With Specialties Details Why Contact Info Additional Information    Pretty Luciano, [address], Nurse Practitioner Call in 3 days for further evaluation of symptoms [address]       [facility] Emergency Department Emergency Medicine Go to  If symptoms worsen [address] [facility] [address] [facility] Emergency Department, [address] [place], [place], [phone] MD [facility] Call in 2 weeks for further evaluation of symptoms [address] [facility]  [address]  [facility] [address] [place] [address]             Discharge Medication List as of 8/3/2022  1:58 PM      START taking these medications    Details   ibuprofen (MOTRIN) 400 mg tablet Take 1 tablet (400 mg total) by mouth every 6 (six) hours as needed for moderate pain (back pain/initial rx ) for up to 5 days, Starting Wed 8/3/2022, Until Mon 8/8/2022 at 2359, Normal      methocarbamol (ROBAXIN) 750 mg tablet Take 1 tablet (750 mg total) by mouth 4 (four) times a day for 5 days, Starting Wed 8/3/2022, Until Mon 8/8/2022, Normal      oxyCODONE-acetaminophen (PERCOCET) 5-325 mg per tablet Take 1 tablet by mouth every 6 (six) hours as needed for severe pain for up to 3 days Label no driving no etoh  Initial rx    Dx: Max Daily Amount: 4 tablets, Starting Wed 8/3/2022, Until Sat 8/6/2022 at 2359, Normal         CONTINUE these medications which have NOT CHANGED    Details   atorvastatin (LIPITOR) 10 mg tablet Take 1 tablet (10 mg total) by mouth daily, Starting Mon 9/20/2021, Normal      FLUoxetine (PROzac) 20 MG tablet TAKE 3 TABLETS BY MOUTH EVERY DAY, Normal      naproxen (Naprosyn) 500 mg tablet Take 1 tablet (500 mg total) by mouth 2 (two) times a day with meals, Starting Mon 3/21/2022, Normal      ondansetron (ZOFRAN) 4 mg tablet Take 1 tablet (4 mg total) by mouth every 8 (eight) hours as needed for nausea or vomiting, Starting u 5/6/2021, Normal      tobramycin (TOBREX) 0 3 % OINT Administer 0 5 inches to the right eye 4 (four) times a day, Starting Mon 3/21/2022, Normal      valACYclovir (VALTREX) 1,000 mg tablet TAKE 1 TABLET BY MOUTH EVERY DAY, Normal             No discharge procedures on file      PDMP Review     None          ED Provider  Electronically Signed by           Denny Jacobs PA-C  08/03/22 7814

## 2022-08-03 NOTE — Clinical Note
Vicstephen Samanthamadeleine was seen and treated in our emergency department on 8/3/2022  No restrictions            Diagnosis:     Brionna Bower  may return to work on return date  She may return on this date: 08/08/2022         If you have any questions or concerns, please don't hesitate to call        Vicente Buchanan MD    ______________________________           _______________          _______________  Hospital Representative                              Date                                Time

## 2022-08-03 NOTE — Clinical Note
Song Golsdtein was seen and treated in our emergency department on 8/3/2022  No restrictions            Diagnosis:     Glendora Curling  may return to work on return date  She may return on this date: 08/08/2022         If you have any questions or concerns, please don't hesitate to call        Brandyn Spence MD    ______________________________           _______________          _______________  Hospital Representative                              Date                                Time

## 2022-08-09 ENCOUNTER — OFFICE VISIT (OUTPATIENT)
Dept: FAMILY MEDICINE CLINIC | Facility: CLINIC | Age: 57
End: 2022-08-09
Payer: COMMERCIAL

## 2022-08-09 VITALS
HEART RATE: 65 BPM | SYSTOLIC BLOOD PRESSURE: 128 MMHG | DIASTOLIC BLOOD PRESSURE: 80 MMHG | OXYGEN SATURATION: 99 % | HEIGHT: 61 IN | BODY MASS INDEX: 27.6 KG/M2 | WEIGHT: 146.2 LBS | TEMPERATURE: 96.8 F

## 2022-08-09 DIAGNOSIS — M54.50 ACUTE RIGHT-SIDED LOW BACK PAIN WITHOUT SCIATICA: Primary | ICD-10-CM

## 2022-08-09 DIAGNOSIS — M62.838 MUSCLE SPASM: ICD-10-CM

## 2022-08-09 PROCEDURE — 3725F SCREEN DEPRESSION PERFORMED: CPT | Performed by: NURSE PRACTITIONER

## 2022-08-09 PROCEDURE — 99214 OFFICE O/P EST MOD 30 MIN: CPT | Performed by: NURSE PRACTITIONER

## 2022-08-09 RX ORDER — METAXALONE 800 MG/1
800 TABLET ORAL EVERY 6 HOURS PRN
Qty: 30 TABLET | Refills: 0 | Status: SHIPPED | OUTPATIENT
Start: 2022-08-09

## 2022-08-09 NOTE — PROGRESS NOTES
Assessment/Plan:     Chronic Problems:  No problem-specific Assessment & Plan notes found for this encounter  Visit Diagnosis:  Diagnoses and all orders for this visit:    Acute right-sided low back pain without sciatica  Comments:  Reviewed xray from ER  Advised to continue motrin prn, skelaxin prn, muscle rubs, heating pad and stretching  Orders:  -     Ambulatory Referral to Physical Therapy; Future    Muscle spasm  Comments: Will try skelaxin as robaxin made her sick  Advised heating pad, stretching, PT  Orders:  -     metaxalone (SKELAXIN) 800 mg tablet; Take 1 tablet (800 mg total) by mouth every 6 (six) hours as needed for muscle spasms          Subjective:    Patient ID: Federica Ronquillo is a 59 y o  female  Patient presents for ER follow up  Patient seen on 8/3 in the ER for acute back pain  Patient had xray lumbar spine that showed: Progressive multilevel degenerative spondylosis and persistent levoscoliosis  She states the back pain started Last week on Monday (8/1), started at work  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  Denies any known injury  The meds from the ER made her very sick, she was given percocet, robaxin and motrin  She continues with motrin with some relief  Back pain is 7/10 right lower back currently, denies radiation  Denies numbness or tingling in the legs  Using her heating pad with a little relief  She has been contipstated for the past week  She tried senna with little relief  She passed a small BM yesterday  The following portions of the patient's history were reviewed and updated as appropriate: allergies, current medications, past family history, past medical history, past social history, past surgical history and problem list     Review of Systems   Constitutional: Negative for chills, diaphoresis and fever  HENT: Negative for ear pain and sore throat  Eyes: Negative for pain and visual disturbance     Respiratory: Negative for cough and shortness of breath  Cardiovascular: Negative for chest pain and palpitations  Gastrointestinal: Positive for constipation, nausea (from constipation) and vomiting (because of the meds)  Negative for abdominal pain and diarrhea  Genitourinary: Negative for dysuria, frequency and hematuria  Musculoskeletal: Positive for back pain  Negative for arthralgias  Skin: Negative for color change and rash  Neurological: Positive for dizziness (with the pills)  Negative for light-headedness and headaches  All other systems reviewed and are negative          /80 (BP Location: Left arm, Patient Position: Sitting)   Pulse 65   Temp (!) 96 8 °F (36 °C) (Tympanic)   Ht 5' 1" (1 549 m)   Wt 66 3 kg (146 lb 3 2 oz)   SpO2 99%   BMI 27 62 kg/m²   Social History     Socioeconomic History    Marital status: /Civil Union     Spouse name: Not on file    Number of children: Not on file    Years of education: Not on file    Highest education level: Not on file   Occupational History    Not on file   Tobacco Use    Smoking status: Never Smoker    Smokeless tobacco: Never Used    Tobacco comment: Per allscripts - former smoker   Vaping Use    Vaping Use: Never used   Substance and Sexual Activity    Alcohol use: Not Currently    Drug use: No    Sexual activity: Not Currently     Partners: Male   Other Topics Concern    Not on file   Social History Narrative    Dental care, reg    Lives with spouse     Social Determinants of Health     Financial Resource Strain: Not on file   Food Insecurity: Not on file   Transportation Needs: Not on file   Physical Activity: Not on file   Stress: Not on file   Social Connections: Not on file   Intimate Partner Violence: Not on file   Housing Stability: Not on file     Past Medical History:   Diagnosis Date    Anxiety 06/20/2016    Last assessed    Chronic infective otitis externa of left ear 09/18/2017    Last assessed    GERD (gastroesophageal reflux disease)  Right lumbar radiculopathy 05/05/2016    Last assessed    Sciatic leg pain      Family History   Problem Relation Age of Onset    No Known Problems Mother     Alcohol abuse Father     Diabetes Paternal Grandmother     Hypertension Paternal Grandmother     Cancer Family     Breast cancer Maternal Aunt      Past Surgical History:   Procedure Laterality Date    APPENDECTOMY      CHOLECYSTECTOMY      KNEE ARTHROSCOPY      torn menicus    SC LAMNOTMY INCL W/DCMPRSN NRV ROOT 1 INTRSPC LUMBR Right 7/6/2016    Procedure: L3/4 FORAMINOTOMY;  Surgeon: Lydia Perez MD;  [facility]: [facility];  Service: Neurosurgery    TONSILLECTOMY         Current Outpatient Medications:     FLUoxetine (PROzac) 20 MG tablet, TAKE 3 TABLETS BY MOUTH EVERY DAY, Disp: 270 tablet, Rfl: 0    metaxalone (SKELAXIN) 800 mg tablet, Take 1 tablet (800 mg total) by mouth every 6 (six) hours as needed for muscle spasms, Disp: 30 tablet, Rfl: 0    ondansetron (ZOFRAN) 4 mg tablet, Take 1 tablet (4 mg total) by mouth every 8 (eight) hours as needed for nausea or vomiting, Disp: 6 tablet, Rfl: 0    tobramycin (TOBREX) 0 3 % OINT, Administer 0 5 inches to the right eye 4 (four) times a day, Disp: 3 5 g, Rfl: 0    ibuprofen (MOTRIN) 400 mg tablet, Take 1 tablet (400 mg total) by mouth every 6 (six) hours as needed for moderate pain (back pain/initial rx ) for up to 5 days, Disp: 30 tablet, Rfl: 0    valACYclovir (VALTREX) 1,000 mg tablet, TAKE 1 TABLET BY MOUTH EVERY DAY, Disp: 90 tablet, Rfl: 0    Allergies   Allergen Reactions    Prednisone Anaphylaxis     Annotation - [id number]: reaction within 2 hours: shaking, sweats, near syncope    Erythromycin           Lab Review   Admission on 08/03/2022, Discharged on 08/03/2022   Component Date Value    Color, UA 08/03/2022 Yellow     Clarity, UA 08/03/2022 Clear     Specific Gravity, UA 08/03/2022 1 020     pH, UA 08/03/2022 6 5     Leukocytes, UA 08/03/2022 Negative     Nitrite, UA 08/03/2022 Negative     Protein, UA 08/03/2022 Negative     Glucose, UA 08/03/2022 Negative     Ketones, UA 08/03/2022 Negative     Urobilinogen, UA 08/03/2022 0 2     Bilirubin, UA 08/03/2022 Negative     Occult Blood, UA 08/03/2022 Trace-Intact (A)    RBC, UA 08/03/2022 1-2     WBC, UA 08/03/2022 None Seen     Epithelial Cells 08/03/2022 None Seen     Bacteria, UA 08/03/2022 None Seen     MUCUS THREADS 08/03/2022 Moderate (A)        Imaging: XR lumbar spine 2 or 3 views    Result Date: 8/3/2022  Narrative: LUMBAR SPINE INDICATION:   back pain  COMPARISON:  3/17/2016 CT VIEWS:  XR SPINE LUMBAR 2 OR 3 VIEWS INJURY Images: 3 FINDINGS: There are 5 non rib bearing lumbar vertebral bodies  There is no evidence of acute fracture or destructive osseous lesion  Mild levoscoliosis, apex L2-3, unchanged Progressive moderate to severe degenerative disc disease L2-3, L3-4 and moderate degenerative disc disease L4-5 and L5-S1 The pedicles appear intact  Soft tissues are unremarkable  Impression: Progressive multilevel degenerative spondylosis Persistent levoscoliosis No acute lumbar spinal abnormalities Workstation performed: [id number]       Objective:     Physical Exam  Constitutional:       Appearance: She is well-developed  Cardiovascular:      Rate and Rhythm: Normal rate and regular rhythm  Heart sounds: Normal heart sounds  No murmur heard  Pulmonary:      Effort: Pulmonary effort is normal  No respiratory distress  Breath sounds: Normal breath sounds  Musculoskeletal:         General: Normal range of motion  Lumbar back: Spasms and tenderness present  Negative right straight leg raise test and negative left straight leg raise test         Back:    Skin:     General: Skin is warm and dry  Neurological:      Mental Status: She is alert and oriented to person, place, and time     Psychiatric:         Mood and Affect: Mood normal            There are no Patient Instructions on file for this visit  AMAURI Miranda    Portions of the record may have been created with voice recognition software  Occasional wrong word or "sound a like" substitutions may have occurred due to the inherent limitations of voice recognition software  Read the chart carefully and recognize, using context, where substitutions have occurred

## 2022-08-12 DIAGNOSIS — B00.9 HERPES INFECTION: ICD-10-CM

## 2022-08-12 RX ORDER — VALACYCLOVIR HYDROCHLORIDE 1 G/1
TABLET, FILM COATED ORAL
Qty: 90 TABLET | Refills: 0 | Status: SHIPPED | OUTPATIENT
Start: 2022-08-12 | End: 2022-11-10

## 2022-10-18 ENCOUNTER — TELEMEDICINE (OUTPATIENT)
Dept: FAMILY MEDICINE CLINIC | Facility: CLINIC | Age: 57
End: 2022-10-18
Payer: COMMERCIAL

## 2022-10-18 DIAGNOSIS — B34.9 VIRAL INFECTION, UNSPECIFIED: Primary | ICD-10-CM

## 2022-10-18 DIAGNOSIS — R05.1 ACUTE COUGH: ICD-10-CM

## 2022-10-18 PROCEDURE — 0241U HB NFCT DS VIR RESP RNA 4 TRGT: CPT

## 2022-10-18 PROCEDURE — 99213 OFFICE O/P EST LOW 20 MIN: CPT

## 2022-10-18 RX ORDER — GUAIFENESIN 600 MG/1
1200 TABLET, EXTENDED RELEASE ORAL EVERY 12 HOURS SCHEDULED
Qty: 30 TABLET | Refills: 0 | Status: SHIPPED | OUTPATIENT
Start: 2022-10-18

## 2022-10-18 NOTE — LETTER
[address] [id number] [phone]  Dept: [phone]    October 18, 2022    Patient: Gwendolyn Wolf  YOB: 1965    Ten Jessie Booker was seen and evaluated at our [facility]  Please note if Covid and Flu tests are negative, they may return to work when fever free for 24 hours without the use of a fever reducing agent  If Covid or Flu test is positive, they may return to work on 10/21/22, as this is 5 days from the onset of symptoms  Upon return, they must then adhere to strict masking for an additional 5 days      Sincerely,    AMAURI Gates

## 2022-10-18 NOTE — PROGRESS NOTES
COVID-19 Outpatient Progress Note    Assessment/Plan:    Problem List Items Addressed This Visit    None     Visit Diagnoses     Viral infection, unspecified    -  Primary    Will test for COVID/flu/RSV  Stay hydrated  Start taking vitamin-D, vitamin-C, and zinc    Relevant Orders    COVID/FLU/RSV    Acute cough        Start taking Mucinex b i d  Relevant Medications    guaiFENesin (MUCINEX) 600 mg 12 hr tablet    Other Relevant Orders    COVID/FLU/RSV         Disposition:     Recommended patient to come to the office to test for COVID-19/Influenza/RSV  While awaiting the results of covid testing, patient was advised to isolate  Discussed symptom directed medication options with patient  Discussed vitamin D, vitamin C, and/or zinc supplementation with patient  I have spent 10 minutes directly with the patient  Greater than 50% of this time was spent in counseling/coordination of care regarding: diagnostic results, prognosis, risks and benefits of treatment options, instructions for management, patient and family education, importance of treatment compliance, risk factor reductions and impressions  Encounter provider: AMAURI Ortega     Provider located at: [facility] [address] [facility] [address] [facility] [facility]  [address] [place] [address] [address]     Recent Visits  No visits were found meeting these conditions  Showing recent visits within past 7 days and meeting all other requirements  Today's Visits  Date Type Provider Dept   10/18/22 Telemedicine AMAURI Ortega [facility] [address] [facility]   Showing today's visits and meeting all other requirements  Future Appointments  No visits were found meeting these conditions    Showing future appointments within next 150 days and meeting all other requirements     This virtual check-in was done via [employer] and patient was informed that this is a secure, HIPAA-compliant platform  She agrees to proceed  Patient agrees to participate in a virtual check in via telephone or video visit instead of presenting to the office to address urgent/immediate medical needs  Patient is aware this is a billable service  She acknowledged consent and understanding of privacy and security of the video platform  The patient has agreed to participate and understands they can discontinue the visit at any time  After connecting through [facility], the patient was identified by name and date of birth  Randa Agudelo was informed that this was a telemedicine visit and that the exam was being conducted confidentially over secure lines  My office door was closed  No one else was in the room  Randa Agudelo acknowledged consent and understanding of privacy and security of the telemedicine visit  I informed the patient that I have reviewed her record in Epic and presented the opportunity for her to ask any questions regarding the visit today  The patient agreed to participate  Verification of patient location:  Patient is located in the following state in which I hold an active license: [place]    Subjective:   Randa Agudelo is a 59 y o  female who is concerned about COVID-19  Patient's symptoms include chills, fatigue, nasal congestion, rhinorrhea, sore throat, cough, chest tightness and headache  Patient denies fever, malaise, anosmia, loss of taste, shortness of breath, abdominal pain, nausea, vomiting, diarrhea and myalgias  - Date of symptom onset: 10/16/2022      COVID-19 vaccination status: Fully vaccinated (primary series)    Flu shot last week Monday     Lab Results   Component Value Date    SARSCOV2 Negative 05/06/2021       Review of Systems   Constitutional: Positive for chills and fatigue  Negative for fever  HENT: Positive for congestion, rhinorrhea and sore throat  Respiratory: Positive for cough and chest tightness  Negative for shortness of breath      Gastrointestinal: Negative for abdominal pain, diarrhea, nausea and vomiting  Musculoskeletal: Negative for myalgias  Neurological: Positive for headaches  Current Outpatient Medications on File Prior to Visit   Medication Sig   • FLUoxetine (PROzac) 20 MG tablet TAKE 3 TABLETS BY MOUTH EVERY DAY   • ibuprofen (MOTRIN) 400 mg tablet Take 1 tablet (400 mg total) by mouth every 6 (six) hours as needed for moderate pain (back pain/initial rx ) for up to 5 days   • metaxalone (SKELAXIN) 800 mg tablet Take 1 tablet (800 mg total) by mouth every 6 (six) hours as needed for muscle spasms   • ondansetron (ZOFRAN) 4 mg tablet Take 1 tablet (4 mg total) by mouth every 8 (eight) hours as needed for nausea or vomiting   • tobramycin (TOBREX) 0 3 % OINT Administer 0 5 inches to the right eye 4 (four) times a day   • valACYclovir (VALTREX) 1,000 mg tablet TAKE 1 TABLET BY MOUTH EVERY DAY       Objective: There were no vitals taken for this visit  Physical Exam  Nursing note reviewed  Constitutional:       General: She is not in acute distress  Appearance: She is not ill-appearing  HENT:      Head: Normocephalic  Pulmonary:      Effort: Pulmonary effort is normal  No respiratory distress  Neurological:      Mental Status: She is alert and oriented to person, place, and time  Psychiatric:         Mood and Affect: Mood normal          Behavior: Behavior normal          Thought Content:  Thought content normal          Judgment: Judgment normal        AMAURI Parham

## 2022-10-19 ENCOUNTER — TELEPHONE (OUTPATIENT)
Dept: FAMILY MEDICINE CLINIC | Facility: CLINIC | Age: 57
End: 2022-10-19

## 2022-10-19 LAB
FLUAV RNA RESP QL NAA+PROBE: NEGATIVE
FLUBV RNA RESP QL NAA+PROBE: NEGATIVE
RSV RNA RESP QL NAA+PROBE: NEGATIVE
SARS-COV-2 RNA RESP QL NAA+PROBE: NEGATIVE

## 2022-10-19 NOTE — TELEPHONE ENCOUNTER
Most likely due to virus, she should listen to her body and accordingly, I am ok extending her excuse from work   Plenty of fluids, rest, vitamin C, D, Zinc - Barber

## 2022-10-19 NOTE — TELEPHONE ENCOUNTER
Informed patient her tests were negative  She wants to know what she should do  She is still tired, body aches etc   She is going back to work tomorrow

## 2022-10-20 NOTE — TELEPHONE ENCOUNTER
Patient went to work and they sent her home  She has to test Sunday to see if she can return on Monday  She has a wicked headache and very tired  I informed she can still test positive for up to 90 days  She is going to call us on Monday and let us know what she needs  (if she needs the note extended past Monday)

## 2022-10-30 DIAGNOSIS — F33.1 MODERATE EPISODE OF RECURRENT MAJOR DEPRESSIVE DISORDER (HCC): ICD-10-CM

## 2022-10-30 RX ORDER — FLUOXETINE 20 MG/1
TABLET, FILM COATED ORAL
Qty: 270 TABLET | Refills: 0 | Status: SHIPPED | OUTPATIENT
Start: 2022-10-30

## 2022-11-08 DIAGNOSIS — B00.9 HERPES INFECTION: ICD-10-CM

## 2022-11-08 RX ORDER — VALACYCLOVIR HYDROCHLORIDE 1 G/1
TABLET, FILM COATED ORAL
Qty: 90 TABLET | Refills: 0 | Status: SHIPPED | OUTPATIENT
Start: 2022-11-08 | End: 2023-02-06

## 2022-11-15 ENCOUNTER — APPOINTMENT (EMERGENCY)
Dept: RADIOLOGY | Facility: HOSPITAL | Age: 57
End: 2022-11-15

## 2022-11-15 ENCOUNTER — HOSPITAL ENCOUNTER (EMERGENCY)
Facility: HOSPITAL | Age: 57
Discharge: HOME | End: 2022-11-15
Attending: EMERGENCY MEDICINE

## 2022-11-15 VITALS
TEMPERATURE: 98.7 F | OXYGEN SATURATION: 99 % | RESPIRATION RATE: 16 BRPM | HEART RATE: 72 BPM | DIASTOLIC BLOOD PRESSURE: 73 MMHG | HEIGHT: 61 IN | WEIGHT: 150 LBS | SYSTOLIC BLOOD PRESSURE: 158 MMHG | BODY MASS INDEX: 28.32 KG/M2

## 2022-11-15 DIAGNOSIS — B34.9 VIRAL SYNDROME: ICD-10-CM

## 2022-11-15 DIAGNOSIS — R68.89 FLU-LIKE SYMPTOMS: Primary | ICD-10-CM

## 2022-11-15 LAB
FLUAV RNA RESP QL NAA+PROBE: NEGATIVE
FLUBV RNA RESP QL NAA+PROBE: NEGATIVE
RSV RNA RESP QL NAA+PROBE: NEGATIVE
S PYO DNA THROAT QL NAA+PROBE: NOT DETECTED
SARS-COV-2 RNA RESP QL NAA+PROBE: NEGATIVE

## 2022-11-15 NOTE — Clinical Note
Arlene Valderrama was seen and treated in our emergency department on 11/15/2022  No restrictions            Diagnosis:     Mirna Ma  may return to work on return date  She may return on this date: 11/18/2022    May return to work on 11/18/22 if fever free x 24 hours  COVID/flu/RSV negative  If you have any questions or concerns, please don't hesitate to call        Alyssa Valencia PA-C    ______________________________           _______________          _______________  Hospital Representative                              Date                                Time

## 2022-11-15 NOTE — ED PROVIDER NOTES
History  Chief Complaint   Patient presents with   • Flu Symptoms     Headache, body ache, dry cough, sore throat, nausea - s/s started yesterday  Had similar 2 weeks ago  Has been cleaning out her mothers house where there is a lot of dust, dirt, mouse droppings  Wants to be tested for Hanta Virus     63yo female with no significant past medical history presenting for flu-like symptoms x 1 day  Symptoms began yesterday and include cough, congestion, sore throat, headache, body aches, and fatigue  No fevers  She is worried because she has been cleaning out her mother's house with mouse droppings  She was sick a few weeks ago with similar symptoms  Patient has been taking leftover amoxicillin due to her symptoms  No chest pain, abdominal pain, vomiting  History provided by:  Patient   used: No    Flu Symptoms  Presenting symptoms: cough, diarrhea, fatigue, headache, myalgias and sore throat    Presenting symptoms: no fever, no nausea, no shortness of breath and no vomiting    Sore throat:     Severity:  Moderate    Onset quality:  Gradual    Duration:  1 day    Timing:  Constant    Progression:  Worsening  Severity:  Moderate  Onset quality:  Gradual  Duration:  1 day  Progression:  Worsening  Chronicity:  New  Relieved by:  Nothing  Worsened by:  Nothing  Associated symptoms: chills and nasal congestion    Associated symptoms: no mental status change, no neck stiffness and no syncope    Risk factors: no immunocompromised state        Prior to Admission Medications   Prescriptions Last Dose Informant Patient Reported? Taking?    FLUoxetine (PROzac) 20 MG tablet   No No   Sig: TAKE 3 TABLETS BY MOUTH EVERY DAY   guaiFENesin (MUCINEX) 600 mg 12 hr tablet   No No   Sig: Take 2 tablets (1,200 mg total) by mouth every 12 (twelve) hours   ibuprofen (MOTRIN) 400 mg tablet   No No   Sig: Take 1 tablet (400 mg total) by mouth every 6 (six) hours as needed for moderate pain (back pain/initial rx ) for up to 5 days   metaxalone (SKELAXIN) 800 mg tablet   No No   Sig: Take 1 tablet (800 mg total) by mouth every 6 (six) hours as needed for muscle spasms   ondansetron (ZOFRAN) 4 mg tablet   No No   Sig: Take 1 tablet (4 mg total) by mouth every 8 (eight) hours as needed for nausea or vomiting   tobramycin (TOBREX) 0 3 % OINT   No No   Sig: Administer 0 5 inches to the right eye 4 (four) times a day   valACYclovir (VALTREX) 1,000 mg tablet   No No   Sig: TAKE 1 TABLET BY MOUTH EVERY DAY      Facility-Administered Medications: None       Past Medical History:   Diagnosis Date   • Anxiety 06/20/2016    Last assessed   • Chronic infective otitis externa of left ear 09/18/2017    Last assessed   • GERD (gastroesophageal reflux disease)    • Right lumbar radiculopathy 05/05/2016    Last assessed   • Sciatic leg pain        Past Surgical History:   Procedure Laterality Date   • APPENDECTOMY     • CHOLECYSTECTOMY     • KNEE ARTHROSCOPY      torn menicus   • HI LAMNOTMY INCL W/DCMPRSN NRV ROOT 1 INTRSPC LUMBR Right 7/6/2016    Procedure: L3/4 FORAMINOTOMY;  Surgeon: Polly Dow MD;  Location: [facility];  Service: Neurosurgery   • TONSILLECTOMY         Family History   Problem Relation Age of Onset   • No Known Problems Mother    • Alcohol abuse Father    • Diabetes Paternal Grandmother    • Hypertension Paternal Grandmother    • Cancer Family    • Breast cancer Maternal Aunt      I have reviewed and agree with the history as documented  E-Cigarette/Vaping   • E-Cigarette Use Never User      E-Cigarette/Vaping Substances   • Nicotine No    • THC No    • CBD No    • Flavoring No    • Other No    • Unknown No      Social History     Tobacco Use   • Smoking status: Never Smoker   • Smokeless tobacco: Never Used   • Tobacco comment: Per allscripts - former smoker   Vaping Use   • Vaping Use: Never used   Substance Use Topics   • Alcohol use:  Yes   • Drug use: No       Review of Systems   Constitutional: Positive for chills and fatigue  Negative for fever  HENT: Positive for congestion and sore throat  Negative for drooling and voice change  Eyes: Negative for discharge and redness  Respiratory: Positive for cough  Negative for shortness of breath and stridor  Cardiovascular: Negative for chest pain and leg swelling  Gastrointestinal: Positive for diarrhea  Negative for abdominal pain, nausea and vomiting  Musculoskeletal: Positive for myalgias  Negative for neck pain and neck stiffness  Skin: Negative for color change and rash  Neurological: Positive for headaches  Negative for seizures and syncope  Psychiatric/Behavioral: Negative for confusion  The patient is not nervous/anxious  All other systems reviewed and are negative  Physical Exam  Physical Exam  Vitals and nursing note reviewed  Constitutional:       General: She is not in acute distress  Appearance: She is well-developed  She is not diaphoretic  Comments: Non-toxic appearing   HENT:      Head: Normocephalic and atraumatic  Right Ear: Tympanic membrane, ear canal and external ear normal       Left Ear: Tympanic membrane, ear canal and external ear normal       Nose: Nose normal       Mouth/Throat:      Mouth: Mucous membranes are moist       Pharynx: Oropharynx is clear  No oropharyngeal exudate  Eyes:      General: No scleral icterus  Right eye: No discharge  Left eye: No discharge  Conjunctiva/sclera: Conjunctivae normal    Cardiovascular:      Rate and Rhythm: Normal rate and regular rhythm  Heart sounds: Normal heart sounds  No murmur heard  Pulmonary:      Effort: Pulmonary effort is normal  No respiratory distress  Breath sounds: Normal breath sounds  No stridor  No wheezing or rales  Abdominal:      General: Bowel sounds are normal  There is no distension  Palpations: Abdomen is soft  Tenderness: There is no abdominal tenderness  There is no guarding     Musculoskeletal: General: No deformity  Normal range of motion  Cervical back: Normal range of motion and neck supple  Lymphadenopathy:      Cervical: No cervical adenopathy  Skin:     General: Skin is warm and dry  Neurological:      Mental Status: She is alert  She is not disoriented  GCS: GCS eye subscore is 4  GCS verbal subscore is 5  GCS motor subscore is 6  Psychiatric:         Behavior: Behavior normal          Vital Signs  ED Triage Vitals [11/15/22 0831]   Temperature Pulse Respirations Blood Pressure SpO2   98 7 °F (37 1 °C) 72 16 158/73 99 %      Temp Source Heart Rate Source Patient Position - Orthostatic VS BP Location FiO2 (%)   Oral Monitor Sitting Left arm --      Pain Score       7           Vitals:    11/15/22 0831   BP: 158/73   Pulse: 72   Patient Position - Orthostatic VS: Sitting         Visual Acuity      ED Medications  Medications - No data to display    Diagnostic Studies  Results Reviewed     Procedure Component Value Units Date/Time    FLU/RSV/COVID - if FLU/RSV clinically relevant [[id number]]  (Normal) Collected: 11/15/22 0835    Lab Status: Final result Specimen: Nares from Nose Updated: 11/15/22 0924     SARS-CoV-2 Negative     INFLUENZA A PCR Negative     INFLUENZA B PCR Negative     RSV PCR Negative    Narrative:      FOR PEDIATRIC PATIENTS - copy/paste COVID Guidelines URL to browser: [email or web address]    SARS-CoV-2 assay is a Nucleic Acid Amplification assay intended for the  qualitative detection of nucleic acid from SARS-CoV-2 in nasopharyngeal  swabs  Results are for the presumptive identification of SARS-CoV-2 RNA  Positive results are indicative of infection with SARS-CoV-2, the virus  causing COVID-19, but do not rule out bacterial infection or co-infection  with other viruses   Laboratories within the [place] and its  territories are required to report all positive results to the appropriate  public health authorities  Negative results do not preclude SARS-CoV-2  infection and should not be used as the sole basis for treatment or other  patient management decisions  Negative results must be combined with  clinical observations, patient history, and epidemiological information  This test has not been FDA cleared or approved  This test has been authorized by FDA under an Emergency Use Authorization  (EUA)  This test is only authorized for the duration of time the  declaration that circumstances exist justifying the authorization of the  emergency use of an in vitro diagnostic tests for detection of SARS-CoV-2  virus and/or diagnosis of COVID-19 infection under section 564(b)(1) of  the Act, 21 U  S C  245ARK-3(T)(8), unless the authorization is terminated  or revoked sooner  The test has been validated but independent review by FDA  and CLIA is pending  Test performed using [employer] Gene[employer]pert: This RT-PCR assay targets N2,  a region unique to SARS-CoV-2  A conserved region in the E-gene was chosen  for pan-Sarbecovirus detection which includes SARS-CoV-2  According to CMS-2020-01-R, this platform meets the definition of high-throughput technology  Strep A PCR [[id number]]  (Normal) Collected: 11/15/22 0835    Lab Status: Final result Specimen: Throat Updated: 11/15/22 0909     STREP A PCR Not Detected                 XR chest 2 views   ED Interpretation by Alyssa Valencia PA-C (11/15 1035)   No acute consolidation      Final Result by Lashay Plunkett MD (11/15 1041)      No acute cardiopulmonary disease  Workstation performed: [id number]                    Procedures  Procedures         ED Course                   MDM  Number of Diagnoses or Management Options  Flu-like symptoms: new and requires workup  Viral syndrome: new and requires workup  Diagnosis management comments: 61yo female presenting for 24 hours of flu-like symptoms   C/o cough, congestion, headache, body aches, diarrhea, nausea  No CP  Patient is afebrile and hemodynamically stable  She is well appearing in no distress  Lungs are clear and respirations are unlabored  Remainder of exam is reassuring  COVID/flu/RSV and strep swabs are negative  CXR obtained which appears normal  No indication for further workup  Presentation consistent with a viral syndrome  Supportive care discussed  Advised close PCP follow-up  ED return precautions discussed  Patient expressed understanding and is agreeable to plan  Patient discharged in stable condition  Amount and/or Complexity of Data Reviewed  Clinical lab tests: ordered  Tests in the radiology section of CPT®: reviewed and ordered  Independent visualization of images, tracings, or specimens: yes    Risk of Complications, Morbidity, and/or Mortality  Presenting problems: low  Diagnostic procedures: low  Management options: low    Patient Progress  Patient progress: stable      Disposition  Final diagnoses:   Flu-like symptoms   Viral syndrome     Time reflects when diagnosis was documented in both MDM as applicable and the Disposition within this note     Time User Action Codes Description Comment    11/15/2022 10:35 AM Negrito Poe [R68 89] Flu-like symptoms     11/15/2022 10:35 AM Negrito Poe [B34 9] Viral syndrome       ED Disposition     ED Disposition   Discharge    Condition   Stable    Date/Time   Tue Nov 15, 2022 10:35 AM    Comment   Linda Celeste discharge to home/self care                 Follow-up Information     Follow up With Specialties Details Why Contact Info Additional Information    Geri Archibald, [address], Nurse Practitioner Schedule an appointment as soon as possible for a visit   [address] [place] [address] Emergency Department Emergency Medicine  If symptoms worsen [address] [facility], [address] [place], [place], [address]          Discharge Medication List as of 11/15/2022 10:36 AM      CONTINUE these medications which have NOT CHANGED    Details   FLUoxetine (PROzac) 20 MG tablet TAKE 3 TABLETS BY MOUTH EVERY DAY, Normal      guaiFENesin (MUCINEX) 600 mg 12 hr tablet Take 2 tablets (1,200 mg total) by mouth every 12 (twelve) hours, Starting Tue 10/18/2022, Normal      ibuprofen (MOTRIN) 400 mg tablet Take 1 tablet (400 mg total) by mouth every 6 (six) hours as needed for moderate pain (back pain/initial rx ) for up to 5 days, Starting Wed 8/3/2022, Until Mon 8/8/2022 at 2359, Normal      metaxalone (SKELAXIN) 800 mg tablet Take 1 tablet (800 mg total) by mouth every 6 (six) hours as needed for muscle spasms, Starting Tue 8/9/2022, Normal      ondansetron (ZOFRAN) 4 mg tablet Take 1 tablet (4 mg total) by mouth every 8 (eight) hours as needed for nausea or vomiting, Starting Thu 5/6/2021, Normal      tobramycin (TOBREX) 0 3 % OINT Administer 0 5 inches to the right eye 4 (four) times a day, Starting Mon 3/21/2022, Normal      valACYclovir (VALTREX) 1,000 mg tablet TAKE 1 TABLET BY MOUTH EVERY DAY, Normal             No discharge procedures on file      PDMP Review     None          ED Provider  Electronically Signed by           Fabio Bernard PA-C  11/15/22 1677

## 2022-11-15 NOTE — DISCHARGE INSTRUCTIONS
Drink plenty of fluids and rest  Use honey and tea for cough/sore throat  Please follow-up with your family doctor  Return to the ER with any worsening symptoms

## 2023-01-16 ENCOUNTER — OFFICE VISIT (OUTPATIENT)
Dept: FAMILY MEDICINE CLINIC | Facility: CLINIC | Age: 58
End: 2023-01-16

## 2023-01-16 VITALS
WEIGHT: 149.6 LBS | BODY MASS INDEX: 28.25 KG/M2 | DIASTOLIC BLOOD PRESSURE: 82 MMHG | HEIGHT: 61 IN | TEMPERATURE: 97.9 F | OXYGEN SATURATION: 100 % | HEART RATE: 62 BPM | SYSTOLIC BLOOD PRESSURE: 130 MMHG

## 2023-01-16 DIAGNOSIS — H66.001 NON-RECURRENT ACUTE SUPPURATIVE OTITIS MEDIA OF RIGHT EAR WITHOUT SPONTANEOUS RUPTURE OF TYMPANIC MEMBRANE: Primary | ICD-10-CM

## 2023-01-16 RX ORDER — AMOXICILLIN 875 MG/1
875 TABLET, COATED ORAL 2 TIMES DAILY
Qty: 10 TABLET | Refills: 0 | Status: SHIPPED | OUTPATIENT
Start: 2023-01-16 | End: 2023-01-18 | Stop reason: ALTCHOICE

## 2023-01-16 NOTE — PROGRESS NOTES
Assessment/Plan:         Problem List Items Addressed This Visit    None  Visit Diagnoses     Non-recurrent acute suppurative otitis media of right ear without spontaneous rupture of tympanic membrane    -  Primary    Start taking antibiotic finish all the doses even if you feel better  Continue to use Motrin or Tylenol as needed for pain  Relevant Medications    amoxicillin (AMOXIL) 875 mg tablet            Subjective:      Patient ID: Holly Quezada is a 59 y o  female  Earache   There is pain in the right ear  This is a new problem  The current episode started today  The problem has been gradually worsening  There has been no fever  Associated symptoms include headaches and neck pain  Pertinent negatives include no abdominal pain, coughing, diarrhea, ear discharge, hearing loss, rash, rhinorrhea, sore throat or vomiting  She has tried acetaminophen for the symptoms  The treatment provided mild relief  There is no history of a chronic ear infection, hearing loss or a tympanostomy tube  The following portions of the patient's history were reviewed and updated as appropriate:   Past Medical History:  She has a past medical history of Anxiety (06/20/2016), Chronic infective otitis externa of left ear (09/18/2017), GERD (gastroesophageal reflux disease), Right lumbar radiculopathy (05/05/2016), and Sciatic leg pain  ,  _______________________________________________________________________  Medical Problems:  does not have any pertinent problems on file ,  _______________________________________________________________________  Past Surgical History:   has a past surgical history that includes Appendectomy; Cholecystectomy; Tonsillectomy; Knee arthroscopy; and pr lamnotmy incl w/dcmprsn nrv root 1 intrspc lumbr (Right, 7/6/2016)  ,  _______________________________________________________________________  Family History:  family history includes Alcohol abuse in her father; Breast cancer in her maternal aunt; Cancer in her family; Diabetes in her paternal grandmother; Hypertension in her paternal grandmother; No Known Problems in her mother ,  _______________________________________________________________________  Social History:   reports that she has never smoked  She has never used smokeless tobacco  She reports current alcohol use  She reports that she does not use drugs  ,  _______________________________________________________________________  Allergies:  is allergic to prednisone and erythromycin     _______________________________________________________________________  Current Outpatient Medications   Medication Sig Dispense Refill   • amoxicillin (AMOXIL) 875 mg tablet Take 1 tablet (875 mg total) by mouth 2 (two) times a day for 5 days 10 tablet 0   • FLUoxetine (PROzac) 20 MG tablet TAKE 3 TABLETS BY MOUTH EVERY  tablet 0     No current facility-administered medications for this visit      _______________________________________________________________________  Review of Systems   HENT: Positive for ear pain  Negative for ear discharge, hearing loss, rhinorrhea and sore throat  Respiratory: Negative for cough  Gastrointestinal: Negative for abdominal pain, diarrhea and vomiting  Musculoskeletal: Positive for arthralgias and neck pain  Skin: Negative for rash  Neurological: Positive for headaches  Objective:  Vitals:    01/16/23 1034   BP: 130/82   BP Location: Right arm   Patient Position: Sitting   Pulse: 62   Temp: 97 9 °F (36 6 °C)   TempSrc: Tympanic   SpO2: 100%   Weight: 67 9 kg (149 lb 9 6 oz)   Height: 5' 1" (1 549 m)     Body mass index is 28 27 kg/m²  Physical Exam  Vitals and nursing note reviewed  Constitutional:       General: She is not in acute distress  Appearance: Normal appearance  She is not ill-appearing  HENT:      Right Ear: Tympanic membrane is erythematous and bulging        Left Ear: Tympanic membrane, ear canal and external ear normal  Cardiovascular:      Rate and Rhythm: Normal rate and regular rhythm  Heart sounds: Normal heart sounds  Pulmonary:      Effort: Pulmonary effort is normal       Breath sounds: Normal breath sounds  Musculoskeletal:         General: Normal range of motion  Skin:     General: Skin is warm and dry  Neurological:      Mental Status: She is alert and oriented to person, place, and time  Psychiatric:         Mood and Affect: Mood normal          Behavior: Behavior normal          Thought Content:  Thought content normal          Judgment: Judgment normal

## 2023-01-18 ENCOUNTER — TELEPHONE (OUTPATIENT)
Dept: FAMILY MEDICINE CLINIC | Facility: CLINIC | Age: 58
End: 2023-01-18

## 2023-01-18 DIAGNOSIS — H66.001 NON-RECURRENT ACUTE SUPPURATIVE OTITIS MEDIA OF RIGHT EAR WITHOUT SPONTANEOUS RUPTURE OF TYMPANIC MEMBRANE: Primary | ICD-10-CM

## 2023-01-18 RX ORDER — AMOXICILLIN AND CLAVULANATE POTASSIUM 875; 125 MG/1; MG/1
1 TABLET, FILM COATED ORAL EVERY 12 HOURS SCHEDULED
Qty: 20 TABLET | Refills: 0 | Status: SHIPPED | OUTPATIENT
Start: 2023-01-18 | End: 2023-01-28

## 2023-01-18 NOTE — TELEPHONE ENCOUNTER
Patient called in as she is still not feeling well  She stated that you ask her to call back if she not better  Please advise what she should do next    [phone]

## 2023-01-18 NOTE — TELEPHONE ENCOUNTER
Pain is moving down her neck and into the shoulder  Ear has gotten slightly better  She stayed home the last 2 days and  put heat on it      Please advise    She was going to go to the hospital

## 2023-01-18 NOTE — TELEPHONE ENCOUNTER
Patient called in for a work note  She would like a letter stating she will possibly be able to returning to work on Monday 23, 2023   She would like the letter sent to her email address

## 2023-01-19 ENCOUNTER — HOSPITAL ENCOUNTER (EMERGENCY)
Facility: HOSPITAL | Age: 58
Discharge: HOME | End: 2023-01-19
Attending: EMERGENCY MEDICINE

## 2023-01-19 ENCOUNTER — APPOINTMENT (EMERGENCY)
Dept: CT IMAGING | Facility: HOSPITAL | Age: 58
End: 2023-01-19

## 2023-01-19 VITALS
DIASTOLIC BLOOD PRESSURE: 70 MMHG | OXYGEN SATURATION: 96 % | SYSTOLIC BLOOD PRESSURE: 162 MMHG | TEMPERATURE: 98 F | HEART RATE: 61 BPM | RESPIRATION RATE: 18 BRPM

## 2023-01-19 DIAGNOSIS — H60.91 RIGHT OTITIS EXTERNA: Primary | ICD-10-CM

## 2023-01-19 LAB
ANION GAP SERPL CALCULATED.3IONS-SCNC: 9 MMOL/L (ref 4–13)
BASOPHILS # BLD AUTO: 0.04 THOUSANDS/ÂΜL (ref 0–0.1)
BASOPHILS NFR BLD AUTO: 1 % (ref 0–1)
BUN SERPL-MCNC: 14 MG/DL (ref 5–25)
CALCIUM SERPL-MCNC: 9.4 MG/DL (ref 8.3–10.1)
CHLORIDE SERPL-SCNC: 107 MMOL/L (ref 96–108)
CO2 SERPL-SCNC: 27 MMOL/L (ref 21–32)
CREAT SERPL-MCNC: 0.81 MG/DL (ref 0.6–1.3)
EOSINOPHIL # BLD AUTO: 0.06 THOUSAND/ÂΜL (ref 0–0.61)
EOSINOPHIL NFR BLD AUTO: 1 % (ref 0–6)
ERYTHROCYTE [DISTWIDTH] IN BLOOD BY AUTOMATED COUNT: 12.5 % (ref 11.6–15.1)
GFR SERPL CREATININE-BSD FRML MDRD: 79 ML/MIN/1.73SQ M
GLUCOSE SERPL-MCNC: 95 MG/DL (ref 65–140)
HCT VFR BLD AUTO: 38.7 % (ref 34.8–46.1)
HGB BLD-MCNC: 13 G/DL (ref 11.5–15.4)
IMM GRANULOCYTES # BLD AUTO: 0.01 THOUSAND/UL (ref 0–0.2)
IMM GRANULOCYTES NFR BLD AUTO: 0 % (ref 0–2)
LYMPHOCYTES # BLD AUTO: 2.23 THOUSANDS/ÂΜL (ref 0.6–4.47)
LYMPHOCYTES NFR BLD AUTO: 34 % (ref 14–44)
MCH RBC QN AUTO: 30.4 PG (ref 26.8–34.3)
MCHC RBC AUTO-ENTMCNC: 33.6 G/DL (ref 31.4–37.4)
MCV RBC AUTO: 90 FL (ref 82–98)
MONOCYTES # BLD AUTO: 0.77 THOUSAND/ÂΜL (ref 0.17–1.22)
MONOCYTES NFR BLD AUTO: 12 % (ref 4–12)
NEUTROPHILS # BLD AUTO: 3.42 THOUSANDS/ÂΜL (ref 1.85–7.62)
NEUTS SEG NFR BLD AUTO: 52 % (ref 43–75)
NRBC BLD AUTO-RTO: 0 /100 WBCS
PLATELET # BLD AUTO: 326 THOUSANDS/UL (ref 149–390)
PMV BLD AUTO: 10.1 FL (ref 8.9–12.7)
POTASSIUM SERPL-SCNC: 3.4 MMOL/L (ref 3.5–5.3)
RBC # BLD AUTO: 4.28 MILLION/UL (ref 3.81–5.12)
SODIUM SERPL-SCNC: 143 MMOL/L (ref 135–147)
WBC # BLD AUTO: 6.53 THOUSAND/UL (ref 4.31–10.16)

## 2023-01-19 RX ORDER — OFLOXACIN 3 MG/ML
10 SOLUTION/ DROPS OPHTHALMIC ONCE
Status: COMPLETED | OUTPATIENT
Start: 2023-01-19 | End: 2023-01-19

## 2023-01-19 RX ORDER — OFLOXACIN 3 MG/ML
10 SOLUTION AURICULAR (OTIC) DAILY
Qty: 5 ML | Refills: 0 | Status: SHIPPED | OUTPATIENT
Start: 2023-01-19 | End: 2023-01-25

## 2023-01-19 RX ORDER — KETOROLAC TROMETHAMINE 30 MG/ML
15 INJECTION, SOLUTION INTRAMUSCULAR; INTRAVENOUS ONCE
Status: COMPLETED | OUTPATIENT
Start: 2023-01-19 | End: 2023-01-19

## 2023-01-19 RX ORDER — KETOROLAC TROMETHAMINE 30 MG/ML
15 INJECTION, SOLUTION INTRAMUSCULAR; INTRAVENOUS ONCE
Status: DISCONTINUED | OUTPATIENT
Start: 2023-01-19 | End: 2023-01-19

## 2023-01-19 RX ADMIN — KETOROLAC TROMETHAMINE 15 MG: 30 INJECTION, SOLUTION INTRAMUSCULAR at 19:10

## 2023-01-19 RX ADMIN — IOHEXOL 100 ML: 350 INJECTION, SOLUTION INTRAVENOUS at 19:23

## 2023-01-19 RX ADMIN — OFLOXACIN 10 DROP: 3 SOLUTION/ DROPS OPHTHALMIC at 21:54

## 2023-01-19 NOTE — ED PROVIDER NOTES
History  Chief Complaint   Patient presents with   • Earache     Patient c/o right earache that started on Monday  Patient was placed on antibiotics on Monday by PCP  Patient reports pain radiates down right side of face, neck, and shoulder  Patient is a 59year old female presenting with right sided neck pain  She has right ear infection that was initially treated with Amoxicillin on Monday then switched to Augmentin for past 2 days prescribed by PCP  She continues to have pain in right ear, right side of neck and radiating to right shoulder  Pain also behind ear  She was nauseas and vomiting yesterday  Today she has been able to eat and drink  She denies sinus pain, congestion, sore throat, chest pain, SOB and abdominal pain  Prior to Admission Medications   Prescriptions Last Dose Informant Patient Reported? Taking?    FLUoxetine (PROzac) 20 MG tablet   No Yes   Sig: TAKE 3 TABLETS BY MOUTH EVERY DAY   amoxicillin-clavulanate (Augmentin) 875-125 mg per tablet   No Yes   Sig: Take 1 tablet by mouth every 12 (twelve) hours for 10 days      Facility-Administered Medications: None       Past Medical History:   Diagnosis Date   • Anxiety 06/20/2016    Last assessed   • Chronic infective otitis externa of left ear 09/18/2017    Last assessed   • GERD (gastroesophageal reflux disease)    • Right lumbar radiculopathy 05/05/2016    Last assessed   • Sciatic leg pain        Past Surgical History:   Procedure Laterality Date   • APPENDECTOMY     • CHOLECYSTECTOMY     • KNEE ARTHROSCOPY      torn menicus   • RI LAMNOTMY INCL W/DCMPRSN NRV ROOT 1 INTRSPC LUMBR Right 7/6/2016    Procedure: L3/4 FORAMINOTOMY;  Surgeon: Ivonne Mariano MD;  Location: [facility];  Service: Neurosurgery   • TONSILLECTOMY         Family History   Problem Relation Age of Onset   • No Known Problems Mother    • Alcohol abuse Father    • Diabetes Paternal Grandmother    • Hypertension Paternal Grandmother    • Cancer Family    • Breast cancer Maternal Aunt      I have reviewed and agree with the history as documented  E-Cigarette/Vaping   • E-Cigarette Use Never User      E-Cigarette/Vaping Substances   • Nicotine No    • THC No    • CBD No    • Flavoring No    • Other No    • Unknown No      Social History     Tobacco Use   • Smoking status: Never   • Smokeless tobacco: Never   • Tobacco comments:     Per allscripts - former smoker   Vaping Use   • Vaping Use: Never used   Substance Use Topics   • Alcohol use: Yes   • Drug use: No       Review of Systems   Constitutional: Negative for chills and fever  HENT: Positive for ear pain and facial swelling  Negative for congestion, mouth sores, sore throat and trouble swallowing  Eyes: Negative for pain and visual disturbance  Respiratory: Negative for cough and shortness of breath  Cardiovascular: Negative for chest pain and palpitations  Gastrointestinal: Positive for nausea and vomiting  Negative for abdominal pain  Genitourinary: Negative for dysuria and hematuria  Musculoskeletal: Positive for neck pain  Negative for arthralgias and back pain  Skin: Negative for color change and rash  Neurological: Positive for headaches  Negative for dizziness, seizures, syncope and light-headedness  All other systems reviewed and are negative  Physical Exam  Physical Exam  Vitals and nursing note reviewed  Constitutional:       General: She is not in acute distress  Appearance: She is well-developed  HENT:      Head: Normocephalic and atraumatic  Right Ear: Ear canal and external ear normal  Tympanic membrane is erythematous  Left Ear: Tympanic membrane, ear canal and external ear normal       Ears:      Comments: Right TM infection     Mouth/Throat:      Mouth: Mucous membranes are moist       Pharynx: Oropharynx is clear  No oropharyngeal exudate or posterior oropharyngeal erythema  Eyes:      Extraocular Movements: Extraocular movements intact  Conjunctiva/sclera: Conjunctivae normal    Cardiovascular:      Rate and Rhythm: Normal rate and regular rhythm  Heart sounds: Normal heart sounds  No murmur heard  Pulmonary:      Effort: Pulmonary effort is normal  No respiratory distress  Breath sounds: Normal breath sounds  Abdominal:      General: There is no distension  Palpations: Abdomen is soft  Tenderness: There is no abdominal tenderness  Musculoskeletal:      Cervical back: Neck supple  Tenderness present  Skin:     General: Skin is warm and dry  Capillary Refill: Capillary refill takes less than 2 seconds  Neurological:      Mental Status: She is alert and oriented to person, place, and time     Psychiatric:         Mood and Affect: Mood normal          Behavior: Behavior normal          Vital Signs  ED Triage Vitals [01/19/23 1809]   Temperature Pulse Respirations Blood Pressure SpO2   98 °F (36 7 °C) 73 18 167/77 96 %      Temp Source Heart Rate Source Patient Position - Orthostatic VS BP Location FiO2 (%)   Tympanic Monitor Sitting Left arm --      Pain Score       --           Vitals:    01/19/23 1809 01/19/23 2146   BP: 167/77 162/70   Pulse: 73 61   Patient Position - Orthostatic VS: Sitting Lying         Visual Acuity      ED Medications  Medications   ofloxacin (OCUFLOX) 0 3 % ophthalmic solution 10 drop (has no administration in time range)   ketorolac (TORADOL) injection 15 mg (15 mg Intravenous Given 1/19/23 1910)   iohexol (OMNIPAQUE) 350 MG/ML injection (SINGLE-DOSE) 100 mL (100 mL Intravenous Given 1/19/23 1923)       Diagnostic Studies  Results Reviewed     Procedure Component Value Units Date/Time    Basic metabolic panel [[id number]]  (Abnormal) Collected: 01/19/23 1856    Lab Status: Final result Specimen: Blood from Arm, Right Updated: 01/19/23 1916     Sodium 143 mmol/L      Potassium 3 4 mmol/L      Chloride 107 mmol/L      CO2 27 mmol/L      ANION GAP 9 mmol/L      BUN 14 mg/dL      Creatinine 0 81 mg/dL      Glucose 95 mg/dL      Calcium 9 4 mg/dL      eGFR 79 ml/min/1 73sq m     Narrative:      [place] guidelines for Chronic Kidney Disease (CKD):   •  Stage 1 with normal or high GFR (GFR > 90 mL/min/1 73 square meters)  •  Stage 2 Mild CKD (GFR = 60-89 mL/min/1 73 square meters)  •  Stage 3A Moderate CKD (GFR = 45-59 mL/min/1 73 square meters)  •  Stage 3B Moderate CKD (GFR = 30-44 mL/min/1 73 square meters)  •  Stage 4 Severe CKD (GFR = 15-29 mL/min/1 73 square meters)  •  Stage 5 End Stage CKD (GFR <15 mL/min/1 73 square meters)  Note: GFR calculation is accurate only with a steady state creatinine    CBC and differential [[id number]] Collected: 01/19/23 1856    Lab Status: Final result Specimen: Blood from Arm, Right Updated: 01/19/23 1902     WBC 6 53 Thousand/uL      RBC 4 28 Million/uL      Hemoglobin 13 0 g/dL      Hematocrit 38 7 %      MCV 90 fL      MCH 30 4 pg      MCHC 33 6 g/dL      RDW 12 5 %      MPV 10 1 fL      Platelets 912 Thousands/uL      nRBC 0 /100 WBCs      Neutrophils Relative 52 %      Immat GRANS % 0 %      Lymphocytes Relative 34 %      Monocytes Relative 12 %      Eosinophils Relative 1 %      Basophils Relative 1 %      Neutrophils Absolute 3 42 Thousands/µL      Immature Grans Absolute 0 01 Thousand/uL      Lymphocytes Absolute 2 23 Thousands/µL      Monocytes Absolute 0 77 Thousand/µL      Eosinophils Absolute 0 06 Thousand/µL      Basophils Absolute 0 04 Thousands/µL                  CT soft tissue neck with contrast   Final Result by Madhu Ya MD (01/19 2101)      No evidence of mass, fluid collection, inflammation or lymphadenopathy in the neck  Workstation performed: [id number]         CT orbits/temporal bones/skull base w contrast   Final Result by Madhu Ya MD (01/19 2136)      Mild right otitis externa  No evidence of otitis media or mastoiditis                 Workstation performed: [id number] Procedures  Procedures         ED Course                               SBIRT 20yo+    Flowsheet Row Most Recent Value   SBIRT (25 yo +)    In order to provide better care to our patients, we are screening all of our patients for alcohol and drug use  Would it be okay to ask you these screening questions? Yes Filed at: 01/19/2023 1816   Initial Alcohol Screen: US AUDIT-C     1  How often do you have a drink containing alcohol? 0 Filed at: 01/19/2023 1816   2  How many drinks containing alcohol do you have on a typical day you are drinking? 0 Filed at: 01/19/2023 1816   3b  FEMALE Any Age, or MALE 65+: How often do you have 4 or more drinks on one occassion? 0 Filed at: 01/19/2023 1816   Audit-C Score 0 Filed at: 01/19/2023 1816   ABIGAIL: How many times in the past year have you    Used an illegal drug or used a prescription medication for non-medical reasons? Never Filed at: 01/19/2023 1816                    Medical Decision Making  Patient is a 59year old female presenting with right eye, face and neck pain  Vitals normal  Right TM with infection  Left TM normal  Otherwise normal physical exam  CT neck and temporal no evidence of mass, fluid collection, inflammation or lymphadenopathy in the neck  No acute changes or findings  Mild right otitis externa  No evidence of otitis media or mastoiditis  Recommend continuing antibiotics and taking ibuprofen for pain  Will prescribe ear drops  Follow up with PCP in a week  Return to ER if worsening or new symptoms as discussed  Amount and/or Complexity of Data Reviewed  Labs: ordered  Radiology: ordered  Risk  Prescription drug management            Disposition  Final diagnoses:   Right otitis externa     Time reflects when diagnosis was documented in both MDM as applicable and the Disposition within this note     Time User Action Codes Description Comment    1/19/2023  9:06 PM Connor Young Add [H66 91] Right otitis media     1/19/2023  9:40 PM [place] David Add [H60 91] Right otitis externa     1/19/2023  9:40 PM Ernie David Modify [H60 91] Right otitis externa     1/19/2023  9:40 PM Ernie David Remove [V41 97] Right otitis media       ED Disposition     ED Disposition   Discharge    Condition   Stable    Date/Time   Thu Jan 19, 2023  9:51 PM    Comment   Linda Celeste discharge to home/self care  Follow-up Information     Follow up With Specialties Details Why Contact Info Additional Information    Lavern Gonzales, [id number], Nurse Practitioner Schedule an appointment as soon as possible for a visit in 1 week  [address] [facility] Emergency Department Emergency Medicine Go to  If symptoms worsen [address] Emergency Department, [address] [place] [address], Formerly [facility]          Patient's Medications   Discharge Prescriptions    OFLOXACIN (FLOXIN) 0 3 % OTIC SOLUTION    Administer 10 drops to the right ear daily for 6 days       Start Date: 1/19/2023 End Date: 1/25/2023       Order Dose: 10 drops       Quantity: 5 mL    Refills: 0       No discharge procedures on file      PDMP Review     None          ED Provider  Electronically Signed by           Piedad Mejia PA-C  01/19/23 9867

## 2023-01-24 ENCOUNTER — OFFICE VISIT (OUTPATIENT)
Dept: FAMILY MEDICINE CLINIC | Facility: CLINIC | Age: 58
End: 2023-01-24

## 2023-01-24 VITALS
WEIGHT: 150 LBS | SYSTOLIC BLOOD PRESSURE: 120 MMHG | HEART RATE: 76 BPM | RESPIRATION RATE: 12 BRPM | DIASTOLIC BLOOD PRESSURE: 88 MMHG | OXYGEN SATURATION: 98 % | HEIGHT: 61 IN | TEMPERATURE: 97.5 F | BODY MASS INDEX: 28.32 KG/M2

## 2023-01-24 DIAGNOSIS — H60.501 ACUTE OTITIS EXTERNA OF RIGHT EAR, UNSPECIFIED TYPE: Primary | ICD-10-CM

## 2023-01-24 RX ORDER — METHYLPREDNISOLONE 4 MG/1
TABLET ORAL
Qty: 21 EACH | Refills: 0 | Status: SHIPPED | OUTPATIENT
Start: 2023-01-24 | End: 2023-02-01

## 2023-01-24 RX ORDER — IBUPROFEN 600 MG/1
600 TABLET ORAL EVERY 6 HOURS PRN
Qty: 30 TABLET | Refills: 0 | Status: SHIPPED | OUTPATIENT
Start: 2023-01-24

## 2023-01-24 NOTE — PATIENT INSTRUCTIONS
Ear Infection   WHAT YOU NEED TO KNOW:   An ear infection is also called otitis media  Blocked or swollen eustachian tubes can cause an infection  Eustachian tubes connect the middle ear to the back of the nose and throat  They drain fluid from the middle ear  You may have a buildup of fluid in your ear  Germs build up in the fluid and infection develops  DISCHARGE INSTRUCTIONS:   Return to the emergency department if:   You have clear fluid coming from your ear  You have a stiff neck, headache, and a fever  Call your doctor if:   You see blood or pus draining from your ear  Your ear pain gets worse or does not go away, even after treatment  The outside of your ear is red or swollen  You are vomiting or have diarrhea  You have questions or concerns about your condition or care  Medicines: You may  need any of the following:  Acetaminophen  decreases pain and fever  It is available without a doctor's order  Ask how much to take and how often to take it  Follow directions  Read the labels of all other medicines you are using to see if they also contain acetaminophen, or ask your doctor or pharmacist  Acetaminophen can cause liver damage if not taken correctly  Do not use more than 4 grams (4,000 milligrams) total of acetaminophen in one day  NSAIDs , such as ibuprofen, help decrease swelling, pain, and fever  This medicine is available with or without a doctor's order  NSAIDs can cause stomach bleeding or kidney problems in certain people  If you take blood thinner medicine, always ask your healthcare provider if NSAIDs are safe for you  Always read the medicine label and follow directions  Ear drops  may contain medicine to decrease pain and inflammation  Antibiotics  help treat a bacterial infection  Take your medicine as directed  Contact your healthcare provider if you think your medicine is not helping or if you have side effects   Tell him or her if you are allergic to any medicine  Keep a list of the medicines, vitamins, and herbs you take  Include the amounts, and when and why you take them  Bring the list or the pill bottles to follow-up visits  Carry your medicine list with you in case of an emergency  Self-care:   Apply heat  on your ear for 15 to 20 minutes, 3 to 4 times a day or as directed  You can apply heat with an electric heating pad, hot water bottle, or warm compress  Always put a cloth between your skin and the heat pack to prevent burns  Heat helps decrease pain  Apply ice  on your ear for 15 to 20 minutes, 3 to 4 times a day for 2 days or as directed  Use an ice pack, or put crushed ice in a plastic bag  Cover it with a towel before you apply it to your ear  Ice decreases swelling and pain  Prevent an ear infection:   Wash your hands often  to help prevent the spread of germs  Ask everyone in your house to wash their hands with soap and water  Ask them to wash after they use the bathroom or change a diaper  Remind them to wash before they prepare or eat food  Stay away from people who are ill  Some germs spread easily and quickly through contact  Follow up with your doctor as directed:  Write down your questions so you remember to ask them during your visits  © Copyright [employer] 2022 Information is for End User's use only and may not be sold, redistributed or otherwise used for commercial purposes  All illustrations and images included in CareNotes® are the copyrighted property of [employer]  or Iker Ramirez  The above information is an  only  It is not intended as medical advice for individual conditions or treatments  Talk to your doctor, nurse or pharmacist before following any medical regimen to see if it is safe and effective for you

## 2023-01-24 NOTE — PROGRESS NOTES
Assessment/Plan:         Problem List Items Addressed This Visit    None  Visit Diagnoses     Acute otitis externa of right ear, unspecified type    -  Primary    Finish antibiotic eardrops  Start steroid Dosepak, may take ibuprofen 600 mg every 6 hours  Make an appointment with ENT  Relevant Medications    methylPREDNISolone 4 MG tablet therapy pack    ibuprofen (MOTRIN) 600 mg tablet    Other Relevant Orders    Ambulatory Referral to Otolaryngology            Subjective:      Patient ID: Johnny Tovar is a 59 y o  female  Patient presents to the office complaining of ongoing right ear pain with some hearing loss  He was recently seen at emergency room where she was put on eardrops, she also had CT scan of her neck and head which were suggestive of otitis externa  Discussed treatment plan with the patient and will add prednisone  Patient stated that she had a reaction to prednisone in the past, but it was most likely due to high dose of 80 mg  Patient was instructed if she develops any adverse reaction please stop the medicine and make me aware  Earache   There is pain in the right ear  This is a new problem  The current episode started in the past 7 days  The problem has been waxing and waning  There has been no fever  The pain is moderate  Associated symptoms include hearing loss and neck pain  Pertinent negatives include no abdominal pain, coughing, diarrhea, ear discharge, headaches, rash, rhinorrhea, sore throat or vomiting  She has tried antibiotics and ear drops for the symptoms  The treatment provided mild relief  There is no history of a chronic ear infection         The following portions of the patient's history were reviewed and updated as appropriate:   Past Medical History:  She has a past medical history of Anxiety (06/20/2016), Chronic infective otitis externa of left ear (09/18/2017), GERD (gastroesophageal reflux disease), Right lumbar radiculopathy (05/05/2016), and Sciatic leg pain ,  _______________________________________________________________________  Medical Problems:  does not have any pertinent problems on file ,  _______________________________________________________________________  Past Surgical History:   has a past surgical history that includes Appendectomy; Cholecystectomy; Tonsillectomy; Knee arthroscopy; and pr lamnotmy incl w/dcmprsn nrv root 1 intrspc lumbr (Right, 7/6/2016)  ,  _______________________________________________________________________  Family History:  family history includes Alcohol abuse in her father; Breast cancer in her maternal aunt; Cancer in her family; Diabetes in her paternal grandmother; Hypertension in her paternal grandmother; No Known Problems in her mother ,  _______________________________________________________________________  Social History:   reports that she has never smoked  She has never used smokeless tobacco  She reports current alcohol use  She reports that she does not use drugs  ,  _______________________________________________________________________  Allergies:  is allergic to prednisone and erythromycin     _______________________________________________________________________  Current Outpatient Medications   Medication Sig Dispense Refill   • amoxicillin-clavulanate (Augmentin) 875-125 mg per tablet Take 1 tablet by mouth every 12 (twelve) hours for 10 days 20 tablet 0   • FLUoxetine (PROzac) 20 MG tablet TAKE 3 TABLETS BY MOUTH EVERY  tablet 0   • ibuprofen (MOTRIN) 600 mg tablet Take 1 tablet (600 mg total) by mouth every 6 (six) hours as needed for mild pain 30 tablet 0   • methylPREDNISolone 4 MG tablet therapy pack Use as directed on package 21 each 0   • ofloxacin (FLOXIN) 0 3 % otic solution Administer 10 drops to the right ear daily for 6 days 5 mL 0     No current facility-administered medications for this visit      _______________________________________________________________________  Review of Systems HENT: Positive for ear pain and hearing loss  Negative for ear discharge, rhinorrhea and sore throat  Respiratory: Negative for cough  Gastrointestinal: Negative for abdominal pain, diarrhea and vomiting  Musculoskeletal: Positive for neck pain  Skin: Negative for rash  Neurological: Negative for headaches  Objective:  Vitals:    01/24/23 0658   BP: 120/88   Pulse: 76   Resp: 12   Temp: 97 5 °F (36 4 °C)   SpO2: 98%   Weight: 68 kg (150 lb)   Height: 5' 1" (1 549 m)     Body mass index is 28 34 kg/m²  Physical Exam  Vitals and nursing note reviewed  Constitutional:       General: She is not in acute distress  Appearance: Normal appearance  She is not ill-appearing  HENT:      Right Ear: Decreased hearing noted  Tenderness present  There is mastoid tenderness  Left Ear: Tympanic membrane, ear canal and external ear normal    Cardiovascular:      Rate and Rhythm: Normal rate and regular rhythm  Heart sounds: Normal heart sounds  Pulmonary:      Effort: Pulmonary effort is normal       Breath sounds: Normal breath sounds  Skin:     General: Skin is warm and dry  Neurological:      Mental Status: She is alert and oriented to person, place, and time  Psychiatric:         Mood and Affect: Mood normal          Behavior: Behavior normal          Thought Content:  Thought content normal          Judgment: Judgment normal

## 2023-01-27 DIAGNOSIS — F33.1 MODERATE EPISODE OF RECURRENT MAJOR DEPRESSIVE DISORDER (HCC): ICD-10-CM

## 2023-01-27 RX ORDER — FLUOXETINE 20 MG/1
TABLET, FILM COATED ORAL
Qty: 270 TABLET | Refills: 0 | Status: SHIPPED | OUTPATIENT
Start: 2023-01-27

## 2023-01-31 ENCOUNTER — TELEPHONE (OUTPATIENT)
Dept: FAMILY MEDICINE CLINIC | Facility: CLINIC | Age: 58
End: 2023-01-31

## 2023-01-31 NOTE — TELEPHONE ENCOUNTER
Pt finished her medication but doesn't have an appt with ENT until Friday  The pain is back and throat hurts again so she is asking for a refill

## 2023-02-01 DIAGNOSIS — H60.501 ACUTE OTITIS EXTERNA OF RIGHT EAR, UNSPECIFIED TYPE: Primary | ICD-10-CM

## 2023-02-01 RX ORDER — PREDNISONE 20 MG/1
20 TABLET ORAL DAILY
Qty: 3 TABLET | Refills: 0 | Status: SHIPPED | OUTPATIENT
Start: 2023-02-01 | End: 2023-02-04

## 2023-02-15 ENCOUNTER — HOSPITAL ENCOUNTER (EMERGENCY)
Facility: HOSPITAL | Age: 58
Discharge: HOME | End: 2023-02-15
Attending: EMERGENCY MEDICINE | Admitting: EMERGENCY MEDICINE

## 2023-02-15 ENCOUNTER — HOSPITAL ENCOUNTER (OUTPATIENT)
Dept: VASCULAR ULTRASOUND | Facility: HOSPITAL | Age: 58
Discharge: HOME | End: 2023-02-15

## 2023-02-15 VITALS
OXYGEN SATURATION: 99 % | SYSTOLIC BLOOD PRESSURE: 141 MMHG | DIASTOLIC BLOOD PRESSURE: 65 MMHG | HEART RATE: 65 BPM | TEMPERATURE: 98.5 F | RESPIRATION RATE: 19 BRPM

## 2023-02-15 DIAGNOSIS — M25.529 ELBOW PAIN: ICD-10-CM

## 2023-02-15 DIAGNOSIS — M25.529 ELBOW PAIN: Primary | ICD-10-CM

## 2023-02-15 NOTE — ED PROVIDER NOTES
History  Chief Complaint   Patient presents with   • Elbow Pain     Right elbow pain that starts in ear and radiates down  Has been seeing ent for issue  Has not been compliant with meds, Did not take anything for pain      57-year-old female presents with a chief complaint of "I have a pain and here all day," pointing to her right elbow  Patient notes that she had an IV in that location a week ago and had some substantial bruising but no pain at that time  Patient notes that the bruising subsided though it took some time and she did have a "lump" in the area for an extended period of time in the same location as the pain that developed yesterday  Patient denies any chest pain or dyspnea  Patient's pulse oximetry    Patient denies any falls or trauma  Patient denies any falls or inciting events  Patient also notes persistent right ear pain that radiates to her shoulder that has been ongoing and has been evaluated extensively including prior imaging obtained in January when the patient had the IV and subsequent CT scan  Patient followed up with ENT who obtained cultures and was concerned it was a fungal infection for which the patient was placed on itraconazole  Patient notes that the medication "makes me feel really weird" so she subsequently decreased the dosing of the medication to once a day due to her tolerance  The symptoms have improved though her otalgia has persisted  Note that the otalgia does not radiate to the elbow according to the patient which differs from the triage note  Impression and plan: Right antecubital pain with a broad differential   Patient denies any falls or trauma and there is no posterior elbow pain  Patient has full range of motion including supination and pronation without pain  As such, unlikely to represent osseous injury    Considering patient's history, more concerns for potential venous thrombosis but more likely superficial based on patient's history and she states it does not extend to the upper arm, the pain to her shoulder extends from her ear and has been ongoing and chronic  Unfortunately ultrasound is unavailable overnight for vascular studies  I offered to have the patient wait for vascular imaging in the morning though after discussion of risks and benefits, she declined and prefers to schedule close outpatient follow-up and I will provide her with a prescription to obtain this along with the contact information for to be completed today  I discussed and emphasized that risks and benefits of delayed evaluation  Note that patient is a poor candidate for anticoagulation while she is taking itraconazole so these medications would need to be adjusted and do not feel appropriate to start any anticoagulation until imaging is completed  I discussed this with the patient  Regarding patient's ongoing otalgia, I discussed with the patient the need for follow-up with her ENT and emphasized contacting them today as she is not taking the medication as directed  I discussed contacting them regarding alternative medications or evaluation for monitoring of the side effects  Discussed risks of the medication in detail and the need for close follow-up with them  Prior to Admission Medications   Prescriptions Last Dose Informant Patient Reported? Taking?    FLUoxetine (PROzac) 20 MG tablet   No No   Sig: TAKE 3 TABLETS BY MOUTH EVERY DAY   ibuprofen (MOTRIN) 600 mg tablet   No No   Sig: Take 1 tablet (600 mg total) by mouth every 6 (six) hours as needed for mild pain      Facility-Administered Medications: None       Past Medical History:   Diagnosis Date   • Anxiety 06/20/2016    Last assessed   • Chronic infective otitis externa of left ear 09/18/2017    Last assessed   • GERD (gastroesophageal reflux disease)    • Right lumbar radiculopathy 05/05/2016    Last assessed   • Sciatic leg pain        Past Surgical History:   Procedure Laterality Date   • APPENDECTOMY     • CHOLECYSTECTOMY     • KNEE ARTHROSCOPY      torn menicus   • GA LAMNOTMY INCL W/DCMPRSN NRV ROOT 1 INTRSPC LUMBR Right 7/6/2016    Procedure: L3/4 FORAMINOTOMY;  Surgeon: Austyn Husain MD;  Location: [facility];  Service: Neurosurgery   • TONSILLECTOMY         Family History   Problem Relation Age of Onset   • No Known Problems Mother    • Alcohol abuse Father    • Diabetes Paternal Grandmother    • Hypertension Paternal Grandmother    • Cancer Family    • Breast cancer Maternal Aunt      I have reviewed and agree with the history as documented  E-Cigarette/Vaping   • E-Cigarette Use Never User      E-Cigarette/Vaping Substances   • Nicotine No    • THC No    • CBD No    • Flavoring No    • Other No    • Unknown No      Social History     Tobacco Use   • Smoking status: Never   • Smokeless tobacco: Never   • Tobacco comments:     Per allscripts - former smoker   Vaping Use   • Vaping Use: Never used   Substance Use Topics   • Alcohol use: Yes   • Drug use: No       Review of Systems    Physical Exam  Physical Exam  Vitals reviewed  HENT:      Head: Atraumatic  Eyes:      General: No scleral icterus  Cardiovascular:      Rate and Rhythm: Normal rate  Pulmonary:      Effort: Pulmonary effort is normal    Musculoskeletal:         General: Tenderness present  No deformity  Cervical back: Neck supple  Comments: No erythema or ecchymosis noted  No signs of trauma  There is discrete tenderness in the antecubital fossa but no appreciable mass can be completed  No tenderness over the remainder of the elbow including over the posterior aspect  Normal range of motion  Skin:     General: Skin is warm and dry  Neurological:      General: No focal deficit present  Mental Status: She is alert  Comments: Normal distal neurovascular exam of the right arm including sensory in all dermatomes  Motor is intact including okay, crosses finger, thumb to pinky    2+ radial pulse with appropriate capillary refill  Vital Signs  ED Triage Vitals   Temperature Pulse Respirations Blood Pressure SpO2   02/15/23 0404 02/15/23 0400 02/15/23 0400 02/15/23 0400 02/15/23 0430   98 5 °F (36 9 °C) 65 19 137/67 99 %      Temp Source Heart Rate Source Patient Position - Orthostatic VS BP Location FiO2 (%)   02/15/23 0404 02/15/23 0400 -- 02/15/23 0400 --   Oral Monitor  Left arm       Pain Score       --                  Vitals:    02/15/23 0400 02/15/23 0430   BP: 137/67 141/65   Pulse: 65 65         Visual Acuity      ED Medications  Medications - No data to display    Diagnostic Studies  Results Reviewed     None                 No orders to display              Procedures  Procedures         ED Course  ED Course as of 02/16/23 0620   Wed Feb 15, 2023   0440 Emphasized with the patient the need for close follow-up with ultrasound today along with primary care if testing is negative  Discussed challenges associated with anticoagulation and returning with any signs of progression of any potential venous thrombosis, which patient affirmed understanding  Discussed contacting ENT regarding issues regarding medication  Discussed and emphasized return precautions  MDM    Disposition  Final diagnoses:   Elbow pain     Time reflects when diagnosis was documented in both MDM as applicable and the Disposition within this note     Time User Action Codes Description Comment    2/15/2023  4:18 AM Jonathon Dawson [M25 529] Elbow pain       ED Disposition     ED Disposition   Discharge    Condition   Stable    Date/Time   Wed Feb 15, 2023  4:18 AM    Comment   Linda Celeste discharge to home/self care  Follow-up Information     Follow up With Specialties Details Why Contact Info Additional Information    Daja Bravo, [address] Nurse Practitioner Schedule an appointment as soon as possible for a visit in 2 days Follow-up and reassessment   [address] [address] [facility]  [address]       Liliya Mcgill MD Otolaryngology Call today To discuss your potential reaction to the itraconazole and continued management of your ear infection  [address] [facility] [address] Emergency Department Emergency Medicine Go to  If symptoms worsen [address] [facility] [id number] [facility] Emergency Department, [address] [place], [place], [address]          Discharge Medication List as of 2/15/2023  4:23 AM      CONTINUE these medications which have NOT CHANGED    Details   FLUoxetine (PROzac) 20 MG tablet TAKE 3 TABLETS BY MOUTH EVERY DAY, Normal      ibuprofen (MOTRIN) 600 mg tablet Take 1 tablet (600 mg total) by mouth every 6 (six) hours as needed for mild pain, Starting Tue 1/24/2023, Normal             Outpatient Discharge Orders   VAS upper limb venous duplex scan, unilateral/limited   Standing Status: Future Number of Occurrences: 1 Standing Exp   Date: 02/15/27       PDMP Review     None          ED Provider  Electronically Signed by           Michelle Calloway MD  02/16/23 9806

## 2023-02-15 NOTE — ED TRIAGE NOTES
Pt with known fungal infection to right ear, seen recently for symptoms, Has been seeing ENT, pt not compliant with prescribed meds  States pain radiates from right ear to elbow

## 2023-02-16 ENCOUNTER — HOSPITAL ENCOUNTER (OUTPATIENT)
Dept: RADIOLOGY | Facility: HOSPITAL | Age: 58
End: 2023-02-16

## 2023-02-16 ENCOUNTER — OFFICE VISIT (OUTPATIENT)
Dept: FAMILY MEDICINE CLINIC | Facility: CLINIC | Age: 58
End: 2023-02-16

## 2023-02-16 VITALS
WEIGHT: 147 LBS | HEART RATE: 88 BPM | OXYGEN SATURATION: 98 % | RESPIRATION RATE: 14 BRPM | HEIGHT: 61 IN | BODY MASS INDEX: 27.75 KG/M2 | DIASTOLIC BLOOD PRESSURE: 100 MMHG | SYSTOLIC BLOOD PRESSURE: 140 MMHG

## 2023-02-16 DIAGNOSIS — M54.12 CERVICAL RADICULOPATHY: ICD-10-CM

## 2023-02-16 DIAGNOSIS — M54.12 CERVICAL RADICULOPATHY: Primary | ICD-10-CM

## 2023-02-16 DIAGNOSIS — L03.113 CELLULITIS OF RIGHT UPPER EXTREMITY: ICD-10-CM

## 2023-02-16 RX ORDER — ITRACONAZOLE 100 MG/1
100 CAPSULE ORAL 2 TIMES DAILY
COMMUNITY
Start: 2023-02-10

## 2023-02-16 RX ORDER — CEPHALEXIN 500 MG/1
500 CAPSULE ORAL EVERY 8 HOURS SCHEDULED
Qty: 21 CAPSULE | Refills: 0 | Status: SHIPPED | OUTPATIENT
Start: 2023-02-16 | End: 2023-02-23

## 2023-02-16 NOTE — ASSESSMENT & PLAN NOTE
To proceed with EMG and x-ray of cervical spine  We will call with results  Follow-up in 2 weeks to review results

## 2023-02-16 NOTE — LETTER
February 16, 2023     Patient: Sulma Vanegas  YOB: 1965  Date of Visit: 2/16/2023      To Whom it May Concern:    Ginny Aleman is under my professional care  Paige Wadsworth was seen in my office on 2/16/2023  Paigezully Hickmanrles may return to work on 2/20/23  If you have any questions or concerns, please don't hesitate to call           Sincerely,          AMAURI Momin        CC: No Recipients

## 2023-02-16 NOTE — ASSESSMENT & PLAN NOTE
Right AC appears erythematous and warm  Venous duplex negative for clot  To begin Keflex 500 mg every 8 hours and begin warm compresses 4-5 times a day for 20 minutes at a time  Follow-up if no improvement in 1 week or sooner if symptoms worsen

## 2023-02-16 NOTE — PROGRESS NOTES
Assessment/Plan:    Cellulitis of right upper extremity  Right AC appears erythematous and warm  Venous duplex negative for clot  To begin Keflex 500 mg every 8 hours and begin warm compresses 4-5 times a day for 20 minutes at a time  Follow-up if no improvement in 1 week or sooner if symptoms worsen  Cervical radiculopathy  To proceed with EMG and x-ray of cervical spine  We will call with results  Follow-up in 2 weeks to review results  Diagnoses and all orders for this visit:    Cervical radiculopathy  -     XR spine cervical complete 4 or 5 vw non injury; Future  -     EMG 1 Limb; Future    Cellulitis of right upper extremity  -     cephalexin (KEFLEX) 500 mg capsule; Take 1 capsule (500 mg total) by mouth every 8 (eight) hours for 7 days    Other orders  -     itraconazole (SPORANOX) 100 mg capsule; Take 100 mg by mouth 2 (two) times a day          Subjective:      Patient ID: Robin Miranda is a 59 y o  female  Kaushik Anguiano presents for a follow-up  She was evaluated in the ER yesterday with right elbow pain  She obtained a venous duplex which was negative for blood clot  On note, on 1/19/23, she had an IV inserted in the area during another ER visit  Denies fever or chills but is having body aches  She has also been treated for a fungal ear infection in her right ear with itraconazole which has improved her symptoms  She has an upcoming appointment with ENT tomorrow  Kaushik Anguiano also reports right sided neck pain that radiates down her right arm  She is also experiencing numbness and tingling associated with her symptoms for the past month        The following portions of the patient's history were reviewed and updated as appropriate: She   Patient Active Problem List    Diagnosis Date Noted   • Cervical radiculopathy 02/16/2023   • Cellulitis of right upper extremity 02/16/2023   • Arthralgia of right temporomandibular joint 03/21/2022   • Elevated alkaline phosphatase level 09/15/2021   • 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   • Elevated liver enzymes 06/28/2019   • Cervical spinal stenosis 06/07/2016   • Sacroiliitis ([facility] ) 06/07/2016   • Degenerative disc disease, lumbar 05/05/2016   • Depression, recurrent ([place] [address] ) 03/15/2016   • Hyperlipidemia, mixed 01/26/2015     Current Outpatient Medications   Medication Sig Dispense Refill   • cephalexin (KEFLEX) 500 mg capsule Take 1 capsule (500 mg total) by mouth every 8 (eight) hours for 7 days 21 capsule 0   • FLUoxetine (PROzac) 20 MG tablet TAKE 3 TABLETS BY MOUTH EVERY  tablet 0   • itraconazole (SPORANOX) 100 mg capsule Take 100 mg by mouth 2 (two) times a day     • ibuprofen (MOTRIN) 600 mg tablet Take 1 tablet (600 mg total) by mouth every 6 (six) hours as needed for mild pain 30 tablet 0     No current facility-administered medications for this visit  She is allergic to prednisone and erythromycin       Review of Systems   Constitutional: Negative  HENT: Positive for ear pain (improved) and hearing loss  Respiratory: Negative  Cardiovascular: Negative  Gastrointestinal: Negative  Musculoskeletal: Positive for myalgias  Neurological: Negative  Psychiatric/Behavioral: Negative  /100   Pulse 88   Resp 14   Ht 5' 1" (1 549 m)   Wt 66 7 kg (147 lb)   SpO2 98%   BMI 27 78 kg/m²     Objective:     Physical Exam  Vitals and nursing note reviewed  Constitutional:       General: She is not in acute distress  Appearance: Normal appearance  She is well-developed  She is not ill-appearing, toxic-appearing or diaphoretic  HENT:      Head: Normocephalic and atraumatic  Ears:      Comments: Presence of fungal debris in right ear canal  Eyes:      Conjunctiva/sclera: Conjunctivae normal    Cardiovascular:      Rate and Rhythm: Normal rate and regular rhythm  Heart sounds: Normal heart sounds  No murmur heard  Pulmonary:      Effort: Pulmonary effort is normal  No respiratory distress        Breath sounds: Normal breath sounds  No wheezing or rales  Chest:      Chest wall: No tenderness  Musculoskeletal:      Cervical back: Neck supple  Comments: No obvious deformity in cervical spine  Range of motion intact  Sensation to light touch intact in right upper extremity  Strength 5/5 in LUE and 4/5 in RUE  Neurological:      General: No focal deficit present  Mental Status: She is alert and oriented to person, place, and time  Psychiatric:         Mood and Affect: Mood normal          Behavior: Behavior normal          Thought Content: Thought content normal          Judgment: Judgment normal            BMI Counseling: Body mass index is 27 78 kg/m²  The BMI is above normal  Nutrition recommendations include decreasing overall calorie intake, 3-5 servings of fruits/vegetables daily, reducing fast food intake, consuming healthier snacks, decreasing soda and/or juice intake, moderation in carbohydrate intake and increasing intake of lean protein  Exercise recommendations include exercising 3-5 times per week

## 2023-02-20 ENCOUNTER — TELEPHONE (OUTPATIENT)
Dept: NEUROLOGY | Facility: CLINIC | Age: 58
End: 2023-02-20

## 2023-02-20 ENCOUNTER — HOSPITAL ENCOUNTER (EMERGENCY)
Facility: HOSPITAL | Age: 58
Discharge: HOME | End: 2023-02-20
Attending: EMERGENCY MEDICINE

## 2023-02-20 VITALS
RESPIRATION RATE: 16 BRPM | DIASTOLIC BLOOD PRESSURE: 82 MMHG | OXYGEN SATURATION: 98 % | SYSTOLIC BLOOD PRESSURE: 146 MMHG | TEMPERATURE: 97.1 F | HEART RATE: 76 BPM

## 2023-02-20 DIAGNOSIS — R11.0 NAUSEA: ICD-10-CM

## 2023-02-20 DIAGNOSIS — M54.12 CERVICAL RADICULOPATHY: Primary | ICD-10-CM

## 2023-02-20 DIAGNOSIS — H92.01 RIGHT EAR PAIN: ICD-10-CM

## 2023-02-20 DIAGNOSIS — M79.601 RIGHT ARM PAIN: ICD-10-CM

## 2023-02-20 DIAGNOSIS — M54.2 NECK PAIN ON RIGHT SIDE: Primary | ICD-10-CM

## 2023-02-20 LAB
ALBUMIN SERPL BCP-MCNC: 3.7 G/DL (ref 3.5–5)
ALP SERPL-CCNC: 81 U/L (ref 34–104)
ALT SERPL W P-5'-P-CCNC: 10 U/L (ref 7–52)
ANION GAP SERPL CALCULATED.3IONS-SCNC: 6 MMOL/L (ref 4–13)
AST SERPL W P-5'-P-CCNC: 12 U/L (ref 13–39)
BASOPHILS # BLD AUTO: 0.02 THOUSANDS/ÂΜL (ref 0–0.1)
BASOPHILS NFR BLD AUTO: 0 % (ref 0–1)
BILIRUB SERPL-MCNC: 0.39 MG/DL (ref 0.2–1)
BUN SERPL-MCNC: 12 MG/DL (ref 5–25)
CALCIUM SERPL-MCNC: 9.3 MG/DL (ref 8.4–10.2)
CHLORIDE SERPL-SCNC: 108 MMOL/L (ref 96–108)
CO2 SERPL-SCNC: 28 MMOL/L (ref 21–32)
CREAT SERPL-MCNC: 0.9 MG/DL (ref 0.6–1.3)
EOSINOPHIL # BLD AUTO: 0.06 THOUSAND/ÂΜL (ref 0–0.61)
EOSINOPHIL NFR BLD AUTO: 1 % (ref 0–6)
ERYTHROCYTE [DISTWIDTH] IN BLOOD BY AUTOMATED COUNT: 12.2 % (ref 11.6–15.1)
GFR SERPL CREATININE-BSD FRML MDRD: 70 ML/MIN/1.73SQ M
GLUCOSE SERPL-MCNC: 85 MG/DL (ref 65–140)
HCT VFR BLD AUTO: 38.1 % (ref 34.8–46.1)
HGB BLD-MCNC: 12.5 G/DL (ref 11.5–15.4)
IMM GRANULOCYTES # BLD AUTO: 0.01 THOUSAND/UL (ref 0–0.2)
IMM GRANULOCYTES NFR BLD AUTO: 0 % (ref 0–2)
LYMPHOCYTES # BLD AUTO: 0.73 THOUSANDS/ÂΜL (ref 0.6–4.47)
LYMPHOCYTES NFR BLD AUTO: 14 % (ref 14–44)
MCH RBC QN AUTO: 29.9 PG (ref 26.8–34.3)
MCHC RBC AUTO-ENTMCNC: 32.8 G/DL (ref 31.4–37.4)
MCV RBC AUTO: 91 FL (ref 82–98)
MONOCYTES # BLD AUTO: 0.49 THOUSAND/ÂΜL (ref 0.17–1.22)
MONOCYTES NFR BLD AUTO: 9 % (ref 4–12)
NEUTROPHILS # BLD AUTO: 3.99 THOUSANDS/ÂΜL (ref 1.85–7.62)
NEUTS SEG NFR BLD AUTO: 76 % (ref 43–75)
NRBC BLD AUTO-RTO: 0 /100 WBCS
PLATELET # BLD AUTO: 270 THOUSANDS/UL (ref 149–390)
PMV BLD AUTO: 10 FL (ref 8.9–12.7)
POTASSIUM SERPL-SCNC: 3.8 MMOL/L (ref 3.5–5.3)
PROT SERPL-MCNC: 6.1 G/DL (ref 6.4–8.4)
RBC # BLD AUTO: 4.18 MILLION/UL (ref 3.81–5.12)
SODIUM SERPL-SCNC: 142 MMOL/L (ref 135–147)
WBC # BLD AUTO: 5.3 THOUSAND/UL (ref 4.31–10.16)

## 2023-02-20 RX ORDER — KETOROLAC TROMETHAMINE 30 MG/ML
15 INJECTION, SOLUTION INTRAMUSCULAR; INTRAVENOUS ONCE
Status: COMPLETED | OUTPATIENT
Start: 2023-02-20 | End: 2023-02-20

## 2023-02-20 RX ORDER — ONDANSETRON 2 MG/ML
4 INJECTION INTRAMUSCULAR; INTRAVENOUS ONCE
Status: COMPLETED | OUTPATIENT
Start: 2023-02-20 | End: 2023-02-20

## 2023-02-20 RX ORDER — LIDOCAINE 50 MG/G
1 PATCH TOPICAL ONCE
Status: DISCONTINUED | OUTPATIENT
Start: 2023-02-20 | End: 2023-02-20 | Stop reason: HOSPADM

## 2023-02-20 RX ORDER — CYCLOBENZAPRINE HCL 5 MG
5 TABLET ORAL 3 TIMES DAILY PRN
Qty: 30 TABLET | Refills: 0 | Status: SHIPPED | OUTPATIENT
Start: 2023-02-20

## 2023-02-20 RX ORDER — ONDANSETRON 4 MG/1
4 TABLET, ORALLY DISINTEGRATING ORAL EVERY 6 HOURS PRN
Qty: 20 TABLET | Refills: 0 | Status: SHIPPED | OUTPATIENT
Start: 2023-02-20

## 2023-02-20 RX ADMIN — KETOROLAC TROMETHAMINE 15 MG: 30 INJECTION, SOLUTION INTRAMUSCULAR at 06:35

## 2023-02-20 RX ADMIN — ONDANSETRON 4 MG: 2 INJECTION INTRAMUSCULAR; INTRAVENOUS at 06:35

## 2023-02-20 RX ADMIN — LIDOCAINE 5% 1 PATCH: 700 PATCH TOPICAL at 06:35

## 2023-02-20 RX ADMIN — SODIUM CHLORIDE 1000 ML: 0.9 INJECTION, SOLUTION INTRAVENOUS at 06:36

## 2023-02-20 NOTE — Clinical Note
Elma Ndiaye was seen and treated in our emergency department on 2/20/2023  No restrictions            Diagnosis:     Clearence Dose  may return to work on return date  She may return on this date: 02/22/2023         If you have any questions or concerns, please don't hesitate to call        Vonda Ledbetter PA-C    ______________________________           _______________          _______________  Hospital Representative                              Date                                Time

## 2023-02-20 NOTE — DISCHARGE INSTRUCTIONS
Take Tylenol 650mg and ibuprofen 400mg every 6 hours as needed for pain  Use lidocaine patches daily (12 hours on, 12 hours off)  Apply heating pad to affected area  Take Zofran as needed for nausea      Please follow-up with your family doctor and the [facility]

## 2023-02-20 NOTE — ED PROVIDER NOTES
History  Chief Complaint   Patient presents with   • Ear Problem     3 wks and 3 er visits of R ear pain radiating down neck      63yo female with a history of GERD and anxiety presenting for evaluation of multiple complaints  Patient started with right ear pain over a month ago  She has been seen multiple times for right ear pain including ED visit, PCP visits, and ENT visits  CT temporal bones last month which revealed "Mild right otitis externa  No evidence of otitis media or mastoiditis " She had a culture of the ear canal on 2/6/23 which grew out a few colonies of aspergillus  She has been on numerous medications including Augmentin, ofloxacin drops, Medrol dose pack, prednisone, itraconazole, and Keflex  She is currently on an ear drop but is unsure of the name  Patient is here reporting right sided neck pain radiating down her right arm with associated paresthesias  She denies any trauma  She also reports generalized malaise and states she feels awful  She is having nausea and reports intermittent vomiting  She also is having lightheadedness  No chest pain or fevers  She had cervical spine x-rays last week which shows "Multilevel degenerative changes of cervical spine " Venous duplex of right upper extremity last week was negative for DVT  She has an EMG upcoming in April  History provided by:  Medical records and patient   used: No    Earache  Location:  Right  Behind ear:  No abnormality  Severity:  Moderate  Onset quality:  Gradual  Duration:  1 month  Timing:  Constant  Progression:  Unchanged  Associated symptoms: neck pain and vomiting    Associated symptoms: no fever and no rash        Prior to Admission Medications   Prescriptions Last Dose Informant Patient Reported? Taking?    FLUoxetine (PROzac) 20 MG tablet   No No   Sig: TAKE 3 TABLETS BY MOUTH EVERY DAY   cephalexin (KEFLEX) 500 mg capsule   No No   Sig: Take 1 capsule (500 mg total) by mouth every 8 (eight) hours for 7 days   ibuprofen (MOTRIN) 600 mg tablet   No No   Sig: Take 1 tablet (600 mg total) by mouth every 6 (six) hours as needed for mild pain   itraconazole (SPORANOX) 100 mg capsule   Yes No   Sig: Take 100 mg by mouth 2 (two) times a day      Facility-Administered Medications: None       Past Medical History:   Diagnosis Date   • Anxiety 06/20/2016    Last assessed   • Chronic infective otitis externa of left ear 09/18/2017    Last assessed   • GERD (gastroesophageal reflux disease)    • Right lumbar radiculopathy 05/05/2016    Last assessed   • Sciatic leg pain        Past Surgical History:   Procedure Laterality Date   • APPENDECTOMY     • CHOLECYSTECTOMY     • KNEE ARTHROSCOPY      torn menicus   • HI LAMNOTMY INCL W/DCMPRSN NRV ROOT 1 INTRSPC LUMBR Right 7/6/2016    Procedure: L3/4 FORAMINOTOMY;  Surgeon: Maggy Jiménez MD;  Location: [facility];  Service: Neurosurgery   • TONSILLECTOMY         Family History   Problem Relation Age of Onset   • No Known Problems Mother    • Alcohol abuse Father    • Diabetes Paternal Grandmother    • Hypertension Paternal Grandmother    • Cancer Family    • Breast cancer Maternal Aunt      I have reviewed and agree with the history as documented  E-Cigarette/Vaping   • E-Cigarette Use Never User      E-Cigarette/Vaping Substances   • Nicotine No    • THC No    • CBD No    • Flavoring No    • Other No    • Unknown No      Social History     Tobacco Use   • Smoking status: Never   • Smokeless tobacco: Never   • Tobacco comments:     Per allscripts - former smoker   Vaping Use   • Vaping Use: Never used   Substance Use Topics   • Alcohol use: Yes   • Drug use: No       Review of Systems   Constitutional: Positive for fatigue  Negative for fever  HENT: Positive for ear pain  Eyes: Negative for discharge and redness  Gastrointestinal: Positive for nausea and vomiting  Musculoskeletal: Positive for neck pain  Negative for neck stiffness     Skin: Negative for color change and rash  Neurological: Positive for light-headedness  Negative for syncope  Psychiatric/Behavioral: Negative for confusion  The patient is not nervous/anxious  All other systems reviewed and are negative  Physical Exam  Physical Exam  Vitals and nursing note reviewed  Constitutional:       General: She is not in acute distress  Appearance: Normal appearance  She is not toxic-appearing  HENT:      Head: Normocephalic and atraumatic  Right Ear: Ear canal and external ear normal       Left Ear: Tympanic membrane, ear canal and external ear normal       Ears:      Comments: Whitish discoloration of TM  No erythema  Canal appears normal without drainage or edema  No pain on palpation of tragus  No mastoid erythema or tenderness  Mouth/Throat:      Mouth: Mucous membranes are moist       Pharynx: Oropharynx is clear  No oropharyngeal exudate or posterior oropharyngeal erythema  Eyes:      General: No scleral icterus  Right eye: No discharge  Left eye: No discharge  Conjunctiva/sclera: Conjunctivae normal    Neck:     Cardiovascular:      Rate and Rhythm: Normal rate  Pulmonary:      Effort: Pulmonary effort is normal  No respiratory distress  Breath sounds: No stridor  Musculoskeletal:         General: No deformity  Normal range of motion  Right shoulder: No swelling or deformity  Normal range of motion  Normal pulse  Cervical back: Normal range of motion and neck supple  Tenderness present  No rigidity  Lymphadenopathy:      Cervical: No cervical adenopathy  Skin:     General: Skin is warm and dry  Neurological:      General: No focal deficit present  Mental Status: She is alert  Mental status is at baseline     Psychiatric:         Mood and Affect: Mood normal          Behavior: Behavior normal          Vital Signs  ED Triage Vitals   Temperature Pulse Respirations Blood Pressure SpO2   02/20/23 0532 02/20/23 0532 02/20/23 0532 02/20/23 0532 02/20/23 0532   (!) 97 1 °F (36 2 °C) 82 18 154/70 99 %      Temp src Heart Rate Source Patient Position - Orthostatic VS BP Location FiO2 (%)   -- 02/20/23 0743 -- -- --    Monitor         Pain Score       02/20/23 0635       8           Vitals:    02/20/23 0532 02/20/23 0743   BP: 154/70 146/82   Pulse: 82 76         Visual Acuity      ED Medications  Medications   lidocaine (LIDODERM) 5 % patch 1 patch (1 patch Topical Medication Applied 2/20/23 0635)   sodium chloride 0 9 % bolus 1,000 mL (0 mL Intravenous Stopped 2/20/23 0736)   ketorolac (TORADOL) injection 15 mg (15 mg Intravenous Given 2/20/23 0635)   ondansetron (ZOFRAN) injection 4 mg (4 mg Intravenous Given 2/20/23 0635)       Diagnostic Studies  Results Reviewed     Procedure Component Value Units Date/Time    Comprehensive metabolic panel [[id number]]  (Abnormal) Collected: 02/20/23 0635    Lab Status: Final result Specimen: Blood from Arm, Left Updated: 02/20/23 0705     Sodium 142 mmol/L      Potassium 3 8 mmol/L      Chloride 108 mmol/L      CO2 28 mmol/L      ANION GAP 6 mmol/L      BUN 12 mg/dL      Creatinine 0 90 mg/dL      Glucose 85 mg/dL      Calcium 9 3 mg/dL      AST 12 U/L      ALT 10 U/L      Alkaline Phosphatase 81 U/L      Total Protein 6 1 g/dL      Albumin 3 7 g/dL      Total Bilirubin 0 39 mg/dL      eGFR 70 ml/min/1 73sq m     Narrative:      [place] guidelines for Chronic Kidney Disease (CKD):   •  Stage 1 with normal or high GFR (GFR > 90 mL/min/1 73 square meters)  •  Stage 2 Mild CKD (GFR = 60-89 mL/min/1 73 square meters)  •  Stage 3A Moderate CKD (GFR = 45-59 mL/min/1 73 square meters)  •  Stage 3B Moderate CKD (GFR = 30-44 mL/min/1 73 square meters)  •  Stage 4 Severe CKD (GFR = 15-29 mL/min/1 73 square meters)  •  Stage 5 End Stage CKD (GFR <15 mL/min/1 73 square meters)  Note: GFR calculation is accurate only with a steady state creatinine    CBC and differential [[id number]]  (Abnormal) Collected: 02/20/23 0635    Lab Status: Final result Specimen: Blood from Arm, Left Updated: 02/20/23 0647     WBC 5 30 Thousand/uL      RBC 4 18 Million/uL      Hemoglobin 12 5 g/dL      Hematocrit 38 1 %      MCV 91 fL      MCH 29 9 pg      MCHC 32 8 g/dL      RDW 12 2 %      MPV 10 0 fL      Platelets 547 Thousands/uL      nRBC 0 /100 WBCs      Neutrophils Relative 76 %      Immat GRANS % 0 %      Lymphocytes Relative 14 %      Monocytes Relative 9 %      Eosinophils Relative 1 %      Basophils Relative 0 %      Neutrophils Absolute 3 99 Thousands/µL      Immature Grans Absolute 0 01 Thousand/uL      Lymphocytes Absolute 0 73 Thousands/µL      Monocytes Absolute 0 49 Thousand/µL      Eosinophils Absolute 0 06 Thousand/µL      Basophils Absolute 0 02 Thousands/µL                  No orders to display              Procedures  Procedures         ED Course                     Medical Decision Making  57yoF here with multiple complaints  Reports R ear pain and R sided neck pain that radiates down R arm  Symptoms ongoing for >1 month  Has been seen at ED, PCP, and ENT for the same and completed multiple rounds of medications and just finished itraconazole  Cervical spine x-rays from last week show multilevel degenerative changes  Also reports malaise and nausea  She is afebrile and hemodynamically stable  She is non-toxic appearing  There is reproducible tenderness at the cervical portion of the trapezius muscle  ROM of R shoulder intact  2+ radial pulse in right arm  No signs of AOM or mastoiditis on exam      Initial ED plan: Check CBC and CMP to evaluate for ROBBIE and transaminitis  No indication for repeat imaging at this time  IV Zofran, Toradol, lidocaine patch, and fluid bolus for symptoms  Final assessment: Labs unremarkable including normal white count, renal function, LFTs, electrolytes  No indication for further workup  Suspect her neck/arm pain is 2/2 cervical radiculopathy    Her nausea and malaise may be related to her multiple rounds of antibiotics  Script provided for Zofran  Advised Tylenol, ibuprofen, lidocaine patches, and heat for pain  Referral placed for the [facility]  Advised close PCP follow-up  ED return precautions discussed  Patient expressed understanding and is agreeable to plan  Patient discharged in stable condition  Nausea: acute illness or injury  Neck pain on right side: acute illness or injury  Right arm pain: acute illness or injury  Right ear pain: acute illness or injury  Amount and/or Complexity of Data Reviewed  External Data Reviewed: labs, radiology and notes  Details: Reviewed ear culture, outpatient x-rays, and PCP notes  Labs: ordered  Risk  Prescription drug management  Disposition  Final diagnoses:   Neck pain on right side   Right arm pain   Right ear pain   Nausea     Time reflects when diagnosis was documented in both MDM as applicable and the Disposition within this note     Time User Action Codes Description Comment    2/20/2023  7:17 AM [address] [place], [place] [address]y, [place] [M54 2] Neck pain on right side     2/20/2023  7:18 AM Thong Poe Add [M79 601] Right arm pain     2/20/2023  7:18 AM Thong Poe Add [H92 01] Right ear pain     2/20/2023  7:19 AM Bee Poe Add [R11 0] Nausea       ED Disposition     ED Disposition   Discharge    Condition   Stable    Date/Time   Mon Feb 20, 2023  7:17 AM    Comment   Linda Celeste discharge to home/self care                 Follow-up Information     Follow up With Specialties Details Why Contact Info    Kami Jackson, [address] [facility], Nurse Practitioner Schedule an appointment as soon as possible for a visit   [address] [place] [id number] [phone]            Discharge Medication List as of 2/20/2023  7:20 AM      START taking these medications    Details   ondansetron (Zofran ODT) 4 mg disintegrating tablet Take 1 tablet (4 mg total) by mouth every 6 (six) hours as needed for nausea or vomiting, Starting Mon 2/20/2023, Normal         CONTINUE these medications which have NOT CHANGED    Details   cephalexin (KEFLEX) 500 mg capsule Take 1 capsule (500 mg total) by mouth every 8 (eight) hours for 7 days, Starting Thu 2/16/2023, Until Thu 2/23/2023, Normal      FLUoxetine (PROzac) 20 MG tablet TAKE 3 TABLETS BY MOUTH EVERY DAY, Normal      ibuprofen (MOTRIN) 600 mg tablet Take 1 tablet (600 mg total) by mouth every 6 (six) hours as needed for mild pain, Starting Tue 1/24/2023, Normal      itraconazole (SPORANOX) 100 mg capsule Take 100 mg by mouth 2 (two) times a day, Starting Fri 2/10/2023, Historical Med                 PDMP Review       Value Time User    PDMP Reviewed  Yes 2/16/2023  8:25 AM Lobo Bernardo, [facility] Provider  Electronically Signed by           Alyssa Valencia PA-C  02/20/23 5311

## 2023-02-20 NOTE — TELEPHONE ENCOUNTER
Kamla Hernandez from [facility] called and stated that the patient has been experiencing new symptoms such as vomitting, increased headaches and has been seen in the ER  PCP would like the EMG to moved up to a sooner appointment if possible      Please call Kamla Hernandez back at [phone]

## 2023-02-21 ENCOUNTER — TELEPHONE (OUTPATIENT)
Dept: PHYSICAL THERAPY | Facility: OTHER | Age: 58
End: 2023-02-21

## 2023-02-24 NOTE — TELEPHONE ENCOUNTER
Called placed to the patient per [facility]     V/M left for the patient to call back  Phone and hours of business provided  This is the 2nd attempt to reach the patient  Will defer per protocol

## 2023-03-03 ENCOUNTER — TELEPHONE (OUTPATIENT)
Dept: PHYSICAL THERAPY | Facility: OTHER | Age: 58
End: 2023-03-03

## 2023-03-03 NOTE — TELEPHONE ENCOUNTER
Call placed to the patient per [facility] referral     Spoke with the patient and explained [facility]  She declined services because her  is in the hospital at the moment and she won't have time to do PT       Will close [facility]SP referral per protocol

## 2023-04-06 ENCOUNTER — PROCEDURE VISIT (OUTPATIENT)
Dept: NEUROLOGY | Facility: CLINIC | Age: 58
End: 2023-04-06

## 2023-04-06 DIAGNOSIS — M54.12 CERVICAL RADICULOPATHY: ICD-10-CM

## 2023-04-06 NOTE — PROGRESS NOTES
EMG 1 Limb     Date/Time 4/6/2023 8:54 AM     Performed by  Pb Balderrama MD     Authorized by AMAURI Lomas              EMG RIGHT UPPER EXTREMITY    Patient reports symptoms of right-sided neck pain beginning a month and a half ago after earache associated with paresthesia of the shoulder and proximal right upper extremity  She reports that the neck pain and paresthesia have resolved but now reports some pain in the proximal right arm with abducting the arm at the shoulder    Motor and sensory conduction studies were performed on the right median, ulnar as well as radial sensory nerves  The distal motor latencies were normal  The motor action potential amplitudes were normal  Motor conduction velocities were normal including conduction of the ulnar nerve across the elbow  The right median and ulnar F waves were normal     Right median, radial and ulnar sensory latencies were normal including median palmar stimulation with normal sensory action potential amplitudes  Concentric needle EMG was performed in various distal and proximal muscles of the right upper extremity including APB, FDI, EDC, brachial radialis, biceps, triceps in the low cervical paraspinal region  There was no evidence of spontaneous activity seen   The compound motor unit potentials were of normal configuration and interference patterns were full or full for effort    IMPRESSION: This is a normal EMG of the right upper extremity    KAMERON Barrera

## 2023-04-29 DIAGNOSIS — F33.1 MODERATE EPISODE OF RECURRENT MAJOR DEPRESSIVE DISORDER (HCC): ICD-10-CM

## 2023-04-29 RX ORDER — FLUOXETINE 20 MG/1
TABLET, FILM COATED ORAL
Qty: 270 TABLET | Refills: 0 | Status: SHIPPED | OUTPATIENT
Start: 2023-04-29

## 2023-07-27 DIAGNOSIS — F33.1 MODERATE EPISODE OF RECURRENT MAJOR DEPRESSIVE DISORDER (HCC): ICD-10-CM

## 2023-07-27 RX ORDER — FLUOXETINE 20 MG/1
TABLET, FILM COATED ORAL
Qty: 270 TABLET | Refills: 0 | Status: SHIPPED | OUTPATIENT
Start: 2023-07-27

## 2023-09-13 ENCOUNTER — APPOINTMENT (EMERGENCY)
Dept: CT IMAGING | Facility: HOSPITAL | Age: 58
End: 2023-09-13
Payer: COMMERCIAL

## 2023-09-13 ENCOUNTER — APPOINTMENT (OUTPATIENT)
Dept: MRI IMAGING | Facility: HOSPITAL | Age: 58
End: 2023-09-13
Payer: COMMERCIAL

## 2023-09-13 ENCOUNTER — HOSPITAL ENCOUNTER (OUTPATIENT)
Facility: HOSPITAL | Age: 58
Setting detail: OBSERVATION
Discharge: HOME | End: 2023-09-14
Attending: EMERGENCY MEDICINE | Admitting: INTERNAL MEDICINE
Payer: COMMERCIAL

## 2023-09-13 DIAGNOSIS — I65.02 STENOSIS OF LEFT VERTEBRAL ARTERY: ICD-10-CM

## 2023-09-13 DIAGNOSIS — R42 VERTIGO: Primary | ICD-10-CM

## 2023-09-13 LAB
ALBUMIN SERPL BCP-MCNC: 4.1 G/DL (ref 3.5–5)
ALP SERPL-CCNC: 100 U/L (ref 34–104)
ALT SERPL W P-5'-P-CCNC: 10 U/L (ref 7–52)
ANION GAP SERPL CALCULATED.3IONS-SCNC: 5 MMOL/L
AST SERPL W P-5'-P-CCNC: 14 U/L (ref 13–39)
ATRIAL RATE: 59 BPM
BASOPHILS # BLD AUTO: 0.03 THOUSANDS/ÂΜL (ref 0–0.1)
BASOPHILS NFR BLD AUTO: 1 % (ref 0–1)
BILIRUB SERPL-MCNC: 0.43 MG/DL (ref 0.2–1)
BUN SERPL-MCNC: 14 MG/DL (ref 5–25)
CALCIUM SERPL-MCNC: 9.4 MG/DL (ref 8.4–10.2)
CARDIAC TROPONIN I PNL SERPL HS: 2 NG/L
CHLORIDE SERPL-SCNC: 107 MMOL/L (ref 96–108)
CHOLEST SERPL-MCNC: 290 MG/DL
CO2 SERPL-SCNC: 28 MMOL/L (ref 21–32)
CREAT SERPL-MCNC: 0.72 MG/DL (ref 0.6–1.3)
EOSINOPHIL # BLD AUTO: 0.08 THOUSAND/ÂΜL (ref 0–0.61)
EOSINOPHIL NFR BLD AUTO: 2 % (ref 0–6)
ERYTHROCYTE [DISTWIDTH] IN BLOOD BY AUTOMATED COUNT: 13 % (ref 11.6–15.1)
EST. AVERAGE GLUCOSE BLD GHB EST-MCNC: 105 MG/DL
GFR SERPL CREATININE-BSD FRML MDRD: 91 ML/MIN/1.73SQ M
GLUCOSE SERPL-MCNC: 74 MG/DL (ref 65–140)
HBA1C MFR BLD: 5.3 %
HCT VFR BLD AUTO: 38.1 % (ref 34.8–46.1)
HDLC SERPL-MCNC: 64 MG/DL
HGB BLD-MCNC: 12.5 G/DL (ref 11.5–15.4)
IMM GRANULOCYTES # BLD AUTO: 0.01 THOUSAND/UL (ref 0–0.2)
IMM GRANULOCYTES NFR BLD AUTO: 0 % (ref 0–2)
LDLC SERPL CALC-MCNC: 189 MG/DL (ref 0–100)
LYMPHOCYTES # BLD AUTO: 1.36 THOUSANDS/ÂΜL (ref 0.6–4.47)
LYMPHOCYTES NFR BLD AUTO: 27 % (ref 14–44)
MCH RBC QN AUTO: 30.1 PG (ref 26.8–34.3)
MCHC RBC AUTO-ENTMCNC: 32.8 G/DL (ref 31.4–37.4)
MCV RBC AUTO: 92 FL (ref 82–98)
MONOCYTES # BLD AUTO: 0.49 THOUSAND/ÂΜL (ref 0.17–1.22)
MONOCYTES NFR BLD AUTO: 10 % (ref 4–12)
NEUTROPHILS # BLD AUTO: 3.14 THOUSANDS/ÂΜL (ref 1.85–7.62)
NEUTS SEG NFR BLD AUTO: 60 % (ref 43–75)
NRBC BLD AUTO-RTO: 0 /100 WBCS
P AXIS: 64 DEGREES
PLATELET # BLD AUTO: 290 THOUSANDS/UL (ref 149–390)
PMV BLD AUTO: 10.3 FL (ref 8.9–12.7)
POTASSIUM SERPL-SCNC: 3.7 MMOL/L (ref 3.5–5.3)
PR INTERVAL: 138 MS
PROT SERPL-MCNC: 6.7 G/DL (ref 6.4–8.4)
QRS AXIS: 85 DEGREES
QRSD INTERVAL: 86 MS
QT INTERVAL: 428 MS
QTC INTERVAL: 423 MS
RBC # BLD AUTO: 4.15 MILLION/UL (ref 3.81–5.12)
SODIUM SERPL-SCNC: 140 MMOL/L (ref 135–147)
T WAVE AXIS: 88 DEGREES
TRIGL SERPL-MCNC: 186 MG/DL
VENTRICULAR RATE: 59 BPM
WBC # BLD AUTO: 5.11 THOUSAND/UL (ref 4.31–10.16)

## 2023-09-13 PROCEDURE — 99284 EMERGENCY DEPT VISIT MOD MDM: CPT

## 2023-09-13 PROCEDURE — 85025 COMPLETE CBC W/AUTO DIFF WBC: CPT | Performed by: PHYSICIAN ASSISTANT

## 2023-09-13 PROCEDURE — 80053 COMPREHEN METABOLIC PANEL: CPT | Performed by: PHYSICIAN ASSISTANT

## 2023-09-13 PROCEDURE — 93005 ELECTROCARDIOGRAM TRACING: CPT

## 2023-09-13 PROCEDURE — 83036 HEMOGLOBIN GLYCOSYLATED A1C: CPT | Performed by: NURSE PRACTITIONER

## 2023-09-13 PROCEDURE — 36415 COLL VENOUS BLD VENIPUNCTURE: CPT | Performed by: PHYSICIAN ASSISTANT

## 2023-09-13 PROCEDURE — 84484 ASSAY OF TROPONIN QUANT: CPT | Performed by: PHYSICIAN ASSISTANT

## 2023-09-13 PROCEDURE — 70498 CT ANGIOGRAPHY NECK: CPT

## 2023-09-13 PROCEDURE — 96375 TX/PRO/DX INJ NEW DRUG ADDON: CPT

## 2023-09-13 PROCEDURE — 96374 THER/PROPH/DIAG INJ IV PUSH: CPT

## 2023-09-13 PROCEDURE — 96361 HYDRATE IV INFUSION ADD-ON: CPT

## 2023-09-13 PROCEDURE — 70553 MRI BRAIN STEM W/O & W/DYE: CPT

## 2023-09-13 PROCEDURE — 99223 1ST HOSP IP/OBS HIGH 75: CPT | Performed by: INTERNAL MEDICINE

## 2023-09-13 PROCEDURE — A9585 GADOBUTROL INJECTION: HCPCS | Performed by: NURSE PRACTITIONER

## 2023-09-13 PROCEDURE — 99285 EMERGENCY DEPT VISIT HI MDM: CPT | Performed by: PHYSICIAN ASSISTANT

## 2023-09-13 PROCEDURE — 70496 CT ANGIOGRAPHY HEAD: CPT

## 2023-09-13 PROCEDURE — 80061 LIPID PANEL: CPT | Performed by: NURSE PRACTITIONER

## 2023-09-13 PROCEDURE — 93010 ELECTROCARDIOGRAM REPORT: CPT | Performed by: INTERNAL MEDICINE

## 2023-09-13 RX ORDER — CYCLOBENZAPRINE HCL 10 MG
5 TABLET ORAL 3 TIMES DAILY PRN
Status: DISCONTINUED | OUTPATIENT
Start: 2023-09-13 | End: 2023-09-14 | Stop reason: HOSPADM

## 2023-09-13 RX ORDER — MECLIZINE HYDROCHLORIDE 25 MG/1
25 TABLET ORAL EVERY 8 HOURS SCHEDULED
Status: DISCONTINUED | OUTPATIENT
Start: 2023-09-13 | End: 2023-09-14 | Stop reason: HOSPADM

## 2023-09-13 RX ORDER — KETOROLAC TROMETHAMINE 30 MG/ML
30 INJECTION, SOLUTION INTRAMUSCULAR; INTRAVENOUS ONCE
Status: COMPLETED | OUTPATIENT
Start: 2023-09-13 | End: 2023-09-13

## 2023-09-13 RX ORDER — ONDANSETRON 2 MG/ML
4 INJECTION INTRAMUSCULAR; INTRAVENOUS ONCE
Status: COMPLETED | OUTPATIENT
Start: 2023-09-13 | End: 2023-09-13

## 2023-09-13 RX ORDER — ACETAMINOPHEN 325 MG/1
650 TABLET ORAL EVERY 6 HOURS PRN
Status: DISCONTINUED | OUTPATIENT
Start: 2023-09-13 | End: 2023-09-14 | Stop reason: HOSPADM

## 2023-09-13 RX ORDER — MAGNESIUM SULFATE 1 G/100ML
1 INJECTION INTRAVENOUS DAILY
Status: DISCONTINUED | OUTPATIENT
Start: 2023-09-13 | End: 2023-09-14 | Stop reason: HOSPADM

## 2023-09-13 RX ORDER — ATORVASTATIN CALCIUM 40 MG/1
40 TABLET, FILM COATED ORAL
Status: DISCONTINUED | OUTPATIENT
Start: 2023-09-13 | End: 2023-09-14 | Stop reason: HOSPADM

## 2023-09-13 RX ORDER — METOCLOPRAMIDE HYDROCHLORIDE 5 MG/ML
10 INJECTION INTRAMUSCULAR; INTRAVENOUS ONCE
Status: COMPLETED | OUTPATIENT
Start: 2023-09-13 | End: 2023-09-13

## 2023-09-13 RX ORDER — MECLIZINE HYDROCHLORIDE 25 MG/1
25 TABLET ORAL ONCE
Status: COMPLETED | OUTPATIENT
Start: 2023-09-13 | End: 2023-09-13

## 2023-09-13 RX ORDER — ONDANSETRON 2 MG/ML
4 INJECTION INTRAMUSCULAR; INTRAVENOUS EVERY 6 HOURS PRN
Status: DISCONTINUED | OUTPATIENT
Start: 2023-09-13 | End: 2023-09-14 | Stop reason: HOSPADM

## 2023-09-13 RX ORDER — ASPIRIN 81 MG/1
81 TABLET, CHEWABLE ORAL DAILY
Status: DISCONTINUED | OUTPATIENT
Start: 2023-09-13 | End: 2023-09-14 | Stop reason: HOSPADM

## 2023-09-13 RX ORDER — DIAZEPAM 5 MG/ML
2.5 INJECTION, SOLUTION INTRAMUSCULAR; INTRAVENOUS ONCE
Status: COMPLETED | OUTPATIENT
Start: 2023-09-13 | End: 2023-09-13

## 2023-09-13 RX ORDER — FLUOXETINE HYDROCHLORIDE 20 MG/1
20 CAPSULE ORAL DAILY
Status: DISCONTINUED | OUTPATIENT
Start: 2023-09-13 | End: 2023-09-14 | Stop reason: HOSPADM

## 2023-09-13 RX ORDER — GADOBUTROL 604.72 MG/ML
6 INJECTION INTRAVENOUS
Status: COMPLETED | OUTPATIENT
Start: 2023-09-13 | End: 2023-09-13

## 2023-09-13 RX ORDER — SODIUM CHLORIDE, SODIUM LACTATE, POTASSIUM CHLORIDE, CALCIUM CHLORIDE 600; 310; 30; 20 MG/100ML; MG/100ML; MG/100ML; MG/100ML
75 INJECTION, SOLUTION INTRAVENOUS CONTINUOUS
Status: DISCONTINUED | OUTPATIENT
Start: 2023-09-13 | End: 2023-09-14 | Stop reason: HOSPADM

## 2023-09-13 RX ORDER — ENOXAPARIN SODIUM 100 MG/ML
40 INJECTION SUBCUTANEOUS DAILY
Status: DISCONTINUED | OUTPATIENT
Start: 2023-09-13 | End: 2023-09-14 | Stop reason: HOSPADM

## 2023-09-13 RX ADMIN — SODIUM CHLORIDE 1000 ML: 0.9 INJECTION, SOLUTION INTRAVENOUS at 07:05

## 2023-09-13 RX ADMIN — MECLIZINE HYDROCHLORIDE 25 MG: 25 TABLET ORAL at 14:00

## 2023-09-13 RX ADMIN — ASPIRIN 81 MG: 81 TABLET, CHEWABLE ORAL at 15:00

## 2023-09-13 RX ADMIN — IOHEXOL 85 ML: 350 INJECTION, SOLUTION INTRAVENOUS at 07:47

## 2023-09-13 RX ADMIN — ONDANSETRON 4 MG: 2 INJECTION INTRAMUSCULAR; INTRAVENOUS at 07:07

## 2023-09-13 RX ADMIN — ENOXAPARIN SODIUM 40 MG: 40 INJECTION SUBCUTANEOUS at 12:26

## 2023-09-13 RX ADMIN — MECLIZINE HYDROCHLORIDE 25 MG: 25 TABLET ORAL at 16:00

## 2023-09-13 RX ADMIN — KETOROLAC TROMETHAMINE 30 MG: 30 INJECTION, SOLUTION INTRAMUSCULAR at 14:00

## 2023-09-13 RX ADMIN — GADOBUTROL 6 ML: 604.72 INJECTION INTRAVENOUS at 16:20

## 2023-09-13 RX ADMIN — SODIUM CHLORIDE, SODIUM LACTATE, POTASSIUM CHLORIDE, AND CALCIUM CHLORIDE 75 ML/HR: .6; .31; .03; .02 INJECTION, SOLUTION INTRAVENOUS at 17:33

## 2023-09-13 RX ADMIN — FLUOXETINE 20 MG: 20 CAPSULE ORAL at 21:34

## 2023-09-13 RX ADMIN — MECLIZINE HYDROCHLORIDE 25 MG: 25 TABLET ORAL at 07:07

## 2023-09-13 RX ADMIN — SODIUM CHLORIDE, SODIUM LACTATE, POTASSIUM CHLORIDE, AND CALCIUM CHLORIDE 75 ML/HR: .6; .31; .03; .02 INJECTION, SOLUTION INTRAVENOUS at 12:25

## 2023-09-13 RX ADMIN — MAGNESIUM SULFATE HEPTAHYDRATE 1 G: 1 INJECTION, SOLUTION INTRAVENOUS at 13:21

## 2023-09-13 RX ADMIN — METOCLOPRAMIDE 10 MG: 5 INJECTION, SOLUTION INTRAMUSCULAR; INTRAVENOUS at 14:00

## 2023-09-13 RX ADMIN — ATORVASTATIN CALCIUM 40 MG: 40 TABLET, FILM COATED ORAL at 18:00

## 2023-09-13 RX ADMIN — MECLIZINE HYDROCHLORIDE 25 MG: 25 TABLET ORAL at 21:34

## 2023-09-13 RX ADMIN — DIAZEPAM 2.5 MG: 5 INJECTION INTRAMUSCULAR; INTRAVENOUS at 08:42

## 2023-09-13 NOTE — ED PROVIDER NOTES
History  Chief Complaint   Patient presents with   • Dizziness     Pt reports since yesterday she started feeling dizzy at work at the room was spinning and it continues. Did had a full nights sleep but woke back up w it. 61yo female with a history of anxiety presenting for evaluation of dizziness x 1 day. Symptoms began yesterday at 6:30 AM while she was driving to work. She feels like the room is spinning. She also states she feels like she is drunk. Dizziness is worse with head movement, position changes, and standing. Dizziness has been constant for the past 24 hours. She is also having nausea and a headache. No chest pain, shortness of breath, weakness, paresthesias, dysarthria, vision changes. She denies any head trauma. No recent URIs. No ear pain or tinnitus. No prior history of vertigo. History provided by:  Patient   used: No    Dizziness  Associated symptoms: headaches and nausea    Associated symptoms: no chest pain, no shortness of breath and no vomiting        Prior to Admission Medications   Prescriptions Last Dose Informant Patient Reported? Taking?    FLUoxetine (PROzac) 20 MG tablet   No No   Sig: TAKE 3 TABLETS BY MOUTH EVERY DAY   cyclobenzaprine (FLEXERIL) 5 mg tablet   No No   Sig: Take 1 tablet (5 mg total) by mouth 3 (three) times a day as needed for muscle spasms   ibuprofen (MOTRIN) 600 mg tablet   No No   Sig: Take 1 tablet (600 mg total) by mouth every 6 (six) hours as needed for mild pain   itraconazole (SPORANOX) 100 mg capsule   Yes No   Sig: Take 100 mg by mouth 2 (two) times a day   ondansetron (Zofran ODT) 4 mg disintegrating tablet   No No   Sig: Take 1 tablet (4 mg total) by mouth every 6 (six) hours as needed for nausea or vomiting      Facility-Administered Medications: None       Past Medical History:   Diagnosis Date   • Anxiety 06/20/2016    Last assessed   • Chronic infective otitis externa of left ear 09/18/2017    Last assessed   • GERD (gastroesophageal reflux disease)    • Right lumbar radiculopathy 05/05/2016    Last assessed   • Sciatic leg pain        Past Surgical History:   Procedure Laterality Date   • APPENDECTOMY     • CHOLECYSTECTOMY     • KNEE ARTHROSCOPY      torn menicus   • SC LAMNOTMY INCL W/DCMPRSN NRV ROOT 1 INTRSPC LUMBR Right 7/6/2016    Procedure: L3/4 FORAMINOTOMY;  Surgeon: Ashley Denney MD;  Location: [facility];  Service: Neurosurgery   • TONSILLECTOMY         Family History   Problem Relation Age of Onset   • No Known Problems Mother    • Alcohol abuse Father    • Diabetes Paternal Grandmother    • Hypertension Paternal Grandmother    • Cancer Family    • Breast cancer Maternal Aunt      I have reviewed and agree with the history as documented. E-Cigarette/Vaping   • E-Cigarette Use Never User      E-Cigarette/Vaping Substances   • Nicotine No    • THC No    • CBD No    • Flavoring No    • Other No    • Unknown No      Social History     Tobacco Use   • Smoking status: Never   • Smokeless tobacco: Never   • Tobacco comments:     Per allscripts - former smoker   Vaping Use   • Vaping Use: Never used   Substance Use Topics   • Alcohol use: Yes   • Drug use: No       Review of Systems   Constitutional: Negative for chills and fever. HENT: Negative for drooling and voice change. Eyes: Negative for discharge and redness. Respiratory: Negative for shortness of breath and stridor. Cardiovascular: Negative for chest pain and leg swelling. Gastrointestinal: Positive for nausea. Negative for vomiting. Musculoskeletal: Negative for neck pain and neck stiffness. Skin: Negative for color change and rash. Neurological: Positive for dizziness and headaches. Negative for seizures and syncope. Psychiatric/Behavioral: Negative for confusion. The patient is not nervous/anxious. All other systems reviewed and are negative. Physical Exam  Physical Exam  Vitals and nursing note reviewed.    Constitutional: General: She is not in acute distress. Appearance: She is well-developed. She is not diaphoretic. HENT:      Head: Normocephalic and atraumatic. Right Ear: External ear normal.      Left Ear: External ear normal.      Nose: Nose normal.   Eyes:      General: No scleral icterus. Right eye: No discharge. Left eye: No discharge. Extraocular Movements:      Right eye: Nystagmus present. Left eye: Nystagmus present. Conjunctiva/sclera: Conjunctivae normal.   Cardiovascular:      Rate and Rhythm: Normal rate and regular rhythm. Heart sounds: Normal heart sounds. No murmur heard. Pulmonary:      Effort: Pulmonary effort is normal. No respiratory distress. Breath sounds: Normal breath sounds. No stridor. No wheezing or rales. Musculoskeletal:         General: No deformity. Normal range of motion. Cervical back: Normal range of motion and neck supple. Lymphadenopathy:      Cervical: No cervical adenopathy. Skin:     General: Skin is warm and dry. Neurological:      Mental Status: She is alert. She is not disoriented. GCS: GCS eye subscore is 4. GCS verbal subscore is 5. GCS motor subscore is 6. Sensory: Sensation is intact. Motor: Motor function is intact. No seizure activity or pronator drift. Coordination: Romberg sign positive. Finger-Nose-Finger Test and Heel to [facility] Test normal.      Comments: Horizontal nystagmus. +Romberg. No ataxia with finger to nose or heel to shin. No other deficits appreciated.     Psychiatric:         Behavior: Behavior normal.         Vital Signs  ED Triage Vitals [09/13/23 0633]   Temperature Pulse Respirations Blood Pressure SpO2   98.4 °F (36.9 °C) 64 18 (!) 179/74 100 %      Temp Source Heart Rate Source Patient Position - Orthostatic VS BP Location FiO2 (%)   Oral Monitor Sitting Left arm --      Pain Score       --           Vitals:    09/13/23 0715 09/13/23 0800 09/13/23 0830 09/13/23 0900   BP: (!) 176/79 166/72 149/66 163/74   Pulse: 56 61 55 58   Patient Position - Orthostatic VS: Sitting Sitting Sitting Sitting         Visual Acuity  Visual Acuity    Flowsheet Row Most Recent Value   L Pupil Size (mm) 3   R Pupil Size (mm) 3          ED Medications  Medications   sodium chloride 0.9 % bolus 1,000 mL (0 mL Intravenous Stopped 9/13/23 0836)   ondansetron (ZOFRAN) injection 4 mg (4 mg Intravenous Given 9/13/23 0707)   meclizine (ANTIVERT) tablet 25 mg (25 mg Oral Given 9/13/23 0707)   iohexol (OMNIPAQUE) 350 MG/ML injection (MULTI-DOSE) 85 mL (85 mL Intravenous Given 9/13/23 0747)   diazepam (VALIUM) injection 2.5 mg (2.5 mg Intravenous Given 9/13/23 0842)       Diagnostic Studies  Results Reviewed     Procedure Component Value Units Date/Time    HS Troponin 0hr (reflex protocol) [[id number]]  (Normal) Collected: 09/13/23 0705    Lab Status: Final result Specimen: Blood from Arm, Right Updated: 09/13/23 0733     hs TnI 0hr 2 ng/L     Comprehensive metabolic panel [[id number]] Collected: 09/13/23 0705    Lab Status: Final result Specimen: Blood from Arm, Right Updated: 09/13/23 0726     Sodium 140 mmol/L      Potassium 3.7 mmol/L      Chloride 107 mmol/L      CO2 28 mmol/L      ANION GAP 5 mmol/L      BUN 14 mg/dL      Creatinine 0.72 mg/dL      Glucose 74 mg/dL      Calcium 9.4 mg/dL      AST 14 U/L      ALT 10 U/L      Alkaline Phosphatase 100 U/L      Total Protein 6.7 g/dL      Albumin 4.1 g/dL      Total Bilirubin 0.43 mg/dL      eGFR 91 ml/min/1.73sq m     Narrative:      [place] guidelines for Chronic Kidney Disease (CKD):   •  Stage 1 with normal or high GFR (GFR > 90 mL/min/1.73 square meters)  •  Stage 2 Mild CKD (GFR = 60-89 mL/min/1.73 square meters)  •  Stage 3A Moderate CKD (GFR = 45-59 mL/min/1.73 square meters)  •  Stage 3B Moderate CKD (GFR = 30-44 mL/min/1.73 square meters)  •  Stage 4 Severe CKD (GFR = 15-29 mL/min/1.73 square meters)  •  Stage 5 End Stage CKD (GFR <15 mL/min/1.73 square meters)  Note: GFR calculation is accurate only with a steady state creatinine    CBC and differential [[id number]] Collected: 09/13/23 0705    Lab Status: Final result Specimen: Blood from Arm, Right Updated: 09/13/23 0711     WBC 5.11 Thousand/uL      RBC 4.15 Million/uL      Hemoglobin 12.5 g/dL      Hematocrit 38.1 %      MCV 92 fL      MCH 30.1 pg      MCHC 32.8 g/dL      RDW 13.0 %      MPV 10.3 fL      Platelets 189 Thousands/uL      nRBC 0 /100 WBCs      Neutrophils Relative 60 %      Immat GRANS % 0 %      Lymphocytes Relative 27 %      Monocytes Relative 10 %      Eosinophils Relative 2 %      Basophils Relative 1 %      Neutrophils Absolute 3.14 Thousands/µL      Immature Grans Absolute 0.01 Thousand/uL      Lymphocytes Absolute 1.36 Thousands/µL      Monocytes Absolute 0.49 Thousand/µL      Eosinophils Absolute 0.08 Thousand/µL      Basophils Absolute 0.03 Thousands/µL                  CTA head and neck with and without contrast   Final Result by Urbano Mills DO (09/13 7806)      No acute intracranial abnormality. Moderate stenosis origin left vertebral artery. No significant carotid stenosis. No focal intracranial stenosis or aneurysm.             Workstation performed: [id number]                    Procedures  ECG 12 Lead Documentation Only    Date/Time: 9/13/2023 8:28 AM    Performed by: Lazarus Clay PA-C  Authorized by: Lazarus Clay PA-C    Indications / Diagnosis:  Dizziness  ECG reviewed by me, the ED Provider: yes    Patient location:  ED  Rate:     ECG rate:  59    ECG rate assessment: bradycardic    Rhythm:     Rhythm: sinus bradycardia    Ectopy:     Ectopy: none    QRS:     QRS axis:  Normal  Conduction:     Conduction: normal    ST segments:     ST segments:  Normal  T waves:     T waves: non-specific    Comments:      Nonspecific T wave abnormality, appears unchanged from prior EKG on 9/15/21             ED Course  ED Course as of 09/13/23 [facility] Sep 13, 2023   0916 Patient with persistent dizziness despite receiving meclizine and Valium. Recommended admission for neurology eval. She states her  is sick at home and she would like to discuss admission with her  first before deciding. Medical Decision Making  58yoF presenting for dizziness x 1 day. Started about 24 hours ago. Described as feeling like the room is spinning and feeling like she is drunk. Worse with head movement and position changes. Also c/o headache and nausea. No ear pain or tinnitus. No recent URIs or head trauma. She is hypertensive on arrival with otherwise normal vitals. She is non-toxic appearing. No ataxia with finger to nose or heel to shin. Horizontal nystagmus present. Initial ED plan: Check cardiac labs, EKG, and CTA head/neck. IV Zofran, meclizine, and fluid bolus for symptoms. Final assessment: Labs unremarkable including normal glucose, electrolytes, renal function. EKG shows normal sinus rhythm with nonspecific T wave changes which appears consistent with prior. High-sensitivity troponin is normal.  CTA shows moderate stenosis at the origin of the left vertebral artery. Imaging otherwise unremarkable. Patient with persistent symptoms despite receiving meclizine and IV Valium. Will admit for further evaluation. Vertigo: acute illness or injury  Amount and/or Complexity of Data Reviewed  Labs: ordered. Radiology: ordered. ECG/medicine tests: ordered and independent interpretation performed. Risk  Prescription drug management. Decision regarding hospitalization.           Disposition  Final diagnoses:   Vertigo     Time reflects when diagnosis was documented in both MDM as applicable and the Disposition within this note     Time User Action Codes Description Comment    9/13/2023  9:38 AM Santana Poe Add [R42] Vertigo       ED Disposition     ED Disposition   Admit    Condition   Stable    Date/Time   Wed Sep 13, 2023  9:39 AM    Comment   Case was discussed with Dr. Kassie Parada and the patient's admission status was agreed to be Admission Status: observation status to the service of Dr. Kassie Parada . Follow-up Information    None         Patient's Medications   Discharge Prescriptions    No medications on file       No discharge procedures on file.     PDMP Review       Value Time User    PDMP Reviewed  Yes 2/16/2023  8:25 AM AMAURI Worley          ED Provider  Electronically Signed by           Mami Camargo PA-C  09/13/23 5506

## 2023-09-13 NOTE — ASSESSMENT & PLAN NOTE
The patient presents with dizziness and headache. CTA with Moderate stenosis origin left vertebral artery.    Exam without overt ataxia    · Neurology to evaluate patient  · Follow MRI  · IV fluids  · Meclizine

## 2023-09-13 NOTE — ASSESSMENT & PLAN NOTE
Neurology is asked to see again this 76-year-old right-hand-dominant lady who we saw 2 years ago this week for a similar complaint of dizziness and headaches which started yesterday on the 12th. She has a history of having a similar episode in September 2021 of dizziness and headache for 2 days with a clean CTA and MRI at that time and her symptoms improved with hydration and meclizine and she was subsequently discharged but never seen in our office for a follow-up as to whether or not this reoccurs and what triggered it. She appears this morning approximately 6 AM.,  After having approximately 24 hours of similar dizziness & disequilibrium symptoms starting yesterday and persisting overnight. [place] she went to bed as normal on 9/11/2023 however when she woke up on the morning of 9-12 and went to work she reports that she felt drunk and disequilibrium this all day. 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 She was able to come home and use a heating pad for her headache and go to bed she will still wake up closer to midnight she had to hold onto the wall to walk down the hallway. She denies that she had any double vision, she reports that she has to concentrate to focus. She came to the emergency room today because this is clearly a change and she has a headache which she reports is all anterior which she did not have Monday evening. She does have some blood pressure elevations now which she reports she is does not check her blood pressure at home and she reports that she is unaware of whether or not she has hypertension as she does not have prescribed medication. She reports this feeling of being drunk and need to hold on as she walks is new. She denies double vision, endorses some room spinning. She reports that she is somewhat light sensitive now and she does also reports some nausea even though she has not eaten anything in a while.   Her work-up so far has included a CTA of the head and neck done earlier this morning, with a moderate stenosis at the origin of the left vertebral which was not present 2 years ago. Her MRI 2 years ago was clean. Her CT parenchyma today had no acute pathology appreciated. Her exam today is significant for anterior headache, eye pain OD with right lateral gaze predominantly. Fast persuit in the L and slow on the R w maneuvers done today at bedside. She is still disequilibrium as but she was not grossly ataxic with finger-to-nose maneuvers however she was noted to minimally past-pointing bilaterally on a recumbent Romberg. She was noted to have R opaque light in the AD and bright in the AS. The remainder of her exam was unremarkable. At this time I have started her on a magnesium IV infusion, w migraine cocktail meds and using meclazine, not benadryl. She has had a dose of meclizine as well as 2.5 of Valium IV approximately 4 hours ago. She is unable to report any relief at this time. I suspect this patient is having a complex migraine complicated by a possible ear infection. She has no other lateralizing or neurologic symptoms. We will try and treat her at this time, and check an MRI w & w/out and IAC's r/t the change in her CTA. She did not follow-up with us in the office after her last episode in September 2021. I would encourage her to see us in the office after this episode.

## 2023-09-13 NOTE — PLAN OF CARE
Problem: Potential for Falls  Goal: Patient will remain free of falls  Description: INTERVENTIONS:  - Educate patient/family on patient safety including physical limitations  - Instruct patient to call for assistance with activity   - Consult OT/PT to assist with strengthening/mobility   - Keep Call bell within reach  - Keep bed low and locked with side rails adjusted as appropriate  - Keep care items and personal belongings within reach  - Initiate and maintain comfort rounds  - Make Fall Risk Sign visible to staff  - Offer Toileting every    Hours, in advance of need  - Initiate/Maintain   alarm  - Obtain necessary fall risk management equipment:     - Apply yellow socks and bracelet for high fall risk patients  - Consider moving patient to room near nurses station  Outcome: Progressing     Problem: PAIN - ADULT  Goal: Verbalizes/displays adequate comfort level or baseline comfort level  Description: Interventions:  - Encourage patient to monitor pain and request assistance  - Assess pain using appropriate pain scale  - Administer analgesics based on type and severity of pain and evaluate response  - Implement non-pharmacological measures as appropriate and evaluate response  - Consider cultural and social influences on pain and pain management  - Notify physician/advanced practitioner if interventions unsuccessful or patient reports new pain  Outcome: Progressing     Problem: INFECTION - ADULT  Goal: Absence or prevention of progression during hospitalization  Description: INTERVENTIONS:  - Assess and monitor for signs and symptoms of infection  - Monitor lab/diagnostic results  - Monitor all insertion sites, i.e. indwelling lines, tubes, and drains  - Monitor endotracheal if appropriate and nasal secretions for changes in amount and color  - [place] appropriate cooling/warming therapies per order  - Administer medications as ordered  - Instruct and encourage patient and family to use good hand hygiene technique  - Identify and instruct in appropriate isolation precautions for identified infection/condition  Outcome: Progressing  Goal: Absence of fever/infection during neutropenic period  Description: INTERVENTIONS:  - Monitor WBC    Outcome: Progressing     Problem: SAFETY ADULT  Goal: Patient will remain free of falls  Description: INTERVENTIONS:  - Educate patient/family on patient safety including physical limitations  - Instruct patient to call for assistance with activity   - Consult OT/PT to assist with strengthening/mobility   - Keep Call bell within reach  - Keep bed low and locked with side rails adjusted as appropriate  - Keep care items and personal belongings within reach  - Initiate and maintain comfort rounds  - Make Fall Risk Sign visible to staff  - Offer Toileting every    Hours, in advance of need  - Initiate/Maintain   alarm  - Obtain necessary fall risk management equipment:     - Apply yellow socks and bracelet for high fall risk patients  - Consider moving patient to room near nurses station  Outcome: Progressing  Goal: Maintain or return to baseline ADL function  Description: INTERVENTIONS:  -  Assess patient's ability to carry out ADLs; assess patient's baseline for ADL function and identify physical deficits which impact ability to perform ADLs (bathing, care of mouth/teeth, toileting, grooming, dressing, etc.)  - Assess/evaluate cause of self-care deficits   - Assess range of motion  - Assess patient's mobility; develop plan if impaired  - Assess patient's need for assistive devices and provide as appropriate  - Encourage maximum independence but intervene and supervise when necessary  - Involve family in performance of ADLs  - Assess for home care needs following discharge   - Consider OT consult to assist with ADL evaluation and planning for discharge  - Provide patient education as appropriate  Outcome: Progressing  Goal: Maintains/Returns to pre admission functional level  Description: INTERVENTIONS:  - Perform BMAT or MOVE assessment daily.   - Set and communicate daily mobility goal to care team and patient/family/caregiver. - Collaborate with rehabilitation services on mobility goals if consulted  - Perform Range of Motion    times a day. - Reposition patient every    hours. - Dangle patient    times a day  - Stand patient    times a day  - Ambulate patient    times a day  - Out of bed to chair    times a day   - Out of bed for meals  times a day  - Out of bed for toileting  - Record patient progress and toleration of activity level   Outcome: Progressing     Problem: DISCHARGE PLANNING  Goal: Discharge to home or other facility with appropriate resources  Description: INTERVENTIONS:  - Identify barriers to discharge w/patient and caregiver  - Arrange for needed discharge resources and transportation as appropriate  - Identify discharge learning needs (meds, wound care, etc.)  - Arrange for interpretive services to assist at discharge as needed  - Refer to Case Management Department for coordinating discharge planning if the patient needs post-hospital services based on physician/advanced practitioner order or complex needs related to functional status, cognitive ability, or social support system  Outcome: Progressing     Problem: Knowledge Deficit  Goal: Patient/family/caregiver demonstrates understanding of disease process, treatment plan, medications, and discharge instructions  Description: Complete learning assessment and assess knowledge base.   Interventions:  - Provide teaching at level of understanding  - Provide teaching via preferred learning methods  Outcome: Progressing

## 2023-09-13 NOTE — ASSESSMENT & PLAN NOTE
Neurology is asked to see this 49-year-old right-hand-dominant female patient who we have evaluated for headache and dizziness, see above. Her CTA when we saw her 2 years ago this month was clean without any evidence of a vertebral artery stenosis. When we did her CTA earlier this morning she is noted to have a left vertebral artery origin stenosis of a mild to moderate nature. Because of this we have asked her to start 81 mg of aspirin daily and we will check her lipid profile and she possibly may need a statin as well. We will follow her in the office.

## 2023-09-13 NOTE — H&P
[address]  H&P  Name: Sivan Mcclellan 59 y.o. female I MRN: [id number]  Unit/Bed#: -01 I Date of Admission: 9/13/2023   Date of Service: 9/13/2023 I Hospital Day: 0      Assessment/Plan   Stenosis of left vertebral artery  Assessment & Plan  · Noted on imaging  · Started on aspirin and statin given LDL    Episode of dizziness with headache  Assessment & Plan  The patient presents with dizziness and headache. CTA with Moderate stenosis origin left vertebral artery. Exam without overt ataxia    · Neurology to evaluate patient  · Follow MRI  · IV fluids  · Meclizine      Hyperlipidemia, mixed  Assessment & Plan  · History of hyperlipidemia  · Fairly elevated LDL -we will start statin    Cervical spinal stenosis  Assessment & Plan  · Monitor closely, as needed analgesics if needed    Depression, recurrent (HCC)  Assessment & Plan  · Documented history of depression  · Continue fluoxetine           VTE Prophylaxis: Enoxaparin (Lovenox)  / sequential compression device   Code Status: FC  POLST: POLST form is not discussed and not completed at this time. Discussion with family: Patient     Anticipated Length of Stay:  Patient will be admitted on an Observation basis with an anticipated length of stay of less than 2 midnights. Justification for Hospital Stay: Vertigo, headache     Total Time for Visit, including Counseling / Coordination of Care: 90 minutes. Greater than 50% of this total time spent on direct patient counseling and coordination of care.     Chief Complaint:   Dizziness     History of Present Illness:     Sivan Mcclellan is a 59 y.o. female who presents with dizziness and sensation of room spinning. The patient states that for the past 24 hours at least she has been having worsening sensation of room spinning and dizziness. This is accompanied by nausea and has a headache. She denies any loss of consciousness. Patient states that she feels like "she is drunk" (Sic).   She also feels like she is having trouble standing still on her feet and she may have to hold the wall when walking. The patient apparently did have a similar episode a couple years ago in which she did have headache associated with vertigo as well.     At the ED so far she underwent a work-up which included a CT of the head without acute abnormalities, CTA with moderate stenosis of the left vertebral artery origin. Otherwise chemistry unremarkable  The patient is for some meclizine and diazepam without improvement so far.     Review of Systems:     Review of Systems   Neurological: Positive for dizziness.         Past Medical and Surgical History:           Past Medical History:   Diagnosis Date   • Anxiety 06/20/2016     Last assessed   • Chronic infective otitis externa of left ear 09/18/2017     Last assessed   • GERD (gastroesophageal reflux disease)     • Right lumbar radiculopathy 05/05/2016     Last assessed   • Sciatic leg pain                 Past Surgical History:   Procedure Laterality Date   • APPENDECTOMY       • CHOLECYSTECTOMY       • KNEE ARTHROSCOPY         torn menicus   • CT LAMNOTMY INCL W/DCMPRSN NRV ROOT 1 INTRSPC LUMBR Right 7/6/2016     Procedure: L3/4 FORAMINOTOMY;  Surgeon: Isabelle Laboy MD;  [facility];  Service: Neurosurgery   • TONSILLECTOMY             Meds/Allergies:             Prior to Admission medications    Medication Sig Start Date End Date Taking?  Authorizing Provider   cyclobenzaprine (FLEXERIL) 5 mg tablet Take 1 tablet (5 mg total) by mouth 3 (three) times a day as needed for muscle spasms 2/20/23     AMAURI Aden   FLUoxetine (PROzac) 20 MG tablet TAKE 3 TABLETS BY MOUTH EVERY DAY 7/27/23     AMAURI Aden   ibuprofen (MOTRIN) 600 mg tablet Take 1 tablet (600 mg total) by mouth every 6 (six) hours as needed for mild pain 1/24/23     AMAURI Rogers   itraconazole (SPORANOX) 100 mg capsule Take 100 mg by mouth 2 (two) times a day 2/10/23     Historical Provider, MD   ondansetron (Zofran ODT) 4 mg disintegrating tablet Take 1 tablet (4 mg total) by mouth every 6 (six) hours as needed for nausea or vomiting 2/20/23     [facility], [place]      I have reviewed home medications with patient personally.     Allergies: Allergies   Allergen Reactions   • Prednisone Anaphylaxis       Annotation - [id number]: reaction within 2 hours: shaking, sweats, near syncope   • Erythromycin Vomiting         Social History:     Marital Status: /Civil Union   Substance Use History:   Social History          Substance and Sexual Activity   Alcohol Use Yes      Social History           Tobacco Use   Smoking Status Never   Smokeless Tobacco Never   Tobacco Comments     Per allscripts - former smoker      Social History          Substance and Sexual Activity   Drug Use No         Family History:     non-contributory     Physical Exam:      Vitals:   Blood Pressure: 128/78 (09/13/23 1426)  Pulse: 61 (09/13/23 1426)  Temperature: 98.5 °F (36.9 °C) (09/13/23 1426)  Temp Source: Oral (09/13/23 1426)  Respirations: 18 (09/13/23 1426)  Height: 5' 1" (154.9 cm) (09/13/23 8543)  Weight - Scale: 68 kg (150 lb) (09/13/23 0633)  SpO2: 96 % (09/13/23 1426)     Physical Exam  Vitals and nursing note reviewed. Constitutional:       Appearance: Normal appearance. She is normal weight. HENT:      Head: Normocephalic and atraumatic. Right Ear: External ear normal.      Left Ear: External ear normal.      Nose: Nose normal. No congestion. Mouth/Throat:      Mouth: Mucous membranes are moist.      Pharynx: Oropharynx is clear. No oropharyngeal exudate or posterior oropharyngeal erythema. Eyes:      General: No scleral icterus. Right eye: No discharge. Left eye: No discharge. Extraocular Movements: Extraocular movements intact. Conjunctiva/sclera: Conjunctivae normal.      Pupils: Pupils are equal, round, and reactive to light.    Cardiovascular: Rate and Rhythm: Normal rate and regular rhythm. Pulses: Normal pulses. Heart sounds: Normal heart sounds. No murmur heard. No friction rub. No gallop. Pulmonary:      Effort: Pulmonary effort is normal. No respiratory distress. Breath sounds: Normal breath sounds. No stridor. No wheezing, rhonchi or rales. Chest:      Chest wall: No tenderness. Abdominal:      General: Abdomen is flat. Bowel sounds are normal. There is no distension. Palpations: Abdomen is soft. There is no mass. Tenderness: There is no abdominal tenderness. There is no guarding or rebound. Musculoskeletal:         General: No swelling, tenderness, deformity or signs of injury. Normal range of motion. Cervical back: Normal range of motion and neck supple. No rigidity. No muscular tenderness. Skin:     General: Skin is warm and dry. Capillary Refill: Capillary refill takes less than 2 seconds. Coloration: Skin is not jaundiced or pale. Findings: No bruising, erythema, lesion or rash. Neurological:      General: No focal deficit present. Mental Status: She is alert and oriented to person, place, and time. Mental status is at baseline. Cranial Nerves: No cranial nerve deficit. Sensory: No sensory deficit. Motor: No weakness. Coordination: Coordination normal.      Comments: AAOx4, GCS15, no facial mimic changes   Psychiatric:         Mood and Affect: Mood normal.         Behavior: Behavior normal.         Thought Content:  Thought content normal.         Judgment: Judgment normal.            Additional Data:      Lab Results: I have personally reviewed pertinent reports.            Results from last 7 days   Lab Units 09/13/23  0705   WBC Thousand/uL 5.11   HEMOGLOBIN g/dL 12.5   HEMATOCRIT % 38.1   PLATELETS Thousands/uL 290   NEUTROS PCT % 60   LYMPHS PCT % 27   MONOS PCT % 10   EOS PCT % 2           Results from last 7 days   Lab Units 09/13/23  0705   SODIUM mmol/L 140   POTASSIUM mmol/L 3.7   CHLORIDE mmol/L 107   CO2 mmol/L 28   BUN mg/dL 14   CREATININE mg/dL 0.72   ANION GAP mmol/L 5   CALCIUM mg/dL 9.4   ALBUMIN g/dL 4.1   TOTAL BILIRUBIN mg/dL 0.43   ALK PHOS U/L 100   ALT U/L 10   AST U/L 14   GLUCOSE RANDOM mg/dL 74                         Imaging: I have personally reviewed pertinent reports.       MRI brain IAC wo and w contrast   Final Result by NADEGE Hernandez MD (09/13 1659)   No acute intracranial pathology. Unremarkable internal auditory canals. Mild chronic microangiopathy.       Workstation performed: [id number]           CTA head and neck with and without contrast   Final Result by [address] [place] [address] (09/13 1115)   Addendum (preliminary) 1 of 1 by [address] [place] [address] (09/13 1115)   ADDENDUM:       Prior MRI and CT studies dated September 15, 2021 was utilized for    comparison. The mild small vessel ischemic changes seen on the prior MRI    or not clearly visualized on the prior CT or on the current CT which    remain normal in appearance.       Final       No acute intracranial abnormality.       Moderate stenosis origin left vertebral artery.       No significant carotid stenosis. No focal intracranial stenosis or aneurysm.               Workstation performed: [id number]                       Allscripts / Epic Records Reviewed: Yes      ** Please Note: This note has been constructed using a voice recognition system.  **

## 2023-09-13 NOTE — CONSULTS
[address]  Neurology consult - Name: Jose M Zapata 59 y.o. female   I MRN: [id number] Unit/Bed#: ED 32 I   Date of Admission: 9/13/2023  Date of Service: 9/13/2023 I Hospital Day: 0    Consult to neurology  Consult performed by: AMAURI Ewing  Consult ordered by: Jhonny Dickson PA-C      Reason for Consult / Principal Problem: Headache and dizziness x24 hours  Hx and PE limited by: None  Review of previous medical records was completed. Family, was not present at the bedside for history and examination      Assessment/Plan   Episode of dizziness with headache  Assessment & Plan  Neurology is asked to see again this 25-year-old right-hand-dominant lady who we saw 2 years ago this week for a similar complaint of dizziness and headaches which started yesterday on the 12th. She has a history of having a similar episode in September 2021 of dizziness and headache for 2 days with a clean CTA and MRI at that time and her symptoms improved with hydration and meclizine and she was subsequently discharged but never seen in our office for a follow-up as to whether or not this reoccurs and what triggered it. She appears this morning approximately 6 AM.,  After having approximately 24 hours of similar dizziness & disequilibrium symptoms starting yesterday and persisting overnight. Ports she went to bed as normal on 9/11/2023 however when she woke up on the morning of 9-12 and went to work she reports that she felt drunk and disequilibrium this all day. 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 She was able to come home and use a heating pad for her headache and go to bed she will still wake up closer to midnight she had to hold onto the wall to walk down the hallway. She denies that she had any double vision, she reports that she has to concentrate to focus.   She came to the emergency room today because this is clearly a change and she has a headache which she reports is all anterior which she did not have Monday evening. She does have some blood pressure elevations now which she reports she is does not check her blood pressure at home and she reports that she is unaware of whether or not she has hypertension as she does not have prescribed medication. She reports this feeling of being drunk and need to hold on as she walks is new. She denies double vision, endorses some room spinning. She reports that she is somewhat light sensitive now and she does also reports some nausea even though she has not eaten anything in a while. Her work-up so far has included a CTA of the head and neck done earlier this morning, with a moderate stenosis at the origin of the left vertebral which was not present 2 years ago. Her MRI 2 years ago was clean. Her CT parenchyma today had no acute pathology appreciated. Her exam today is significant for anterior headache, eye pain OD with right lateral gaze predominantly. Fast persuit in the L and slow on the R w maneuvers done today at bedside. She is still disequilibrium as but she was not grossly ataxic with finger-to-nose maneuvers however she was noted to minimally past-pointing bilaterally on a recumbent Romberg. She was noted to have R opaque light in the AD and bright in the AS. The remainder of her exam was unremarkable. At this time I have started her on a magnesium IV infusion, w migraine cocktail meds and using meclazine, not benadryl. She has had a dose of meclizine as well as 2.5 of Valium IV approximately 4 hours ago. She is unable to report any relief at this time. I suspect this patient is having a complex migraine complicated by a possible ear infection. She has no other lateralizing or neurologic symptoms. We will try and treat her at this time, and check an MRI w & w/out and IAC's r/t the change in her CTA. She did not follow-up with us in the office after her last episode in September 2021.   I would encourage her to see us in the office after this episode. Stenosis of left vertebral artery  Assessment & Plan  Neurology is asked to see this 78-year-old right-hand-dominant female patient who we have evaluated for headache and dizziness, see above. Her CTA when we saw her 2 years ago this month was clean without any evidence of a vertebral artery stenosis. When we did her CTA earlier this morning she is noted to have a left vertebral artery origin stenosis of a mild to moderate nature. Because of this we have asked her to start 81 mg of aspirin daily and we will check her lipid profile and she possibly may need a statin as well. We will follow her in the office. This patient needs to be seen in our outpatient neurology offices in approximately 1 to 2 months if she can. She was not seen after her last episode in 2021 but I have encouraged her to follow through and see us in the office in October or November. Her meds are yet to be determined based on the rest of her work-up and response. HPI: Sally Llanos is a right handed  59 y.o. female who neurology is asked to see again after she presented here approximately 6 or 630 this morning after having 24 hours of headache, dizziness, and unsteadiness that she feels like she is drunk needs to stand with her feet apart and walk holding onto the wall etc.  She denies that headache was the first symptom when she awoke yesterday. She reports that she just felt off. As she had worked throughout the day she reports that she became more and more headachy with the symptoms of being off balance. She reports she was able to drive home and she took a rest and even when she woke up before she went back to bed she continued to feel the sensation of being drunk. She did not take any headache medications yesterday or last night. .      ROS: 12 system cued query: She denies that she has any ongoing acute sickness or illness. She denies double vision specifically diplopia. She reports she has no blurry vision although she reports she has to concentrate to focus. She has no bulbar or tingling. She denies peripheral numbness or tingling in her limbs. No chest pain shortness of breath or palpitations. She reports a headache that she has is all anterior forehead and bilateral.  She reports the eye pain is in the right more so when she looks right etc.  She has no lateralizing weakness she is not dropping things. She reports a high degree of stress at home caring for her sick  as well as the mother-in-law that also lives with him in addition to her job as an  of some sort for aviation equipment. He denies that she has other migrainous features. Historical Information     Past Medical History:   Diagnosis Date   • Anxiety 06/20/2016    Last assessed   • Chronic infective otitis externa of left ear 09/18/2017    Last assessed   • GERD (gastroesophageal reflux disease)    • Right lumbar radiculopathy 05/05/2016    Last assessed   • Sciatic leg pain      Past Surgical History:   Procedure Laterality Date   • APPENDECTOMY     • CHOLECYSTECTOMY     • KNEE ARTHROSCOPY      torn menicus   • OR LAMNOTMY INCL W/DCMPRSN NRV ROOT 1 INTRSPC LUMBR Right 7/6/2016    Procedure: L3/4 FORAMINOTOMY;  Surgeon: Dunia Drew MD;  Location[facility];  Service: Neurosurgery   • TONSILLECTOMY         Social History : , she and her  and mother-in-law all live together. Non-smoker no alcohol no recreational's. Family History: She is unable to report whether or not her mother has ever had migraines.   Family History   Problem Relation Age of Onset   • No Known Problems Mother    • Alcohol abuse Father    • Diabetes Paternal Grandmother    • Hypertension Paternal Grandmother    • Cancer Family    • Breast cancer Maternal Aunt          Allergies   Allergen Reactions   • Prednisone Anaphylaxis     Annotation - [id number]: reaction within 2 hours: shaking, sweats, near syncope   • Erythromycin      Meds:all current active meds have been reviewed and Flexeril Prozac saw Reche Sans and Motrin are used as needed as well as a daily vitamin. Scheduled Meds:  Medication Dose Route Frequency   • acetaminophen  650 mg Oral Q6H PRN   • cyclobenzaprine  5 mg Oral TID PRN   • enoxaparin  40 mg Subcutaneous Daily   • FLUoxetine  20 mg Oral Daily   • lactated ringers  75 mL/hr Intravenous Continuous   • magnesium sulfate  1 g Intravenous Daily   • meclizine  25 mg Oral Q8H [address]   • ondansetron  4 mg Intravenous Q6H PRN     PRN Meds:.•  acetaminophen  •  cyclobenzaprine  •  ondansetron      Physical Exam:   Objective   Vitals:Blood pressure 159/72, pulse 60, temperature 98.4 °F (36.9 °C), temperature source Oral, resp. rate 20, height 5' 1" (1.549 m), weight 68 kg (150 lb), SpO2 97 %. ,Body mass index is 28.34 kg/m². Patient was examined in emergency department bed her ED bay is dark. The TV is off  General: Sleeping, appears stated age and cooperative  Head: Normocephalic, without obvious abnormality, atraumatic  Oral exam: lips, mucosa, and tongue moist;   Neck: no carotid bruit,   Lungs: clear to auscultation ant. bilaterally  Heart: regular rate and rhythm, S1, S2 normal, no murmur appreciated,   Abdomen: soft, +BS    Extremities: atraumatic, no cyanosis or edema    Neurologic:   Mental status: Alert, oriented, thought content appropriate as able to report her history there is no cognitive delays  CN Exam: LY, EOM's I with pain in the right eye when scanning to the right more so than the left.,  I did not appreciate any nystagmus VF full, Gaze conjugate No sensory or motor lateralizations (No PP on face), Hearing I B, CNIX-XII I B, she had a slightly skewed uvula to the left.   Motor: full power, age appropriate x 4 limbs  Sensory: Grossly intact  X 4 limbs, mod inc lt, temp, vib (not PP tested on today's exam)  Cerebellar: Bilateral mild past pointing w/out drift from recumbent Romberg position, no ataxia w upper maneuvers,   DTR's: Age appropriate, WNL; Plantars: downgoing B  Gait: Needs wide base and or handhold for ambulation       Lab Results:   I have personally reviewed pertinent reports. , CBC:   Results from last 7 days   Lab Units 09/13/23  0705   WBC Thousand/uL 5.11   RBC Million/uL 4.15   HEMOGLOBIN g/dL 12.5   HEMATOCRIT % 38.1   MCV fL 92   PLATELETS Thousands/uL 290   , BMP/CMP:   Results from last 7 days   Lab Units 09/13/23  0705   SODIUM mmol/L 140   POTASSIUM mmol/L 3.7   CHLORIDE mmol/L 107   CO2 mmol/L 28   BUN mg/dL 14   CREATININE mg/dL 0.72   CALCIUM mg/dL 9.4   AST U/L 14   ALT U/L 10   ALK PHOS U/L 100   EGFR ml/min/1.73sq m 91   , Vitamin B12:   , HgBA1C:   , TSH:   , Coagulation:   , Lipid Profile:        Imaging Studies: I have personally reviewed pertinent films in PACS and I have reviewed her CTA and MRI from September 2021, as well as her CTA and CT head available done earlier this morning. I note that she does appear to have a left vertebral stenosis at the origin on today's film. Her CT head was unremarkable on my review. Counseling / Coordination of Care  Total time spent today 60 minutes. Greater than 50% of total time was spent with the patient and / or family counseling and / or coordination of care. A description of the counseling / coordination of care: Record chart and film review were all done discussed with the patient at length. She had several questions I believe they were all answered to her satisfaction at this time. Dictation voice to text software has been used in the creation of this document. Please consider this in light of any contextual or grammatical errors.

## 2023-09-13 NOTE — LETTER
[address] [facility] [address]  Dept: [phone]    September 14, 2023     Patient: Ginny Paulino   YOB: 1965   Date of Visit: 9/13/2023       To Whom it May Concern:    Glen Ordoñez is under my professional care. She was seen in the hospital from 9/13/2023 to 09/14/23. She may return to work on 09/18/2023 without limitations. If you have any questions or concerns, please don't hesitate to call.          Sincerely,          Ramon Roman MD

## 2023-09-14 VITALS
SYSTOLIC BLOOD PRESSURE: 147 MMHG | DIASTOLIC BLOOD PRESSURE: 70 MMHG | RESPIRATION RATE: 18 BRPM | OXYGEN SATURATION: 95 % | BODY MASS INDEX: 28.32 KG/M2 | HEART RATE: 65 BPM | WEIGHT: 150 LBS | HEIGHT: 61 IN | TEMPERATURE: 98.6 F

## 2023-09-14 PROCEDURE — 97161 PT EVAL LOW COMPLEX 20 MIN: CPT

## 2023-09-14 PROCEDURE — 97165 OT EVAL LOW COMPLEX 30 MIN: CPT

## 2023-09-14 PROCEDURE — 99239 HOSP IP/OBS DSCHRG MGMT >30: CPT | Performed by: INTERNAL MEDICINE

## 2023-09-14 RX ORDER — ASPIRIN 81 MG/1
81 TABLET, CHEWABLE ORAL DAILY
Qty: 30 TABLET | Refills: 0 | Status: SHIPPED | OUTPATIENT
Start: 2023-09-15

## 2023-09-14 RX ORDER — ATORVASTATIN CALCIUM 40 MG/1
40 TABLET, FILM COATED ORAL
Qty: 30 TABLET | Refills: 0 | Status: SHIPPED | OUTPATIENT
Start: 2023-09-14

## 2023-09-14 RX ORDER — MECLIZINE HYDROCHLORIDE 25 MG/1
25 TABLET ORAL EVERY 8 HOURS PRN
Qty: 15 TABLET | Refills: 0 | Status: SHIPPED | OUTPATIENT
Start: 2023-09-14

## 2023-09-14 RX ADMIN — MAGNESIUM SULFATE HEPTAHYDRATE 1 G: 1 INJECTION, SOLUTION INTRAVENOUS at 09:23

## 2023-09-14 RX ADMIN — ENOXAPARIN SODIUM 40 MG: 40 INJECTION SUBCUTANEOUS at 09:23

## 2023-09-14 RX ADMIN — MECLIZINE HYDROCHLORIDE 25 MG: 25 TABLET ORAL at 06:00

## 2023-09-14 RX ADMIN — FLUOXETINE 20 MG: 20 CAPSULE ORAL at 09:23

## 2023-09-14 RX ADMIN — ASPIRIN 81 MG: 81 TABLET, CHEWABLE ORAL at 09:23

## 2023-09-14 RX ADMIN — SODIUM CHLORIDE, SODIUM LACTATE, POTASSIUM CHLORIDE, AND CALCIUM CHLORIDE 75 ML/HR: .6; .31; .03; .02 INJECTION, SOLUTION INTRAVENOUS at 06:02

## 2023-09-14 NOTE — PLAN OF CARE
Problem: Potential for Falls  Goal: Patient will remain free of falls  Description: INTERVENTIONS:  - Educate patient/family on patient safety including physical limitations  - Instruct patient to call for assistance with activity   - Consult OT/PT to assist with strengthening/mobility   - Keep Call bell within reach  - Keep bed low and locked with side rails adjusted as appropriate  - Keep care items and personal belongings within reach  - Initiate and maintain comfort rounds  - Make Fall Risk Sign visible to staff  - Offer Toileting every 2 Hours, in advance of need  - Initiate/Maintain bed alarm  - Apply yellow socks and bracelet for high fall risk patients  - Consider moving patient to room near nurses station  Outcome: Progressing     Problem: SAFETY ADULT  Goal: Maintain or return to baseline ADL function  Description: INTERVENTIONS:  -  Assess patient's ability to carry out ADLs; assess patient's baseline for ADL function and identify physical deficits which impact ability to perform ADLs (bathing, care of mouth/teeth, toileting, grooming, dressing, etc.)  - Assess/evaluate cause of self-care deficits   - Assess range of motion  - Assess patient's mobility; develop plan if impaired  - Assess patient's need for assistive devices and provide as appropriate  - Encourage maximum independence but intervene and supervise when necessary  - Involve family in performance of ADLs  - Assess for home care needs following discharge   - Consider OT consult to assist with ADL evaluation and planning for discharge  - Provide patient education as appropriate  Outcome: Progressing  Goal: Maintains/Returns to pre admission functional level  Description: INTERVENTIONS:  - Perform BMAT or MOVE assessment daily.   - Set and communicate daily mobility goal to care team and patient/family/caregiver. - Collaborate with rehabilitation services on mobility goals if consulted  - Perform Range of Motion 3 times a day.   - Reposition patient every 2 hours. - Dangle patient 3 times a day  - Stand patient 3 times a day  - Ambulate patient 4 times a day  - Out of bed to chair 3 times a day   - Out of bed for meals 3 times a day  - Out of bed for toileting  - Record patient progress and toleration of activity level   Outcome: Progressing     Problem: DISCHARGE PLANNING  Goal: Discharge to home or other facility with appropriate resources  Description: INTERVENTIONS:  - Identify barriers to discharge w/patient and caregiver  - Arrange for needed discharge resources and transportation as appropriate  - Identify discharge learning needs (meds, wound care, etc.)  - Arrange for interpretive services to assist at discharge as needed  - Refer to Case Management Department for coordinating discharge planning if the patient needs post-hospital services based on physician/advanced practitioner order or complex needs related to functional status, cognitive ability, or social support system  Outcome: Progressing     Problem: Knowledge Deficit  Goal: Patient/family/caregiver demonstrates understanding of disease process, treatment plan, medications, and discharge instructions  Description: Complete learning assessment and assess knowledge base.   Interventions:  - Provide teaching at level of understanding  - Provide teaching via preferred learning methods  Outcome: Progressing

## 2023-09-14 NOTE — QUICK NOTE
[facility] Internal Medicine Progress Note  Patient: Nasreen Whitley 59 y.o. female   MRN: [id number]  PCP: AMAURI Segundo  Unit/Bed#: -01 Encounter: [id number]  Date Of Visit: 09/14/23     Episode of dizziness with headache  Assessment & Plan  63yo F with h/o depression/anxiety, and HLD on day 1 of hospital admission due to episode of dizziness with associated headache and nausea. CT head with moderate L vertebral artery stenosis; MRI brain with signs of mild chronic microangiopathy. Neuro evaluation reports that symptoms are likely due to complex migraine. Patient reports significant improvement of symptoms with migraine cocktail and meclizine. Had similar episode 2 years ago with normal workup. · Continue meclizine prn for nausea. · IV fluids. · Appreciate neuro recommendations. Emphasized importance of outpatient f/u with neuro following hospital visit. L vertebral artery stenosis  Patient with moderate stenosis of L vertebral artery seen on CT head/neck that was not present on prior CT done in 2021. MRI brain showed signs of mild chronic microangiopathy, but otherwise unremarkable. · Started on 81 mg aspirin and 40 mg atorvastatin. Hyperlipidemia  Patient with significant LDL and triglycerides on lipid panel. HgA1c was wnl (5.3). Patient was not previously on statin therapy. · Continue 40 mg atorvastatin. Depression  Patient with documented h/o depression. · Continue home fluoxetine 20 mg.     Present on Admission:  • Depression, recurrent ([address])  • Hyperlipidemia, mixed  • Cervical spinal stenosis            VTE Pharmacologic Prophylaxis:   Pharmacologic: Enoxaparin (Lovenox)  Mechanical VTE Prophylaxis in Place: No    Patient Centered Rounds: I have performed bedside rounds with nursing staff today. Discussions with Specialists or Other Care Team Provider: neurology    Education and Discussions with Family / Patient: patient    Time Spent for Care: 30 minutes.   More than 50% of total time spent on counseling and coordination of care as described above. Current Length of Stay: 0 day(s)    Current Patient Status: Observation   Certification Statement: The patient will continue to require additional inpatient hospital stay due to dizziness, headache    Discharge Plan: 24-48 hrs    Code Status: Level 1 - Full Code      Subjective:   63yo F with h/o anxiety/depression, and HLD on day 1 of hospital admission for episode of vertigo with associated HA, nausea, lightheadedness, and photosensitivity. Symptoms did not improve on Valium and meclizine. Has had prior episode of similar symptoms 2 years ago with negative neuro workup. CTA head done yesterday showed moderate stenosis of L vertebral artery that was not present 2 years ago; brain MRI showed mild chronic microangiopathy, but otherwise no abnormalities. Lipid panel showed significant LDL and triglycerides. Neuro gave migraine cocktail (IV magnesium, toradol, metoclopramide) x 1 and meclizine. Neuro also started patient on ASA and statin therapy. Patient reports significant improvement in symptoms following medication regimen. She now denies HA, nausea. She is able to go to the bathroom without significant dizziness/lightheadedness, and she reports decreased photosensitivity. Objective:     Vitals:   Temp (24hrs), Av.3 °F (36.8 °C), Min:97.7 °F (36.5 °C), Max:98.6 °F (37 °C)    Temp:  [97.7 °F (36.5 °C)-98.6 °F (37 °C)] 98.6 °F (37 °C)  HR:  [58-66] 65  Resp:  [16-21] 18  BP: (115-172)/(62-78) 147/70  SpO2:  [95 %-99 %] 95 %  Body mass index is 28.34 kg/m². Input and Output Summary (last 24 hours): Intake/Output Summary (Last 24 hours) at 2023 0919  Last data filed at 2023 0602  Gross per 24 hour   Intake 2158.75 ml   Output --   Net 2158.75 ml           Physical Exam:     Vital signs are reviewed as above  Constitutional:. Patient in bed. Patient laying comfortably  Eyes: EOM grossly intact.  Conjunctivae slightly pale. Patient has anicteric sclera  HENT: Oropharynx are moist. Did not notice any significant lesions on the tongue. Head normocephalic  Neck: Neck is supple. I was not able to visualize any JVD at 90°. There is no significant lymphadenopathy. I also did not notice any significant thyromegaly  Cardiac: I did not hear any rubs or gallop. Patient appears to be in sinus rhythm. Respiratory: Patient not in significant respiratory distress. Air entry in general CTAB  GI: Abdomen is soft. It is grossly nontender. Bowel sounds are audible. I was not able to appreciate any hepatosplenomegaly. Neurologic:. Patient is awake and alert. Neurological examination is grossly intact. No obvious focal neurological deficit noticed. Skin: Skin is warm and dry. Psychiatric: Mood and affect are pleasant  Musculoskeletal. Patient moving all extremities equally. Extremities: Patient has no significant cyanosis, clubbing, or lower extremity edema       Additional Data:     Labs:    Results from last 7 days   Lab Units 09/13/23  0705   WBC Thousand/uL 5.11   HEMOGLOBIN g/dL 12.5   HEMATOCRIT % 38.1   PLATELETS Thousands/uL 290   NEUTROS PCT % 60   LYMPHS PCT % 27   MONOS PCT % 10   EOS PCT % 2     Results from last 7 days   Lab Units 09/13/23  0705   POTASSIUM mmol/L 3.7   CHLORIDE mmol/L 107   CO2 mmol/L 28   BUN mg/dL 14   CREATININE mg/dL 0.72   CALCIUM mg/dL 9.4   ALK PHOS U/L 100   ALT U/L 10   AST U/L 14           * I Have Reviewed All Lab Data Listed Above. * Additional Pertinent Lab Tests Reviewed:  All Labs Within Last 24 Hours Reviewed    Imaging:    Imaging Reports Reviewed Today Include: CT head/neck, MRI brain  Imaging Personally Reviewed by Myself Includes:      Recent Cultures (last 7 days):           Last 24 Hours Medication List:   Current Facility-Administered Medications   Medication Dose Route Frequency Provider Last Rate   • acetaminophen  650 mg Oral Q6H PRN Jp Lance MD     • aspirin 81 mg Oral Daily AMAURI Duggan     • atorvastatin  40 mg Oral Daily With [address] Bobby Hughes MD     • cyclobenzaprine  5 mg Oral TID PRN Radames Osei MD     • enoxaparin  40 mg Subcutaneous Daily Radames Osei MD     • FLUoxetine  20 mg Oral Daily Radames Osei MD     • lactated ringers  75 mL/hr Intravenous Continuous Radames Osei MD 75 mL/hr (09/14/23 0602)   • magnesium sulfate  1 g Intravenous Daily AMAURI Duggan 1 g (09/13/23 1321)   • meclizine  25 mg Oral [facility] Radames Osie MD     • ondansetron  4 mg Intravenous Q6H PRN Rdaames Osei MD          Today, Patient Was Seen By: Otto Santamaria    ** Please Note: Dragon 360 Dictation voice to text software may have been used in the creation of this document.  **

## 2023-09-14 NOTE — DISCHARGE SUMMARY
[address]  Discharge- Prabha Celeste 1965, 59 y.o. female MRN: [id number]  Unit/Bed#: -Buddy Encounter: [id number]  Primary Care Provider: AMAURI Aden   Date and time admitted to hospital: 9/13/2023  6:39 AM    Discharge Diagnosis:    Dizziness episodes, resolved, potentially migraine related  Stenosis of left vertebral artery  Headache  Hyperlipidemia  Cervical spinal stenosis  Depression    Discharging Physician / Practitioner: Jennifer Alba MD  PCP: AMAURI Aden  Admission Date:   Admission Orders (From admission, onward)     Ordered        09/13/23 0942  Place in Observation  Once                      Discharge Date: 09/14/23    Consultations During Hospital Stay:  Neurology     Outpatient follow up Requested:  · PCP    Complications:  None    Reason for Admission: Dizziness    HPI:  Linda Celeste is a 59 y.o. female who presents with dizziness and sensation of room spinning. The patient states that for the past 24 hours at least she has been having worsening sensation of room spinning and dizziness.  This is accompanied by nausea and has a headache. She denies any loss of consciousness. Patient states that she feels like "she is drunk" (Sic).  She also feels like she is having trouble standing still on her feet and she may have to hold the wall when walking. The patient apparently did have a similar episode a couple years ago in which she did have headache associated with vertigo as well.     At the ED so far she underwent a work-up which included a CT of the head without acute abnormalities, CTA with moderate stenosis of the left vertebral artery origin.  Otherwise chemistry unremarkable  The patient is for some meclizine and diazepam without improvement so far. Hospital Course: The patient was hospitalized. She did undergo neurologic evaluation, she she did have an MRI which was negative for any acute stroke.   The patient improved with a migraine cocktail. She is feeling much better and is able to ambulate without difficulty. Symptoms are likely related to migraine. The patient does have an elevated LDL and given her CT findings she likely would benefit from aspirin and statin. Patient being discharged home in condition. Condition at Discharge: good     Discharge Day Visit / Exam:     Subjective:    Patient valuated by. She is feeling well. Denies chest pain nausea or vomiting. Vitals: Blood Pressure: 147/70 (09/14/23 0745)  Pulse: 65 (09/14/23 0745)  Temperature: 98.6 °F (37 °C) (09/14/23 0745)  Temp Source: Oral (09/13/23 1426)  Respirations: 18 (09/14/23 0745)  Height: 5' 1" (154.9 cm) (09/13/23 1844)  Weight - Scale: 68 kg (150 lb) (09/13/23 0633)  SpO2: 95 % (09/14/23 0745)     Exam:   Physical Exam  Vitals and nursing note reviewed. Constitutional:       Appearance: Normal appearance. She is normal weight. Comments: Female in bed, awake   HENT:      Head: Normocephalic and atraumatic. Right Ear: External ear normal.      Left Ear: External ear normal.      Nose: Nose normal. No congestion. Mouth/Throat:      Mouth: Mucous membranes are moist.      Pharynx: Oropharynx is clear. No oropharyngeal exudate or posterior oropharyngeal erythema. Eyes:      General: No scleral icterus. Right eye: No discharge. Left eye: No discharge. Extraocular Movements: Extraocular movements intact. Conjunctiva/sclera: Conjunctivae normal.      Pupils: Pupils are equal, round, and reactive to light. Cardiovascular:      Rate and Rhythm: Normal rate and regular rhythm. Pulses: Normal pulses. Heart sounds: Normal heart sounds. No murmur heard. No friction rub. No gallop. Pulmonary:      Effort: Pulmonary effort is normal. No respiratory distress. Breath sounds: Normal breath sounds. No stridor. No wheezing, rhonchi or rales. Chest:      Chest wall: No tenderness.    Abdominal: General: Abdomen is flat. Bowel sounds are normal. There is no distension. Palpations: Abdomen is soft. There is no mass. Tenderness: There is no abdominal tenderness. There is no guarding or rebound. Musculoskeletal:         General: No swelling, tenderness, deformity or signs of injury. Normal range of motion. Cervical back: Normal range of motion and neck supple. No rigidity. No muscular tenderness. Skin:     General: Skin is warm and dry. Capillary Refill: Capillary refill takes less than 2 seconds. Coloration: Skin is not jaundiced or pale. Findings: No bruising, erythema, lesion or rash. Neurological:      General: No focal deficit present. Mental Status: She is alert and oriented to person, place, and time. Mental status is at baseline. Cranial Nerves: No cranial nerve deficit. Sensory: No sensory deficit. Motor: No weakness. Coordination: Coordination normal.   Psychiatric:         Mood and Affect: Mood normal.         Behavior: Behavior normal.         Thought Content: Thought content normal.         Judgment: Judgment normal.           Discussion with Family: Patient herself. Discharge instructions/Information to patient and family:   See after visit summary for information provided to patient and family. Provisions for Follow-Up Care:  See after visit summary for information related to follow-up care and any pertinent home health orders. Disposition:     Home    For Discharges to [facility] SNF:   · Not Applicable to this Patient - Not Applicable to this Patient    Planned Readmission: No     Discharge Statement:  I spent 91 minutes discharging the patient. This time was spent on the day of discharge. I had direct contact with the patient on the day of discharge.  Greater than 50% of the total time was spent examining patient, answering all patient questions, arranging and discussing plan of care with patient as well as directly providing post-discharge instructions. Additional time then spent on discharge activities. Discharge Medications:  See after visit summary for reconciled discharge medications provided to patient and family.       ** Please Note: This note has been constructed using a voice recognition system **

## 2023-09-14 NOTE — PHYSICAL THERAPY NOTE
Physical Therapy Evaluation     Patient's Name: Staci Celeste    Admitting Diagnosis  Dizziness [R42]  Vertigo [R42]    Problem List  Patient Active Problem List   Diagnosis    Depression, recurrent (HCC)    Cervical spinal stenosis    Degenerative disc disease, lumbar    Hyperlipidemia, mixed    Sacroiliitis (HCC)    Elevated liver enzymes    Mesenteric panniculitis (HCC)    Episode of dizziness with headache    Elevated alkaline phosphatase level    Arthralgia of right temporomandibular joint    Cervical radiculopathy    Cellulitis of right upper extremity    Stenosis of left vertebral artery     Past Medical History  Past Medical History:   Diagnosis Date    Anxiety 06/20/2016    Last assessed    Chronic infective otitis externa of left ear 09/18/2017    Last assessed    GERD (gastroesophageal reflux disease)     Right lumbar radiculopathy 05/05/2016    Last assessed    Sciatic leg pain      Past Surgical History  Past Surgical History:   Procedure Laterality Date    APPENDECTOMY      CHOLECYSTECTOMY      KNEE ARTHROSCOPY      torn menicus    CA LAMNOTMY INCL W/DCMPRSN NRV ROOT 1 INTRSPC LUMBR Right 7/6/2016    Procedure: L3/4 FORAMINOTOMY;  Surgeon: Gina Cast MD;  Location: [facility];  Service: Neurosurgery    TONSILLECTOMY        09/14/23 0938   PT Last Visit   PT Visit Date 09/14/23   Note Type   Note type Evaluation   Pain Assessment   Pain Assessment Tool 0-10   Pain Score No Pain   Restrictions/Precautions   Weight Bearing Precautions Per Order No   Other Precautions Chair Alarm; Bed Alarm;Multiple lines; Fall Risk   Home Living   Type of [address] [place] [address] Dr Two level; Able to live on main level with bedroom/bathroom; Performs ADLs on one level;Stairs to enter with rails; Ramped entrance  (2 BRIONNA)   Bathroom Shower/Tub Tub/shower unit   Bathroom Toilet Standard   Bathroom Equipment Grab bars in shower; Shower chair;Commode   Bathroom Accessibility Accessible   Home Equipment Other (Comment)  (none per patient)   Additional Comments Pt ambulates without an AD. Prior Function   Level of [place] Independent with functional mobility; Independent with ADLs; Independent with IADLS   Lives With Spouse; Family   Receives Help From Family  (pt reports limited support)   IADLs Independent with driving; Independent with meal prep; Independent with medication management   Falls in the last 6 months 0   Vocational Full time employment   General   Family/Caregiver Present No   Cognition   Overall Cognitive Status WFL   Arousal/Participation Alert   Orientation Level Oriented X4   Memory Within functional limits   Following Commands Follows all commands and directions without difficulty   Comments Pt agreeable to PT. Subjective   Subjective "I'm feeling more steady."   RLE Assessment   RLE Assessment WFL   LLE Assessment   LLE Assessment WFL   Light Touch   RLE Light Touch Grossly intact   LLE Light Touch Grossly intact   Bed Mobility   Supine to Sit 6  Modified independent   Additional items HOB elevated   Transfers   Sit to Stand 7  Independent   Stand to Sit 7  Independent   Ambulation/Elevation   Gait pattern WNL   Gait Assistance 7  Independent   Assistive Device None   Distance 250 feet   Stair Management Assistance 7  Independent  (Pt performed alternating high marching in place, independently)   Ambulation/Elevation Additional Comments Pt denied complaints of dizziness when ambulating. Pt performed head turns when ambulating, denied dizziness turning head left and right and reported slight dizziness when looking up/down. No overt LOB.    Balance   Static Sitting Normal   Dynamic Sitting Good   Static Standing Good   Dynamic Standing Good   Ambulatory Good   Endurance Deficit   Endurance Deficit No   Activity Tolerance   Activity Tolerance Patient tolerated treatment well   Medical Staff Made Aware care coordination with ROSEMARY Paulino   Assessment   Prognosis Good   Problem List   (no acute PT impairments)   Assessment Pt is 59year old female seen for PT evaluation s/p admit to [facility] on 9/13/2023 with Episode of dizziness. PT consulted to assess pt's functional mobility and discharge needs. Order placed for PT evaluation and treatment, with up and out of bed as tolerated order. Comorbidities affecting pt's physical performance at time of assessment include recurrent depression, cervical spinal stenosis, mixed hyperlipidemia, and stenosis of left vertebral artery. Prior to hospitalization, pt was independent with all functional mobility without an AD. Pt ambulates unrestricted distances on all terrain and elevations. Pt resides with her spouse and family, in a two level house, but is able to stay on the first floor, with 2 steps or a ramp to enter the home. Personal factors affecting pt at time of initial evaluation include lives in a two story house, stairs to enter home, and limited home support. Please find objective findings from PT assessment regarding body systems outlined above. The following objective measures were performed on initial evaluation Barthel Index: 100/100, Modified [place]: 0 no symptoms at all and AM-PAC 6-Clicks: 92/21. Pt's clinical presentation is currently stable seen in pt's presentation of need for ongoing medical management/monitoring. Pt is independent with transfers and functional mobility. From a PT standpoint, recommendation at time of discharge would be home with no PT needs. Pt is at her baseline function. No acute PT impairments identified. Plan to discontinue PT at this time.    Barriers to Discharge None   Goals   Patient Goals to go home   Plan   PT Frequency Other (Comment)  (discontinue PT)   Recommendation   PT Discharge Recommendation No rehabilitation needs   AM-PAC Basic Mobility Inpatient   Turning in Flat Bed Without Bedrails 4   Lying on Back to Sitting on Edge of Flat Bed Without Bedrails 4   Moving Bed to Chair 4   Standing Up From Chair Using Arms 4   Walk in Room 4 Climb 3-5 Stairs With Railing 4   Basic Mobility Inpatient Raw Score 24   Basic Mobility Standardized Score 57.68   Highest Level Of Mobility   -HLM Goal 8: Walk 250 feet or more   -HLM Achieved 8: Walk 250 feet ot more   Modified [place] Scale   Modified [place] Scale 0   Barthel Index   Feeding 10   Bathing 5   Grooming Score 5   Dressing Score 10   Bladder Score 10   Bowels Score 10   Toilet Use Score 10   Transfers (Bed/Chair) Score 15   Mobility (Level Surface) Score 15   Stairs Score 10   Barthel Index Score 100     PT Evaluation Time: 5979-0698    Armand Odor, PT, DPT

## 2023-09-14 NOTE — ASSESSMENT & PLAN NOTE
9/14/2023: Neurology saw again today in follow-up after our initial consult yesterday this plan 68-year-old right-hand-dominant lady who we saw yesterday for complaints of dizziness being off balance and headache. Coincidentally we saw this patient 2 years ago this week for a similar complaint of dizziness and headaches. At that time she had a clean CTA and MRI and her symptoms improved with hydration and meclizine and she was subsequently discharged but never seen in our office for a follow-up as to whether or not this reoccurs and what triggered it. She presented yesterday morning approximately 6 AM., after having approximately 24 hours of similar dizziness & disequilibrium symptoms starting yesterday and persisting overnight. Ports she went to bed as normal on 9/11/2023 however when she woke up on the morning of 9-12 and went to work she reports that she felt drunk and disequilibrium this all day. 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 She was able to come home and use a heating pad for her headache and go to bed she will still wake up closer to midnight she had to hold onto the wall to walk down the hallway. She denies that she had any double vision, she reports that she has to concentrate to focus. She came to the emergency room today because this is clearly a change and she has a headache which she reports is all anterior which she did not have Monday evening. She does have some blood pressure elevations now which she reports she is does not check her blood pressure at home and she reports that she is unaware of whether or not she has hypertension as she does not have prescribed medication. She reports this feeling of being drunk and need to hold on as she walks is new. She denies double vision, endorses some room spinning. She reports that she is somewhat light sensitive now and she does also reports some nausea even though she has not eaten anything in a while.   Her work-up so far has included a CTA of the head and neck done earlier this morning, with a moderate stenosis at the origin of the left vertebral which was not present 2 years ago. Her MRI 2 years ago and yesterday was clean. Her exam today improved significantly by her report for decreased anterior headache, resolved eye pain OD with right lateral gaze predominantly, and she reports no dizziness or disequilibrium today. There was no nystagmus on today's exam.  She did well with her gait. She feels much improved and is anxious for discharge. She has had a magnesium IV infusion, w migraine cocktail meds and using meclazine, not benadryl. She reports the medications yesterday feeling better. We suspect this patient had a complex migraine complicated by a possible ear infection. She has no other lateralizing or neurologic symptoms. Our point of view she is stable for discharge. She should be started and use meclizine when she feels these episodes come on and she would also benefit from vestibular therapy. She did not follow-up with us in the office after her last episode in September 2021. I would encourage her to see us in the office after this episode.

## 2023-09-14 NOTE — PROGRESS NOTES
[address]  Neuro Progress Note - Name: Lizzeth Nathan  MRN: [id number] Unit/Bed#: -01 I   Date of Admission: 9/13/2023  Date of Service: 9/14/2023 I Hospital Day: 0    Assessment/Plan   * Episode of dizziness with headache  Assessment & Plan  9/14/2023: Neurology saw again today in follow-up after our initial consult yesterday this plan 59-year-old right-hand-dominant lady who we saw yesterday for complaints of dizziness being off balance and headache. Coincidentally we saw this patient 2 years ago this week for a similar complaint of dizziness and headaches. At that time she had a clean CTA and MRI and her symptoms improved with hydration and meclizine and she was subsequently discharged but never seen in our office for a follow-up as to whether or not this reoccurs and what triggered it. She presented yesterday morning approximately 6 AM., after having approximately 24 hours of similar dizziness & disequilibrium symptoms starting yesterday and persisting overnight. Ports she went to bed as normal on 9/11/2023 however when she woke up on the morning of 9-12 and went to work she reports that she felt drunk and disequilibrium this all day. 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 She was able to come home and use a heating pad for her headache and go to bed she will still wake up closer to midnight she had to hold onto the wall to walk down the hallway. She denies that she had any double vision, she reports that she has to concentrate to focus. She came to the emergency room today because this is clearly a change and she has a headache which she reports is all anterior which she did not have Monday evening. She does have some blood pressure elevations now which she reports she is does not check her blood pressure at home and she reports that she is unaware of whether or not she has hypertension as she does not have prescribed medication.    She reports this feeling of being drunk and need to hold on as she walks is new. She denies double vision, endorses some room spinning. She reports that she is somewhat light sensitive now and she does also reports some nausea even though she has not eaten anything in a while. Her work-up so far has included a CTA of the head and neck done earlier this morning, with a moderate stenosis at the origin of the left vertebral which was not present 2 years ago. Her MRI 2 years ago and yesterday was clean. Her exam today improved significantly by her report for decreased anterior headache, resolved eye pain OD with right lateral gaze predominantly, and she reports no dizziness or disequilibrium today. There was no nystagmus on today's exam.  She did well with her gait. She feels much improved and is anxious for discharge. She has had a magnesium IV infusion, w migraine cocktail meds and using meclazine, not benadryl. She reports the medications yesterday feeling better. We suspect this patient had a complex migraine complicated by a possible ear infection. She has no other lateralizing or neurologic symptoms. Our point of view she is stable for discharge. She should be started and use meclizine when she feels these episodes come on and she would also benefit from vestibular therapy. She did not follow-up with us in the office after her last episode in September 2021. I would encourage her to see us in the office after this episode. Stenosis of left vertebral artery  Assessment & Plan  9/14/2023: Yesterday neurology was asked to see this 53-year-old right-hand-dominant female patient who we have evaluated for headache and dizziness, see above. Her CTA when we saw her 2 years ago this month was clean without any evidence of a vertebral artery stenosis. When we did her CTA yesterday she is noted to have a left vertebral artery origin stenosis of a mild to moderate nature.   Her lipid panel was grossly abnormal with a markedly elevated LDL. Because of this we have asked her to start 81 mg of aspirin daily and atorvastatin as well, which she reports she thinks she has used previously. .  We will follow her in the office. This patient should be seen in either our [facility] office or she can be seen in our [facility] office at our [facility] clinic nonurgently in 1 to 2 months. We have started her on aspirin as well as Lipitor for early newly diagnosed left vertebral origin stenosis as well as meclizine and given her prescription for vestibular therapy. Subjective/Objective     Subjective: I feel so much better today. ROS: The patient reports that she feels much improved today. She reports she only has a trace of a very small headache in the anterior midline frontal region. She reports that she has no further disequilibrium or drunkenness sensation as she had yesterday. She reports everything else feels better. Current Facility-Administered Medications   Medication Dose Route Frequency   • acetaminophen  650 mg Oral Q6H PRN   • aspirin  81 mg Oral Daily   • atorvastatin  40 mg Oral Daily With Dinner   • cyclobenzaprine  5 mg Oral TID PRN   • enoxaparin  40 mg Subcutaneous Daily   • FLUoxetine  20 mg Oral Daily   • lactated ringers  75 mL/hr Intravenous Continuous   • magnesium sulfate  1 g Intravenous Daily   • meclizine  25 mg Oral Q8H [address]   • ondansetron  4 mg Intravenous Q6H PRN     •  acetaminophen  •  cyclobenzaprine  •  ondansetron    Vitals: Blood pressure 147/70, pulse 65, temperature 98.6 °F (37 °C), resp. rate 18, height 5' 1" (1.549 m), weight 68 kg (150 lb), SpO2 95 %. ,Body mass index is 28.34 kg/m².         Physical Exam:     Kristi Sanchez seen in: Bed and ambulated to and from the bathroom  General appearance: alert,   Neck, Lungs, Heart, & abdomen: WNL  Extremities: atraumatic, no cyanosis or edema    Neurologic:   Mental status: Alert, oriented, thought content appropriate, there is no cognitive deficits. CN: exam EOM's I, no nystagmus appreciated are seen. Gaze conjugate. Non lateralizing sensory & motor exam, (PP not tested on face). Reminder CNVIII-XII normal.   Motor: full power age appropriate x 4 limbs  Sensory: grossly intact  X 4 limbs, PP not tested  Cerebellar: no gross ataxia or past pointing w pronation from a modified Romberg position. Gait: Fluid smooth, she was able to have a fairly narrow normal gait even while manipulating the IV pole. She was able to extricate herself from a recumbent position in bed to a standing position to and from the bathroom easily. DTR's: Age appropriate,  Plantars: downgoing       Lab Results:   I have personally reviewed pertinent reports. , CBC:   Results from last 7 days   Lab Units 09/13/23  0705   WBC Thousand/uL 5.11   RBC Million/uL 4.15   HEMOGLOBIN g/dL 12.5   HEMATOCRIT % 38.1   MCV fL 92   PLATELETS Thousands/uL 290   , BMP/CMP:   Results from last 7 days   Lab Units 09/13/23  0705   SODIUM mmol/L 140   POTASSIUM mmol/L 3.7   CHLORIDE mmol/L 107   CO2 mmol/L 28   BUN mg/dL 14   CREATININE mg/dL 0.72   CALCIUM mg/dL 9.4   AST U/L 14   ALT U/L 10   ALK PHOS U/L 100   EGFR ml/min/1.73sq m 91   , Vitamin B12:   , HgBA1C:   Results from last 7 days   Lab Units 09/13/23  0705   HEMOGLOBIN A1C % 5.3   , TSH:   , Coagulation:   , Lipid Profile:   Results from last 7 days   Lab Units 09/13/23  0705   HDL mg/dL 64   LDL CALC mg/dL 189*   TRIGLYCERIDES mg/dL 186*        Imaging Studies: I have personally reviewed pertinent films in PACS and I have reviewed her MRI as well as her CTA. I note that he mild stenosis of the left vertebral artery at the origin on the left side not the right side as noted in the body of the report. Her MRI is also a clean with very little small vessel disease appreciated. Counseling / Coordination of Care  Total time spent today Approximately 30 minutes.  Greater than 50% of total time was spent with the patient and / or family counseling and / or coordination of care. A description of the counseling / coordination of care: All of the above was discussed and reviewed with the patient at the bedside as well as with internal medicine and nursing. The patient had no further questions at this time. She reports she will adopt now at least for the next several months the statin to improve her LDL. She had no further questions.

## 2023-09-14 NOTE — OCCUPATIONAL THERAPY NOTE
Occupational Therapy Evaluation        Patient Name: Mel Gomes  ULERQ'K Date: 9/14/2023 09/14/23 0948   OT Last Visit   OT Visit Date 09/14/23   Note Type   Note type Evaluation   Pain Assessment   Pain Assessment Tool 0-10   Pain Score No Pain   Restrictions/Precautions   Weight Bearing Precautions Per Order No   Other Precautions Chair Alarm; Bed Alarm;Multiple lines; Fall Risk   Home Living   Type of [address] [place] [address] Two level; Able to live on main level with bedroom/bathroom; Performs ADLs on one level;Stairs to enter with rails; Ramped entrance  (2 [address])   Bathroom Shower/Tub Tub/shower unit   Bathroom Toilet Standard   Bathroom Equipment Grab bars in shower; Shower chair;Commode   Bathroom Accessibility Accessible   Home Equipment Other (Comment)  (none per patient)   Additional Comments Pt ambulates without an AD. Prior Function   Level of [place] Independent with functional mobility; Independent with ADLs; Independent with IADLS   Lives With Spouse; Family   Receives Help From Family  (pt reports limited support)   IADLs Independent with driving; Independent with meal prep; Independent with medication management   Falls in the last 6 months 0   Vocational Full time employment   Lifestyle   Autonomy Patient reports independent with ADLs/ IADLs, ambulatory without AD. Patient lives with family in a 2 story house, [address] with 1st floor set up. patient ambulates without AD and works full time. Reciprocal Relationships Supportive Family   Service to Others Full time employment as a .    General   Family/Caregiver Present No   ADL   Eating Assistance 7  Independent   Grooming Assistance 7  Independent   UB Bathing Assistance 6  Modified Independent   LB Bathing Assistance 6  Modified Independent   UB Dressing Assistance 7  Independent   LB Dressing Assistance 6  Modified independent   Toileting Assistance  6  Modified independent   Functional Assistance 7  Independent   Bed Mobility   Supine to Sit 6  Modified independent   Additional items HOB elevated   Transfers   Sit to Stand 7  Independent   Stand to Sit 7  Independent   Balance   Static Sitting Normal   Dynamic Sitting Good   Static Standing Good   Dynamic Standing Good   Activity Tolerance   Activity Tolerance Patient tolerated treatment well   RUE Assessment   RUE Assessment WNL   LUE Assessment   LUE Assessment WNL   Hand Function   Gross Motor Coordination Functional   Fine Motor Coordination Functional   Sensation   Light Touch No apparent deficits  (BUEs)   Vision-Basic Assessment   Current Vision Wears contacts   Psychosocial   Psychosocial (WDL) WDL   Cognition   Overall Cognitive Status WFL   Arousal/Participation Alert; Responsive; Cooperative   Attention Within functional limits   Orientation Level Oriented X4   Memory Within functional limits   Following Commands Follows all commands and directions without difficulty   Assessment   Assessment Patient is a 59 y.o. female seen for OT evaluation s/p admit to [facility] on 9/13/2023 w/Episode of dizziness. Commorbidities affecting patient's functional performance at time of assessment include: recurrent depression, cervical spinal stenosis, mixed hyperlipidemia, and stenosis of left vertebral artery. Orders placed for OT evaluation and treatment. Performed at least two patient identifiers during session including name and wristband. Prior to admission,Patient reports independent with ADLs/ IADLs, ambulatory without AD. Patient lives with family in a 2 story house, 2 [facility] with 1st floor set up. patient ambulates without AD and works full time. Upon evaluation, patient requires independent assist for UB ADLs, independent assist for LB ADLs, transfers and functional ambulation in room and bathroom with independent assist, without AD. Presents with functional use of BUEs, with intact prehension, coordination and symmetrical muscle strength.  Patient appears to be functioning at baseline. No further acute OT needs identified at this time to warrant continuation of services. D/C OT services. From OT standpoint, recommendation at time of d/c would be Home with family support.    Recommendation   OT Discharge Recommendation No rehabilitation needs   AM-PAC Daily Activity Inpatient   Lower Body Dressing 4   Bathing 4   Toileting 4   Upper Body Dressing 4   Grooming 4   Eating 4   Daily Activity Raw Score 24   Daily Activity Standardized Score (Calc for Raw Score >=11) 57.54   AM-PAC Applied Cognition Inpatient   Following a Speech/Presentation 4   Understanding Ordinary Conversation 4   Taking Medications 4   Remembering Where Things Are Placed or Put Away 4   Remembering List of 4-5 Errands 4   Taking Care of Complicated Tasks 4   Applied Cognition Raw Score 24   Applied Cognition Standardized Score 62.21   Barthel Index   Feeding 10   Bathing 5   Grooming Score 5   Dressing Score 10   Bladder Score 10   Bowels Score 10   Toilet Use Score 10   Transfers (Bed/Chair) Score 15   Mobility (Level Surface) Score 15   Stairs Score 10   Barthel Index Score 100

## 2023-09-14 NOTE — PLAN OF CARE
Problem: INFECTION - ADULT  Goal: Absence or prevention of progression during hospitalization  Description: INTERVENTIONS:  - Assess and monitor for signs and symptoms of infection  - Monitor lab/diagnostic results  - Monitor all insertion sites, i.e. indwelling lines, tubes, and drains  - Monitor endotracheal if appropriate and nasal secretions for changes in amount and color  - [place] appropriate cooling/warming therapies per order  - Administer medications as ordered  - Instruct and encourage patient and family to use good hand hygiene technique  - Identify and instruct in appropriate isolation precautions for identified infection/condition  Outcome: Progressing  Goal: Absence of fever/infection during neutropenic period  Description: INTERVENTIONS:  - Monitor WBC    Outcome: Progressing     Problem: PAIN - ADULT  Goal: Verbalizes/displays adequate comfort level or baseline comfort level  Description: Interventions:  - Encourage patient to monitor pain and request assistance  - Assess pain using appropriate pain scale  - Administer analgesics based on type and severity of pain and evaluate response  - Implement non-pharmacological measures as appropriate and evaluate response  - Consider cultural and social influences on pain and pain management  - Notify physician/advanced practitioner if interventions unsuccessful or patient reports new pain  Outcome: Progressing

## 2023-09-14 NOTE — ASSESSMENT & PLAN NOTE
9/14/2023: Yesterday neurology was asked to see this 78-year-old right-hand-dominant female patient who we have evaluated for headache and dizziness, see above. Her CTA when we saw her 2 years ago this month was clean without any evidence of a vertebral artery stenosis. When we did her CTA yesterday she is noted to have a left vertebral artery origin stenosis of a mild to moderate nature. Her lipid panel was grossly abnormal with a markedly elevated LDL. Because of this we have asked her to start 81 mg of aspirin daily and atorvastatin as well, which she reports she thinks she has used previously. .  We will follow her in the office.

## 2023-09-18 ENCOUNTER — HOSPITAL ENCOUNTER (EMERGENCY)
Facility: HOSPITAL | Age: 58
Discharge: HOME | End: 2023-09-18
Admitting: EMERGENCY MEDICINE
Payer: COMMERCIAL

## 2023-09-18 ENCOUNTER — APPOINTMENT (EMERGENCY)
Dept: CT IMAGING | Facility: HOSPITAL | Age: 58
End: 2023-09-18
Payer: COMMERCIAL

## 2023-09-18 VITALS
OXYGEN SATURATION: 98 % | RESPIRATION RATE: 20 BRPM | SYSTOLIC BLOOD PRESSURE: 162 MMHG | DIASTOLIC BLOOD PRESSURE: 69 MMHG | HEART RATE: 62 BPM | TEMPERATURE: 98 F

## 2023-09-18 DIAGNOSIS — R51.9 HEADACHE: ICD-10-CM

## 2023-09-18 DIAGNOSIS — R42 DIZZINESS: Primary | ICD-10-CM

## 2023-09-18 LAB
ALBUMIN SERPL BCP-MCNC: 4.2 G/DL (ref 3.5–5)
ALP SERPL-CCNC: 113 U/L (ref 34–104)
ALT SERPL W P-5'-P-CCNC: 18 U/L (ref 7–52)
ANION GAP SERPL CALCULATED.3IONS-SCNC: 5 MMOL/L
AST SERPL W P-5'-P-CCNC: 19 U/L (ref 13–39)
ATRIAL RATE: 60 BPM
BASOPHILS # BLD AUTO: 0.03 THOUSANDS/ÂΜL (ref 0–0.1)
BASOPHILS NFR BLD AUTO: 1 % (ref 0–1)
BILIRUB SERPL-MCNC: 0.44 MG/DL (ref 0.2–1)
BNP SERPL-MCNC: 29 PG/ML (ref 0–100)
BUN SERPL-MCNC: 18 MG/DL (ref 5–25)
CALCIUM SERPL-MCNC: 9.9 MG/DL (ref 8.4–10.2)
CARDIAC TROPONIN I PNL SERPL HS: <2 NG/L
CHLORIDE SERPL-SCNC: 106 MMOL/L (ref 96–108)
CO2 SERPL-SCNC: 29 MMOL/L (ref 21–32)
CREAT SERPL-MCNC: 0.85 MG/DL (ref 0.6–1.3)
EOSINOPHIL # BLD AUTO: 0.08 THOUSAND/ÂΜL (ref 0–0.61)
EOSINOPHIL NFR BLD AUTO: 2 % (ref 0–6)
ERYTHROCYTE [DISTWIDTH] IN BLOOD BY AUTOMATED COUNT: 12.7 % (ref 11.6–15.1)
GFR SERPL CREATININE-BSD FRML MDRD: 74 ML/MIN/1.73SQ M
GLUCOSE SERPL-MCNC: 84 MG/DL (ref 65–140)
HCT VFR BLD AUTO: 39.8 % (ref 34.8–46.1)
HGB BLD-MCNC: 13.1 G/DL (ref 11.5–15.4)
IMM GRANULOCYTES # BLD AUTO: 0 THOUSAND/UL (ref 0–0.2)
IMM GRANULOCYTES NFR BLD AUTO: 0 % (ref 0–2)
LYMPHOCYTES # BLD AUTO: 1.3 THOUSANDS/ÂΜL (ref 0.6–4.47)
LYMPHOCYTES NFR BLD AUTO: 27 % (ref 14–44)
MAGNESIUM SERPL-MCNC: 2.1 MG/DL (ref 1.9–2.7)
MCH RBC QN AUTO: 30.3 PG (ref 26.8–34.3)
MCHC RBC AUTO-ENTMCNC: 32.9 G/DL (ref 31.4–37.4)
MCV RBC AUTO: 92 FL (ref 82–98)
MONOCYTES # BLD AUTO: 0.5 THOUSAND/ÂΜL (ref 0.17–1.22)
MONOCYTES NFR BLD AUTO: 10 % (ref 4–12)
NEUTROPHILS # BLD AUTO: 3 THOUSANDS/ÂΜL (ref 1.85–7.62)
NEUTS SEG NFR BLD AUTO: 60 % (ref 43–75)
NRBC BLD AUTO-RTO: 0 /100 WBCS
P AXIS: 47 DEGREES
PLATELET # BLD AUTO: 292 THOUSANDS/UL (ref 149–390)
PMV BLD AUTO: 10.5 FL (ref 8.9–12.7)
POTASSIUM SERPL-SCNC: 4.6 MMOL/L (ref 3.5–5.3)
PR INTERVAL: 136 MS
PROT SERPL-MCNC: 7 G/DL (ref 6.4–8.4)
QRS AXIS: 70 DEGREES
QRSD INTERVAL: 86 MS
QT INTERVAL: 442 MS
QTC INTERVAL: 442 MS
RBC # BLD AUTO: 4.32 MILLION/UL (ref 3.81–5.12)
SODIUM SERPL-SCNC: 140 MMOL/L (ref 135–147)
T WAVE AXIS: 89 DEGREES
VENTRICULAR RATE: 60 BPM
WBC # BLD AUTO: 4.91 THOUSAND/UL (ref 4.31–10.16)

## 2023-09-18 PROCEDURE — 80053 COMPREHEN METABOLIC PANEL: CPT

## 2023-09-18 PROCEDURE — 93010 ELECTROCARDIOGRAM REPORT: CPT | Performed by: INTERNAL MEDICINE

## 2023-09-18 PROCEDURE — 99285 EMERGENCY DEPT VISIT HI MDM: CPT

## 2023-09-18 PROCEDURE — G1004 CDSM NDSC: HCPCS

## 2023-09-18 PROCEDURE — 83735 ASSAY OF MAGNESIUM: CPT

## 2023-09-18 PROCEDURE — 96366 THER/PROPH/DIAG IV INF ADDON: CPT

## 2023-09-18 PROCEDURE — 93005 ELECTROCARDIOGRAM TRACING: CPT

## 2023-09-18 PROCEDURE — 36415 COLL VENOUS BLD VENIPUNCTURE: CPT

## 2023-09-18 PROCEDURE — 70496 CT ANGIOGRAPHY HEAD: CPT

## 2023-09-18 PROCEDURE — 83880 ASSAY OF NATRIURETIC PEPTIDE: CPT

## 2023-09-18 PROCEDURE — 70498 CT ANGIOGRAPHY NECK: CPT

## 2023-09-18 PROCEDURE — 96365 THER/PROPH/DIAG IV INF INIT: CPT

## 2023-09-18 PROCEDURE — 84484 ASSAY OF TROPONIN QUANT: CPT

## 2023-09-18 PROCEDURE — 96375 TX/PRO/DX INJ NEW DRUG ADDON: CPT

## 2023-09-18 PROCEDURE — 85025 COMPLETE CBC W/AUTO DIFF WBC: CPT

## 2023-09-18 RX ORDER — DIPHENHYDRAMINE HYDROCHLORIDE 50 MG/ML
25 INJECTION INTRAMUSCULAR; INTRAVENOUS ONCE
Status: COMPLETED | OUTPATIENT
Start: 2023-09-18 | End: 2023-09-18

## 2023-09-18 RX ORDER — KETOROLAC TROMETHAMINE 30 MG/ML
30 INJECTION, SOLUTION INTRAMUSCULAR; INTRAVENOUS ONCE
Status: COMPLETED | OUTPATIENT
Start: 2023-09-18 | End: 2023-09-18

## 2023-09-18 RX ORDER — METOCLOPRAMIDE 10 MG/1
10 TABLET ORAL EVERY 8 HOURS PRN
Qty: 21 TABLET | Refills: 0 | Status: SHIPPED | OUTPATIENT
Start: 2023-09-18 | End: 2023-09-25

## 2023-09-18 RX ORDER — METOCLOPRAMIDE HYDROCHLORIDE 5 MG/ML
10 INJECTION INTRAMUSCULAR; INTRAVENOUS ONCE
Status: COMPLETED | OUTPATIENT
Start: 2023-09-18 | End: 2023-09-18

## 2023-09-18 RX ORDER — MAGNESIUM SULFATE HEPTAHYDRATE 40 MG/ML
2 INJECTION, SOLUTION INTRAVENOUS ONCE
Status: COMPLETED | OUTPATIENT
Start: 2023-09-18 | End: 2023-09-18

## 2023-09-18 RX ADMIN — SODIUM CHLORIDE 500 ML: 0.9 INJECTION, SOLUTION INTRAVENOUS at 10:32

## 2023-09-18 RX ADMIN — MAGNESIUM SULFATE HEPTAHYDRATE 2 G: 40 INJECTION, SOLUTION INTRAVENOUS at 10:41

## 2023-09-18 RX ADMIN — IOHEXOL 100 ML: 350 INJECTION, SOLUTION INTRAVENOUS at 11:24

## 2023-09-18 RX ADMIN — DIPHENHYDRAMINE HYDROCHLORIDE 25 MG: 50 INJECTION, SOLUTION INTRAMUSCULAR; INTRAVENOUS at 10:32

## 2023-09-18 RX ADMIN — METOCLOPRAMIDE HYDROCHLORIDE 10 MG: 5 INJECTION INTRAMUSCULAR; INTRAVENOUS at 10:35

## 2023-09-18 RX ADMIN — KETOROLAC TROMETHAMINE 30 MG: 30 INJECTION, SOLUTION INTRAMUSCULAR; INTRAVENOUS at 10:35

## 2023-09-18 NOTE — ED PROVIDER NOTES
History  Chief Complaint   Patient presents with   • Dizziness     Pt reports being seen here last week for same chief complaint. Discharged Thursday. Reports dizziness and HA have now returned. Patient is a 25-year-old female who presents to the emergency room for a headache. Patient states headache started yesterday and describes it as a gnawing pain. Patient reports pain has been constant since yesterday. Associated with nausea, blurry vision, tinnitus in her right ear, neck pain, numbness and tingling down her right upper extremity and right lower extremity. Associated with dizziness that she describes as lightheadedness. Denies fever, chills, speech difficulty, loss of sensation, weakness, tinnitus in her left ear, hearing loss, chest pain, palpitations, shortness of breath, cough, abdominal pain, vomiting, incontinence, numbness/tingling down her left upper and lower extremities. Patient has taken Tylenol without symptomatic relief. Patient reports she "feels off". Of note, patient was seen in the emergency room 9/13 for similar chief complaint. Patient was admitted to the hospital and discharged on 9/14. She reports her symptoms feel similar to that episode but worsened since yesterday. She was given meclizine and Valium in the emergency room 9/13 for symptomatic relief, however patient reported it did not help symptoms. CTA head and neck with and without contrast done in the emergency room showed moderate stenosis origin left vertebral artery. MRI was done inpatient. Neurology saw patient while she was inpatient, and recommended a non-urgent 1-2 month follow up outpatient. Patient was given aspirin and statin on discharge for elevated LDL. She was also discharged with meclizine for vestibular therapy. Prior to Admission Medications   Prescriptions Last Dose Informant Patient Reported? Taking?    FLUoxetine (PROzac) 20 MG tablet   No No   Sig: TAKE 3 TABLETS BY MOUTH EVERY DAY   aspirin 81 mg chewable tablet   No No   Sig: Chew 1 tablet (81 mg total) daily Do not start before September 15, 2023.    atorvastatin (LIPITOR) 40 mg tablet   No No   Sig: Take 1 tablet (40 mg total) by mouth daily with dinner   cyclobenzaprine (FLEXERIL) 5 mg tablet   No No   Sig: Take 1 tablet (5 mg total) by mouth 3 (three) times a day as needed for muscle spasms   Patient not taking: Reported on 9/13/2023   ibuprofen (MOTRIN) 600 mg tablet   No No   Sig: Take 1 tablet (600 mg total) by mouth every 6 (six) hours as needed for mild pain   itraconazole (SPORANOX) 100 mg capsule   Yes No   Sig: Take 100 mg by mouth 2 (two) times a day   Patient not taking: Reported on 9/13/2023   meclizine (ANTIVERT) 25 mg tablet   No No   Sig: Take 1 tablet (25 mg total) by mouth every 8 (eight) hours as needed for dizziness   ondansetron (Zofran ODT) 4 mg disintegrating tablet   No No   Sig: Take 1 tablet (4 mg total) by mouth every 6 (six) hours as needed for nausea or vomiting      Facility-Administered Medications: None       Past Medical History:   Diagnosis Date   • Anxiety 06/20/2016    Last assessed   • Chronic infective otitis externa of left ear 09/18/2017    Last assessed   • GERD (gastroesophageal reflux disease)    • Right lumbar radiculopathy 05/05/2016    Last assessed   • Sciatic leg pain        Past Surgical History:   Procedure Laterality Date   • APPENDECTOMY     • CHOLECYSTECTOMY     • KNEE ARTHROSCOPY      torn menicus   • CO LAMNOTMY INCL W/DCMPRSN NRV ROOT 1 INTRSPC LUMBR Right 7/6/2016    Procedure: L3/4 FORAMINOTOMY;  Surgeon: Dominic Grey MD;  Location: [facility];  Service: Neurosurgery   • TONSILLECTOMY         Family History   Problem Relation Age of Onset   • No Known Problems Mother    • Alcohol abuse Father    • Diabetes Paternal Grandmother    • Hypertension Paternal Grandmother    • Cancer Family    • Breast cancer Maternal Aunt      I have reviewed and agree with the history as documented. E-Cigarette/Vaping   • E-Cigarette Use Never User      E-Cigarette/Vaping Substances   • Nicotine No    • THC No    • CBD No    • Flavoring No    • Other No    • Unknown No      Social History     Tobacco Use   • Smoking status: Never   • Smokeless tobacco: Never   • Tobacco comments:     Per allscripts - former smoker   Vaping Use   • Vaping Use: Never used   Substance Use Topics   • Alcohol use: Yes   • Drug use: No       Review of Systems   Constitutional: Negative for chills, diaphoresis, fatigue and fever. HENT: Positive for tinnitus (right ear). Negative for congestion, ear pain, hearing loss, sinus pressure, sore throat, trouble swallowing and voice change. Eyes: Positive for visual disturbance (blurry vision). Negative for photophobia, pain and redness. Respiratory: Negative for cough and shortness of breath. Cardiovascular: Negative for chest pain and palpitations. Gastrointestinal: Positive for nausea. Negative for abdominal distention, abdominal pain and vomiting. Genitourinary: Negative for flank pain. Musculoskeletal: Positive for neck pain. Negative for arthralgias and back pain. Skin: Negative for color change. Neurological: Positive for dizziness (describes as lightheadedness), light-headedness, numbness (numbness/tingling right upper extremity and right lower extremity) and headaches. Negative for tremors, seizures, syncope, facial asymmetry, speech difficulty and weakness. Psychiatric/Behavioral: Negative for confusion. Physical Exam  Physical Exam  Vitals and nursing note reviewed. Constitutional:       General: She is not in acute distress. Appearance: She is well-developed. HENT:      Head: Normocephalic and atraumatic.       Right Ear: Tympanic membrane, ear canal and external ear normal.      Left Ear: Tympanic membrane, ear canal and external ear normal.      Mouth/Throat:      Mouth: Mucous membranes are moist.   Eyes:      General: No visual field deficit. Extraocular Movements: Extraocular movements intact. Right eye: Normal extraocular motion and no nystagmus. Left eye: Normal extraocular motion and no nystagmus. Conjunctiva/sclera: Conjunctivae normal.      Pupils: Pupils are equal, round, and reactive to light. Neck:      Vascular: No carotid bruit. Cardiovascular:      Rate and Rhythm: Normal rate and regular rhythm. Pulses: Normal pulses. Heart sounds: Normal heart sounds. No murmur heard. Pulmonary:      Effort: Pulmonary effort is normal. No respiratory distress. Breath sounds: Normal breath sounds. Abdominal:      Palpations: Abdomen is soft. Tenderness: There is no abdominal tenderness. There is no right CVA tenderness or left CVA tenderness. Musculoskeletal:         General: No swelling. Cervical back: Neck supple. Lymphadenopathy:      Cervical: No cervical adenopathy. Skin:     General: Skin is warm and dry. Capillary Refill: Capillary refill takes less than 2 seconds. Neurological:      General: No focal deficit present. Mental Status: She is alert and oriented to person, place, and time. GCS: GCS eye subscore is 4. GCS verbal subscore is 5. GCS motor subscore is 6. Cranial Nerves: Cranial nerves 2-12 are intact. No cranial nerve deficit, dysarthria or facial asymmetry. Sensory: No sensory deficit. Motor: No weakness. Coordination: Romberg sign negative.  Coordination normal. Finger-Nose-Finger Test normal.      Gait: Gait normal.   Psychiatric:         Mood and Affect: Mood normal.         Vital Signs  ED Triage Vitals [09/18/23 0917]   Temperature Pulse Respirations Blood Pressure SpO2   98 °F (36.7 °C) 70 18 141/66 100 %      Temp src Heart Rate Source Patient Position - Orthostatic VS BP Location FiO2 (%)   -- Monitor Sitting Left arm --      Pain Score       5           Vitals:    09/18/23 0917 09/18/23 1130   BP: 141/66 162/69   Pulse: 70 62 Patient Position - Orthostatic VS: Sitting Lying         Visual Acuity  Visual Acuity    Flowsheet Row Most Recent Value   L Pupil Size (mm) 4   R Pupil Size (mm) 4          ED Medications  Medications   metoclopramide (REGLAN) injection 10 mg (10 mg Intravenous Given 9/18/23 1035)   sodium chloride 0.9 % bolus 500 mL (0 mL Intravenous Stopped 9/18/23 1200)   magnesium sulfate 2 g/50 mL IVPB (premix) 2 g (0 g Intravenous Stopped 9/18/23 1246)   diphenhydrAMINE (BENADRYL) injection 25 mg (25 mg Intravenous Given 9/18/23 1032)   ketorolac (TORADOL) injection 30 mg (30 mg Intravenous Given 9/18/23 1035)   iohexol (OMNIPAQUE) 350 MG/ML injection (MULTI-DOSE) 100 mL (100 mL Intravenous Given 9/18/23 1124)       Diagnostic Studies  Results Reviewed     Procedure Component Value Units Date/Time    Comprehensive metabolic panel [[id number]]  (Abnormal) Collected: 09/18/23 1036    Lab Status: Final result Specimen: Blood from Arm, Left Updated: 09/18/23 1109     Sodium 140 mmol/L      Potassium 4.6 mmol/L      Chloride 106 mmol/L      CO2 29 mmol/L      ANION GAP 5 mmol/L      BUN 18 mg/dL      Creatinine 0.85 mg/dL      Glucose 84 mg/dL      Calcium 9.9 mg/dL      AST 19 U/L      ALT 18 U/L      Alkaline Phosphatase 113 U/L      Total Protein 7.0 g/dL      Albumin 4.2 g/dL      Total Bilirubin 0.44 mg/dL      eGFR 74 ml/min/1.73sq m     Narrative:      [place] guidelines for Chronic Kidney Disease (CKD):   •  Stage 1 with normal or high GFR (GFR > 90 mL/min/1.73 square meters)  •  Stage 2 Mild CKD (GFR = 60-89 mL/min/1.73 square meters)  •  Stage 3A Moderate CKD (GFR = 45-59 mL/min/1.73 square meters)  •  Stage 3B Moderate CKD (GFR = 30-44 mL/min/1.73 square meters)  •  Stage 4 Severe CKD (GFR = 15-29 mL/min/1.73 square meters)  •  Stage 5 End Stage CKD (GFR <15 mL/min/1.73 square meters)  Note: GFR calculation is accurate only with a steady state creatinine    Magnesium [[id number]]  (Normal) Collected: 09/18/23 1036    Lab Status: Final result Specimen: Blood from Arm, Left Updated: 09/18/23 1109     Magnesium 2.1 mg/dL     HS Troponin 0hr (reflex protocol) [[id number]]  (Normal) Collected: 09/18/23 1036    Lab Status: Final result Specimen: Blood from Arm, Left Updated: 09/18/23 1106     hs TnI 0hr <2 ng/L     B-Type Natriuretic Peptide(BNP) [[id number]]  (Normal) Collected: 09/18/23 1036    Lab Status: Final result Specimen: Blood from Arm, Left Updated: 09/18/23 1105     BNP 29 pg/mL     CBC and differential [[id number]] Collected: 09/18/23 1036    Lab Status: Final result Specimen: Blood from Arm, Left Updated: 09/18/23 1046     WBC 4.91 Thousand/uL      RBC 4.32 Million/uL      Hemoglobin 13.1 g/dL      Hematocrit 39.8 %      MCV 92 fL      MCH 30.3 pg      MCHC 32.9 g/dL      RDW 12.7 %      MPV 10.5 fL      Platelets 891 Thousands/uL      nRBC 0 /100 WBCs      Neutrophils Relative 60 %      Immat GRANS % 0 %      Lymphocytes Relative 27 %      Monocytes Relative 10 %      Eosinophils Relative 2 %      Basophils Relative 1 %      Neutrophils Absolute 3.00 Thousands/µL      Immature Grans Absolute 0.00 Thousand/uL      Lymphocytes Absolute 1.30 Thousands/µL      Monocytes Absolute 0.50 Thousand/µL      Eosinophils Absolute 0.08 Thousand/µL      Basophils Absolute 0.03 Thousands/µL                  CTA head and neck with and without contrast   Final Result by Interface, Radiology Results In (09/18 1210)      CT head: No acute intracranial abnormalities. CTA of the head: No significant stenosis or aneurysm involving the [place]. CTA of the neck: Stable mild stenosis at the origin of the left vertebral artery due to soft plaque extending into the left subclavian artery. Otherwise no significant stenosis, dissection or aneurysm involving the carotid or vertebral arteries.                   Workstation performed: [id number]                    Procedures  ECG 12 Lead Documentation Only    Date/Time: 9/18/2023 10:53 AM    Performed by: Dennise Diallo PA-C  Authorized by: Dennise Diallo PA-C    Indications / Diagnosis:  Dizziness  ECG reviewed by me, the ED Provider: yes    Patient location:  ED  Previous ECG:     Previous ECG:  Compared to current    Comparison ECG info:  Slight t wave inversion noted in V3 compared to previous    Similarity:  Changes noted  Interpretation:     Interpretation: normal    Rate:     ECG rate:  60  Rhythm:     Rhythm: sinus rhythm    ST segments:     ST segments:  Normal  T waves:     T waves: normal    Comments:      No arrhythmias or ischemic changes             ED Course  ED Course as of 09/18/23 1252   Mon Sep 18, 2023   1154 On re-evaluation patient reports feeling better              HEART Risk Score    Flowsheet Row Most Recent Value   Heart Score Risk Calculator    History 0 Filed at: 09/18/2023 1049   ECG 1 Filed at: 09/18/2023 1049   Age 1 Filed at: 09/18/2023 1049   Risk Factors 2 Filed at: 09/18/2023 1049   Troponin 0 Filed at: 09/18/2023 1049   HEART Score 4 Filed at: 09/18/2023 1049                        SBIRT 22yo+    Flowsheet Row Most Recent Value   Initial Alcohol Screen: US AUDIT-C     1. How often do you have a drink containing alcohol? 0 Filed at: 09/18/2023 1009   2. How many drinks containing alcohol do you have on a typical day you are drinking? 0 Filed at: 09/18/2023 1009   3b. FEMALE Any Age, or MALE 65+: How often do you have 4 or more drinks on one occassion? 0 Filed at: 09/18/2023 1009   Audit-C Score 0 Filed at: 09/18/2023 1009   ABIGAIL: How many times in the past year have you. .. Used an illegal drug or used a prescription medication for non-medical reasons? Never Filed at: 09/18/2023 1009                    Medical Decision Making  Patient is a 25-year-old female who presents today for headache. Patient was admitted to the hospital on 9/13 and discharged on 9/14 for similar complaint.   CTA head and neck with and without contrast that was completed in the emergency room showed moderate stenosis origin left vertebral artery. Patient was given meclizine and valium during her ED visit 9/13 without symptom relief. MRI was completed and patient. Neurology evaluated patient during her stay and have schedueld non-urgent follow up. Patient is taking aspirin, Lipitor for elevated LDLs noted during her stay. Meclizine prescribed for her complaint of dizziness. Today, the patient describes the headache as gnawing and similar to her previous episode. Her headache today is associated with nausea, blurry vision, tinnitus in her right ear, neck pain, numbness and tingling down her right extremities. She also complains of lightheadedness. Lab work includes magnesium, BNP, CMP, troponin, CBC with differential. Patient's lab work in the emergency room was unremarkable. EKG showed no arrhythmias or ischemic changes. CT head and neck with and without contrast was performed the ED today. CTA of neck showed, "Stable mild stenosis at the origin of the left vertebral artery due to soft plaque extending into the left subclavian artery". Patient was given a migraine cocktail in the emergency room today. Patient reported symptomatic relief after receiving medication. Patient has scheduled neurology follow-up for her symptoms. I prescribed patient Reglan as needed at home. Patient advised to take Benadryl, ibuprofen, tylenol as needed OTC for symptoms. Patient to follow-up with PCP and neurology. Patient to return to the emergency room for any worsening symptoms. Patient understands and agrees with treatment plan. Dizziness: acute illness or injury  Headache: acute illness or injury  Amount and/or Complexity of Data Reviewed  Labs: ordered. Decision-making details documented in ED Course. Radiology: ordered. Decision-making details documented in ED Course.   ECG/medicine tests:  Decision-making details documented in ED Course. Risk  OTC drugs. Prescription drug management. Disposition  Final diagnoses:   Dizziness   Headache     Time reflects when diagnosis was documented in both MDM as applicable and the Disposition within this note     Time User Action Codes Description Comment    9/18/2023 12:27 PM Edmond Six Add [R42] Dizziness     9/18/2023 12:27 PM Edmond Six Add [R51.9] Headache       ED Disposition     ED Disposition   Discharge    Condition   Stable    Date/Time   Mon Sep 18, 2023 12:27 PM    Comment   Linda Celeste discharge to home/self care.                Follow-up Information     Follow up With Specialties Details Why Contact Info Additional Information    Paul Gomez, [address] [facility], Nurse Practitioner Schedule an appointment as soon as possible for a visit   [address] [address]       [address] [facility] Emergency Medicine Go to  If symptoms worsen [address] [facility] Emergency Department, [address] [facility], [place], [place], [facility]  [facility]   [address] [place] [id number]  [address] [place] [address], [facility] [address], [place], [place], [address] [facility]          Discharge Medication List as of 9/18/2023 12:34 PM      START taking these medications    Details   metoclopramide (Reglan) 10 mg tablet Take 1 tablet (10 mg total) by mouth every 8 (eight) hours as needed (as needed for headache, nausea/vomiting) for up to 7 days, Starting Mon 9/18/2023, Until Mon 9/25/2023 at 2359, Normal         CONTINUE these medications which have NOT CHANGED    Details   aspirin 81 mg chewable tablet Chew 1 tablet (81 mg total) daily Do not start before September 15, 2023., Starting Fri 9/15/2023, Normal      atorvastatin (LIPITOR) 40 mg tablet Take 1 tablet (40 mg total) by mouth daily with dinner, Starting Thu 9/14/2023, Normal      cyclobenzaprine (FLEXERIL) 5 mg tablet Take 1 tablet (5 mg total) by mouth 3 (three) times a day as needed for muscle spasms, Starting Mon 2/20/2023, Normal      FLUoxetine (PROzac) 20 MG tablet TAKE 3 TABLETS BY MOUTH EVERY DAY, Normal      ibuprofen (MOTRIN) 600 mg tablet Take 1 tablet (600 mg total) by mouth every 6 (six) hours as needed for mild pain, Starting Tue 1/24/2023, Normal      itraconazole (SPORANOX) 100 mg capsule Take 100 mg by mouth 2 (two) times a day, Starting Fri 2/10/2023, Historical Med      meclizine (ANTIVERT) 25 mg tablet Take 1 tablet (25 mg total) by mouth every 8 (eight) hours as needed for dizziness, Starting Thu 9/14/2023, Normal      ondansetron (Zofran ODT) 4 mg disintegrating tablet Take 1 tablet (4 mg total) by mouth every 6 (six) hours as needed for nausea or vomiting, Starting Mon 2/20/2023, Normal             No discharge procedures on file.     PDMP Review       Value Time User    PDMP Reviewed  Yes 2/16/2023  8:25 AM AMAURI Worley          ED Provider  Electronically Signed by           Diogenes Mathew PA-C  09/18/23 3362

## 2023-09-18 NOTE — DISCHARGE INSTRUCTIONS
You have been seen and evaluated the emergency room. I prescribed medication to help your headache, nausea and vomiting. Take medication as needed. You can take over-the-counter Benadryl as needed. You can take over-the-counter Tylenol or ibuprofen as needed. Please follow-up with your PCP. Please follow-up with neurology.

## 2023-09-18 NOTE — Clinical Note
Iron Irizarry was seen and treated in our emergency department on 9/18/2023. Diagnosis:     Cornelio Russell  may return to work on return date. She may return on this date: 09/20/2023         If you have any questions or concerns, please don't hesitate to call.       Kayleigh Barber PA-C    ______________________________           _______________          _______________  Hospital Representative                              Date                                Time

## 2023-10-02 ENCOUNTER — OFFICE VISIT (OUTPATIENT)
Dept: FAMILY MEDICINE CLINIC | Facility: CLINIC | Age: 58
End: 2023-10-02
Payer: COMMERCIAL

## 2023-10-02 ENCOUNTER — APPOINTMENT (OUTPATIENT)
Dept: LAB | Facility: HOSPITAL | Age: 58
End: 2023-10-02
Payer: COMMERCIAL

## 2023-10-02 VITALS
HEIGHT: 61 IN | BODY MASS INDEX: 26.81 KG/M2 | DIASTOLIC BLOOD PRESSURE: 80 MMHG | SYSTOLIC BLOOD PRESSURE: 130 MMHG | OXYGEN SATURATION: 98 % | RESPIRATION RATE: 16 BRPM | WEIGHT: 142 LBS | HEART RATE: 70 BPM

## 2023-10-02 DIAGNOSIS — R11.0 NAUSEA: ICD-10-CM

## 2023-10-02 DIAGNOSIS — R42 VERTIGO: ICD-10-CM

## 2023-10-02 DIAGNOSIS — R42 DIZZINESS: ICD-10-CM

## 2023-10-02 DIAGNOSIS — M54.12 CERVICAL RADICULOPATHY: ICD-10-CM

## 2023-10-02 DIAGNOSIS — E78.2 HYPERLIPIDEMIA, MIXED: Primary | ICD-10-CM

## 2023-10-02 DIAGNOSIS — R42 POSITIONAL LIGHTHEADEDNESS: ICD-10-CM

## 2023-10-02 DIAGNOSIS — H69.91 DYSFUNCTION OF RIGHT EUSTACHIAN TUBE: ICD-10-CM

## 2023-10-02 LAB
B BURGDOR IGG+IGM SER QL IA: NEGATIVE
CRP SERPL QL: 4.4 MG/L
FERRITIN SERPL-MCNC: 138 NG/ML (ref 11–307)
IRON SATN MFR SERPL: 34 % (ref 15–50)
IRON SERPL-MCNC: 91 UG/DL (ref 50–212)
TIBC SERPL-MCNC: 269 UG/DL (ref 250–450)
UIBC SERPL-MCNC: 178 UG/DL (ref 155–355)

## 2023-10-02 PROCEDURE — 83550 IRON BINDING TEST: CPT

## 2023-10-02 PROCEDURE — 83540 ASSAY OF IRON: CPT

## 2023-10-02 PROCEDURE — 36415 COLL VENOUS BLD VENIPUNCTURE: CPT

## 2023-10-02 PROCEDURE — 86140 C-REACTIVE PROTEIN: CPT

## 2023-10-02 PROCEDURE — 86618 LYME DISEASE ANTIBODY: CPT

## 2023-10-02 PROCEDURE — 99214 OFFICE O/P EST MOD 30 MIN: CPT

## 2023-10-02 PROCEDURE — 82728 ASSAY OF FERRITIN: CPT

## 2023-10-02 RX ORDER — CYCLOBENZAPRINE HCL 5 MG
5 TABLET ORAL
Qty: 30 TABLET | Refills: 0 | Status: SHIPPED | OUTPATIENT
Start: 2023-10-02

## 2023-10-02 RX ORDER — PREDNISONE 20 MG/1
20 TABLET ORAL DAILY
Qty: 5 TABLET | Refills: 0 | Status: SHIPPED | OUTPATIENT
Start: 2023-10-02

## 2023-10-02 RX ORDER — MECLIZINE HYDROCHLORIDE 25 MG/1
25 TABLET ORAL EVERY 8 HOURS PRN
Qty: 90 TABLET | Refills: 0 | Status: SHIPPED | OUTPATIENT
Start: 2023-10-02

## 2023-10-02 NOTE — ASSESSMENT & PLAN NOTE
This episode of dizziness could be related to your chronic neck pain. Will restart Flexeril 5 mg daily at bedtime next 7 days. Please obtain studies and blood work as discussed.

## 2023-10-02 NOTE — PROGRESS NOTES
Assessment/Plan:         Problem List Items Addressed This Visit        Nervous and Auditory    Cervical radiculopathy     This episode of dizziness could be related to your chronic neck pain. Will restart Flexeril 5 mg daily at bedtime next 7 days. Please obtain studies and blood work as discussed. Relevant Medications    cyclobenzaprine (FLEXERIL) 5 mg tablet       Other    Hyperlipidemia, mixed - Primary    Vertigo     Continue on meclizine 25 mg every 6 hours as needed for dizziness. No driving when you take this medicine. Also schedule with physical therapy for vestibular therapy         Relevant Medications    meclizine (ANTIVERT) 25 mg tablet    Other Relevant Orders    Ambulatory Referral to Physical Therapy   Other Visit Diagnoses     Nausea        Relevant Orders    VAS carotid complete study    Dysfunction of right eustachian tube        Dizziness could be related to eustachian dysfunction. Start prednisone 20 mg daily for 5 days. Reviewed allergies, patient denies anaphylaxis    Relevant Medications    predniSONE 20 mg tablet    Dizziness        Please obtain blood work and studies as discussed during the visit. Relevant Orders    Lyme Total AB W Reflex to IGM/IGG    Iron Panel (Includes Ferritin, Iron Sat%, Iron, and TIBC)    C-reactive protein    VAS carotid complete study    Positional lightheadedness        Relevant Medications    meclizine (ANTIVERT) 25 mg tablet    predniSONE 20 mg tablet    cyclobenzaprine (FLEXERIL) 5 mg tablet    Other Relevant Orders    Lyme Total AB W Reflex to IGM/IGG    Iron Panel (Includes Ferritin, Iron Sat%, Iron, and TIBC)    C-reactive protein    VAS carotid complete study    Ambulatory Referral to Physical Therapy            Subjective:      Patient ID: Laron Antonio is a 59 y.o. female. Patient presents to the office complaining of on and off dizziness. She was recently seen to emergency room for the same complaint.   Recent studies and blood work reviewed. Dizziness  This is a new problem. The current episode started 1 to 4 weeks ago. The problem occurs intermittently. Associated symptoms include arthralgias, nausea, neck pain, vertigo and a visual change. Pertinent negatives include no abdominal pain, anorexia, change in bowel habit, chest pain, chills, congestion, coughing, diaphoresis, fatigue, fever, headaches, joint swelling, myalgias, numbness, rash, sore throat, swollen glands, urinary symptoms, vomiting or weakness. The symptoms are aggravated by standing. She has tried relaxation (meclazine ) for the symptoms. The treatment provided moderate relief. The following portions of the patient's history were reviewed and updated as appropriate:   Past Medical History:  She has a past medical history of Anxiety (06/20/2016), Chronic infective otitis externa of left ear (09/18/2017), GERD (gastroesophageal reflux disease), Right lumbar radiculopathy (05/05/2016), and Sciatic leg pain. ,  _______________________________________________________________________  Medical Problems:  does not have any pertinent problems on file.,  _______________________________________________________________________  Past Surgical History:   has a past surgical history that includes Appendectomy; Cholecystectomy; Tonsillectomy; Knee arthroscopy; and pr lamnotmy incl w/dcmprsn nrv root 1 intrspc lumbr (Right, 7/6/2016). ,  _______________________________________________________________________  Family History:  family history includes Alcohol abuse in her father; Breast cancer in her maternal aunt; Cancer in her family; Diabetes in her paternal grandmother; Hypertension in her paternal grandmother; No Known Problems in her mother.,  _______________________________________________________________________  Social History:   reports that she has never smoked. She has never used smokeless tobacco. She reports current alcohol use.  She reports that she does not use drugs.,  _______________________________________________________________________  Allergies:  is allergic to prednisone and erythromycin. .  _______________________________________________________________________  Current Outpatient Medications   Medication Sig Dispense Refill   • cyclobenzaprine (FLEXERIL) 5 mg tablet Take 1 tablet (5 mg total) by mouth daily at bedtime 30 tablet 0   • meclizine (ANTIVERT) 25 mg tablet Take 1 tablet (25 mg total) by mouth every 8 (eight) hours as needed for dizziness 90 tablet 0   • predniSONE 20 mg tablet Take 1 tablet (20 mg total) by mouth daily 5 tablet 0   • aspirin 81 mg chewable tablet Chew 1 tablet (81 mg total) daily Do not start before September 15, 2023. 30 tablet 0   • atorvastatin (LIPITOR) 40 mg tablet Take 1 tablet (40 mg total) by mouth daily with dinner 30 tablet 0   • FLUoxetine (PROzac) 20 MG tablet TAKE 3 TABLETS BY MOUTH EVERY  tablet 0     No current facility-administered medications for this visit.     _______________________________________________________________________  Review of Systems   Constitutional: Negative for chills, diaphoresis, fatigue and fever. HENT: Negative for congestion and sore throat. Respiratory: Negative for cough. Cardiovascular: Negative for chest pain. Gastrointestinal: Positive for nausea. Negative for abdominal pain, anorexia, change in bowel habit and vomiting. Musculoskeletal: Positive for arthralgias and neck pain. Negative for joint swelling and myalgias. Skin: Negative for rash. Neurological: Positive for dizziness and vertigo. Negative for weakness, numbness and headaches. Objective:  Vitals:    10/02/23 0911 10/02/23 0912 10/02/23 0913 10/02/23 0946   BP: 130/70 140/90 130/100 130/80   BP Location:  Left arm Left arm Left arm   Patient Position: Supine Sitting Standing    Pulse: 66 68 70    Resp:       SpO2:       Weight:       Height:         Body mass index is 26.83 kg/m².      Physical Exam  Vitals and nursing note reviewed. Constitutional:       General: She is not in acute distress. Appearance: Normal appearance. She is not ill-appearing. HENT:      Right Ear: Tympanic membrane, ear canal and external ear normal.      Left Ear: Tympanic membrane, ear canal and external ear normal.   Eyes:      Pupils: Pupils are equal, round, and reactive to light. Cardiovascular:      Rate and Rhythm: Normal rate and regular rhythm. Heart sounds: Normal heart sounds. Pulmonary:      Effort: Pulmonary effort is normal.      Breath sounds: Normal breath sounds. Musculoskeletal:      Cervical back: Normal range of motion. Skin:     General: Skin is warm and dry. Neurological:      Mental Status: She is alert and oriented to person, place, and time. Cranial Nerves: No cranial nerve deficit. Sensory: No sensory deficit. Motor: No weakness. Coordination: Coordination normal.      Gait: Gait normal.   Psychiatric:         Mood and Affect: Mood normal.         Behavior: Behavior normal.         Thought Content: Thought content normal.         Judgment: Judgment normal.               Portions of the above record have been created with voice recognition software. Occasional wrong word or "sound alike" substitution may have occurred due to the inherent limitations of voice recognition software. Read the chart carefully and recognize, using context, where substitution may have occurred.

## 2023-10-02 NOTE — LETTER
October 2, 2023     Patient: Cindy Reeder  YOB: 1965  Date of Visit: 10/2/2023      To Whom it May Concern:    Jeny Katz is under my professional care. Timoteo Bundy was seen in my office on 10/2/2023. Timoteo Bundy may return to work on 10/9/23 . If you have any questions or concerns, please don't hesitate to call.          Sincerely,          AMAURI Pena        CC: No Recipients

## 2023-10-02 NOTE — ASSESSMENT & PLAN NOTE
Continue on meclizine 25 mg every 6 hours as needed for dizziness. No driving when you take this medicine.   Also schedule with physical therapy for vestibular therapy

## 2023-10-06 ENCOUNTER — HOSPITAL ENCOUNTER (OUTPATIENT)
Dept: NON INVASIVE DIAGNOSTICS | Facility: CLINIC | Age: 58
Discharge: HOME | End: 2023-10-06
Payer: COMMERCIAL

## 2023-10-06 DIAGNOSIS — R42 POSITIONAL LIGHTHEADEDNESS: ICD-10-CM

## 2023-10-06 DIAGNOSIS — R11.0 NAUSEA: ICD-10-CM

## 2023-10-06 DIAGNOSIS — R42 DIZZINESS: ICD-10-CM

## 2023-10-06 PROCEDURE — 93880 EXTRACRANIAL BILAT STUDY: CPT

## 2023-10-06 PROCEDURE — 93880 EXTRACRANIAL BILAT STUDY: CPT | Performed by: SURGERY

## 2023-10-09 ENCOUNTER — TELEPHONE (OUTPATIENT)
Dept: FAMILY MEDICINE CLINIC | Facility: CLINIC | Age: 58
End: 2023-10-09

## 2023-10-09 NOTE — TELEPHONE ENCOUNTER
----- Message from [address] [place] [address] sent at 10/9/2023 11:01 AM EDT -----  Carotid studies negative.  TY

## 2023-10-20 ENCOUNTER — OFFICE VISIT (OUTPATIENT)
Dept: FAMILY MEDICINE CLINIC | Facility: CLINIC | Age: 58
End: 2023-10-20
Payer: COMMERCIAL

## 2023-10-20 VITALS
WEIGHT: 142.2 LBS | BODY MASS INDEX: 26.85 KG/M2 | SYSTOLIC BLOOD PRESSURE: 130 MMHG | TEMPERATURE: 97.8 F | RESPIRATION RATE: 12 BRPM | DIASTOLIC BLOOD PRESSURE: 80 MMHG | HEIGHT: 61 IN | HEART RATE: 76 BPM | OXYGEN SATURATION: 98 %

## 2023-10-20 DIAGNOSIS — E78.2 HYPERLIPIDEMIA, MIXED: ICD-10-CM

## 2023-10-20 DIAGNOSIS — Z13.6 SCREENING FOR CARDIOVASCULAR CONDITION: ICD-10-CM

## 2023-10-20 DIAGNOSIS — I65.02 STENOSIS OF LEFT VERTEBRAL ARTERY: ICD-10-CM

## 2023-10-20 DIAGNOSIS — Z11.4 SCREENING FOR HIV (HUMAN IMMUNODEFICIENCY VIRUS): ICD-10-CM

## 2023-10-20 DIAGNOSIS — Z23 ENCOUNTER FOR IMMUNIZATION: Primary | ICD-10-CM

## 2023-10-20 DIAGNOSIS — Z12.31 SCREENING MAMMOGRAM, ENCOUNTER FOR: ICD-10-CM

## 2023-10-20 PROCEDURE — 99214 OFFICE O/P EST MOD 30 MIN: CPT

## 2023-10-20 PROCEDURE — 90471 IMMUNIZATION ADMIN: CPT

## 2023-10-20 PROCEDURE — 90686 IIV4 VACC NO PRSV 0.5 ML IM: CPT

## 2023-10-20 RX ORDER — ATORVASTATIN CALCIUM 40 MG/1
40 TABLET, FILM COATED ORAL
Qty: 90 TABLET | Refills: 3 | Status: SHIPPED | OUTPATIENT
Start: 2023-10-20

## 2023-10-20 NOTE — PATIENT INSTRUCTIONS
Problem List Items Addressed This Visit          Cardiovascular and Mediastinum    Stenosis of left vertebral artery     Stable, no symptoms at this time. continue atorvastatin, have lab work in December and return for physical, follow up sooner is needed. Relevant Medications    atorvastatin (LIPITOR) 40 mg tablet       Other    Hyperlipidemia, mixed     Reviewed lab work, have repeat lab work in December, follow up in December for physical. Will monitor BP as it is slightly elevated and bring readings and cuff to next visit. Did discuss that because of risk factors it is important that we keep blood pressure in goal of SBP less than 130. Relevant Medications    atorvastatin (LIPITOR) 40 mg tablet    Other Relevant Orders    Lipid panel    Comprehensive metabolic panel    Albumin / creatinine urine ratio     Other Visit Diagnoses       Encounter for immunization    -  Primary    Flu shot today    Relevant Orders    influenza vaccine, quadrivalent, 0.5 mL, preservative-free, for adult and pediatric patients 6 mos+ (AFLURIA, FLUARIX, FLULAVAL, FLUZONE) (Completed)    Screening mammogram, encounter for        please have mammo    Relevant Orders    Mammo screening bilateral w 3d & cad    Screening for HIV (human immunodeficiency virus)        accepts screening    Relevant Orders    HIV 1/2 AG/AB w Reflex [facility] for 2 yr old and above    Screening for cardiovascular condition        Have lab work, will call with results.     Relevant Orders    CBC and differential    TSH, 3rd generation with Free T4 reflex

## 2023-10-20 NOTE — ASSESSMENT & PLAN NOTE
Stable, no symptoms at this time. continue atorvastatin, have lab work in December and return for physical, follow up sooner is needed.

## 2023-10-20 NOTE — ASSESSMENT & PLAN NOTE
Reviewed lab work, have repeat lab work in December, follow up in December for physical. Will monitor BP as it is slightly elevated and bring readings and cuff to next visit. Did discuss that because of risk factors it is important that we keep blood pressure in goal of SBP less than 130.

## 2023-10-20 NOTE — PROGRESS NOTES
Assessment/Plan:         Problem List Items Addressed This Visit        Cardiovascular and Mediastinum    Stenosis of left vertebral artery     Stable, no symptoms at this time. continue atorvastatin, have lab work in December and return for physical, follow up sooner is needed. Relevant Medications    atorvastatin (LIPITOR) 40 mg tablet       Other    Hyperlipidemia, mixed     Reviewed lab work, have repeat lab work in December, follow up in December for physical. Will monitor BP as it is slightly elevated and bring readings and cuff to next visit. Did discuss that because of risk factors it is important that we keep blood pressure in goal of SBP less than 130. Relevant Medications    atorvastatin (LIPITOR) 40 mg tablet    Other Relevant Orders    Lipid panel    Comprehensive metabolic panel    Albumin / creatinine urine ratio   Other Visit Diagnoses     Encounter for immunization    -  Primary    Flu shot today    Relevant Orders    influenza vaccine, quadrivalent, 0.5 mL, preservative-free, for adult and pediatric patients 6 mos+ (AFLURIA, FLUARIX, FLULAVAL, FLUZONE) (Completed)    Screening mammogram, encounter for        please have mammo    Relevant Orders    Mammo screening bilateral w 3d & cad    Screening for HIV (human immunodeficiency virus)        accepts screening    Relevant Orders    HIV 1/2 AG/AB w Reflex [facility] for 2 yr old and above    Screening for cardiovascular condition        Have lab work, will call with results. Relevant Orders    CBC and differential    TSH, 3rd generation with Free T4 reflex            Subjective:      Patient ID: Kumar Lemon is a 59 y.o. female. Rhonda Egan is here for follow up, her dizziness is resolved and she is not taking any of the medications for dizziness. Medication Refill  Associated symptoms include arthralgias, diaphoresis and myalgias.  Pertinent negatives include no abdominal pain, chest pain, chills, congestion, coughing, fatigue, fever, headaches, nausea, rash, sore throat or vomiting. The following portions of the patient's history were reviewed and updated as appropriate:   Past Medical History:  She has a past medical history of Anxiety (06/20/2016), Chronic infective otitis externa of left ear (09/18/2017), GERD (gastroesophageal reflux disease), Right lumbar radiculopathy (05/05/2016), and Sciatic leg pain. ,  _______________________________________________________________________  Medical Problems:  does not have any pertinent problems on file.,  _______________________________________________________________________  Past Surgical History:   has a past surgical history that includes Appendectomy; Cholecystectomy; Tonsillectomy; Knee arthroscopy; and pr lamnotmy incl w/dcmprsn nrv root 1 intrspc lumbr (Right, 7/6/2016). ,  _______________________________________________________________________  Family History:  family history includes Alcohol abuse in her father; Breast cancer in her maternal aunt; Cancer in her family; Diabetes in her paternal grandmother; Hypertension in her paternal grandmother; No Known Problems in her mother.,  _______________________________________________________________________  Social History:   reports that she has never smoked. She has never used smokeless tobacco. She reports current alcohol use. She reports that she does not use drugs. ,  _______________________________________________________________________  Allergies:  is allergic to prednisone and erythromycin. .  _______________________________________________________________________  Current Outpatient Medications   Medication Sig Dispense Refill   • aspirin 81 mg chewable tablet Chew 1 tablet (81 mg total) daily Do not start before September 15, 2023. 30 tablet 0   • atorvastatin (LIPITOR) 40 mg tablet Take 1 tablet (40 mg total) by mouth daily with dinner 90 tablet 3   • FLUoxetine (PROzac) 20 MG tablet TAKE 3 TABLETS BY MOUTH EVERY  tablet 0 • meclizine (ANTIVERT) 25 mg tablet Take 1 tablet (25 mg total) by mouth every 8 (eight) hours as needed for dizziness 90 tablet 0   • cyclobenzaprine (FLEXERIL) 5 mg tablet Take 1 tablet (5 mg total) by mouth daily at bedtime 30 tablet 0     No current facility-administered medications for this visit.     _______________________________________________________________________  Review of Systems   Constitutional:  Positive for diaphoresis. Negative for chills, fatigue and fever. HENT:  Negative for congestion, ear pain, postnasal drip, rhinorrhea, sinus pressure, sinus pain and sore throat. Eyes:  Negative for pain and visual disturbance. Respiratory:  Negative for cough, chest tightness and shortness of breath. Cardiovascular:  Negative for chest pain and palpitations. Gastrointestinal:  Negative for abdominal pain, constipation, diarrhea, nausea and vomiting. Genitourinary:  Negative for dysuria, frequency, hematuria and urgency. Musculoskeletal:  Positive for arthralgias and myalgias. Negative for back pain. Skin:  Negative for color change and rash. Neurological:  Negative for dizziness, seizures, syncope, light-headedness and headaches. Psychiatric/Behavioral:  Negative for dysphoric mood and sleep disturbance. The patient is not nervous/anxious. All other systems reviewed and are negative. Objective:  Vitals:    10/20/23 0652   BP: 130/80   Pulse: 76   Resp: 12   Temp: 97.8 °F (36.6 °C)   SpO2: 98%   Weight: 64.5 kg (142 lb 3.2 oz)   Height: 5' 1" (1.549 m)     Body mass index is 26.87 kg/m². Physical Exam  Vitals and nursing note reviewed. Constitutional:       General: She is not in acute distress. Appearance: Normal appearance. She is not ill-appearing. HENT:      Head: Normocephalic. Right Ear: Tympanic membrane, ear canal and external ear normal. There is no impacted cerumen.       Left Ear: Tympanic membrane, ear canal and external ear normal. There is no impacted cerumen. Nose: Nose normal. No congestion. Mouth/Throat:      Mouth: Mucous membranes are moist.      Pharynx: No posterior oropharyngeal erythema. Eyes:      Extraocular Movements: Extraocular movements intact. Conjunctiva/sclera: Conjunctivae normal.      Pupils: Pupils are equal, round, and reactive to light. Cardiovascular:      Rate and Rhythm: Normal rate and regular rhythm. Pulses: Normal pulses. Heart sounds: Normal heart sounds. No murmur heard. Pulmonary:      Effort: Pulmonary effort is normal. No respiratory distress. Breath sounds: Normal breath sounds. No wheezing. Abdominal:      General: Bowel sounds are normal.      Palpations: Abdomen is soft. Tenderness: There is no abdominal tenderness. Musculoskeletal:         General: No swelling or tenderness. Normal range of motion. Cervical back: Normal range of motion. No tenderness. Right lower leg: No edema. Left lower leg: No edema. Lymphadenopathy:      Cervical: No cervical adenopathy. Skin:     General: Skin is warm and dry. Neurological:      General: No focal deficit present. Mental Status: She is alert and oriented to person, place, and time. Psychiatric:         Mood and Affect: Mood normal.         Behavior: Behavior normal.         Thought Content:  Thought content normal.         Judgment: Judgment normal.

## 2023-11-08 ENCOUNTER — APPOINTMENT (OUTPATIENT)
Dept: LAB | Facility: HOSPITAL | Age: 58
End: 2023-11-08
Payer: COMMERCIAL

## 2023-11-08 DIAGNOSIS — Z13.6 SCREENING FOR CARDIOVASCULAR CONDITION: ICD-10-CM

## 2023-11-08 DIAGNOSIS — E78.2 HYPERLIPIDEMIA, MIXED: ICD-10-CM

## 2023-11-08 DIAGNOSIS — Z11.4 SCREENING FOR HIV (HUMAN IMMUNODEFICIENCY VIRUS): ICD-10-CM

## 2023-11-08 DIAGNOSIS — R74.8 ELEVATED ALKALINE PHOSPHATASE LEVEL: Primary | ICD-10-CM

## 2023-11-08 LAB
ALBUMIN SERPL BCP-MCNC: 4.3 G/DL (ref 3.5–5)
ALP SERPL-CCNC: 124 U/L (ref 34–104)
ALT SERPL W P-5'-P-CCNC: 10 U/L (ref 7–52)
ANION GAP SERPL CALCULATED.3IONS-SCNC: 4 MMOL/L
AST SERPL W P-5'-P-CCNC: 13 U/L (ref 13–39)
BASOPHILS # BLD AUTO: 0.05 THOUSANDS/ÂΜL (ref 0–0.1)
BASOPHILS NFR BLD AUTO: 1 % (ref 0–1)
BILIRUB SERPL-MCNC: 0.35 MG/DL (ref 0.2–1)
BUN SERPL-MCNC: 13 MG/DL (ref 5–25)
CALCIUM SERPL-MCNC: 9.4 MG/DL (ref 8.4–10.2)
CHLORIDE SERPL-SCNC: 106 MMOL/L (ref 96–108)
CHOLEST SERPL-MCNC: 180 MG/DL
CO2 SERPL-SCNC: 31 MMOL/L (ref 21–32)
CREAT SERPL-MCNC: 0.79 MG/DL (ref 0.6–1.3)
CREAT UR-MCNC: 101 MG/DL
EOSINOPHIL # BLD AUTO: 0.7 THOUSAND/ÂΜL (ref 0–0.61)
EOSINOPHIL NFR BLD AUTO: 10 % (ref 0–6)
ERYTHROCYTE [DISTWIDTH] IN BLOOD BY AUTOMATED COUNT: 12.1 % (ref 11.6–15.1)
GFR SERPL CREATININE-BSD FRML MDRD: 81 ML/MIN/1.73SQ M
GLUCOSE P FAST SERPL-MCNC: 82 MG/DL (ref 65–99)
HCT VFR BLD AUTO: 38.7 % (ref 34.8–46.1)
HDLC SERPL-MCNC: 60 MG/DL
HGB BLD-MCNC: 12.5 G/DL (ref 11.5–15.4)
HIV 1+2 AB+HIV1 P24 AG SERPL QL IA: NORMAL
HIV 2 AB SERPL QL IA: NORMAL
HIV1 AB SERPL QL IA: NORMAL
HIV1 P24 AG SERPL QL IA: NORMAL
IMM GRANULOCYTES # BLD AUTO: 0.01 THOUSAND/UL (ref 0–0.2)
IMM GRANULOCYTES NFR BLD AUTO: 0 % (ref 0–2)
LDLC SERPL CALC-MCNC: 99 MG/DL (ref 0–100)
LYMPHOCYTES # BLD AUTO: 1.44 THOUSANDS/ÂΜL (ref 0.6–4.47)
LYMPHOCYTES NFR BLD AUTO: 21 % (ref 14–44)
MCH RBC QN AUTO: 30 PG (ref 26.8–34.3)
MCHC RBC AUTO-ENTMCNC: 32.3 G/DL (ref 31.4–37.4)
MCV RBC AUTO: 93 FL (ref 82–98)
MICROALBUMIN UR-MCNC: <7 MG/L
MICROALBUMIN/CREAT 24H UR: <7 MG/G CREATININE (ref 0–30)
MONOCYTES # BLD AUTO: 0.52 THOUSAND/ÂΜL (ref 0.17–1.22)
MONOCYTES NFR BLD AUTO: 8 % (ref 4–12)
NEUTROPHILS # BLD AUTO: 4.19 THOUSANDS/ÂΜL (ref 1.85–7.62)
NEUTS SEG NFR BLD AUTO: 60 % (ref 43–75)
NONHDLC SERPL-MCNC: 120 MG/DL
NRBC BLD AUTO-RTO: 0 /100 WBCS
PLATELET # BLD AUTO: 336 THOUSANDS/UL (ref 149–390)
PMV BLD AUTO: 10.2 FL (ref 8.9–12.7)
POTASSIUM SERPL-SCNC: 4 MMOL/L (ref 3.5–5.3)
PROT SERPL-MCNC: 6.6 G/DL (ref 6.4–8.4)
RBC # BLD AUTO: 4.17 MILLION/UL (ref 3.81–5.12)
SODIUM SERPL-SCNC: 141 MMOL/L (ref 135–147)
TRIGL SERPL-MCNC: 106 MG/DL
TSH SERPL DL<=0.05 MIU/L-ACNC: 1.89 UIU/ML (ref 0.45–4.5)
WBC # BLD AUTO: 6.91 THOUSAND/UL (ref 4.31–10.16)

## 2023-11-08 PROCEDURE — 87389 HIV-1 AG W/HIV-1&-2 AB AG IA: CPT

## 2023-11-08 PROCEDURE — 80053 COMPREHEN METABOLIC PANEL: CPT

## 2023-11-08 PROCEDURE — 84443 ASSAY THYROID STIM HORMONE: CPT

## 2023-11-08 PROCEDURE — 80061 LIPID PANEL: CPT

## 2023-11-08 PROCEDURE — 36415 COLL VENOUS BLD VENIPUNCTURE: CPT

## 2023-11-08 PROCEDURE — 85025 COMPLETE CBC W/AUTO DIFF WBC: CPT

## 2023-11-17 ENCOUNTER — HOSPITAL ENCOUNTER (OUTPATIENT)
Age: 58
Discharge: HOME | End: 2023-11-17
Payer: COMMERCIAL

## 2023-11-17 VITALS — BODY MASS INDEX: 26.81 KG/M2 | HEIGHT: 61 IN | WEIGHT: 142 LBS

## 2023-11-17 DIAGNOSIS — Z12.31 SCREENING MAMMOGRAM, ENCOUNTER FOR: ICD-10-CM

## 2023-11-17 PROCEDURE — 77063 BREAST TOMOSYNTHESIS BI: CPT

## 2023-11-17 PROCEDURE — 77067 SCR MAMMO BI INCL CAD: CPT

## 2023-12-01 DIAGNOSIS — F33.1 MODERATE EPISODE OF RECURRENT MAJOR DEPRESSIVE DISORDER (HCC): ICD-10-CM

## 2023-12-01 RX ORDER — FLUOXETINE 20 MG/1
TABLET, FILM COATED ORAL
Qty: 270 TABLET | Refills: 0 | Status: SHIPPED | OUTPATIENT
Start: 2023-12-01

## 2023-12-21 ENCOUNTER — OFFICE VISIT (OUTPATIENT)
Dept: FAMILY MEDICINE CLINIC | Facility: CLINIC | Age: 58
End: 2023-12-21
Payer: COMMERCIAL

## 2023-12-21 VITALS
OXYGEN SATURATION: 98 % | HEIGHT: 61 IN | SYSTOLIC BLOOD PRESSURE: 140 MMHG | WEIGHT: 148.4 LBS | HEART RATE: 68 BPM | RESPIRATION RATE: 12 BRPM | BODY MASS INDEX: 28.02 KG/M2 | DIASTOLIC BLOOD PRESSURE: 76 MMHG | TEMPERATURE: 98 F

## 2023-12-21 DIAGNOSIS — Z01.419 ENCOUNTER FOR ROUTINE GYNECOLOGICAL EXAMINATION WITH PAPANICOLAOU SMEAR OF CERVIX: Primary | ICD-10-CM

## 2023-12-21 DIAGNOSIS — E78.2 HYPERLIPIDEMIA, MIXED: ICD-10-CM

## 2023-12-21 DIAGNOSIS — F33.9 DEPRESSION, RECURRENT (HCC): ICD-10-CM

## 2023-12-21 PROBLEM — L03.113 CELLULITIS OF RIGHT UPPER EXTREMITY: Status: RESOLVED | Noted: 2023-02-16 | Resolved: 2023-12-21

## 2023-12-21 PROCEDURE — G0145 SCR C/V CYTO,THINLAYER,RESCR: HCPCS | Performed by: NURSE PRACTITIONER

## 2023-12-21 PROCEDURE — G0476 HPV COMBO ASSAY CA SCREEN: HCPCS | Performed by: NURSE PRACTITIONER

## 2023-12-21 PROCEDURE — 99214 OFFICE O/P EST MOD 30 MIN: CPT | Performed by: NURSE PRACTITIONER

## 2023-12-21 NOTE — PROGRESS NOTES
Assessment/Plan    Depression, recurrent (HCC)  Stable.  To continue 60 mg of Prozac daily.    Hyperlipidemia, mixed  To continue atorvastatin 40 mg daily         OB Plan      Abdominal ultrasound.  Await pap smear results.  Breast self exam technique reviewed and patient encouraged to perform self-exam monthly.  Discussed healthy lifestyle modifications.       Subjective      Linda Celeste is a 58 y.o. female who presents for annual exam. Periods are menopausal, lasting 0 days. Dysmenorrhea:none. Cyclic symptoms include none. No intermenstrual bleeding, spotting, or discharge. The patient reports that there is not domestic violence in her life.     Current contraception: none  History of abnormal Pap smear: no  Family history of uterine or ovarian cancer: no  Regular self breast exam: no  History of abnormal mammogram: no  Family history of breast cancer: yes - maternal aunt  History of abnormal lipids: yes - on atorvastatin  Menstrual History:  OB History               Para        Term   0            AB        Living             SAB        IAB        Ectopic        Multiple        Live Births   0                Menarche age: 10  No LMP recorded. Patient is postmenopausal.       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 30 tablet 0    atorvastatin (LIPITOR) 40 mg tablet Take 1 tablet (40 mg total) by mouth daily with dinner 90 tablet 3    FLUoxetine (PROzac) 20 MG tablet TAKE 3 TABLETS BY MOUTH EVERY  tablet 0    meclizine (ANTIVERT) 25 mg tablet Take 1 tablet (25 mg total) by mouth every 8 (eight) hours as needed for dizziness 90 tablet 0     No current facility-administered medications for this visit.     She is allergic to prednisone and erythromycin..    HPI  Linda presents for a Pap test.  Denies concerns, feeling well.        Review of Systems  Review of Systems   Constitutional: Negative.    HENT: Negative.     Eyes: Negative.    Respiratory: Negative.     Cardiovascular: Negative.    Gastrointestinal: Negative.    Endocrine: Negative.    Genitourinary: Negative.    Musculoskeletal: Negative.    Skin: Negative.    Allergic/Immunologic: Negative.    Neurological: Negative.    Hematological: Negative.    Psychiatric/Behavioral: Negative.          /76   Pulse 68   Temp 98 °F (36.7 °C)   Resp 12   Ht 5' 1\" (1.549 m)   Wt 67.3 kg (148 lb 6.4 oz)   SpO2 98%   BMI 28.04 kg/m²   Social History     Socioeconomic History    Marital status: /Civil Union     Spouse name: Not on file    Number of children: Not on file    Years of education: Not on file    Highest education level: Not on file   Occupational History    Not on file   Tobacco Use    Smoking status: Never    Smokeless tobacco: Never    Tobacco comments:     Per allscripts - former smoker   Vaping Use    Vaping status: Never Used   Substance and Sexual Activity    Alcohol use: Yes    Drug use: No    Sexual activity: Not Currently     Partners: Male   Other Topics Concern    Not on file   Social History Narrative    Dental care, " reg    Lives with spouse     Social Determinants of Health     Financial Resource Strain: Not on file   Food Insecurity: Not on file   Transportation Needs: Not on file   Physical Activity: Not on file   Stress: Not on file   Social Connections: Not on file   Intimate Partner Violence: Not on file   Housing Stability: Not on file     Past Medical History:   Diagnosis Date    Anxiety 06/20/2016    Last assessed    Chronic infective otitis externa of left ear 09/18/2017    Last assessed    GERD (gastroesophageal reflux disease)     Right lumbar radiculopathy 05/05/2016    Last assessed    Sciatic leg pain      Family History   Problem Relation Age of Onset    No Known Problems Mother     Alcohol abuse Father     Diabetes Paternal Grandmother     Hypertension Paternal Grandmother     Breast cancer Maternal Aunt     Cancer Family      Past Surgical History:   Procedure Laterality Date    APPENDECTOMY      CHOLECYSTECTOMY      KNEE ARTHROSCOPY      torn menicus    PA LAMNOTMY INCL W/DCMPRSN NRV ROOT 1 INTRSPC LUMBR Right 7/6/2016    Procedure: L3/4 FORAMINOTOMY;  Surgeon: Merary Lugo MD;  Location: [facility];  Service: Neurosurgery    TONSILLECTOMY         Current Outpatient Medications:     aspirin 81 mg chewable tablet, Chew 1 tablet (81 mg total) daily Do not start before September 15, 2023., Disp: 30 tablet, Rfl: 0    atorvastatin (LIPITOR) 40 mg tablet, Take 1 tablet (40 mg total) by mouth daily with dinner, Disp: 90 tablet, Rfl: 3    FLUoxetine (PROzac) 20 MG tablet, TAKE 3 TABLETS BY MOUTH EVERY DAY, Disp: 270 tablet, Rfl: 0    meclizine (ANTIVERT) 25 mg tablet, Take 1 tablet (25 mg total) by mouth every 8 (eight) hours as needed for dizziness, Disp: 90 tablet, Rfl: 0        Results for orders placed or performed in visit on 11/08/23   Lipid panel   Result Value Ref Range    Cholesterol 180 See Comment mg/dL    Triglycerides 106 See Comment mg/dL    HDL, Direct 60 >=50 mg/dL    LDL Calculated 99 0 - 100 mg/dL     Non-HDL-Chol (CHOL-HDL) 120 mg/dl   CBC and differential   Result Value Ref Range    WBC 6.91 4.31 - 10.16 Thousand/uL    RBC 4.17 3.81 - 5.12 Million/uL    Hemoglobin 12.5 11.5 - 15.4 g/dL    Hematocrit 38.7 34.8 - 46.1 %    MCV 93 82 - 98 fL    MCH 30.0 26.8 - 34.3 pg    MCHC 32.3 31.4 - 37.4 g/dL    RDW 12.1 11.6 - 15.1 %    MPV 10.2 8.9 - 12.7 fL    Platelets 336 149 - 390 Thousands/uL    nRBC 0 /100 WBCs    Neutrophils Relative 60 43 - 75 %    Immat GRANS % 0 0 - 2 %    Lymphocytes Relative 21 14 - 44 %    Monocytes Relative 8 4 - 12 %    Eosinophils Relative 10 (H) 0 - 6 %    Basophils Relative 1 0 - 1 %    Neutrophils Absolute 4.19 1.85 - 7.62 Thousands/µL    Immature Grans Absolute 0.01 0.00 - 0.20 Thousand/uL    Lymphocytes Absolute 1.44 0.60 - 4.47 Thousands/µL    Monocytes Absolute 0.52 0.17 - 1.22 Thousand/µL    Eosinophils Absolute 0.70 (H) 0.00 - 0.61 Thousand/µL    Basophils Absolute 0.05 0.00 - 0.10 Thousands/µL   Comprehensive metabolic panel   Result Value Ref Range    Sodium 141 135 - 147 mmol/L    Potassium 4.0 3.5 - 5.3 mmol/L    Chloride 106 96 - 108 mmol/L    CO2 31 21 - 32 mmol/L    ANION GAP 4 mmol/L    BUN 13 5 - 25 mg/dL    Creatinine 0.79 0.60 - 1.30 mg/dL    Glucose, Fasting 82 65 - 99 mg/dL    Calcium 9.4 8.4 - 10.2 mg/dL    AST 13 13 - 39 U/L    ALT 10 7 - 52 U/L    Alkaline Phosphatase 124 (H) 34 - 104 U/L    Total Protein 6.6 6.4 - 8.4 g/dL    Albumin 4.3 3.5 - 5.0 g/dL    Total Bilirubin 0.35 0.20 - 1.00 mg/dL    eGFR 81 ml/min/1.73sq m   TSH, 3rd generation with Free T4 reflex   Result Value Ref Range    TSH 3RD GENERATON 1.890 0.450 - 4.500 uIU/mL   HIV 1/2 AG/AB w Reflex [facility] for 2 yr old and above   Result Value Ref Range    HIV-1 p24 Antigen Non-Reactive Non-Reactive    HIV-1 Antibody Non-Reactive Non-Reactive    HIV-2 Antibody Non-Reactive Non-Reactive    HIV Ag-Ab 5th Gen Non-Reactive Non-Reactive       Objective      /76   Pulse 68   Temp 98 °F (36.7 °C)   Resp 12   " Ht 5' 1\" (1.549 m)   Wt 67.3 kg (148 lb 6.4 oz)   SpO2 98%   BMI 28.04 kg/m²     General:   alert and oriented, in no acute distress, alert, appears stated age, appears stated age, and cooperative   Heart: regular rate and rhythm, S1, S2 normal, no murmur, click, rub or gallop   Lungs: clear to auscultation bilaterally   Abdomen: soft, non-tender, without masses or organomegaly   Vulva: normal   Vagina: normal mucosa   Cervix: no cervical motion tenderness, no lesions, and nulliparous appearance   Uterus: normal size   Adnexa: no mass, fullness, tenderness   Breasts: breasts appear normal, no suspicious masses, no skin or nipple changes or axillary nodes.         AMAURI Markham    "

## 2023-12-22 LAB
HPV HR 12 DNA CVX QL NAA+PROBE: NEGATIVE
HPV16 DNA CVX QL NAA+PROBE: NEGATIVE
HPV18 DNA CVX QL NAA+PROBE: NEGATIVE

## 2023-12-30 ENCOUNTER — HOSPITAL ENCOUNTER (EMERGENCY)
Facility: HOSPITAL | Age: 58
Discharge: HOME | End: 2023-12-30
Payer: COMMERCIAL

## 2023-12-30 VITALS
HEART RATE: 93 BPM | OXYGEN SATURATION: 99 % | BODY MASS INDEX: 28.32 KG/M2 | RESPIRATION RATE: 20 BRPM | SYSTOLIC BLOOD PRESSURE: 130 MMHG | TEMPERATURE: 98.7 F | DIASTOLIC BLOOD PRESSURE: 62 MMHG | HEIGHT: 61 IN | WEIGHT: 150 LBS

## 2023-12-30 DIAGNOSIS — U07.1 COVID: Primary | ICD-10-CM

## 2023-12-30 LAB
FLUAV RNA RESP QL NAA+PROBE: NEGATIVE
FLUBV RNA RESP QL NAA+PROBE: NEGATIVE
RSV RNA RESP QL NAA+PROBE: NEGATIVE
SARS-COV-2 RNA RESP QL NAA+PROBE: POSITIVE

## 2023-12-30 PROCEDURE — 99283 EMERGENCY DEPT VISIT LOW MDM: CPT

## 2023-12-30 PROCEDURE — 99284 EMERGENCY DEPT VISIT MOD MDM: CPT | Performed by: NURSE PRACTITIONER

## 2023-12-30 PROCEDURE — 96372 THER/PROPH/DIAG INJ SC/IM: CPT

## 2023-12-30 PROCEDURE — 0241U HB NFCT DS VIR RESP RNA 4 TRGT: CPT | Performed by: EMERGENCY MEDICINE

## 2023-12-30 RX ORDER — KETOROLAC TROMETHAMINE 30 MG/ML
15 INJECTION, SOLUTION INTRAMUSCULAR; INTRAVENOUS ONCE
Status: COMPLETED | OUTPATIENT
Start: 2023-12-30 | End: 2023-12-30

## 2023-12-30 RX ADMIN — KETOROLAC TROMETHAMINE 15 MG: 30 INJECTION, SOLUTION INTRAMUSCULAR; INTRAVENOUS at 06:27

## 2023-12-30 NOTE — Clinical Note
Linda Celeste was seen and treated in our emergency department on 12/30/2023.                Diagnosis:     Linda  may return to work on return date.    She may return on this date: 01/02/2024         If you have any questions or concerns, please don't hesitate to call.      AMAURI Fish    ______________________________           _______________          _______________  Hospital Representative                              Date                                Time

## 2023-12-30 NOTE — ED PROVIDER NOTES
History  Chief Complaint   Patient presents with    Flu Symptoms     Pt arrives ambulatory with a c/o fever, cough, body aches and vomiting since yesterday.     58-year-old female presenting here with complaints of fever cough body aches and nausea started yesterday.      Flu Symptoms  Presenting symptoms: cough, fever and nausea    Presenting symptoms: no diarrhea, no fatigue, no headaches, no shortness of breath, no sore throat and no vomiting    Associated symptoms: no ear pain and no congestion        Prior to Admission Medications   Prescriptions Last Dose Informant Patient Reported? Taking?   FLUoxetine (PROzac) 20 MG tablet   No No   Sig: TAKE 3 TABLETS BY MOUTH EVERY DAY   aspirin 81 mg chewable tablet   No No   Sig: Chew 1 tablet (81 mg total) daily Do not start before September 15, 2023.   atorvastatin (LIPITOR) 40 mg tablet   No No   Sig: Take 1 tablet (40 mg total) by mouth daily with dinner   meclizine (ANTIVERT) 25 mg tablet   No No   Sig: Take 1 tablet (25 mg total) by mouth every 8 (eight) hours as needed for dizziness      Facility-Administered Medications: None       Past Medical History:   Diagnosis Date    Anxiety 06/20/2016    Last assessed    Chronic infective otitis externa of left ear 09/18/2017    Last assessed    GERD (gastroesophageal reflux disease)     Right lumbar radiculopathy 05/05/2016    Last assessed    Sciatic leg pain        Past Surgical History:   Procedure Laterality Date    APPENDECTOMY      CHOLECYSTECTOMY      KNEE ARTHROSCOPY      torn menicus    MD LAMNOTMY INCL W/DCMPRSN NRV ROOT 1 INTRSPC LUMBR Right 7/6/2016    Procedure: L3/4 FORAMINOTOMY;  Surgeon: Merary Lugo MD;  [facility];  Service: Neurosurgery    TONSILLECTOMY         Family History   Problem Relation Age of Onset    No Known Problems Mother     Alcohol abuse Father     Diabetes Paternal Grandmother     Hypertension Paternal Grandmother     Breast cancer Maternal Aunt     Cancer Family      I have  reviewed and agree with the history as documented.    E-Cigarette/Vaping    E-Cigarette Use Never User      E-Cigarette/Vaping Substances    Nicotine No     THC No     CBD No     Flavoring No     Other No     Unknown No      Social History     Tobacco Use    Smoking status: Never    Smokeless tobacco: Never    Tobacco comments:     Per allscripts - former smoker   Vaping Use    Vaping status: Never Used   Substance Use Topics    Alcohol use: Yes    Drug use: No       Review of Systems   Constitutional:  Positive for fever. Negative for diaphoresis and fatigue.   HENT:  Negative for congestion, ear pain, nosebleeds and sore throat.    Eyes:  Negative for photophobia, pain, discharge and visual disturbance.   Respiratory:  Positive for cough. Negative for choking, chest tightness, shortness of breath and wheezing.    Cardiovascular:  Negative for chest pain and palpitations.   Gastrointestinal:  Positive for nausea. Negative for abdominal distention, abdominal pain, diarrhea and vomiting.   Genitourinary:  Negative for dysuria, flank pain and frequency.   Musculoskeletal:  Positive for arthralgias. Negative for back pain, gait problem and joint swelling.   Skin:  Negative for color change and rash.   Neurological:  Negative for dizziness, syncope and headaches.   Psychiatric/Behavioral:  Negative for behavioral problems and confusion. The patient is not nervous/anxious.    All other systems reviewed and are negative.      Physical Exam  Physical Exam  Vitals and nursing note reviewed.   Constitutional:       General: She is not in acute distress.     Appearance: She is well-developed. She is not ill-appearing or toxic-appearing.   HENT:      Head: Normocephalic and atraumatic.      Nose: No rhinorrhea.      Mouth/Throat:      Mouth: Mucous membranes are moist.      Dentition: Normal dentition.   Eyes:      General:         Right eye: No discharge.         Left eye: No discharge.   Cardiovascular:      Rate and Rhythm:  Normal rate and regular rhythm.   Pulmonary:      Effort: Pulmonary effort is normal. No accessory muscle usage or respiratory distress.   Abdominal:      General: There is no distension.      Tenderness: There is no guarding.   Musculoskeletal:         General: Normal range of motion.      Cervical back: Normal range of motion and neck supple. No rigidity.   Skin:     General: Skin is warm and dry.   Neurological:      Mental Status: She is alert and oriented to person, place, and time.      Coordination: Coordination normal.   Psychiatric:         Behavior: Behavior is cooperative.         Vital Signs  ED Triage Vitals [12/30/23 0437]   Temperature Pulse Respirations Blood Pressure SpO2   98.7 °F (37.1 °C) 93 20 130/62 99 %      Temp Source Heart Rate Source Patient Position - Orthostatic VS BP Location FiO2 (%)   Oral Monitor Sitting Left arm --      Pain Score       --           Vitals:    12/30/23 0437   BP: 130/62   Pulse: 93   Patient Position - Orthostatic VS: Sitting         Visual Acuity      ED Medications  Medications   ketorolac (TORADOL) injection 15 mg (15 mg Intramuscular Given 12/30/23 0627)       Diagnostic Studies  Results Reviewed       Procedure Component Value Units Date/Time    FLU/RSV/COVID - if FLU/RSV clinically relevant [[id number]]  (Abnormal) Collected: 12/30/23 0439    Lab Status: Final result Specimen: Nares from Nose Updated: 12/30/23 0525     SARS-CoV-2 Positive     INFLUENZA A PCR Negative     INFLUENZA B PCR Negative     RSV PCR Negative    Narrative:      FOR PEDIATRIC PATIENTS - copy/paste COVID Guidelines URL to browser: [email or web address]    SARS-CoV-2 assay is a Nucleic Acid Amplification assay intended for the  qualitative detection of nucleic acid from SARS-CoV-2 in nasopharyngeal  swabs. Results are for the presumptive identification of SARS-CoV-2 RNA.    Positive results are indicative of infection with SARS-CoV-2, the  virus  causing COVID-19, but do not rule out bacterial infection or co-infection  with other viruses. Laboratories within the [place] and its  territories are required to report all positive results to the appropriate  public health authorities. Negative results do not preclude SARS-CoV-2  infection and should not be used as the sole basis for treatment or other  patient management decisions. Negative results must be combined with  clinical observations, patient history, and epidemiological information.  This test has not been FDA cleared or approved.    This test has been authorized by FDA under an Emergency Use Authorization  (EUA). This test is only authorized for the duration of time the  declaration that circumstances exist justifying the authorization of the  emergency use of an in vitro diagnostic tests for detection of SARS-CoV-2  virus and/or diagnosis of COVID-19 infection under section 564(b)(1) of  the Act, 21 U.S.C. 360bbb-3(b)(1), unless the authorization is terminated  or revoked sooner. The test has been validated but independent review by FDA  and CLIA is pending.    Test performed using [employer] GeneXpert: This RT-PCR assay targets N2,  a region unique to SARS-CoV-2. A conserved region in the E-gene was chosen  for pan-Sarbecovirus detection which includes SARS-CoV-2.    According to CMS-2020-01-R, this platform meets the definition of high-throughput technology.                   No orders to display              Procedures  Procedures         ED Course                               SBIRT 20yo+      Flowsheet Row Most Recent Value   Initial Alcohol Screen: US AUDIT-C     1. How often do you have a drink containing alcohol? 0 Filed at: 12/30/2023 0438   2. How many drinks containing alcohol do you have on a typical day you are drinking?  0 Filed at: 12/30/2023 0438   3b. FEMALE Any Age, or MALE 65+: How often do you have 4 or more drinks on one occassion? 0 Filed at: 12/30/2023 0438   Audit-C  Score 0 Filed at: 12/30/2023 0438   ABIGAIL: How many times in the past year have you...    Used an illegal drug or used a prescription medication for non-medical reasons? Never Filed at: 12/30/2023 0438                      Medical Decision Making  COVID-positive.  Supportive therapy as an outpatient.    Risk  Prescription drug management.             Disposition  Final diagnoses:   COVID     Time reflects when diagnosis was documented in both MDM as applicable and the Disposition within this note       Time User Action Codes Description Comment    12/30/2023  6:22 AM Fredrick Jeffers Add [U07.1] COVID           ED Disposition       ED Disposition   Discharge    Condition   Stable    Date/Time   Sat Dec 30, 2023 0622    Comment   Lindashiva Celeste discharge to home/self care.                   Follow-up Information       Follow up With Specialties Details Why Contact Info    AMAURI Markham [facility], Nurse Practitioner Schedule an appointment as soon as possible for a visit  As needed [facility] [address] [address]  [phone]              Discharge Medication List as of 12/30/2023  6:23 AM        CONTINUE these medications which have NOT CHANGED    Details   aspirin 81 mg chewable tablet Chew 1 tablet (81 mg total) daily Do not start before September 15, 2023., Starting Fri 9/15/2023, Normal      atorvastatin (LIPITOR) 40 mg tablet Take 1 tablet (40 mg total) by mouth daily with dinner, Starting Fri 10/20/2023, Normal      FLUoxetine (PROzac) 20 MG tablet TAKE 3 TABLETS BY MOUTH EVERY DAY, Normal      meclizine (ANTIVERT) 25 mg tablet Take 1 tablet (25 mg total) by mouth every 8 (eight) hours as needed for dizziness, Starting Mon 10/2/2023, Normal             No discharge procedures on file.    PDMP Review         Value Time User    PDMP Reviewed  Yes 2/16/2023  8:25 AM AMAURI Markham            ED Provider  Electronically Signed by             AMAURI Fish  12/30/23 0797

## 2024-01-02 ENCOUNTER — OFFICE VISIT (OUTPATIENT)
Dept: FAMILY MEDICINE CLINIC | Facility: CLINIC | Age: 59
End: 2024-01-02
Payer: COMMERCIAL

## 2024-01-02 VITALS
OXYGEN SATURATION: 99 % | BODY MASS INDEX: 27 KG/M2 | HEIGHT: 61 IN | DIASTOLIC BLOOD PRESSURE: 80 MMHG | HEART RATE: 69 BPM | SYSTOLIC BLOOD PRESSURE: 122 MMHG | WEIGHT: 143 LBS | TEMPERATURE: 97.3 F

## 2024-01-02 DIAGNOSIS — U07.1 COVID-19: Primary | ICD-10-CM

## 2024-01-02 PROCEDURE — 99214 OFFICE O/P EST MOD 30 MIN: CPT | Performed by: NURSE PRACTITIONER

## 2024-01-02 RX ORDER — NIRMATRELVIR AND RITONAVIR 300-100 MG
3 KIT ORAL 2 TIMES DAILY
Qty: 30 TABLET | Refills: 0 | Status: SHIPPED | OUTPATIENT
Start: 2024-01-02 | End: 2024-01-07

## 2024-01-02 NOTE — PROGRESS NOTES
"Name: Linda Celeste      : 1965      MRN: [id number]  Encounter Provider: AMAURI Miranda  Encounter Date: 2024   Encounter department: [facility] [address]    Assessment & Plan     1. COVID-19  Comments:  Discussed paxlovid use, stop statin while on it. Discussed fluids, rest, mucinex, tylenol.  Orders:  -     nirmatrelvir & ritonavir (Paxlovid, 300/100,) tablet therapy pack; Take 3 tablets by mouth 2 (two) times a day for 5 days Take 2 nirmatrelvir tablets + 1 ritonavir tablet together per dose           Subjective      Patient presents for ER follow-up from .  Patient presented with viral symptoms and diagnosed with COVID-19.  Patient states she was given a Toradol injection and sent home.  She has not been taking anything over-the-counter. Has not had a BM in 5 days.       Review of Systems   Constitutional:  Positive for chills, diaphoresis, fatigue and fever.   HENT:  Positive for congestion, postnasal drip, sinus pressure, sinus pain and sore throat.    Respiratory:  Positive for cough and chest tightness. Negative for shortness of breath and wheezing.    Gastrointestinal:  Negative for diarrhea, nausea and vomiting.   Musculoskeletal:  Positive for myalgias.   Neurological:  Positive for headaches.       Current Outpatient Medications on File Prior to Visit   Medication Sig    aspirin 81 mg chewable tablet Chew 1 tablet (81 mg total) daily Do not start before September 15, 2023.    atorvastatin (LIPITOR) 40 mg tablet Take 1 tablet (40 mg total) by mouth daily with dinner    FLUoxetine (PROzac) 20 MG tablet TAKE 3 TABLETS BY MOUTH EVERY DAY    meclizine (ANTIVERT) 25 mg tablet Take 1 tablet (25 mg total) by mouth every 8 (eight) hours as needed for dizziness (Patient not taking: Reported on 2024)       Objective     /80   Pulse 69   Temp (!) 97.3 °F (36.3 °C)   Ht 5' 1\" (1.549 m)   Wt 64.9 kg (143 lb)   SpO2 99%   BMI 27.02 kg/m² "     Physical Exam  Constitutional:       Appearance: She is well-developed. She is ill-appearing.   HENT:      Right Ear: Tympanic membrane normal.      Left Ear: Tympanic membrane normal.      Nose: Congestion present.   Eyes:      Pupils: Pupils are equal, round, and reactive to light.   Cardiovascular:      Rate and Rhythm: Normal rate and regular rhythm.      Heart sounds: Normal heart sounds. No murmur heard.  Pulmonary:      Effort: Pulmonary effort is normal. No respiratory distress.      Breath sounds: Normal breath sounds.   Skin:     General: Skin is warm and dry.   Neurological:      Mental Status: She is alert and oriented to person, place, and time.       AMAURI Miranda

## 2024-01-02 NOTE — LETTER
January 2, 2024     Patient: Linda Celeste  YOB: 1965  Date of Visit: 1/2/2024      To Whom it May Concern:    Linda Celeste is under my professional care. Linda was seen in my office on 1/2/2024. Linda may return to work on 1/9/2024 .    If you have any questions or concerns, please don't hesitate to call.         Sincerely,          AMAURI Miranda        CC: No Recipients

## 2024-01-05 LAB
LAB AP GYN PRIMARY INTERPRETATION: NORMAL
Lab: NORMAL

## 2024-01-23 ENCOUNTER — HOSPITAL ENCOUNTER (EMERGENCY)
Facility: HOSPITAL | Age: 59
Discharge: HOME | End: 2024-01-23
Attending: EMERGENCY MEDICINE
Payer: COMMERCIAL

## 2024-01-23 ENCOUNTER — APPOINTMENT (OUTPATIENT)
Dept: CT IMAGING | Facility: HOSPITAL | Age: 59
End: 2024-01-23
Payer: COMMERCIAL

## 2024-01-23 VITALS
WEIGHT: 150 LBS | TEMPERATURE: 97.7 F | SYSTOLIC BLOOD PRESSURE: 135 MMHG | DIASTOLIC BLOOD PRESSURE: 61 MMHG | BODY MASS INDEX: 28.32 KG/M2 | HEART RATE: 72 BPM | HEIGHT: 61 IN | OXYGEN SATURATION: 100 % | RESPIRATION RATE: 18 BRPM

## 2024-01-23 DIAGNOSIS — R10.9 FLANK PAIN: Primary | ICD-10-CM

## 2024-01-23 LAB
BACTERIA UR QL AUTO: ABNORMAL /HPF
BILIRUB UR QL STRIP: NEGATIVE
CLARITY UR: CLEAR
COLOR UR: ABNORMAL
GLUCOSE UR STRIP-MCNC: NEGATIVE MG/DL
HGB UR QL STRIP.AUTO: ABNORMAL
KETONES UR STRIP-MCNC: NEGATIVE MG/DL
LEUKOCYTE ESTERASE UR QL STRIP: NEGATIVE
MUCOUS THREADS UR QL AUTO: ABNORMAL
NITRITE UR QL STRIP: NEGATIVE
NON-SQ EPI CELLS URNS QL MICRO: ABNORMAL /HPF
PH UR STRIP.AUTO: 6.5 [PH]
PROT UR STRIP-MCNC: NEGATIVE MG/DL
RBC #/AREA URNS AUTO: ABNORMAL /HPF
SP GR UR STRIP.AUTO: 1.02 (ref 1–1.03)
UROBILINOGEN UR STRIP-ACNC: <2 MG/DL
WBC #/AREA URNS AUTO: ABNORMAL /HPF

## 2024-01-23 PROCEDURE — 74176 CT ABD & PELVIS W/O CONTRAST: CPT

## 2024-01-23 PROCEDURE — G1004 CDSM NDSC: HCPCS

## 2024-01-23 PROCEDURE — 96372 THER/PROPH/DIAG INJ SC/IM: CPT

## 2024-01-23 PROCEDURE — 81001 URINALYSIS AUTO W/SCOPE: CPT | Performed by: EMERGENCY MEDICINE

## 2024-01-23 PROCEDURE — 99284 EMERGENCY DEPT VISIT MOD MDM: CPT

## 2024-01-23 PROCEDURE — 99284 EMERGENCY DEPT VISIT MOD MDM: CPT | Performed by: EMERGENCY MEDICINE

## 2024-01-23 RX ORDER — METHOCARBAMOL 500 MG/1
500 TABLET, FILM COATED ORAL 3 TIMES DAILY
Qty: 20 TABLET | Refills: 0 | Status: SHIPPED | OUTPATIENT
Start: 2024-01-23 | End: 2024-01-25 | Stop reason: ALTCHOICE

## 2024-01-23 RX ORDER — IBUPROFEN 800 MG/1
800 TABLET ORAL 3 TIMES DAILY
Qty: 21 TABLET | Refills: 0 | Status: SHIPPED | OUTPATIENT
Start: 2024-01-23

## 2024-01-23 RX ORDER — HYDROCODONE BITARTRATE AND ACETAMINOPHEN 5; 325 MG/1; MG/1
1 TABLET ORAL EVERY 6 HOURS PRN
Qty: 21 TABLET | Refills: 0 | Status: SHIPPED | OUTPATIENT
Start: 2024-01-23 | End: 2024-02-01 | Stop reason: ALTCHOICE

## 2024-01-23 RX ORDER — KETOROLAC TROMETHAMINE 30 MG/ML
30 INJECTION, SOLUTION INTRAMUSCULAR; INTRAVENOUS ONCE
Status: COMPLETED | OUTPATIENT
Start: 2024-01-23 | End: 2024-01-23

## 2024-01-23 RX ADMIN — KETOROLAC TROMETHAMINE 30 MG: 30 INJECTION, SOLUTION INTRAMUSCULAR at 08:29

## 2024-01-23 NOTE — ED NOTES
REVIEWED DISCHARGE INSTRUCTIONS WITH PATIENT. PATIENT VERBALIZED UNDERSTANDING. NO FURTHER QUESTIONS AT THIS TIME.      Shahida River RN  01/23/24 7632

## 2024-01-23 NOTE — DISCHARGE INSTRUCTIONS
A  personal message from Dr. Oswaldo Jasso,  Thank you so much for allowing me to care for you today.    I pride myself in the care and attention I give all my patients.  I hope you were a witness to this tonight.   If for any reason your condition does not improve or worsens, or you have a question that was not answered during your visit you can feel free to text me on my personal phone #  # [phone].   I will answer to your message and continue your care past your emergency room visit.     Please understand that although you are being discharged because your condition has been deemed stable and able to be managed on an outpatient setting. However your condition may worsen as part of the natural progression of the illness/condition, if this occurs please come back to the emergency department for a repeat evaluation.

## 2024-01-23 NOTE — Clinical Note
Linda Celeste was seen and treated in our emergency department on 1/23/2024.                Diagnosis: flank pain    Linda  may return to work on return date.    She may return on this date: 01/25/2024    Please excuse from work 1/23-24     If you have any questions or concerns, please don't hesitate to call.      Oswalod Jasso MD    ______________________________           _______________          _______________  Hospital Representative                              Date                                Time

## 2024-01-24 NOTE — ED PROVIDER NOTES
History  Chief Complaint   Patient presents with    Flank Pain     Pt c/o L flank pain for several days and radiating to abdomen. Pt c/o increased frequency with urinating. Unknown hx of kidney stones     Linda Celeste is a 58 y.o.  year old female  Past Medical History:  06/20/2016: Anxiety      Comment:  Last assessed  09/18/2017: Chronic infective otitis externa of left ear      Comment:  Last assessed  No date: GERD (gastroesophageal reflux disease)  05/05/2016: Right lumbar radiculopathy      Comment:  Last assessed  No date: Sciatic leg pain  Social History    Tobacco Use      Smoking status: Never      Smokeless tobacco: Never      Tobacco comments: Per allscripts - former smoker    Vaping Use      Vaping status: Never Used    Alcohol use: Yes    Drug use: No    Patient presents with:  Flank Pain: Pt c/o L flank pain for several days and radiating to abdomen. Pt c/o increased frequency with urinating. Unknown hx of kidney stones      History obtained directly from the PATIENT              History provided by:  Patient   used: No    Flank Pain  Associated symptoms: no chest pain, no chills, no cough, no dysuria, no fever, no hematuria, no shortness of breath, no sore throat and no vomiting        Prior to Admission Medications   Prescriptions Last Dose Informant Patient Reported? Taking?   FLUoxetine (PROzac) 20 MG tablet   No No   Sig: TAKE 3 TABLETS BY MOUTH EVERY DAY   aspirin 81 mg chewable tablet   No No   Sig: Chew 1 tablet (81 mg total) daily Do not start before September 15, 2023.   atorvastatin (LIPITOR) 40 mg tablet   No No   Sig: Take 1 tablet (40 mg total) by mouth daily with dinner   meclizine (ANTIVERT) 25 mg tablet   No No   Sig: Take 1 tablet (25 mg total) by mouth every 8 (eight) hours as needed for dizziness   Patient not taking: Reported on 1/2/2024      Facility-Administered Medications: None       Past Medical History:   Diagnosis Date    Anxiety 06/20/2016    Last  assessed    Chronic infective otitis externa of left ear 09/18/2017    Last assessed    GERD (gastroesophageal reflux disease)     Right lumbar radiculopathy 05/05/2016    Last assessed    Sciatic leg pain        Past Surgical History:   Procedure Laterality Date    APPENDECTOMY      CHOLECYSTECTOMY      KNEE ARTHROSCOPY      torn menicus    CT LAMNOTMY INCL W/DCMPRSN NRV ROOT 1 INTRSPC LUMBR Right 7/6/2016    Procedure: L3/4 FORAMINOTOMY;  Surgeon: Merary Lugo MD;  Location: [facility];  Service: Neurosurgery    TONSILLECTOMY         Family History   Problem Relation Age of Onset    No Known Problems Mother     Alcohol abuse Father     Diabetes Paternal Grandmother     Hypertension Paternal Grandmother     Breast cancer Maternal Aunt     Cancer Family      I have reviewed and agree with the history as documented.    E-Cigarette/Vaping    E-Cigarette Use Never User      E-Cigarette/Vaping Substances    Nicotine No     THC No     CBD No     Flavoring No     Other No     Unknown No      Social History     Tobacco Use    Smoking status: Never    Smokeless tobacco: Never    Tobacco comments:     Per allscripts - former smoker   Vaping Use    Vaping status: Never Used   Substance Use Topics    Alcohol use: Yes    Drug use: No       Review of Systems   Constitutional:  Negative for chills and fever.   HENT:  Negative for ear pain and sore throat.    Eyes:  Negative for pain and visual disturbance.   Respiratory:  Negative for cough and shortness of breath.    Cardiovascular:  Negative for chest pain and palpitations.   Gastrointestinal:  Negative for abdominal pain and vomiting.   Genitourinary:  Positive for flank pain. Negative for dysuria and hematuria.   Musculoskeletal:  Negative for arthralgias and back pain.   Skin:  Negative for color change and rash.   Neurological:  Negative for seizures and syncope.   All other systems reviewed and are negative.      Physical Exam  Physical Exam  Vitals and nursing note  reviewed.   Constitutional:       General: She is not in acute distress.     Appearance: Normal appearance. She is well-developed.   HENT:      Head: Normocephalic and atraumatic.      Right Ear: External ear normal.      Left Ear: External ear normal.      Mouth/Throat:      Mouth: Mucous membranes are moist.   Eyes:      Conjunctiva/sclera: Conjunctivae normal.      Pupils: Pupils are equal, round, and reactive to light.   Cardiovascular:      Rate and Rhythm: Normal rate and regular rhythm.      Pulses: Normal pulses.      Heart sounds: No murmur heard.  Pulmonary:      Effort: Pulmonary effort is normal. No respiratory distress.      Breath sounds: Normal breath sounds.   Abdominal:      Palpations: Abdomen is soft.      Tenderness: There is no abdominal tenderness.   Musculoskeletal:         General: No swelling.      Cervical back: Neck supple.   Skin:     General: Skin is warm and dry.      Capillary Refill: Capillary refill takes less than 2 seconds.   Neurological:      General: No focal deficit present.      Mental Status: She is alert and oriented to person, place, and time.   Psychiatric:         Mood and Affect: Mood normal.         Thought Content: Thought content normal.         Vital Signs  ED Triage Vitals   Temperature Pulse Respirations Blood Pressure SpO2   01/23/24 0633 01/23/24 0633 01/23/24 0633 01/23/24 0633 01/23/24 0633   97.7 °F (36.5 °C) 72 18 135/61 100 %      Temp Source Heart Rate Source Patient Position - Orthostatic VS BP Location FiO2 (%)   01/23/24 0633 01/23/24 0633 01/23/24 0633 01/23/24 0633 --   Temporal Monitor Sitting Left arm       Pain Score       01/23/24 0829       8           Vitals:    01/23/24 0633   BP: 135/61   Pulse: 72   Patient Position - Orthostatic VS: Sitting         Visual Acuity      ED Medications  Medications   ketorolac (TORADOL) injection 30 mg (30 mg Intramuscular Given 1/23/24 0829)       Diagnostic Studies  Results Reviewed       Procedure Component  Value Units Date/Time    Urine Microscopic [[id number]]  (Abnormal) Collected: 01/23/24 0649    Lab Status: Final result Specimen: Urine, Clean Catch Updated: 01/23/24 0701     RBC, UA 4-10 /hpf      WBC, UA 1-2 /hpf      Epithelial Cells Occasional /hpf      Bacteria, UA None Seen /hpf      MUCUS THREADS Occasional    UA w Reflex to Microscopic w Reflex to Culture [[id number]]  (Abnormal) Collected: 01/23/24 0649    Lab Status: Final result Specimen: Urine, Clean Catch Updated: 01/23/24 0656     Color, UA Light Yellow     Clarity, UA Clear     Specific Gravity, UA 1.021     pH, UA 6.5     Leukocytes, UA Negative     Nitrite, UA Negative     Protein, UA Negative mg/dl      Glucose, UA Negative mg/dl      Ketones, UA Negative mg/dl      Urobilinogen, UA <2.0 mg/dl      Bilirubin, UA Negative     Occult Blood, UA Trace                   CT abdomen pelvis wo contrast   Final Result by Alexandr Powers MD (01/23 0748)      No acute findings. No urolithiasis or obstructive uropathy.            Workstation performed: [id number]                    Procedures  Procedures         ED Course  ED Course as of 01/24/24 1007   Tue Jan 23, 2024   0701 Blood, UA(!): Trace   0819 Leukocytes, UA: Negative   0819 Nitrite, UA: Negative   Wed Jan 24, 2024   1006 Blood, UA(!): Trace                               SBIRT 20yo+      Flowsheet Row Most Recent Value   Initial Alcohol Screen: US AUDIT-C     1. How often do you have a drink containing alcohol? 0 Filed at: 01/23/2024 0636   2. How many drinks containing alcohol do you have on a typical day you are drinking?  0 Filed at: 01/23/2024 0636   3b. FEMALE Any Age, or MALE 65+: How often do you have 4 or more drinks on one occassion? 0 Filed at: 01/23/2024 0636   Audit-C Score 0 Filed at: 01/23/2024 0636   ABIGAIL: How many times in the past year have you...    Used an illegal drug or used a prescription medication for non-medical reasons? Never Filed at: 01/23/2024 0636                       Medical Decision Making  This patient presents emergency department with a chief complaint of flank pain.    Differential diagnosis includes but not limited to: Urinary tract infection, ureteral stone, pyelonephritis, kidney cyst, kidney abscess, musculoskeletal pain, shingles, STI, cystitis, colitis.     CT scan negative for acute disease  UA some hematuria / no infection     Problems Addressed:  Flank pain: acute illness or injury    Amount and/or Complexity of Data Reviewed  Labs:  Decision-making details documented in ED Course.  Radiology: ordered.    Risk  OTC drugs.  Prescription drug management.             Disposition  Final diagnoses:   Flank pain     Time reflects when diagnosis was documented in both MDM as applicable and the Disposition within this note       Time User Action Codes Description Comment    1/23/2024  8:25 AM Oswaldo Jasso Add [R10.9] Flank pain           ED Disposition       ED Disposition   Discharge    Condition   Stable    Date/Time   Tue Jan 23, 2024 0825    Comment   Linda Celeste discharge to home/self care.                   Follow-up Information       Follow up With Specialties Details Why Contact Info    [facility], Nurse Practitioner In 3 days If symptoms worsen [facility] [address] [address]  [phone]              Discharge Medication List as of 1/23/2024  8:27 AM        START taking these medications    Details   HYDROcodone-acetaminophen (Norco) 5-325 mg per tablet Take 1 tablet by mouth every 6 (six) hours as needed for pain for up to 10 days Max Daily Amount: 4 tablets, Starting Tue 1/23/2024, Until Fri 2/2/2024 at 2359, Normal      ibuprofen (MOTRIN) 800 mg tablet Take 1 tablet (800 mg total) by mouth 3 (three) times a day, Starting Tue 1/23/2024, Normal      methocarbamol (ROBAXIN) 500 mg tablet Take 1 tablet (500 mg total) by mouth 3 (three) times a day, Starting Tue 1/23/2024, Normal           CONTINUE these  medications which have NOT CHANGED    Details   aspirin 81 mg chewable tablet Chew 1 tablet (81 mg total) daily Do not start before September 15, 2023., Starting Fri 9/15/2023, Normal      atorvastatin (LIPITOR) 40 mg tablet Take 1 tablet (40 mg total) by mouth daily with dinner, Starting Fri 10/20/2023, Normal      FLUoxetine (PROzac) 20 MG tablet TAKE 3 TABLETS BY MOUTH EVERY DAY, Normal      meclizine (ANTIVERT) 25 mg tablet Take 1 tablet (25 mg total) by mouth every 8 (eight) hours as needed for dizziness, Starting Mon 10/2/2023, Normal             No discharge procedures on file.    PDMP Review         Value Time User    PDMP Reviewed  Yes 2/16/2023  8:25 AM A[facility]            ED Provider  Electronically Signed by             Oswaldo Jasso MD  01/24/24 1007

## 2024-01-25 ENCOUNTER — OFFICE VISIT (OUTPATIENT)
Dept: FAMILY MEDICINE CLINIC | Facility: CLINIC | Age: 59
End: 2024-01-25
Payer: COMMERCIAL

## 2024-01-25 VITALS
HEIGHT: 61 IN | HEART RATE: 79 BPM | TEMPERATURE: 98 F | WEIGHT: 147.8 LBS | DIASTOLIC BLOOD PRESSURE: 78 MMHG | OXYGEN SATURATION: 99 % | SYSTOLIC BLOOD PRESSURE: 132 MMHG | BODY MASS INDEX: 27.9 KG/M2

## 2024-01-25 DIAGNOSIS — M54.50 MIDLINE LOW BACK PAIN WITHOUT SCIATICA, UNSPECIFIED CHRONICITY: Primary | ICD-10-CM

## 2024-01-25 PROBLEM — Z01.419 ENCOUNTER FOR ROUTINE GYNECOLOGICAL EXAMINATION WITH PAPANICOLAOU SMEAR OF CERVIX: Status: RESOLVED | Noted: 2019-07-15 | Resolved: 2024-01-25

## 2024-01-25 PROCEDURE — 99214 OFFICE O/P EST MOD 30 MIN: CPT | Performed by: NURSE PRACTITIONER

## 2024-01-25 PROCEDURE — 96372 THER/PROPH/DIAG INJ SC/IM: CPT

## 2024-01-25 RX ORDER — KETOROLAC TROMETHAMINE 30 MG/ML
30 INJECTION, SOLUTION INTRAMUSCULAR; INTRAVENOUS ONCE
Status: COMPLETED | OUTPATIENT
Start: 2024-01-25 | End: 2024-01-25

## 2024-01-25 RX ORDER — PREDNISONE 20 MG/1
40 TABLET ORAL DAILY
Qty: 8 TABLET | Refills: 0 | Status: SHIPPED | OUTPATIENT
Start: 2024-01-25 | End: 2024-01-29

## 2024-01-25 RX ORDER — CYCLOBENZAPRINE HCL 10 MG
10 TABLET ORAL 3 TIMES DAILY PRN
Qty: 30 TABLET | Refills: 0 | Status: SHIPPED | OUTPATIENT
Start: 2024-01-25

## 2024-01-25 RX ADMIN — KETOROLAC TROMETHAMINE 30 MG: 30 INJECTION, SOLUTION INTRAMUSCULAR; INTRAVENOUS at 10:56

## 2024-01-25 RX ADMIN — KETOROLAC TROMETHAMINE 30 MG: 30 INJECTION, SOLUTION INTRAMUSCULAR; INTRAVENOUS at 10:57

## 2024-01-25 NOTE — ASSESSMENT & PLAN NOTE
Verified allergies with patient, patient is okay to take prednisone.  To begin prednisone 40 mg daily for 4 days.  To stop methocarbamol and begin cyclobenzaprine.  60 mg of Toradol administered while in office.  To begin physical therapy.  Counseled on application of ice and gentle stretching.  To follow-up if no improvement in 2 weeks or sooner if symptoms worsen.

## 2024-01-25 NOTE — PROGRESS NOTES
Assessment/Plan:    Midline low back pain without sciatica  Verified allergies with patient, patient is okay to take prednisone.  To begin prednisone 40 mg daily for 4 days.  To stop methocarbamol and begin cyclobenzaprine.  60 mg of Toradol administered while in office.  To begin physical therapy.  Counseled on application of ice and gentle stretching.  To follow-up if no improvement in 2 weeks or sooner if symptoms worsen.       Diagnoses and all orders for this visit:    Midline low back pain without sciatica, unspecified chronicity  -     predniSONE 20 mg tablet; Take 2 tablets (40 mg total) by mouth daily for 4 days  -     cyclobenzaprine (FLEXERIL) 10 mg tablet; Take 1 tablet (10 mg total) by mouth 3 (three) times a day as needed for muscle spasms  -     Ambulatory referral to Physical Therapy; Future  -     ketorolac (TORADOL) injection 30 mg  -     ketorolac (TORADOL) injection 30 mg          Subjective:      Patient ID: Linda Celeste is a 58 y.o. female.    Linda presents reporting mid back pain that started 4 days ago, the pain started while she was at work.  She was evaluated in the ER on 1/23/2024.  A CAT scan of her abdomen and pelvis was obtained.  No acute findings on imaging.  Her urine was positive for trace amount of blood.  She was prescribed methocarbamol and given a shot of 30 mg of Toradol.  The Toradol helped relieve some pain.  She continues to rate the pain an 8 out of 10.  Denies fever, chills, paresthesias, bowel/bladder dysfunction, or weakness in extremities.        The following portions of the patient's history were reviewed and updated as appropriate: She   Patient Active Problem List    Diagnosis Date Noted    Vertigo 10/02/2023    Stenosis of left vertebral artery 09/13/2023    Cervical radiculopathy 02/16/2023    Arthralgia of right temporomandibular joint 03/21/2022    Episode of dizziness with headache 09/15/2021    Elevated alkaline phosphatase level 09/15/2021    Mesenteric  "panniculitis ([facility]) 07/15/2019    Elevated liver enzymes 06/28/2019    Cervical spinal stenosis 06/07/2016    Sacroiliitis ([facility]) 06/07/2016    Degenerative disc disease, lumbar 05/05/2016    Midline low back pain without sciatica 03/17/2016    Depression, recurrent ([facility]) 03/15/2016    Hyperlipidemia, mixed 01/26/2015     Current Outpatient Medications   Medication Sig Dispense Refill    aspirin 81 mg chewable tablet Chew 1 tablet (81 mg total) daily Do not start before September 15, 2023. 30 tablet 0    atorvastatin (LIPITOR) 40 mg tablet Take 1 tablet (40 mg total) by mouth daily with dinner 90 tablet 3    cyclobenzaprine (FLEXERIL) 10 mg tablet Take 1 tablet (10 mg total) by mouth 3 (three) times a day as needed for muscle spasms 30 tablet 0    FLUoxetine (PROzac) 20 MG tablet TAKE 3 TABLETS BY MOUTH EVERY  tablet 0    HYDROcodone-acetaminophen (Norco) 5-325 mg per tablet Take 1 tablet by mouth every 6 (six) hours as needed for pain for up to 10 days Max Daily Amount: 4 tablets 21 tablet 0    ibuprofen (MOTRIN) 800 mg tablet Take 1 tablet (800 mg total) by mouth 3 (three) times a day 21 tablet 0    predniSONE 20 mg tablet Take 2 tablets (40 mg total) by mouth daily for 4 days 8 tablet 0     No current facility-administered medications for this visit.     She is allergic to prednisone and erythromycin..    Review of Systems   Constitutional: Negative.    HENT: Negative.     Eyes: Negative.    Respiratory: Negative.     Cardiovascular: Negative.    Gastrointestinal: Negative.    Endocrine: Negative.    Genitourinary: Negative.    Musculoskeletal:  Positive for back pain.   Skin: Negative.    Allergic/Immunologic: Negative.    Neurological: Negative.    Hematological: Negative.    Psychiatric/Behavioral: Negative.           /78 (BP Location: Left arm, Patient Position: Sitting)   Pulse 79   Temp 98 °F (36.7 °C)   Ht 5' 1\" (1.549 m)   Wt 67 kg (147 lb 12.8 oz)   SpO2 99%   BMI 27.93 kg/m² "     Objective:     Physical Exam  Vitals and nursing note reviewed.   Constitutional:       General: She is not in acute distress.     Appearance: Normal appearance. She is well-developed. She is not ill-appearing, toxic-appearing or diaphoretic.   HENT:      Head: Normocephalic and atraumatic.   Eyes:      Conjunctiva/sclera: Conjunctivae normal.   Cardiovascular:      Rate and Rhythm: Normal rate and regular rhythm.      Heart sounds: Normal heart sounds. No murmur heard.  Pulmonary:      Effort: Pulmonary effort is normal. No respiratory distress.      Breath sounds: Normal breath sounds. No wheezing or rales.   Chest:      Chest wall: No tenderness.   Musculoskeletal:      Cervical back: Neck supple.      Comments: No obvious deformity in lumbar spine.  Presence of mild swelling of left paraspinal.  Sensation to light touch intact in lower extremities.  5 out of 5 strength in lower extremities.  +2 patellar reflexes.   Skin:     General: Skin is warm and dry.   Neurological:      General: No focal deficit present.      Mental Status: She is alert and oriented to person, place, and time.   Psychiatric:         Behavior: Behavior normal.         Thought Content: Thought content normal.         Judgment: Judgment normal.

## 2024-01-30 ENCOUNTER — HOSPITAL ENCOUNTER (EMERGENCY)
Facility: HOSPITAL | Age: 59
Discharge: HOME | End: 2024-01-30
Payer: COMMERCIAL

## 2024-01-30 ENCOUNTER — NURSE TRIAGE (OUTPATIENT)
Dept: PHYSICAL THERAPY | Facility: OTHER | Age: 59
End: 2024-01-30

## 2024-01-30 ENCOUNTER — APPOINTMENT (EMERGENCY)
Dept: CT IMAGING | Facility: HOSPITAL | Age: 59
End: 2024-01-30
Payer: COMMERCIAL

## 2024-01-30 VITALS
OXYGEN SATURATION: 100 % | DIASTOLIC BLOOD PRESSURE: 74 MMHG | RESPIRATION RATE: 18 BRPM | HEIGHT: 61 IN | HEART RATE: 80 BPM | SYSTOLIC BLOOD PRESSURE: 164 MMHG | TEMPERATURE: 98 F | BODY MASS INDEX: 28.32 KG/M2 | WEIGHT: 150 LBS

## 2024-01-30 DIAGNOSIS — M54.6 ACUTE BILATERAL THORACIC BACK PAIN: Primary | ICD-10-CM

## 2024-01-30 DIAGNOSIS — M51.24 HERNIATED THORACIC DISC WITHOUT MYELOPATHY: Primary | ICD-10-CM

## 2024-01-30 LAB
ALBUMIN SERPL BCP-MCNC: 3.8 G/DL (ref 3.5–5)
ALP SERPL-CCNC: 82 U/L (ref 34–104)
ALT SERPL W P-5'-P-CCNC: 12 U/L (ref 7–52)
ANION GAP SERPL CALCULATED.3IONS-SCNC: 4 MMOL/L
AST SERPL W P-5'-P-CCNC: 10 U/L (ref 13–39)
BACTERIA UR QL AUTO: ABNORMAL /HPF
BASOPHILS # BLD AUTO: 0.04 THOUSANDS/ÂΜL (ref 0–0.1)
BASOPHILS NFR BLD AUTO: 1 % (ref 0–1)
BILIRUB SERPL-MCNC: 0.37 MG/DL (ref 0.2–1)
BILIRUB UR QL STRIP: NEGATIVE
BUN SERPL-MCNC: 22 MG/DL (ref 5–25)
CALCIUM SERPL-MCNC: 9.1 MG/DL (ref 8.4–10.2)
CHLORIDE SERPL-SCNC: 108 MMOL/L (ref 96–108)
CLARITY UR: CLEAR
CO2 SERPL-SCNC: 31 MMOL/L (ref 21–32)
COLOR UR: YELLOW
CREAT SERPL-MCNC: 0.89 MG/DL (ref 0.6–1.3)
EOSINOPHIL # BLD AUTO: 0.03 THOUSAND/ÂΜL (ref 0–0.61)
EOSINOPHIL NFR BLD AUTO: 0 % (ref 0–6)
ERYTHROCYTE [DISTWIDTH] IN BLOOD BY AUTOMATED COUNT: 12.4 % (ref 11.6–15.1)
GFR SERPL CREATININE-BSD FRML MDRD: 70 ML/MIN/1.73SQ M
GLUCOSE SERPL-MCNC: 86 MG/DL (ref 65–140)
GLUCOSE UR STRIP-MCNC: NEGATIVE MG/DL
HCT VFR BLD AUTO: 36.5 % (ref 34.8–46.1)
HGB BLD-MCNC: 11.9 G/DL (ref 11.5–15.4)
HGB UR QL STRIP.AUTO: ABNORMAL
HYALINE CASTS #/AREA URNS LPF: ABNORMAL /LPF
IMM GRANULOCYTES # BLD AUTO: 0.04 THOUSAND/UL (ref 0–0.2)
IMM GRANULOCYTES NFR BLD AUTO: 1 % (ref 0–2)
KETONES UR STRIP-MCNC: NEGATIVE MG/DL
LEUKOCYTE ESTERASE UR QL STRIP: ABNORMAL
LIPASE SERPL-CCNC: 10 U/L (ref 11–82)
LYMPHOCYTES # BLD AUTO: 4.01 THOUSANDS/ÂΜL (ref 0.6–4.47)
LYMPHOCYTES NFR BLD AUTO: 45 % (ref 14–44)
MCH RBC QN AUTO: 29.8 PG (ref 26.8–34.3)
MCHC RBC AUTO-ENTMCNC: 32.6 G/DL (ref 31.4–37.4)
MCV RBC AUTO: 92 FL (ref 82–98)
MONOCYTES # BLD AUTO: 0.75 THOUSAND/ÂΜL (ref 0.17–1.22)
MONOCYTES NFR BLD AUTO: 9 % (ref 4–12)
MUCOUS THREADS UR QL AUTO: ABNORMAL
NEUTROPHILS # BLD AUTO: 3.78 THOUSANDS/ÂΜL (ref 1.85–7.62)
NEUTS SEG NFR BLD AUTO: 44 % (ref 43–75)
NITRITE UR QL STRIP: NEGATIVE
NON-SQ EPI CELLS URNS QL MICRO: ABNORMAL /HPF
NRBC BLD AUTO-RTO: 0 /100 WBCS
PH UR STRIP.AUTO: 6 [PH]
PLATELET # BLD AUTO: 238 THOUSANDS/UL (ref 149–390)
PMV BLD AUTO: 10.9 FL (ref 8.9–12.7)
POTASSIUM SERPL-SCNC: 3.3 MMOL/L (ref 3.5–5.3)
PROT SERPL-MCNC: 5.7 G/DL (ref 6.4–8.4)
PROT UR STRIP-MCNC: ABNORMAL MG/DL
RBC # BLD AUTO: 3.99 MILLION/UL (ref 3.81–5.12)
RBC #/AREA URNS AUTO: ABNORMAL /HPF
SODIUM SERPL-SCNC: 143 MMOL/L (ref 135–147)
SP GR UR STRIP.AUTO: 1.04 (ref 1–1.03)
UROBILINOGEN UR STRIP-ACNC: <2 MG/DL
WBC # BLD AUTO: 8.65 THOUSAND/UL (ref 4.31–10.16)
WBC #/AREA URNS AUTO: ABNORMAL /HPF

## 2024-01-30 PROCEDURE — G1004 CDSM NDSC: HCPCS

## 2024-01-30 PROCEDURE — 96366 THER/PROPH/DIAG IV INF ADDON: CPT

## 2024-01-30 PROCEDURE — 81001 URINALYSIS AUTO W/SCOPE: CPT

## 2024-01-30 PROCEDURE — 85025 COMPLETE CBC W/AUTO DIFF WBC: CPT | Performed by: NURSE PRACTITIONER

## 2024-01-30 PROCEDURE — 72131 CT LUMBAR SPINE W/O DYE: CPT

## 2024-01-30 PROCEDURE — 99284 EMERGENCY DEPT VISIT MOD MDM: CPT

## 2024-01-30 PROCEDURE — 96375 TX/PRO/DX INJ NEW DRUG ADDON: CPT

## 2024-01-30 PROCEDURE — 99284 EMERGENCY DEPT VISIT MOD MDM: CPT | Performed by: NURSE PRACTITIONER

## 2024-01-30 PROCEDURE — 72128 CT CHEST SPINE W/O DYE: CPT

## 2024-01-30 PROCEDURE — 96365 THER/PROPH/DIAG IV INF INIT: CPT

## 2024-01-30 PROCEDURE — 83690 ASSAY OF LIPASE: CPT | Performed by: NURSE PRACTITIONER

## 2024-01-30 PROCEDURE — 80053 COMPREHEN METABOLIC PANEL: CPT | Performed by: NURSE PRACTITIONER

## 2024-01-30 PROCEDURE — 97760 ORTHOTIC MGMT&TRAING 1ST ENC: CPT

## 2024-01-30 PROCEDURE — 36415 COLL VENOUS BLD VENIPUNCTURE: CPT | Performed by: NURSE PRACTITIONER

## 2024-01-30 RX ORDER — MORPHINE SULFATE 4 MG/ML
4 INJECTION, SOLUTION INTRAMUSCULAR; INTRAVENOUS ONCE
Status: COMPLETED | OUTPATIENT
Start: 2024-01-30 | End: 2024-01-30

## 2024-01-30 RX ORDER — SODIUM CHLORIDE, SODIUM GLUCONATE, SODIUM ACETATE, POTASSIUM CHLORIDE, MAGNESIUM CHLORIDE, SODIUM PHOSPHATE, DIBASIC, AND POTASSIUM PHOSPHATE .53; .5; .37; .037; .03; .012; .00082 G/100ML; G/100ML; G/100ML; G/100ML; G/100ML; G/100ML; G/100ML
1000 INJECTION, SOLUTION INTRAVENOUS ONCE
Status: COMPLETED | OUTPATIENT
Start: 2024-01-30 | End: 2024-01-30

## 2024-01-30 RX ORDER — KETOROLAC TROMETHAMINE 30 MG/ML
15 INJECTION, SOLUTION INTRAMUSCULAR; INTRAVENOUS ONCE
Status: COMPLETED | OUTPATIENT
Start: 2024-01-30 | End: 2024-01-30

## 2024-01-30 RX ORDER — HYDROCODONE BITARTRATE AND ACETAMINOPHEN 5; 325 MG/1; MG/1
1 TABLET ORAL EVERY 6 HOURS PRN
Qty: 20 TABLET | Refills: 0 | Status: SHIPPED | OUTPATIENT
Start: 2024-01-30 | End: 2024-02-01 | Stop reason: ALTCHOICE

## 2024-01-30 RX ADMIN — KETOROLAC TROMETHAMINE 15 MG: 30 INJECTION, SOLUTION INTRAMUSCULAR at 07:12

## 2024-01-30 RX ADMIN — MORPHINE SULFATE 4 MG: 4 INJECTION INTRAVENOUS at 07:12

## 2024-01-30 RX ADMIN — SODIUM CHLORIDE, SODIUM GLUCONATE, SODIUM ACETATE, POTASSIUM CHLORIDE, MAGNESIUM CHLORIDE, SODIUM PHOSPHATE, DIBASIC, AND POTASSIUM PHOSPHATE 1000 ML: .53; .5; .37; .037; .03; .012; .00082 INJECTION, SOLUTION INTRAVENOUS at 07:12

## 2024-01-30 NOTE — Clinical Note
Linda Celeste was seen and treated in our emergency department on 1/30/2024.                Diagnosis:     Linda  may return to work on return date.    She may return on this date: 02/05/2024         If you have any questions or concerns, please don't hesitate to call.      AMAURI Fish    ______________________________           _______________          _______________  Hospital Representative                              Date                                Time

## 2024-01-30 NOTE — TELEPHONE ENCOUNTER
Patient called into [facility] today 01/30 and left v/m @2:55pm.    Returned patient's call @3:20pm.      Additional Information   Negative: Is this related to a work injury?   Negative: Is this related to an MVA?   Negative: Are you currently recieving homecare services?    Background - Initial Assessment  Clinical complaint: ED visit today due to Bilateral-Mid-Thoracic Back Pain. Patient states this is a new pain that started 2.5 weeks ago. No radiation. No numbness or tingling. NKI. Seen by FM on 01/02 for mid lower back pain, Doctor prescribed muscle relaxer but pain did not go away, also referred patient to PT. Patient states pain is constant. Patient described pain as stabbing. Had a CT on 01/30 and was referred to [facility] for Herniated Thoracic Disc.  Date of onset: 2.5 weks ago.  Frequency of pain: constant  Quality of pain: stabbing.    Protocols used: [facility] Protocol

## 2024-01-30 NOTE — PHYSICAL THERAPY NOTE
Physical Therapy Orthotic Fit Note    MD Ordered TLSO, PT contacted for fit. PT obtained TLSO and provided to patient. Adjusted TLSO for appropriate fit to pt. With demonstration on Donning/doffing with pt. Demonstrating ability to complete. Educated on importance of skin integrity and spinal precautions. Also educated on wear time for only OOB mobility. Pt. Verbalized understanding.    Payton Dobbins

## 2024-01-30 NOTE — TELEPHONE ENCOUNTER
Additional Information   Negative: Has the patient had unexplained weight loss?   Negative: Does the patient have a fever?   Negative: Is the patient experiencing blood in sputum?   Negative: Is this a chronic condition?   Negative: Is the patient experiencing urine retention?   Negative: Is the patient experiencing acute drop foot or paralysis?   Negative: Has the patient experienced major trauma? (fall from height, high speed collision, direct blow to spine) and is also experiencing nausea, light-headedness, or loss of consciousness?    Protocols used: [facility] Protocol    This RN did review in detail the [facility] and what we can provide for their back pain.  Patient is agreeable to being triaged by this RN and would like to proceed with Physical Therapy.    Referral was placed for Physical Therapy at the [place] site. Patients information was sent to the  to make evaluation appointment. Patient made aware that the PT office  will be calling to schedule the appointment.  Patient was provided with the phone number to the PT office.    No further questions and/or concerns were voiced by the patient at this time. Patient states understanding of the referral that was placed.    Referral Closed.

## 2024-01-30 NOTE — ED PROVIDER NOTES
History  Chief Complaint   Patient presents with    Back Pain     Pt states was here on Tuesday for L sided flank pain. States was also seen by PCP, given pain meds and not getting relief. Pt states pain radiates across lower back. Pt reports mild dysuria.      Is a 58-year-old female presenting here with a chief complaint of mid back pain.  She was seen twice for this already once here and once by her primary care physician prescribed some corticosteroid therapy and some muscle relaxers without any real relief.  She has some mild right-sided radiculopathy but no saddle anesthesia or incontinence.  No difficulty breathing.  Pain is exacerbated by certain movements certain position changes.  She has tenderness along the parathoracic vertebrals musculature.      Back Pain  Associated symptoms: no abdominal pain, no chest pain, no dysuria, no fever and no headaches        Prior to Admission Medications   Prescriptions Last Dose Informant Patient Reported? Taking?   FLUoxetine (PROzac) 20 MG tablet   No No   Sig: TAKE 3 TABLETS BY MOUTH EVERY DAY   HYDROcodone-acetaminophen (Norco) 5-325 mg per tablet   No No   Sig: Take 1 tablet by mouth every 6 (six) hours as needed for pain for up to 10 days Max Daily Amount: 4 tablets   aspirin 81 mg chewable tablet   No No   Sig: Chew 1 tablet (81 mg total) daily Do not start before September 15, 2023.   atorvastatin (LIPITOR) 40 mg tablet   No No   Sig: Take 1 tablet (40 mg total) by mouth daily with dinner   cyclobenzaprine (FLEXERIL) 10 mg tablet   No No   Sig: Take 1 tablet (10 mg total) by mouth 3 (three) times a day as needed for muscle spasms   ibuprofen (MOTRIN) 800 mg tablet   No No   Sig: Take 1 tablet (800 mg total) by mouth 3 (three) times a day   predniSONE 20 mg tablet   No No   Sig: Take 2 tablets (40 mg total) by mouth daily for 4 days      Facility-Administered Medications: None       Past Medical History:   Diagnosis Date    Anxiety 06/20/2016    Last assessed     Chronic infective otitis externa of left ear 09/18/2017    Last assessed    GERD (gastroesophageal reflux disease)     Right lumbar radiculopathy 05/05/2016    Last assessed    Sciatic leg pain        Past Surgical History:   Procedure Laterality Date    APPENDECTOMY      CHOLECYSTECTOMY      KNEE ARTHROSCOPY      torn menicus    CA LAMNOTMY INCL W/DCMPRSN NRV ROOT 1 INTRSPC LUMBR Right 7/6/2016    Procedure: L3/4 FORAMINOTOMY;  Surgeon: Merary Lugo MD;  Location: [facility];  Service: Neurosurgery    TONSILLECTOMY         Family History   Problem Relation Age of Onset    No Known Problems Mother     Alcohol abuse Father     Diabetes Paternal Grandmother     Hypertension Paternal Grandmother     Breast cancer Maternal Aunt     Cancer Family      I have reviewed and agree with the history as documented.    E-Cigarette/Vaping    E-Cigarette Use Never User      E-Cigarette/Vaping Substances    Nicotine No     THC No     CBD No     Flavoring No     Other No     Unknown No      Social History     Tobacco Use    Smoking status: Never    Smokeless tobacco: Never    Tobacco comments:     Per allscripts - former smoker   Vaping Use    Vaping status: Never Used   Substance Use Topics    Alcohol use: Yes    Drug use: No       Review of Systems   Constitutional:  Negative for diaphoresis, fatigue and fever.   HENT:  Negative for congestion, ear pain, nosebleeds and sore throat.    Eyes:  Negative for photophobia, pain, discharge and visual disturbance.   Respiratory:  Negative for cough, choking, chest tightness, shortness of breath and wheezing.    Cardiovascular:  Negative for chest pain and palpitations.   Gastrointestinal:  Negative for abdominal distention, abdominal pain, diarrhea and vomiting.   Genitourinary:  Negative for dysuria, flank pain and frequency.   Musculoskeletal:  Positive for back pain. Negative for gait problem and joint swelling.   Skin:  Negative for color change and rash.   Neurological:  Negative for  dizziness, syncope and headaches.   Psychiatric/Behavioral:  Negative for behavioral problems and confusion. The patient is not nervous/anxious.    All other systems reviewed and are negative.      Physical Exam  Physical Exam  Vitals and nursing note reviewed.   Constitutional:       General: She is not in acute distress.     Appearance: She is well-developed. She is not ill-appearing or toxic-appearing.   HENT:      Head: Normocephalic and atraumatic.      Nose: No rhinorrhea.      Mouth/Throat:      Mouth: Mucous membranes are moist.      Dentition: Normal dentition.   Eyes:      General:         Right eye: No discharge.         Left eye: No discharge.   Cardiovascular:      Rate and Rhythm: Normal rate and regular rhythm.   Pulmonary:      Effort: Pulmonary effort is normal. No accessory muscle usage or respiratory distress.   Abdominal:      General: There is no distension.      Tenderness: There is no guarding.   Musculoskeletal:         General: Normal range of motion.      Cervical back: Normal range of motion and neck supple. No rigidity.      Thoracic back: Spasms and tenderness present.   Skin:     General: Skin is warm and dry.   Neurological:      Mental Status: She is alert and oriented to person, place, and time.      Coordination: Coordination normal.   Psychiatric:         Behavior: Behavior is cooperative.         Vital Signs  ED Triage Vitals   Temperature Pulse Respirations Blood Pressure SpO2   01/30/24 0604 01/30/24 0604 01/30/24 0604 01/30/24 0604 01/30/24 0604   98 °F (36.7 °C) 80 18 164/74 100 %      Temp Source Heart Rate Source Patient Position - Orthostatic VS BP Location FiO2 (%)   01/30/24 0604 01/30/24 0604 01/30/24 0604 01/30/24 0604 --   Temporal Monitor Sitting Left arm       Pain Score       01/30/24 0712       8           Vitals:    01/30/24 0604   BP: 164/74   Pulse: 80   Patient Position - Orthostatic VS: Sitting         Visual Acuity      ED Medications  Medications    multi-electrolyte (ISOLYTE-S PH 7.4) bolus 1,000 mL (0 mL Intravenous Stopped 1/30/24 0856)   ketorolac (TORADOL) injection 15 mg (15 mg Intravenous Given 1/30/24 0712)   morphine injection 4 mg (4 mg Intravenous Given 1/30/24 0712)       Diagnostic Studies  Results Reviewed       Procedure Component Value Units Date/Time    Comprehensive metabolic panel [[id number]]  (Abnormal) Collected: 01/30/24 0711    Lab Status: Final result Specimen: Blood from Arm, Right Updated: 01/30/24 0734     Sodium 143 mmol/L      Potassium 3.3 mmol/L      Chloride 108 mmol/L      CO2 31 mmol/L      ANION GAP 4 mmol/L      BUN 22 mg/dL      Creatinine 0.89 mg/dL      Glucose 86 mg/dL      Calcium 9.1 mg/dL      AST 10 U/L      ALT 12 U/L      Alkaline Phosphatase 82 U/L      Total Protein 5.7 g/dL      Albumin 3.8 g/dL      Total Bilirubin 0.37 mg/dL      eGFR 70 ml/min/1.73sq m     Narrative:      National Kid[employer] Foundation guidelines for Chronic Kidney Disease (CKD):     Stage 1 with normal or high GFR (GFR > 90 mL/min/1.73 square meters)    Stage 2 Mild CKD (GFR = 60-89 mL/min/1.73 square meters)    Stage 3A Moderate CKD (GFR = 45-59 mL/min/1.73 square meters)    Stage 3B Moderate CKD (GFR = 30-44 mL/min/1.73 square meters)    Stage 4 Severe CKD (GFR = 15-29 mL/min/1.73 square meters)    Stage 5 End Stage CKD (GFR <15 mL/min/1.73 square meters)  Note: GFR calculation is accurate only with a steady state creatinine    Lipase [[id number]]  (Abnormal) Collected: 01/30/24 0711    Lab Status: Final result Specimen: Blood from Arm, Right Updated: 01/30/24 0734     Lipase 10 u/L     CBC and differential [[id number]]  (Abnormal) Collected: 01/30/24 0711    Lab Status: Final result Specimen: Blood from Arm, Right Updated: 01/30/24 0720     WBC 8.65 Thousand/uL      RBC 3.99 Million/uL      Hemoglobin 11.9 g/dL      Hematocrit 36.5 %      MCV 92 fL      MCH 29.8 pg      MCHC 32.6 g/dL      RDW 12.4 %      MPV 10.9 fL      Platelets 238  Thousands/uL      nRBC 0 /100 WBCs      Neutrophils Relative 44 %      Immat GRANS % 1 %      Lymphocytes Relative 45 %      Monocytes Relative 9 %      Eosinophils Relative 0 %      Basophils Relative 1 %      Neutrophils Absolute 3.78 Thousands/µL      Immature Grans Absolute 0.04 Thousand/uL      Lymphocytes Absolute 4.01 Thousands/µL      Monocytes Absolute 0.75 Thousand/µL      Eosinophils Absolute 0.03 Thousand/µL      Basophils Absolute 0.04 Thousands/µL     Urine Microscopic [[id number]]  (Abnormal) Collected: 01/30/24 0616    Lab Status: Final result Specimen: Urine, Clean Catch Updated: 01/30/24 0640     RBC, UA 4-10 /hpf      WBC, UA 1-2 /hpf      Epithelial Cells Occasional /hpf      Bacteria, UA None Seen /hpf      MUCUS THREADS Innumerable     Hyaline Casts, UA 0-3 /lpf     UA w Reflex to Microscopic w Reflex to Culture [[id number]]  (Abnormal) Collected: 01/30/24 0616    Lab Status: Final result Specimen: Urine, Clean Catch Updated: 01/30/24 0625     Color, UA Yellow     Clarity, UA Clear     Specific Gravity, UA 1.039     pH, UA 6.0     Leukocytes, UA Small     Nitrite, UA Negative     Protein, UA Trace mg/dl      Glucose, UA Negative mg/dl      Ketones, UA Negative mg/dl      Urobilinogen, UA <2.0 mg/dl      Bilirubin, UA Negative     Occult Blood, UA Trace                   CT spine thoracic & lumbar wo contrast   Final Result by Cm Brown MD (01/30 0753)      No acute osseous abnormality of thoracic or lumbar spine.      Chronic appearing T10 superior endplate compression fracture deformity with mild height loss and small intravertebral herniation.      Degenerative changes, as detailed above.      Additional chronic/incidental findings as detailed above.            Workstation performed: [id number]                    Procedures  Procedures         ED Course                               SBIRT 22yo+      Flowsheet Row Most Recent Value   Initial Alcohol Screen: US AUDIT-C     1. How  often do you have a drink containing alcohol? 0 Filed at: 01/30/2024 0620   2. How many drinks containing alcohol do you have on a typical day you are drinking?  0 Filed at: 01/30/2024 0620   3a. Male UNDER 65: How often do you have five or more drinks on one occasion? 0 Filed at: 01/30/2024 0620   3b. FEMALE Any Age, or MALE 65+: How often do you have 4 or more drinks on one occassion? 0 Filed at: 01/30/2024 0620   Audit-C Score 0 Filed at: 01/30/2024 0620   ABIGAIL: How many times in the past year have you...    Used an illegal drug or used a prescription medication for non-medical reasons? Never Filed at: 01/30/2024 0620                      Medical Decision Making  And disc herniation likely the source of the patient's discomfort.  Ambulatory referral to the comprehensive spine program.  Patient fitted for a thoracic spine brace.    Amount and/or Complexity of Data Reviewed  Labs: ordered.  Radiology: ordered.    Risk  Prescription drug management.             Disposition  Final diagnoses:   Herniated thoracic disc without myelopathy     Time reflects when diagnosis was documented in both MDM as applicable and the Disposition within this note       Time User Action Codes Description Comment    1/30/2024  8:53 AM Fredrick Jeffers Add [M51.24] Herniated thoracic disc without myelopathy           ED Disposition       ED Disposition   Discharge    Condition   Stable    Date/Time   Tue Jan 30, 2024 0852    Comment   Linda Celeste discharge to home/self care.                   Follow-up Information       Follow up With Specialties Details Why Contact Info Additional Information    [facility] Physical Therapy Go to   [phone]            Discharge Medication List as of 1/30/2024  8:55 AM        START taking these medications    Details   !! HYDROcodone-acetaminophen (NORCO) 5-325 mg per tablet Take 1 tablet by mouth every 6 (six) hours as needed for pain for up to 20 doses Max Daily  Amount: 4 tablets, Starting Tue 1/30/2024, Normal       !! - Potential duplicate medications found. Please discuss with provider.        CONTINUE these medications which have NOT CHANGED    Details   aspirin 81 mg chewable tablet Chew 1 tablet (81 mg total) daily Do not start before September 15, 2023., Starting Fri 9/15/2023, Normal      atorvastatin (LIPITOR) 40 mg tablet Take 1 tablet (40 mg total) by mouth daily with dinner, Starting Fri 10/20/2023, Normal      cyclobenzaprine (FLEXERIL) 10 mg tablet Take 1 tablet (10 mg total) by mouth 3 (three) times a day as needed for muscle spasms, Starting Thu 1/25/2024, Normal      FLUoxetine (PROzac) 20 MG tablet TAKE 3 TABLETS BY MOUTH EVERY DAY, Normal      !! HYDROcodone-acetaminophen (Norco) 5-325 mg per tablet Take 1 tablet by mouth every 6 (six) hours as needed for pain for up to 10 days Max Daily Amount: 4 tablets, Starting Tue 1/23/2024, Until Fri 2/2/2024 at 2359, Normal      ibuprofen (MOTRIN) 800 mg tablet Take 1 tablet (800 mg total) by mouth 3 (three) times a day, Starting Tue 1/23/2024, Normal       !! - Potential duplicate medications found. Please discuss with provider.        STOP taking these medications       predniSONE 20 mg tablet Comments:   Reason for Stopping:                   PDMP Review         Value Time User    PDMP Reviewed  Yes 2/16/2023  8:25 AM AMAURI Markham            ED Provider  Electronically Signed by             AMAURI Fish  01/30/24 1038     Buttocks debridement

## 2024-02-01 ENCOUNTER — OFFICE VISIT (OUTPATIENT)
Dept: FAMILY MEDICINE CLINIC | Facility: CLINIC | Age: 59
End: 2024-02-01
Payer: COMMERCIAL

## 2024-02-01 VITALS
TEMPERATURE: 98.5 F | DIASTOLIC BLOOD PRESSURE: 82 MMHG | HEART RATE: 78 BPM | HEIGHT: 61 IN | SYSTOLIC BLOOD PRESSURE: 148 MMHG | WEIGHT: 147.6 LBS | OXYGEN SATURATION: 100 % | BODY MASS INDEX: 27.87 KG/M2

## 2024-02-01 DIAGNOSIS — M51.24 HERNIATED THORACIC DISC WITHOUT MYELOPATHY: Primary | ICD-10-CM

## 2024-02-01 PROCEDURE — 99214 OFFICE O/P EST MOD 30 MIN: CPT | Performed by: NURSE PRACTITIONER

## 2024-02-01 RX ORDER — OXYCODONE HYDROCHLORIDE AND ACETAMINOPHEN 5; 325 MG/1; MG/1
1 TABLET ORAL EVERY 8 HOURS PRN
Qty: 10 TABLET | Refills: 0 | Status: SHIPPED | OUTPATIENT
Start: 2024-02-01

## 2024-02-01 NOTE — ASSESSMENT & PLAN NOTE
To stop [place].  Will give 10 tablets of Percocet for pain relief.  To proceed with physical therapy for the initial evaluation via the spine and pain program.

## 2024-02-01 NOTE — PROGRESS NOTES
Assessment/Plan:    Herniated thoracic disc without myelopathy  To stop Norco.  Will give 10 tablets of Percocet for pain relief.  To proceed with physical therapy for the initial evaluation via the spine and pain program.       Diagnoses and all orders for this visit:    Herniated thoracic disc without myelopathy  -     oxyCODONE-acetaminophen (Percocet) 5-325 mg per tablet; Take 1 tablet by mouth every 8 (eight) hours as needed for moderate pain Max Daily Amount: 3 tablets          Subjective:      Patient ID: Linda Celeste is a 58 y.o. female.    Linda presents reporting continued back pain.  She was reevaluated at the ER on 1/30/2024.  A CT was obtained which revealed chronic appearing T10 superior endplate compression fracture deformity with mild height loss and small intravertebral herniation. Norco was prescribed but ineffective.  She was referred to the comprehensive spine program and is to begin physical therapy.        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 30 tablet 0    atorvastatin  "(LIPITOR) 40 mg tablet Take 1 tablet (40 mg total) by mouth daily with dinner 90 tablet 3    cyclobenzaprine (FLEXERIL) 10 mg tablet Take 1 tablet (10 mg total) by mouth 3 (three) times a day as needed for muscle spasms 30 tablet 0    FLUoxetine (PROzac) 20 MG tablet TAKE 3 TABLETS BY MOUTH EVERY  tablet 0    ibuprofen (MOTRIN) 800 mg tablet Take 1 tablet (800 mg total) by mouth 3 (three) times a day 21 tablet 0    oxyCODONE-acetaminophen (Percocet) 5-325 mg per tablet Take 1 tablet by mouth every 8 (eight) hours as needed for moderate pain Max Daily Amount: 3 tablets 10 tablet 0     No current facility-administered medications for this visit.     She is allergic to prednisone and erythromycin..    Review of Systems   Constitutional: Negative.    HENT: Negative.     Eyes: Negative.    Respiratory: Negative.     Cardiovascular: Negative.    Gastrointestinal: Negative.    Endocrine: Negative.    Genitourinary: Negative.    Musculoskeletal:  Positive for back pain.   Skin: Negative.    Allergic/Immunologic: Negative.    Neurological: Negative.  Negative for numbness.   Hematological: Negative.    Psychiatric/Behavioral: Negative.           /82 (BP Location: Left arm, Patient Position: Sitting)   Pulse 78   Temp 98.5 °F (36.9 °C)   Ht 5' 1\" (1.549 m)   Wt 67 kg (147 lb 9.6 oz)   SpO2 100%   BMI 27.89 kg/m²     Objective:     Physical Exam  Vitals and nursing note reviewed.   Constitutional:       General: She is not in acute distress.     Appearance: Normal appearance. She is well-developed. She is not ill-appearing, toxic-appearing or diaphoretic.   HENT:      Head: Normocephalic and atraumatic.   Eyes:      Conjunctiva/sclera: Conjunctivae normal.   Cardiovascular:      Rate and Rhythm: Normal rate and regular rhythm.      Heart sounds: Normal heart sounds. No murmur heard.  Pulmonary:      Effort: Pulmonary effort is normal. No respiratory distress.      Breath sounds: Normal breath sounds. No " wheezing or rales.   Chest:      Chest wall: No tenderness.   Musculoskeletal:      Cervical back: Neck supple.   Neurological:      General: No focal deficit present.      Mental Status: She is alert and oriented to person, place, and time.   Psychiatric:         Mood and Affect: Mood normal.         Behavior: Behavior normal.         Thought Content: Thought content normal.         Judgment: Judgment normal.

## 2024-02-12 ENCOUNTER — EVALUATION (OUTPATIENT)
Dept: PHYSICAL THERAPY | Facility: CLINIC | Age: 59
End: 2024-02-12
Payer: COMMERCIAL

## 2024-02-12 ENCOUNTER — DOCUMENTATION (OUTPATIENT)
Dept: FAMILY MEDICINE CLINIC | Facility: CLINIC | Age: 59
End: 2024-02-12

## 2024-02-12 DIAGNOSIS — M54.50 MIDLINE LOW BACK PAIN WITHOUT SCIATICA, UNSPECIFIED CHRONICITY: ICD-10-CM

## 2024-02-12 PROCEDURE — 97161 PT EVAL LOW COMPLEX 20 MIN: CPT

## 2024-02-12 PROCEDURE — 97110 THERAPEUTIC EXERCISES: CPT

## 2024-02-12 NOTE — PROGRESS NOTES
PT Evaluation  and PT Discharge    Today's date: 2024  Patient name: Linda Celeste  : 1965  MRN: [id number]  Referring provider: Vanda Thakur CRNP  Dx:   Encounter Diagnosis     ICD-10-CM    1. Midline low back pain without sciatica, unspecified chronicity  M54.50 Ambulatory referral to Physical Therapy          Start Time: 1226  Stop Time: 1308  Total time in clinic (min): 42 minutes    Assessment  Assessment details: Pt is a 58 y.o. female presenting to PT services with c/o LBP. Pertinent negatives include: passive/active SLR, crossed SLR, and negative repeated lumbar movements. Pt does have positive quadrant test bilaterally indicating possible facet joint involvement, however, has no favorable response to manual therapy. Pt has no radicular symptoms into LE, pt does have radicular pain into anterior ribs around T12. Pt has BLE strength and sensation WNL. Pt has painful and limited lumbar AROM R lateral flexion > L lateral flexion > forward flexion > extension. Pt has TTP along T8-S1 and TTP along bilateral paraspinal musculature. PT discussed referral to spine and pain management due to no clear etiology of pain and no favorable response to mechanical movements. PT informed pt that if she has any questions or concerns, she is welcome to contact facility at any time. Pt is discharged from skilled physical therapy and will benefit from initiation of care with spin and pain management.   Impairments: abnormal gait, abnormal or restricted ROM, abnormal movement, activity intolerance, pain with function, safety issue and poor posture     Goals  STG + LTG (1 week):  1. PT will perform IE to determine appropriateness for skilled physical therapy GOAL MET   2. PT will answer all pt questions to pt's satisfaction GOAL MET    Plan  Therapy options: spine and pain management.  Frequency: 1x week  Duration in weeks: 1  Plan of Care beginning date: 2024  Plan of Care expiration date: 2024  Treatment  "plan discussed with: patient        Subjective Evaluation    History of Present Illness  Mechanism of injury: Pt states that he got home from work one day and her back was in terrible pain. Any time she would lean forward, her pain would grab and it would make her stop what she was doing immediately. She states that at first she had pain on her L flank. She went to the hospital and found nothing wrong with her kidneys. Then, she went to her PCP who prescribed pain medications and it didn't help. Then she states that she went back to the hospital and the pain moved to the middle of her back under her ribs. She states that she still feels it is her kidneys. She states that the pain does go away with the new pain killers, but she doesn't like the pain killers. She states that the hospital gave her a TLSO and she states it helps her to remember to sit up straight.   Patient Goals  Patient goals for therapy: decreased pain, increased motion, increased strength, return to work and return to sport/leisure activities  Patient goal: \"get rid of the brace, move better.\"  Pain  Current pain ratin  At best pain ratin  At worst pain ratin  Location: bilateral T10 to anterior aspect of ribs  Quality: grabbing.  Relieving factors: heat (laying down, pain killers)  Exacerbated by: bending forward, vacuuming, cleaning the house.    Social Support  Steps to enter house: yes (3 steps, 1 hand rail - ramp option as well)  Stairs in house: yes (2 flights, bilateral hand rails - stair lift)   Lives in: multiple-level home  Lives with: spouse (and MIL, 2 cats)    Employment status: working ( - out of work right now)  Hand dominance: right      Diagnostic Tests  Abnormal CT scan: No acute osseous abnormality of thoracic or lumbar spine. Chronic appearing T10 superior endplate compression fracture deformity with mild height loss and small intravertebral herniation. Degenerative changes, as detailed above..    FCE " comments:   Treatments  Previous treatment: medication        Objective     Tenderness     Additional Tenderness Details  No TTP    Active Range of Motion     Lumbar   Flexion:  with pain Restriction level: moderate  Extension:  with pain Restriction level: minimal  Left lateral flexion:  with pain Restriction level: minimal  Right lateral flexion:  with pain Restriction level: moderate    Joint Play     Hypomobile: T9, T10, T11, T12, L1, L2, L3, L4, L5 and S1     Pain: T9, T10, T11, T12, L1, L2 and L3   Mechanical Assessment    Cervical      Thoracic      Lumbar    Lying extension: repeated movements  Pain location: no change    Tests     Lumbar     Left   Positive quadrant.   Negative crossed SLR and passive SLR.     Right   Positive quadrant.   Negative crossed SLR and passive SLR.     Left Pelvic Girdle/Sacrum   Negative: active SLR test.     Right Pelvic Girdle/Sacrum   Negative: active SLR test.              Precautions: Anxiety, GERD     POC expires Unit limit Auth Expiration date PT/OT/ST + Visit Limit?   2/19/24 3 12/31/24 BOMN                           Visit/Unit Tracking  AUTH Status:  Date 2/12              Not required Used 1               Remaining                      Date 2/12            Re-Eval             FOTO             Manuals                                                                 Neuro Re-Ed                                                                                                        Ther Ex             Pt edu SC - eval findings, POC                                                                                                       Ther Activity                                       Gait Training                                       Modalities

## 2024-02-15 ENCOUNTER — TELEPHONE (OUTPATIENT)
Dept: FAMILY MEDICINE CLINIC | Facility: CLINIC | Age: 59
End: 2024-02-15

## 2024-02-15 NOTE — TELEPHONE ENCOUNTER
Tried calling patient but no answer not sure if the phone number listed is correct.  Called spouse and gave him the message regarding the FMLA paperwork has been completed by Vanda and the fee is $30 and we can only accept cash or check.  He communicated understanding.  Thanks

## 2024-02-20 NOTE — PROGRESS NOTES
Assessment:  1. Chronic bilateral low back pain without sciatica    2. Lumbar spondylosis    3. Lumbar facet arthropathy    4. Personal history of spine surgery    5. Thoracolumbar back pain    6. Myofascial pain syndrome        Plan:  Orders Placed This Encounter   Procedures    FL spine and pain procedure     Standing Status:   Future     Standing Expiration Date:   2/21/2028     Order Specific Question:   Reason for Exam:     Answer:   TPI USGI     Order Specific Question:   Anticoagulant hold needed?     Answer:   No     Order Specific Question:   Is the patient pregnant?     Answer:   Unknown    Ambulatory referral to Physical Therapy     Standing Status:   Future     Standing Expiration Date:   2/21/2025     Referral Priority:   Routine     Referral Type:   Physical Therapy     Referral Reason:   Specialty Services Required     Requested Specialty:   Physical Therapy     Number of Visits Requested:   1     Expiration Date:   2/20/2025       No orders of the defined types were placed in this encounter.      My impressions and treatment recommendations were discussed in detail with the patient, who verbalized understanding and had no further questions.    This is a 58-year-old female presents her office with about 1 month of increasing thoracolumbar pain.  Has notable pain for about the T12-L3 level with tenderness palpation bilaterally.  Has spine surgery in the past, unclear level.  She has notable facet loading on exam.  She also has notable upper lumbar facet disease and advanced degenerative disease.  Appears that she may be suffering from element strain and likely aggravation of underlying degenerative facet disease.  She was evaluated once by land-based physical therapy and not felt to be a good candidate.  She may benefit from aquatic therapy and referral was provided today.    For her myofascial complaints, we will start with trigger point injection and see if this provides any benefit.  If there is  "still ongoing axial pain, then may consider bilateral L2-3 and L3-4 medial branch block with possible radiofrequency ablatin.  Information about the procedures was provided today.      [place] Prescription Drug Monitoring Program report was reviewed and was appropriate     Complete risks and benefits including bleeding, infection, tissue reaction, nerve injury and allergic reaction were discussed. The approach was demonstrated using models and literature was provided. Verbal and written consent was obtained.    Discharge instructions were provided. I personally saw and examined the patient and I agree with the above discussed plan of care.    History of Present Illness:    Linda Celeste is a 58 y.o. female who presents to [facility] for initial evaluation of the above stated pain complaints. The patient has a past medical and chronic pain history as outlined in the assessment section. She was referred by Referral Self  No address on file .    Reports 1 month of thoracolumbar pain undetermined cause.  Moderate to severe over the past month.  6 and 10.  Intermittent.  Worse in the afternoon and evening.  Sharp in nature.    Increased with standing, bending, walking, exercise, bowel movement.  Decreased with lying down, sitting, relaxation.    She was given referral to physical therapy however went for initial evaluation and was discharged:    \"PT discussed referral to spine and pain management due to no clear etiology of pain and no favorable response to mechanical movements. \"    She reports a \"grabbing\" pain in the mid back especially with twisting. It travels along the inferior ribcage bilaterally to the midaxillary line. Has history of T10 compression fracture- chronic. Denies radiation down the leg. Patient points to the thoracolumbar junction as the source of most of her pain.     Past medical history includes depression, high cholesterol.    No tobacco or marijuana use.  Not " allergic to latex or contrast dye.  Reports moderate leaf with heat/ice therapy.    In the past hydrocodone and oxycodone have provided relief.    In the past is also used acetaminophen, ibuprofen, Skelaxin, gabapentin    Review of Systems:    Review of Systems   Constitutional:         Weight gain   Eyes:  Positive for visual disturbance.   Cardiovascular:  Positive for palpitations.   Endocrine: Positive for polyuria.   Genitourinary:  Positive for hematuria.   Musculoskeletal:  Positive for myalgias.   Psychiatric/Behavioral:  Positive for decreased concentration. The patient is nervous/anxious.         Depression           Past Medical History:   Diagnosis Date    Anxiety 06/20/2016    Last assessed    Chronic infective otitis externa of left ear 09/18/2017    Last assessed    GERD (gastroesophageal reflux disease)     Right lumbar radiculopathy 05/05/2016    Last assessed    Sciatic leg pain        Past Surgical History:   Procedure Laterality Date    APPENDECTOMY      CHOLECYSTECTOMY      KNEE ARTHROSCOPY      torn menicus    AR LAMNOTMY INCL W/DCMPRSN NRV ROOT 1 INTRSPC LUMBR Right 7/6/2016    Procedure: L3/4 FORAMINOTOMY;  Surgeon: Merary Lugo MD;  Location: [facility];  Service: Neurosurgery    TONSILLECTOMY         Family History   Problem Relation Age of Onset    No Known Problems Mother     Alcohol abuse Father     Diabetes Paternal Grandmother     Hypertension Paternal Grandmother     Breast cancer Maternal Aunt     Cancer Family        Social History     Occupational History    Not on file   Tobacco Use    Smoking status: Never    Smokeless tobacco: Never    Tobacco comments:     Per allscripts - former smoker   Vaping Use    Vaping status: Never Used   Substance and Sexual Activity    Alcohol use: Yes    Drug use: No    Sexual activity: Not Currently     Partners: Male         Current Outpatient Medications:     aspirin 81 mg chewable tablet, Chew 1 tablet (81 mg total) daily Do not start before  "September 15, 2023., Disp: 30 tablet, Rfl: 0    atorvastatin (LIPITOR) 40 mg tablet, Take 1 tablet (40 mg total) by mouth daily with dinner, Disp: 90 tablet, Rfl: 3    cyclobenzaprine (FLEXERIL) 10 mg tablet, Take 1 tablet (10 mg total) by mouth 3 (three) times a day as needed for muscle spasms, Disp: 30 tablet, Rfl: 0    FLUoxetine (PROzac) 20 MG tablet, TAKE 3 TABLETS BY MOUTH EVERY DAY, Disp: 270 tablet, Rfl: 0    ibuprofen (MOTRIN) 800 mg tablet, Take 1 tablet (800 mg total) by mouth 3 (three) times a day, Disp: 21 tablet, Rfl: 0    oxyCODONE-acetaminophen (Percocet) 5-325 mg per tablet, Take 1 tablet by mouth every 8 (eight) hours as needed for moderate pain Max Daily Amount: 3 tablets, Disp: 10 tablet, Rfl: 0    Allergies   Allergen Reactions    Prednisone Other (See Comments)     Annotation - [id number]: reaction within 2 hours: shaking, sweats, near syncope    Erythromycin Vomiting       Physical Exam:    /84   Pulse 87   Ht 5' 1\" (1.549 m)   Wt 67.1 kg (148 lb)   BMI 27.96 kg/m²     Constitutional: normal, well developed, well nourished, alert, in no distress and non-toxic and no overt pain behavior.  Eyes: anicteric  HEENT: grossly intact  Neck: supple, symmetric, trachea midline and no masses   Pulmonary:even and unlabored  Cardiovascular:No edema or pitting edema present  Skin:Normal without rashes or lesions and well hydrated  Psychiatric:Mood and affect appropriate  Neurologic:Cranial Nerves II-XII grossly intact  Musculoskeletal:normal    Lumbar Spine Exam    Appearance:  Normal lordosis  Palpation/Tenderness:  left lumbar paraspinal tenderness  right lumbar paraspinal tenderness  Notable tenderness to palpation at thoracolumbar regoin, most notably the L1-3 level  Sensory:  no sensory deficits noted  Range of Motion:  Flexion:  Moderately limited  with pain  Extension:  No limitation  without pain  Motor Strength:  Left hip flexion:  5/5  Left hip extension:  5/5  Right hip flexion:  " 5/5  Right hip extension:  5/5  Left knee flexion:  5/5  Left knee extension:  5/5  Right knee flexion:  5/5  Right knee extension:  5/5  Left foot dorsiflexion:  5/5  Left foot plantar flexion:  5/5  Right foot dorsiflexion:  5/5  Right foot plantar flexion:  5/5  Reflexes:  Left Patellar:  2+   Right Patellar:  2+   Left Achilles:  2+   Right Achilles:  2+   Special Tests:  Facet loading positive bilaterally facet loading positive bilaterally    Imaging    CT THORACIC AND LUMBAR SPINE          1/30/24     INDICATION:   pain.   Back Pain (Pt states was here on Tuesday for L sided flank pain. States was also seen by PCP, given pain meds and not getting relief. Pt states pain radiates across lower back. Pt reports mild dysuria. )        COMPARISON: CT abdomen pelvis without contrast 1/23/2024. CT soft tissue neck with contrast 1/19/2023. Chest radiograph 11/15/2022. CT renal stone study abdomen pelvis without contrast 6/27/2019. CT transforaminal injection 3/17/2016.     TECHNIQUE:  Contiguous axial images were obtained. Sagittal and coronal reconstructions were performed.     Radiation dose length product (DLP) for this visit:  1014 mGy-cm .  This examination, like all CT scans performed in the [facility], was performed utilizing techniques to minimize radiation dose exposure, including the use of iterative   reconstruction and automated exposure control.     IMAGE QUALITY:  Diagnostic.     FINDINGS:     ALIGNMENT: Mild S-shaped scoliosis of thoracolumbar spine. No listhesis.     VERTEBRAE:  No acute fracture. Chronic appearing T10 superior endplate compression fracture deformity with mild height loss and small intravertebral herniation. No lytic or blastic lesions.     DEGENERATIVE CHANGES: Mild thoracic and moderate lumbar multilevel degenerative disc disease with lumbar facet arthropathy, worse at L2-L3 and L3-L4. No critical central canal stenosis.     PARASPINAL SOFT TISSUES:  Unremarkable.      OTHER: Partially imaged cholecystectomy clips, left renal sinus cysts, nonspecific richard mesentery, mild scattered calcified atherosclerotic disease. Unchanged prominent spiculated sclerotic bone lesion in left sacral ala, likely bone island.     IMPRESSION:     No acute osseous abnormality of thoracic or lumbar spine.     Chronic appearing T10 superior endplate compression fracture deformity with mild height loss and small intravertebral herniation.     Degenerative changes, as detailed above.     Additional chronic/incidental findings as detailed above.        FL spine and pain procedure    (Results Pending)       Orders Placed This Encounter   Procedures    FL spine and pain procedure    Ambulatory referral to Physical Therapy

## 2024-02-21 ENCOUNTER — OFFICE VISIT (OUTPATIENT)
Dept: PAIN MEDICINE | Facility: CLINIC | Age: 59
End: 2024-02-21
Payer: COMMERCIAL

## 2024-02-21 VITALS
BODY MASS INDEX: 27.94 KG/M2 | HEART RATE: 87 BPM | WEIGHT: 148 LBS | DIASTOLIC BLOOD PRESSURE: 84 MMHG | SYSTOLIC BLOOD PRESSURE: 138 MMHG | HEIGHT: 61 IN

## 2024-02-21 DIAGNOSIS — M54.50 CHRONIC BILATERAL LOW BACK PAIN WITHOUT SCIATICA: Primary | ICD-10-CM

## 2024-02-21 DIAGNOSIS — G89.29 CHRONIC BILATERAL LOW BACK PAIN WITHOUT SCIATICA: Primary | ICD-10-CM

## 2024-02-21 DIAGNOSIS — M47.816 LUMBAR SPONDYLOSIS: ICD-10-CM

## 2024-02-21 DIAGNOSIS — M54.50 THORACOLUMBAR BACK PAIN: ICD-10-CM

## 2024-02-21 DIAGNOSIS — M79.18 MYOFASCIAL PAIN SYNDROME: ICD-10-CM

## 2024-02-21 DIAGNOSIS — M54.6 THORACOLUMBAR BACK PAIN: ICD-10-CM

## 2024-02-21 DIAGNOSIS — Z98.890 PERSONAL HISTORY OF SPINE SURGERY: ICD-10-CM

## 2024-02-21 DIAGNOSIS — M47.816 LUMBAR FACET ARTHROPATHY: ICD-10-CM

## 2024-02-21 PROCEDURE — 99244 OFF/OP CNSLTJ NEW/EST MOD 40: CPT | Performed by: STUDENT IN AN ORGANIZED HEALTH CARE EDUCATION/TRAINING PROGRAM

## 2024-02-21 NOTE — PATIENT INSTRUCTIONS
Trigger Point Injection   AMBULATORY CARE:   A trigger point injection  is used to relax a muscle knot. This helps relieve pain. You may be able to have more than one trigger point treated during a session.  How to prepare for a trigger point injection:   Your healthcare provider will tell you how to prepare. Arrange to have someone drive you home after the injection.    Tell your provider about all medicines you take, including pain medicine, blood thinners, and muscle relaxers. He or she will tell you if you need to stop any medicine for the injection, and when to stop. He or she will tell you which medicines to take or not take on the day of the injection.    Tell your provider about all your allergies, including to any pain medicine.    What will happen during a trigger point injection:   You may be sitting or lying, depending on where the trigger point is located. Your healthcare provider will feel for a knot in the muscle. He or she may jaspreet your skin over the knot.    Your provider will put a needle through your skin and into the trigger point. Saline (salt solution), pain relievers, or other medicines may be pushed through the needle into the trigger point. Your provider may use only a dry needle (no medicine). He or she will pull the needle almost all the way out and then push it in again. He or she will repeat this several times until the muscle stops twitching or feels relaxed.    Your provider will remove the needle and stretch the muscle area. He or she may apply pressure to the area for 2 minutes. A bandage will be put over the injection site to prevent bleeding or an infection.    What to expect after a trigger point injection:   You may feel pain relief right away. It is normal for some pain to start again 2 hours later. An ice pack or over-the-counter pain medicine can help lower the pain.    You may feel sore in the injection site for a few days. If you need another injection in the same area, wait  until the area is not sore.    Your healthcare provider may give you specific activity instructions to follow at home or recommend physical therapy. In general, you should try to stay active. Avoid strenuous activity for the first 3 or 4 days after the injection.    Do not have more injections if you still have trigger point pain after 2 or 3 injections.    Risks of a trigger point injection:  You may have a severe allergic reaction to pain medicine injected. The injection may be painful, or you may be sore where you got the injection. You may bleed, bruise, or develop an infection in the injection area. The injection may cause you to feel faint. Rarely, the needle may cause muscle or blood vessel damage or your lung may collapse if you get the injection near your chest.  Call your local emergency number (911 in the [place]), or have someone call if:   Your mouth and throat are swollen.    You are wheezing or have trouble breathing.    You have chest pain or your heart is beating faster than usual.    You feel like you are going to faint.    When should I seek immediate care?   Your face is red or swollen.    You have hives that spread over your body.    You feel weak or dizzy.    Call your doctor or pain specialist if:   You have a fever within 1 week of the injection.    You have redness or swelling within 1 week of the injection.    You have new or worsening pain near the injection site.    You have questions or concerns about your condition or care.    Self-care:   Stay active after you have trigger point injections.  Gently move your joints through their full range of motion during the first week. Avoid strenuous activity for 3 or 4 days.    Do regular stretches of the trigger point muscle.  Place gentle pressure on the trigger point, and then stretch the muscle. Ask your healthcare provider for more information about how to stretch and apply pressure.    Apply ice to the injection site.  Use an ice pack, or put ice  in a plastic bag. Cover the bag with a towel before you apply it. Apply ice for 15 to 20 minutes every hour, or as directed.    Apply heat to trigger point sites.  Heat can help relax muscles and relieve trigger point pain. Use a heat pack or a heating pad set on low. Apply heat for 15 minutes every hour, or as directed.    Follow up with your doctor or pain specialist as directed:  Write down your questions so you remember to ask them during your visits.  © Copyright [employer] 2023 Information is for End User's use only and may not be sold, redistributed or otherwise used for commercial purposes.  The above information is an  only. It is not intended as medical advice for individual conditions or treatments. Talk to your doctor, nurse or pharmacist before following any medical regimen to see if it is safe and effective for you.

## 2024-02-26 ENCOUNTER — PROCEDURE VISIT (OUTPATIENT)
Dept: PAIN MEDICINE | Facility: CLINIC | Age: 59
End: 2024-02-26
Payer: COMMERCIAL

## 2024-02-26 VITALS
HEART RATE: 80 BPM | BODY MASS INDEX: 27.94 KG/M2 | HEIGHT: 61 IN | WEIGHT: 148 LBS | SYSTOLIC BLOOD PRESSURE: 125 MMHG | DIASTOLIC BLOOD PRESSURE: 83 MMHG

## 2024-02-26 DIAGNOSIS — M79.18 MYOFASCIAL PAIN SYNDROME: ICD-10-CM

## 2024-02-26 PROCEDURE — 20553 NJX 1/MLT TRIGGER POINTS 3/>: CPT | Performed by: STUDENT IN AN ORGANIZED HEALTH CARE EDUCATION/TRAINING PROGRAM

## 2024-02-26 PROCEDURE — 76942 ECHO GUIDE FOR BIOPSY: CPT | Performed by: STUDENT IN AN ORGANIZED HEALTH CARE EDUCATION/TRAINING PROGRAM

## 2024-02-26 RX ORDER — METHYLPREDNISOLONE ACETATE 40 MG/ML
40 INJECTION, SUSPENSION INTRA-ARTICULAR; INTRALESIONAL; INTRAMUSCULAR; SOFT TISSUE ONCE
Status: COMPLETED | OUTPATIENT
Start: 2024-02-26 | End: 2024-02-26

## 2024-02-26 RX ORDER — BUPIVACAINE HYDROCHLORIDE 2.5 MG/ML
4 INJECTION, SOLUTION EPIDURAL; INFILTRATION; INTRACAUDAL ONCE
Status: COMPLETED | OUTPATIENT
Start: 2024-02-26 | End: 2024-02-26

## 2024-02-26 RX ADMIN — BUPIVACAINE HYDROCHLORIDE 4 ML: 2.5 INJECTION, SOLUTION EPIDURAL; INFILTRATION; INTRACAUDAL at 14:33

## 2024-02-26 RX ADMIN — METHYLPREDNISOLONE ACETATE 40 MG: 40 INJECTION, SUSPENSION INTRA-ARTICULAR; INTRALESIONAL; INTRAMUSCULAR; SOFT TISSUE at 14:33

## 2024-02-26 NOTE — PROGRESS NOTES
" Universal Protocol:  Consent: Verbal consent obtained. Written consent obtained.  Risks and benefits: risks, benefits and alternatives were discussed  Consent given by: patient  Time out: Immediately prior to procedure a \"time out\" was called to verify the correct patient, procedure, equipment, support staff and site/side marked as required.  Timeout called at: 2/26/2024 2:07 PM.  Patient understanding: patient states understanding of the procedure being performed  Patient consent: the patient's understanding of the procedure matches consent given  Procedure consent: procedure consent matches procedure scheduled  Relevant documents: relevant documents present and verified  Test results: test results available and properly labeled  Site marked: the operative site was marked  Radiology Images displayed and confirmed. If images not available, report reviewed: imaging studies available  Required items: required blood products, implants, devices, and special equipment available  Patient identity confirmed: verbally with patient  Supporting Documentation  Indications: pain   Procedure Details  Location(s):  Additional procedure details: PROCEDURE NOTE    PATIENT NAME:  Linda Celeste    MEDICAL RECORD NUMBER:  [id number]    YOB: 1965    DATE OF PROCEDURE:  02/26/24    PROCEDURE:  Trigger point injection x 5  with local anesthetic and steroid in the BILATERAL LATISSIMUS DORSI AND BILATERAL LUMBAR PARASPINAL muscle groups under ultrasound guidance.   ATTENDING PHYSICIAN:  Dennys Noe M.D.  PREPROCEDURE DIAGNOSIS:  Myofascial pain with identifiable trigger points.  POSTPROCEDURE DIAGNOSIS:  Myofascial pain with identifiable trigger points.  ANESTHESIA:  Local  ESTIMATED BLOOD LOSS:  Minimal  COMPLICATIONS: None  CONSENT:  Today's procedure, its potential benefits as well as its risks and potential side effects were reviewed.  Discussed risks of the procedure include bleeding, infection, nerve irritation or " damage, reactions to the medications, failure of the pain to improve and exacerbation of the pain were explained to the patient, who verbalized understanding and who wished to proceed.  Informed consent was signed.  DESCRIPTION OF THE PROCEDURE:  After informed consent was obtained, the patient was placed in the seated position. [] trigger points were identified via palpation and marked with a surgical skin marker.  The skin was prepped with antiseptic in the usual sterile fashion.  Strict aseptic technique was utilized throughout the procedure.  Using ultrasound guidance a 25 gauge, 2inch needle was then advanced into each identified trigger point.  Care was taken to visualize the entirety of the needle throughout the injection. An injectate consisting of 4 ml of 0.25% marcaine with 1 mL of 40mg/mL depo-medrol was slowly injected in divided doses after negative aspiration.  The needle was removed with tip intact.  The patient tolerated the procedure and hemostasis was maintained.  There were no apparent paresthesias or complications.  The skin was wiped clean, and a band-aid was placed as appropriate.  The patient was monitored for an appropriate period of time following the procedure and remained hemodynamically stable and neurovascularly intact.  The patient was ultimately discharged to home with supervision in good condition and instructed to call the office to report the response.

## 2024-03-04 DIAGNOSIS — F33.1 MODERATE EPISODE OF RECURRENT MAJOR DEPRESSIVE DISORDER (HCC): ICD-10-CM

## 2024-03-04 RX ORDER — FLUOXETINE 20 MG/1
TABLET, FILM COATED ORAL
Qty: 270 TABLET | Refills: 0 | Status: SHIPPED | OUTPATIENT
Start: 2024-03-04

## 2024-03-12 ENCOUNTER — TELEPHONE (OUTPATIENT)
Dept: PAIN MEDICINE | Facility: CLINIC | Age: 59
End: 2024-03-12

## 2024-03-12 DIAGNOSIS — M79.18 MYOFASCIAL PAIN SYNDROME: Primary | ICD-10-CM

## 2024-03-13 ENCOUNTER — TELEPHONE (OUTPATIENT)
Dept: NEUROLOGY | Facility: CLINIC | Age: 59
End: 2024-03-13

## 2024-03-13 NOTE — TELEPHONE ENCOUNTER
Hello,     Can you please advise which Speciality/Team and if patient can be seen by an Resident, AP and or Attending  only?    Thank you for your time,     Karolina      1ST ATTEMPT,     Called pt no answer, LMOM.    Thank you,     Karolina       U/ CAN SANTIAGO/ Episode of dizziness with headache     DC- HOME- 9/14/2023.    This patient should be seen in either our [place] office or she can be seen in our [facility] office at our residents clinic nonurgently in 1 to 2 months.  We have started her on aspirin as well as Lipitor for early newly diagnosed left vertebral origin stenosis as well as meclizine and given her prescription for vestibular therapy.

## 2024-03-15 NOTE — TELEPHONE ENCOUNTER
Caller: minh Mckeon    Doctor: Kusum    Reason for call: pt returning call to schedule TPI please Advise.    Call back#: [phone]

## 2024-03-18 ENCOUNTER — TELEPHONE (OUTPATIENT)
Age: 59
End: 2024-03-18

## 2024-03-18 NOTE — TELEPHONE ENCOUNTER
Caller: Patient     Doctor/Office: Kusum     Call regarding :  Returning call to schedule TPI      Call was transferred to:

## 2024-03-29 ENCOUNTER — PROCEDURE VISIT (OUTPATIENT)
Dept: PAIN MEDICINE | Facility: CLINIC | Age: 59
End: 2024-03-29
Payer: COMMERCIAL

## 2024-03-29 VITALS
HEIGHT: 61 IN | DIASTOLIC BLOOD PRESSURE: 82 MMHG | BODY MASS INDEX: 27.94 KG/M2 | HEART RATE: 81 BPM | SYSTOLIC BLOOD PRESSURE: 147 MMHG | WEIGHT: 148 LBS

## 2024-03-29 DIAGNOSIS — M79.18 MYOFASCIAL PAIN SYNDROME: Primary | ICD-10-CM

## 2024-03-29 PROCEDURE — 20552 NJX 1/MLT TRIGGER POINT 1/2: CPT | Performed by: STUDENT IN AN ORGANIZED HEALTH CARE EDUCATION/TRAINING PROGRAM

## 2024-03-29 PROCEDURE — 76942 ECHO GUIDE FOR BIOPSY: CPT | Performed by: STUDENT IN AN ORGANIZED HEALTH CARE EDUCATION/TRAINING PROGRAM

## 2024-03-29 RX ORDER — BUPIVACAINE HYDROCHLORIDE 2.5 MG/ML
4 INJECTION, SOLUTION EPIDURAL; INFILTRATION; INTRACAUDAL ONCE
Status: COMPLETED | OUTPATIENT
Start: 2024-03-29 | End: 2024-03-29

## 2024-03-29 RX ORDER — METHYLPREDNISOLONE ACETATE 40 MG/ML
40 INJECTION, SUSPENSION INTRA-ARTICULAR; INTRALESIONAL; INTRAMUSCULAR; SOFT TISSUE ONCE
Status: COMPLETED | OUTPATIENT
Start: 2024-03-29 | End: 2024-03-29

## 2024-03-29 RX ADMIN — BUPIVACAINE HYDROCHLORIDE 4 ML: 2.5 INJECTION, SOLUTION EPIDURAL; INFILTRATION; INTRACAUDAL at 08:54

## 2024-03-29 RX ADMIN — METHYLPREDNISOLONE ACETATE 40 MG: 40 INJECTION, SUSPENSION INTRA-ARTICULAR; INTRALESIONAL; INTRAMUSCULAR; SOFT TISSUE at 08:55

## 2024-03-29 NOTE — PROGRESS NOTES
" Universal Protocol:  Consent: Verbal consent obtained. Written consent obtained.  Risks and benefits: risks, benefits and alternatives were discussed  Consent given by: patient  Time out: Immediately prior to procedure a \"time out\" was called to verify the correct patient, procedure, equipment, support staff and site/side marked as required.  Timeout called at: 3/29/2024 8:32 AM.  Patient understanding: patient states understanding of the procedure being performed  Patient consent: the patient's understanding of the procedure matches consent given  Procedure consent: procedure consent matches procedure scheduled  Relevant documents: relevant documents present and verified  Test results: test results available and properly labeled  Site marked: the operative site was marked  Radiology Images displayed and confirmed. If images not available, report reviewed: imaging studies available  Required items: required blood products, implants, devices, and special equipment available  Patient identity confirmed: verbally with patient  Supporting Documentation  Indications: pain   Procedure Details  Location(s):  Additional procedure details: PROCEDURE NOTE    PATIENT NAME:  Linda Celeste    MEDICAL RECORD NUMBER:  [id number]    YOB: 1965    DATE OF PROCEDURE:  03/29/24    PROCEDURE:  Trigger point injection x 4 with local anesthetic and steroid in the bilateral lumbar paraspinal muscle groups under ultrasound guidance.   ATTENDING PHYSICIAN:  Dennys Noe M.D.  PREPROCEDURE DIAGNOSIS:  Myofascial pain with identifiable trigger points.  POSTPROCEDURE DIAGNOSIS:  Myofascial pain with identifiable trigger points.  ANESTHESIA:  Local  ESTIMATED BLOOD LOSS:  Minimal  COMPLICATIONS: None  CONSENT:  Today's procedure, its potential benefits as well as its risks and potential side effects were reviewed.  Discussed risks of the procedure include bleeding, infection, nerve irritation or damage, reactions to the " medications, failure of the pain to improve and exacerbation of the pain were explained to the patient, who verbalized understanding and who wished to proceed.  Informed consent was signed.  DESCRIPTION OF THE PROCEDURE:  After informed consent was obtained, the patient was placed in the seated position. [] trigger points were identified via palpation and marked with a surgical skin marker.  The skin was prepped with antiseptic in the usual sterile fashion.  Strict aseptic technique was utilized throughout the procedure.  Using ultrasound guidance a 25 gauge, 2inch needle was then advanced into each identified trigger point.  Care was taken to visualize the entirety of the needle throughout the injection. An injectate consisting of 4 ml of 0.25% marcaine with 1 mL of 40mg/mL depo-medrol was slowly injected in divided doses after negative aspiration.  The needle was removed with tip intact.  The patient tolerated the procedure and hemostasis was maintained.  There were no apparent paresthesias or complications.  The skin was wiped clean, and a band-aid was placed as appropriate.  The patient was monitored for an appropriate period of time following the procedure and remained hemodynamically stable and neurovascularly intact.  The patient was ultimately discharged to home with supervision in good condition and instructed to call the office to report the response.

## 2024-06-02 DIAGNOSIS — F33.1 MODERATE EPISODE OF RECURRENT MAJOR DEPRESSIVE DISORDER (HCC): ICD-10-CM

## 2024-06-02 RX ORDER — FLUOXETINE 20 MG/1
TABLET, FILM COATED ORAL
Qty: 270 TABLET | Refills: 1 | Status: SHIPPED | OUTPATIENT
Start: 2024-06-02

## 2024-07-01 ENCOUNTER — TELEPHONE (OUTPATIENT)
Age: 59
End: 2024-07-01

## 2024-07-01 NOTE — TELEPHONE ENCOUNTER
Spoke with patient to see what section they were referring to and she wasn't sure. Osiris filled out what ever was blank and signed off as here was no page for patient to sign off on so she just signed on the bottom with today's date. Paper work was faxed

## 2024-07-01 NOTE — TELEPHONE ENCOUNTER
Pt called and said she was notified that the [place] paperwork that was filled out and sent in for her is missing information from pg 4, and also the reasoning as to why she is missing work to take care of her . She said they also mentioned there was a signature missing. If the forms can be fixed and refaxed please to the number on the paperwork.

## 2024-08-05 ENCOUNTER — APPOINTMENT (EMERGENCY)
Dept: VASCULAR ULTRASOUND | Facility: HOSPITAL | Age: 59
End: 2024-08-05
Payer: COMMERCIAL

## 2024-08-05 ENCOUNTER — OFFICE VISIT (OUTPATIENT)
Age: 59
End: 2024-08-05
Payer: COMMERCIAL

## 2024-08-05 ENCOUNTER — HOSPITAL ENCOUNTER (EMERGENCY)
Facility: HOSPITAL | Age: 59
Discharge: HOME | End: 2024-08-05
Attending: EMERGENCY MEDICINE
Payer: COMMERCIAL

## 2024-08-05 VITALS
BODY MASS INDEX: 28.34 KG/M2 | WEIGHT: 150 LBS | DIASTOLIC BLOOD PRESSURE: 97 MMHG | TEMPERATURE: 98.1 F | SYSTOLIC BLOOD PRESSURE: 148 MMHG | HEART RATE: 71 BPM | RESPIRATION RATE: 18 BRPM | OXYGEN SATURATION: 100 %

## 2024-08-05 VITALS
DIASTOLIC BLOOD PRESSURE: 61 MMHG | HEART RATE: 70 BPM | SYSTOLIC BLOOD PRESSURE: 139 MMHG | OXYGEN SATURATION: 98 % | TEMPERATURE: 98.6 F | RESPIRATION RATE: 18 BRPM | WEIGHT: 147 LBS | BODY MASS INDEX: 27.78 KG/M2

## 2024-08-05 DIAGNOSIS — M79.604 ACUTE LEG PAIN, RIGHT: Primary | ICD-10-CM

## 2024-08-05 DIAGNOSIS — I80.9 SUPERFICIAL PHLEBITIS: Primary | ICD-10-CM

## 2024-08-05 DIAGNOSIS — M79.89 PAIN AND SWELLING OF RIGHT LOWER LEG: ICD-10-CM

## 2024-08-05 DIAGNOSIS — M79.661 PAIN AND SWELLING OF RIGHT LOWER LEG: ICD-10-CM

## 2024-08-05 PROCEDURE — G0382 LEV 3 HOSP TYPE B ED VISIT: HCPCS | Performed by: PHYSICIAN ASSISTANT

## 2024-08-05 PROCEDURE — 99283 EMERGENCY DEPT VISIT LOW MDM: CPT

## 2024-08-05 PROCEDURE — S9083 URGENT CARE CENTER GLOBAL: HCPCS | Performed by: PHYSICIAN ASSISTANT

## 2024-08-05 PROCEDURE — 99284 EMERGENCY DEPT VISIT MOD MDM: CPT | Performed by: EMERGENCY MEDICINE

## 2024-08-05 PROCEDURE — 93971 EXTREMITY STUDY: CPT

## 2024-08-05 RX ORDER — IBUPROFEN 600 MG/1
600 TABLET ORAL ONCE
Status: COMPLETED | OUTPATIENT
Start: 2024-08-05 | End: 2024-08-05

## 2024-08-05 RX ORDER — ACETAMINOPHEN 325 MG/1
975 TABLET ORAL ONCE
Status: COMPLETED | OUTPATIENT
Start: 2024-08-05 | End: 2024-08-05

## 2024-08-05 RX ADMIN — ACETAMINOPHEN 975 MG: 325 TABLET, FILM COATED ORAL at 18:51

## 2024-08-05 RX ADMIN — IBUPROFEN 600 MG: 600 TABLET, FILM COATED ORAL at 18:51

## 2024-08-05 NOTE — PROGRESS NOTES
[facility]        NAME: Linda Celeste is a 59 y.o. female  : 1965    MRN: [id number]  DATE: 2024  TIME: 5:42 PM    Assessment and Plan   Superficial phlebitis [I80.9]  1. Superficial phlebitis              Patient Instructions     Feet look symmetric doing visual expection  Tenderness over the third fourth and fifth metatarsal region however no signs of injury.    Suspect superficial phlebitis Vs DVT  Recommend ED  for ultrasound and r/o DVT    Follow up with PCP in 3-5 days.  Proceed to  ER if symptoms worsen.    If tests are performed, our office will contact you with results only if changes need to made to the care plan discussed with you at the visit. [facility]    Chief Complaint     Chief Complaint   Patient presents with    Foot Pain     Pt states the veins in her right foot started popping out and has pain radiating up toward the knee starting yesterday          History of Present Illness       58 yo female reporting right foot pain, associated with vessel enlargement over the dorsal region of the foot x 2 days. Pain radiates to the right knee and outer thigh. Denies shortness of breath, chest pain, dyspnea, wheezing, hemoptysis fatigue, weakness, nausea vomiting, numbness dizziness lightheadedness or paresthesia.        Review of Systems   Review of Systems   Constitutional:  Negative for activity change, appetite change, chills and fever.   Respiratory:  Negative for cough.    Cardiovascular:  Negative for palpitations.   Gastrointestinal:  Negative for abdominal pain.   Skin:  Negative for color change, rash and wound.   Neurological:  Negative for headaches.         Current Medications       Current Outpatient Medications:     aspirin 81 mg chewable tablet, Chew 1 tablet (81 mg total) daily Do not start before September 15, 2023., Disp: 30 tablet, Rfl: 0    atorvastatin (LIPITOR) 40 mg tablet, Take 1 tablet (40 mg total) by mouth  daily with dinner, Disp: 90 tablet, Rfl: 3    FLUoxetine (PROzac) 20 MG tablet, TAKE 3 TABLETS BY MOUTH EVERY DAY, Disp: 270 tablet, Rfl: 1    cyclobenzaprine (FLEXERIL) 10 mg tablet, Take 1 tablet (10 mg total) by mouth 3 (three) times a day as needed for muscle spasms, Disp: 30 tablet, Rfl: 0    ibuprofen (MOTRIN) 800 mg tablet, Take 1 tablet (800 mg total) by mouth 3 (three) times a day, Disp: 21 tablet, Rfl: 0    oxyCODONE-acetaminophen (Percocet) 5-325 mg per tablet, Take 1 tablet by mouth every 8 (eight) hours as needed for moderate pain Max Daily Amount: 3 tablets, Disp: 10 tablet, Rfl: 0    Current Allergies     Allergies as of 08/05/2024 - Reviewed 08/05/2024   Allergen Reaction Noted    Prednisone Other (See Comments) 08/21/2017    Erythromycin Vomiting 03/13/2016            The following portions of the patient's history were reviewed and updated as appropriate: allergies, current medications, past family history, past medical history, past social history, past surgical history and problem list.     Past Medical History:   Diagnosis Date    Anxiety 06/20/2016    Last assessed    Chronic infective otitis externa of left ear 09/18/2017    Last assessed    GERD (gastroesophageal reflux disease)     Right lumbar radiculopathy 05/05/2016    Last assessed    Sciatic leg pain        Past Surgical History:   Procedure Laterality Date    APPENDECTOMY      CHOLECYSTECTOMY      KNEE ARTHROSCOPY      torn menicus    IA LAMNOTMY INCL W/DCMPRSN NRV ROOT 1 INTRSPC LUMBR Right 7/6/2016    Procedure: L3/4 FORAMINOTOMY;  Surgeon: Merary Lugo MD;  Location: [facility];  Service: Neurosurgery    TONSILLECTOMY         Family History   Problem Relation Age of Onset    No Known Problems Mother     Alcohol abuse Father     Diabetes Paternal Grandmother     Hypertension Paternal Grandmother     Breast cancer Maternal Aunt     Cancer Family          Medications have been verified.        Objective   /61   Pulse 70   Temp  98.6 °F (37 °C)   Resp 18   Wt 66.7 kg (147 lb)   SpO2 98%   BMI 27.78 kg/m²        Physical Exam     Physical Exam  Vitals and nursing note reviewed.   Constitutional:       Appearance: Normal appearance. She is normal weight.   Eyes:      General: No scleral icterus.     Extraocular Movements: Extraocular movements intact.      Conjunctiva/sclera: Conjunctivae normal.      Pupils: Pupils are equal, round, and reactive to light.   Cardiovascular:      Rate and Rhythm: Normal rate and regular rhythm.      Pulses: Normal pulses.   Pulmonary:      Effort: Pulmonary effort is normal. No respiratory distress.   Musculoskeletal:         General: Normal range of motion.      Cervical back: Normal range of motion and neck supple. No tenderness.        Feet:    Feet:      Comments: Feet: Symmetric, no erythema, swelling, inflammation, discoloration, ecchymosis, deformities, lesions, masses, warmth or crepitus.  + tenderness over the right 3,4,5th metatarsal on palpation. Neurovascular grossly intact.  Lymphadenopathy:      Cervical: No cervical adenopathy.   Skin:     General: Skin is warm and dry.      Findings: No bruising, erythema, lesion or rash.   Neurological:      Mental Status: She is alert and oriented to person, place, and time.      Coordination: Coordination normal.      Gait: Gait normal.   Psychiatric:         Mood and Affect: Mood normal.         Behavior: Behavior normal.

## 2024-08-05 NOTE — ED PROVIDER NOTES
History  Chief Complaint   Patient presents with    Ankle Pain     Came in for complaint of right ankle pain radiating to the upper right leg, symptoms started yesterday.     59-year-old female no significant ported past history on aspirin presenting with leg pain.  Patient reports gradually worsening right leg pain and swelling since yesterday.  Reports pain inside of right calf and foot.  Denies any chest pain shortness of breath.  Denies any abdominal pain nausea vomiting diarrhea.  Denies any known injury.  Denies any neurological changes such as motor or sensory deficits.  Denies any other complaints.  Chart reviewed.    Past Medical History:  06/20/2016: Anxiety      Comment:  Last assessed  09/18/2017: Chronic infective otitis externa of left ear      Comment:  Last assessed  No date: GERD (gastroesophageal reflux disease)  05/05/2016: Right lumbar radiculopathy      Comment:  Last assessed  No date: Sciatic leg pain  Family History: non-contributory  Social History          Prior to Admission Medications   Prescriptions Last Dose Informant Patient Reported? Taking?   FLUoxetine (PROzac) 20 MG tablet   No No   Sig: TAKE 3 TABLETS BY MOUTH EVERY DAY   aspirin 81 mg chewable tablet  Self No No   Sig: Chew 1 tablet (81 mg total) daily Do not start before September 15, 2023.   atorvastatin (LIPITOR) 40 mg tablet  Self No No   Sig: Take 1 tablet (40 mg total) by mouth daily with dinner   cyclobenzaprine (FLEXERIL) 10 mg tablet  Self No No   Sig: Take 1 tablet (10 mg total) by mouth 3 (three) times a day as needed for muscle spasms   ibuprofen (MOTRIN) 800 mg tablet  Self No No   Sig: Take 1 tablet (800 mg total) by mouth 3 (three) times a day   oxyCODONE-acetaminophen (Percocet) 5-325 mg per tablet  Self No No   Sig: Take 1 tablet by mouth every 8 (eight) hours as needed for moderate pain Max Daily Amount: 3 tablets      Facility-Administered Medications: None       Past Medical History:   Diagnosis Date     Anxiety 06/20/2016    Last assessed    Chronic infective otitis externa of left ear 09/18/2017    Last assessed    GERD (gastroesophageal reflux disease)     Right lumbar radiculopathy 05/05/2016    Last assessed    Sciatic leg pain        Past Surgical History:   Procedure Laterality Date    APPENDECTOMY      CHOLECYSTECTOMY      KNEE ARTHROSCOPY      torn menicus    OK LAMNOTMY INCL W/DCMPRSN NRV ROOT 1 INTRSPC LUMBR Right 7/6/2016    Procedure: L3/4 FORAMINOTOMY;  Surgeon: Merary Lugo MD;  Location: [facility];  Service: Neurosurgery    TONSILLECTOMY         Family History   Problem Relation Age of Onset    No Known Problems Mother     Alcohol abuse Father     Diabetes Paternal Grandmother     Hypertension Paternal Grandmother     Breast cancer Maternal Aunt     Cancer Family      I have reviewed and agree with the history as documented.    E-Cigarette/Vaping    E-Cigarette Use Never User      E-Cigarette/Vaping Substances    Nicotine No     THC No     CBD No     Flavoring No     Other No     Unknown No      Social History     Tobacco Use    Smoking status: Never    Smokeless tobacco: Never    Tobacco comments:     Per allscripts - former smoker   Vaping Use    Vaping status: Never Used   Substance Use Topics    Alcohol use: Yes    Drug use: No       Review of Systems   Constitutional:  Negative for appetite change, chills, diaphoresis, fever and unexpected weight change.   HENT:  Negative for congestion and rhinorrhea.    Eyes:  Negative for photophobia and visual disturbance.   Respiratory:  Negative for cough, chest tightness and shortness of breath.    Cardiovascular:  Negative for chest pain, palpitations and leg swelling.   Gastrointestinal:  Negative for abdominal distention, abdominal pain, blood in stool, constipation, diarrhea, nausea and vomiting.   Genitourinary:  Negative for dysuria and hematuria.   Musculoskeletal:  Positive for myalgias. Negative for back pain, joint swelling, neck pain and neck  stiffness.   Skin:  Negative for color change, pallor, rash and wound.   Neurological:  Negative for dizziness, syncope, weakness, light-headedness and headaches.   Psychiatric/Behavioral:  Negative for agitation.    All other systems reviewed and are negative.      Physical Exam  Physical Exam  Vitals and nursing note reviewed.   Constitutional:       General: She is not in acute distress.     Appearance: Normal appearance. She is well-developed. She is not ill-appearing, toxic-appearing or diaphoretic.   HENT:      Head: Normocephalic and atraumatic.      Nose: Nose normal. No congestion or rhinorrhea.      Mouth/Throat:      Mouth: Mucous membranes are moist.      Pharynx: Oropharynx is clear. No oropharyngeal exudate or posterior oropharyngeal erythema.   Eyes:      General: No scleral icterus.        Right eye: No discharge.         Left eye: No discharge.      Extraocular Movements: Extraocular movements intact.      Conjunctiva/sclera: Conjunctivae normal.      Pupils: Pupils are equal, round, and reactive to light.   Neck:      Vascular: No JVD.      Trachea: No tracheal deviation.      Comments: Supple. Normal range of motion.   Cardiovascular:      Rate and Rhythm: Normal rate and regular rhythm.      Heart sounds: Normal heart sounds. No murmur heard.     No friction rub. No gallop.      Comments: Normal rate and regular rhythm  Pulmonary:      Effort: Pulmonary effort is normal. No respiratory distress.      Breath sounds: Normal breath sounds. No stridor. No wheezing or rales.      Comments: Clear to auscultation bilaterally  Chest:      Chest wall: No tenderness.   Abdominal:      General: Bowel sounds are normal. There is no distension.      Palpations: Abdomen is soft.      Tenderness: There is no abdominal tenderness. There is no right CVA tenderness, left CVA tenderness, guarding or rebound.      Comments: Soft, nontender, nondistended.  Normal bowel sounds throughout   Genitourinary:     Comments:  2+ DP PT pulses.  Strength 5 out of 5 right lower extremity.  Sensation intact entirety right lower extremity.  Tenderness along the medial aspect of right calf and thigh  Musculoskeletal:         General: Tenderness present. No swelling, deformity or signs of injury. Normal range of motion.      Cervical back: Normal range of motion and neck supple. No rigidity. No muscular tenderness.      Right lower leg: No edema.      Left lower leg: No edema.   Lymphadenopathy:      Cervical: No cervical adenopathy.   Skin:     General: Skin is warm and dry.      Coloration: Skin is not pale.      Findings: No erythema or rash.   Neurological:      General: No focal deficit present.      Mental Status: She is alert. Mental status is at baseline.      Sensory: No sensory deficit.      Motor: No weakness or abnormal muscle tone.      Coordination: Coordination normal.      Gait: Gait normal.      Comments: Alert.  Strength and sensation grossly intact.  Ambulatory without difficulty at baseline.    Psychiatric:         Behavior: Behavior normal.         Thought Content: Thought content normal.         Vital Signs  ED Triage Vitals   Temperature Pulse Respirations Blood Pressure SpO2   08/05/24 1816 08/05/24 1816 08/05/24 1816 08/05/24 1816 08/05/24 1816   98.1 °F (36.7 °C) 71 18 148/97 100 %      Temp Source Heart Rate Source Patient Position - Orthostatic VS BP Location FiO2 (%)   08/05/24 1816 08/05/24 1816 08/05/24 1816 08/05/24 1816 --   Oral Monitor Sitting Right arm       Pain Score       08/05/24 1851       6           Vitals:    08/05/24 1816   BP: 148/97   Pulse: 71   Patient Position - Orthostatic VS: Sitting         Visual Acuity      ED Medications  Medications   acetaminophen (TYLENOL) tablet 975 mg (975 mg Oral Given 8/5/24 1851)   ibuprofen (MOTRIN) tablet 600 mg (600 mg Oral Given 8/5/24 1851)       Diagnostic Studies  Results Reviewed       None                   VAS lower limb venous duplex study,  unilateral/limited    (Results Pending)              Procedures  Procedures         ED Course                                 SBIRT 22yo+      Flowsheet Row Most Recent Value   Initial Alcohol Screen: US AUDIT-C     1. How often do you have a drink containing alcohol? 0 Filed at: 08/05/2024 1818   2. How many drinks containing alcohol do you have on a typical day you are drinking?  0 Filed at: 08/05/2024 1818   3a. Male UNDER 65: How often do you have five or more drinks on one occasion? 0 Filed at: 08/05/2024 1818   3b. FEMALE Any Age, or MALE 65+: How often do you have 4 or more drinks on one occassion? 0 Filed at: 08/05/2024 1818   Audit-C Score 0 Filed at: 08/05/2024 1818   ABIGAIL: How many times in the past year have you...    Used an illegal drug or used a prescription medication for non-medical reasons? Never Filed at: 08/05/2024 1818                      Medical Decision Making  59-year-old female no significant ported past history on aspirin presenting with leg pain.  Atraumatic pain swelling and tenderness of right leg primarily medial aspect of right calf and thigh.  Plan for duplex study.  Further management with oral pain medication.  Reassess.    Symptoms improved with medications.  Duplex negative for DVT per vascular tech. Discussed results and recommendations. Advised follow up PCP. Medication recommendations. Given instructions and return precautions. Patient/family at bedside acknowledged understanding of all written and verbal instructions and return precautions. Discharged.     Risk  OTC drugs.  Prescription drug management.                 Disposition  Final diagnoses:   Acute leg pain, right   Pain and swelling of right lower leg     Time reflects when diagnosis was documented in both MDM as applicable and the Disposition within this note       Time User Action Codes Description Comment    8/5/2024  6:48 PM Vasiliy Dill [M79.604] Acute leg pain, right     8/5/2024  6:48 PM Vasiliy Dill Add  [M79.661,  M79.89] Pain and swelling of right lower leg           ED Disposition       ED Disposition   Discharge    Condition   Stable    Date/Time   Mon Aug 5, 2024 1958    Comment   Lindashiva Celeste discharge to home/self care.                   Follow-up Information       Follow up With Specialties Details Why Contact Info    [facility], Nurse Practitioner Schedule an appointment as soon as possible for a visit in 1 week  [facility] [address] [address]  [phone]              Discharge Medication List as of 8/5/2024  7:58 PM        CONTINUE these medications which have NOT CHANGED    Details   aspirin 81 mg chewable tablet Chew 1 tablet (81 mg total) daily Do not start before September 15, 2023., Starting Fri 9/15/2023, Normal      atorvastatin (LIPITOR) 40 mg tablet Take 1 tablet (40 mg total) by mouth daily with dinner, Starting Fri 10/20/2023, Normal      cyclobenzaprine (FLEXERIL) 10 mg tablet Take 1 tablet (10 mg total) by mouth 3 (three) times a day as needed for muscle spasms, Starting Thu 1/25/2024, Normal      FLUoxetine (PROzac) 20 MG tablet TAKE 3 TABLETS BY MOUTH EVERY DAY, Normal      ibuprofen (MOTRIN) 800 mg tablet Take 1 tablet (800 mg total) by mouth 3 (three) times a day, Starting Tue 1/23/2024, Normal      oxyCODONE-acetaminophen (Percocet) 5-325 mg per tablet Take 1 tablet by mouth every 8 (eight) hours as needed for moderate pain Max Daily Amount: 3 tablets, Starting Thu 2/1/2024, Normal             No discharge procedures on file.    PDMP Review         Value Time User    PDMP Reviewed  Yes 2/1/2024 11:27 AM [facility]            ED Provider  Electronically Signed by             Vasiliy Dill MD  08/05/24 2033

## 2024-08-06 PROCEDURE — 93971 EXTREMITY STUDY: CPT | Performed by: INTERNAL MEDICINE

## 2024-10-02 DIAGNOSIS — F33.1 MODERATE EPISODE OF RECURRENT MAJOR DEPRESSIVE DISORDER (HCC): ICD-10-CM

## 2024-10-02 DIAGNOSIS — I65.02 STENOSIS OF LEFT VERTEBRAL ARTERY: ICD-10-CM

## 2024-10-02 RX ORDER — ATORVASTATIN CALCIUM 40 MG/1
40 TABLET, FILM COATED ORAL
Qty: 90 TABLET | Refills: 1 | Status: SHIPPED | OUTPATIENT
Start: 2024-10-02

## 2024-10-02 RX ORDER — FLUOXETINE 20 MG/1
TABLET, FILM COATED ORAL
Qty: 270 TABLET | Refills: 1 | Status: SHIPPED | OUTPATIENT
Start: 2024-10-02

## 2024-11-14 ENCOUNTER — OFFICE VISIT (OUTPATIENT)
Dept: FAMILY MEDICINE CLINIC | Facility: CLINIC | Age: 59
End: 2024-11-14
Payer: COMMERCIAL

## 2024-11-14 VITALS
DIASTOLIC BLOOD PRESSURE: 80 MMHG | WEIGHT: 145 LBS | OXYGEN SATURATION: 99 % | SYSTOLIC BLOOD PRESSURE: 116 MMHG | BODY MASS INDEX: 27.38 KG/M2 | RESPIRATION RATE: 18 BRPM | HEART RATE: 67 BPM | HEIGHT: 61 IN

## 2024-11-14 DIAGNOSIS — R10.13 EPIGASTRIC PAIN: Primary | ICD-10-CM

## 2024-11-14 PROCEDURE — 99213 OFFICE O/P EST LOW 20 MIN: CPT | Performed by: NURSE PRACTITIONER

## 2024-11-14 RX ORDER — FAMOTIDINE 40 MG/1
40 TABLET, FILM COATED ORAL
Qty: 30 TABLET | Refills: 1 | Status: SHIPPED | OUTPATIENT
Start: 2024-11-14 | End: 2025-11-09

## 2024-11-14 NOTE — PROGRESS NOTES
"Name: Linda Celeste      : 1965      MRN: [id number]  Encounter Provider: AMA[facility]  Encounter Date: 2024   Encounter department: [facility] [address]  :  Assessment & Plan  Epigastric pain  Patient is currently taking omeprazole 20 mg daily.  To add famotidine 40 mg at bedtime.  To begin bland diet.  To obtain ultrasound.  To ER for severe abdominal pain, blood in stool, or vomiting blood.  Follow-up in 2 weeks if symptoms have not improved or sooner if symptoms worsen.  Orders:    famotidine (PEPCID) 40 MG tablet; Take 1 tablet (40 mg total) by mouth daily at bedtime    US abdomen complete; Future           History of Present Illness     Linda presents reporting abdominal pain for the past week.  She describes it as a gnawing pain.  It is located in her epigastric region and right and left upper quadrant.  Denies fever, chills, nausea, vomiting, chest pain, shortness of breath, change in stools, weight loss, decrease in appetite, or difficulty swallowing.  Her symptoms improved with Tylenol and rest.  She cannot identify anything that worsens her symptoms.  She had similar symptoms about 10 years ago.  She had an EEG which was normal.  She required shots by pain management which resolved the issue but she cannot recall any further details.      Review of Systems   Constitutional: Negative.    HENT: Negative.     Eyes: Negative.    Respiratory: Negative.     Cardiovascular: Negative.    Gastrointestinal:  Positive for abdominal pain.   Endocrine: Negative.    Genitourinary: Negative.    Musculoskeletal: Negative.    Skin: Negative.    Allergic/Immunologic: Negative.    Neurological: Negative.    Hematological: Negative.    Psychiatric/Behavioral: Negative.            Objective   /80 (BP Location: Left arm, Patient Position: Sitting, Cuff Size: Standard)   Pulse 67   Resp 18   Ht 5' 1\" (1.549 m)   Wt 65.8 kg (145 lb)   SpO2 99%   BMI 27.40 kg/m² "      Physical Exam  Vitals and nursing note reviewed.   Constitutional:       General: She is not in acute distress.     Appearance: Normal appearance. She is well-developed. She is not ill-appearing, toxic-appearing or diaphoretic.   HENT:      Head: Normocephalic and atraumatic.   Eyes:      Conjunctiva/sclera: Conjunctivae normal.   Cardiovascular:      Rate and Rhythm: Normal rate and regular rhythm.      Heart sounds: Normal heart sounds. No murmur heard.  Pulmonary:      Effort: Pulmonary effort is normal. No respiratory distress.      Breath sounds: Normal breath sounds. No wheezing or rales.   Chest:      Chest wall: No tenderness.   Abdominal:      General: Bowel sounds are normal. There is no distension.      Palpations: Abdomen is soft. There is no mass.      Tenderness: There is abdominal tenderness in the right upper quadrant, epigastric area and left upper quadrant. There is no guarding or rebound.      Hernia: No hernia is present.   Musculoskeletal:      Cervical back: Neck supple.   Skin:     General: Skin is warm and dry.      Capillary Refill: Capillary refill takes less than 2 seconds.   Neurological:      General: No focal deficit present.      Mental Status: She is alert and oriented to person, place, and time.   Psychiatric:         Mood and Affect: Mood normal.         Behavior: Behavior normal.         Thought Content: Thought content normal.         Judgment: Judgment normal.

## 2024-11-15 ENCOUNTER — APPOINTMENT (EMERGENCY)
Dept: CT IMAGING | Facility: HOSPITAL | Age: 59
End: 2024-11-15
Payer: COMMERCIAL

## 2024-11-15 ENCOUNTER — RESULTS FOLLOW-UP (OUTPATIENT)
Dept: FAMILY MEDICINE CLINIC | Facility: CLINIC | Age: 59
End: 2024-11-15

## 2024-11-15 ENCOUNTER — HOSPITAL ENCOUNTER (OUTPATIENT)
Dept: ULTRASOUND IMAGING | Facility: HOSPITAL | Age: 59
End: 2024-11-15
Payer: COMMERCIAL

## 2024-11-15 ENCOUNTER — TELEPHONE (OUTPATIENT)
Age: 59
End: 2024-11-15

## 2024-11-15 ENCOUNTER — HOSPITAL ENCOUNTER (OUTPATIENT)
Facility: HOSPITAL | Age: 59
Setting detail: OBSERVATION
Discharge: HOME | End: 2024-11-16
Attending: EMERGENCY MEDICINE | Admitting: STUDENT IN AN ORGANIZED HEALTH CARE EDUCATION/TRAINING PROGRAM
Payer: COMMERCIAL

## 2024-11-15 DIAGNOSIS — R10.13 EPIGASTRIC PAIN: ICD-10-CM

## 2024-11-15 DIAGNOSIS — R94.31 ABNORMAL EKG: ICD-10-CM

## 2024-11-15 DIAGNOSIS — R10.13 EPIGASTRIC ABDOMINAL PAIN: Primary | ICD-10-CM

## 2024-11-15 PROBLEM — R10.9 ABDOMINAL PAIN: Status: ACTIVE | Noted: 2024-11-15

## 2024-11-15 LAB
2HR DELTA HS TROPONIN: >1 NG/L
4HR DELTA HS TROPONIN: >1 NG/L
ALBUMIN SERPL BCG-MCNC: 4.5 G/DL (ref 3.5–5)
ALP SERPL-CCNC: 97 U/L (ref 34–104)
ALT SERPL W P-5'-P-CCNC: 12 U/L (ref 7–52)
ANION GAP SERPL CALCULATED.3IONS-SCNC: 8 MMOL/L (ref 4–13)
AST SERPL W P-5'-P-CCNC: 17 U/L (ref 13–39)
BASOPHILS # BLD AUTO: 0.02 THOUSANDS/ÂΜL (ref 0–0.1)
BASOPHILS NFR BLD AUTO: 0 % (ref 0–1)
BILIRUB SERPL-MCNC: 0.51 MG/DL (ref 0.2–1)
BUN SERPL-MCNC: 14 MG/DL (ref 5–25)
CALCIUM SERPL-MCNC: 9.7 MG/DL (ref 8.4–10.2)
CARDIAC TROPONIN I PNL SERPL HS: 3 NG/L (ref ?–50)
CARDIAC TROPONIN I PNL SERPL HS: 3 NG/L (ref ?–50)
CARDIAC TROPONIN I PNL SERPL HS: <2 NG/L (ref ?–50)
CHLORIDE SERPL-SCNC: 105 MMOL/L (ref 96–108)
CO2 SERPL-SCNC: 26 MMOL/L (ref 21–32)
CREAT SERPL-MCNC: 0.82 MG/DL (ref 0.6–1.3)
EOSINOPHIL # BLD AUTO: 0.04 THOUSAND/ÂΜL (ref 0–0.61)
EOSINOPHIL NFR BLD AUTO: 1 % (ref 0–6)
ERYTHROCYTE [DISTWIDTH] IN BLOOD BY AUTOMATED COUNT: 12.2 % (ref 11.6–15.1)
GFR SERPL CREATININE-BSD FRML MDRD: 77 ML/MIN/1.73SQ M
GLUCOSE SERPL-MCNC: 105 MG/DL (ref 65–140)
HCT VFR BLD AUTO: 40.4 % (ref 34.8–46.1)
HGB BLD-MCNC: 13.5 G/DL (ref 11.5–15.4)
IMM GRANULOCYTES # BLD AUTO: 0.01 THOUSAND/UL (ref 0–0.2)
IMM GRANULOCYTES NFR BLD AUTO: 0 % (ref 0–2)
LACTATE SERPL-SCNC: 0.6 MMOL/L (ref 0.5–2)
LIPASE SERPL-CCNC: 15 U/L (ref 11–82)
LYMPHOCYTES # BLD AUTO: 1.77 THOUSANDS/ÂΜL (ref 0.6–4.47)
LYMPHOCYTES NFR BLD AUTO: 29 % (ref 14–44)
MCH RBC QN AUTO: 30.3 PG (ref 26.8–34.3)
MCHC RBC AUTO-ENTMCNC: 33.4 G/DL (ref 31.4–37.4)
MCV RBC AUTO: 91 FL (ref 82–98)
MONOCYTES # BLD AUTO: 0.56 THOUSAND/ÂΜL (ref 0.17–1.22)
MONOCYTES NFR BLD AUTO: 9 % (ref 4–12)
NEUTROPHILS # BLD AUTO: 3.76 THOUSANDS/ÂΜL (ref 1.85–7.62)
NEUTS SEG NFR BLD AUTO: 61 % (ref 43–75)
NRBC BLD AUTO-RTO: 0 /100 WBCS
PLATELET # BLD AUTO: 270 THOUSANDS/UL (ref 149–390)
PMV BLD AUTO: 10.5 FL (ref 8.9–12.7)
POTASSIUM SERPL-SCNC: 4.1 MMOL/L (ref 3.5–5.3)
PROT SERPL-MCNC: 6.6 G/DL (ref 6.4–8.4)
RBC # BLD AUTO: 4.46 MILLION/UL (ref 3.81–5.12)
SODIUM SERPL-SCNC: 139 MMOL/L (ref 135–147)
WBC # BLD AUTO: 6.16 THOUSAND/UL (ref 4.31–10.16)

## 2024-11-15 PROCEDURE — 96375 TX/PRO/DX INJ NEW DRUG ADDON: CPT

## 2024-11-15 PROCEDURE — 99223 1ST HOSP IP/OBS HIGH 75: CPT | Performed by: STUDENT IN AN ORGANIZED HEALTH CARE EDUCATION/TRAINING PROGRAM

## 2024-11-15 PROCEDURE — 96374 THER/PROPH/DIAG INJ IV PUSH: CPT

## 2024-11-15 PROCEDURE — 99285 EMERGENCY DEPT VISIT HI MDM: CPT | Performed by: EMERGENCY MEDICINE

## 2024-11-15 PROCEDURE — 76700 US EXAM ABDOM COMPLETE: CPT

## 2024-11-15 PROCEDURE — 85025 COMPLETE CBC W/AUTO DIFF WBC: CPT

## 2024-11-15 PROCEDURE — 83605 ASSAY OF LACTIC ACID: CPT | Performed by: EMERGENCY MEDICINE

## 2024-11-15 PROCEDURE — 93005 ELECTROCARDIOGRAM TRACING: CPT

## 2024-11-15 PROCEDURE — 96361 HYDRATE IV INFUSION ADD-ON: CPT

## 2024-11-15 PROCEDURE — 84484 ASSAY OF TROPONIN QUANT: CPT | Performed by: EMERGENCY MEDICINE

## 2024-11-15 PROCEDURE — 83690 ASSAY OF LIPASE: CPT

## 2024-11-15 PROCEDURE — 99285 EMERGENCY DEPT VISIT HI MDM: CPT

## 2024-11-15 PROCEDURE — 74177 CT ABD & PELVIS W/CONTRAST: CPT

## 2024-11-15 PROCEDURE — 36415 COLL VENOUS BLD VENIPUNCTURE: CPT

## 2024-11-15 PROCEDURE — 80053 COMPREHEN METABOLIC PANEL: CPT

## 2024-11-15 RX ORDER — ENOXAPARIN SODIUM 100 MG/ML
40 INJECTION SUBCUTANEOUS DAILY
Status: DISCONTINUED | OUTPATIENT
Start: 2024-11-16 | End: 2024-11-16 | Stop reason: HOSPADM

## 2024-11-15 RX ORDER — MAGNESIUM HYDROXIDE/ALUMINUM HYDROXICE/SIMETHICONE 120; 1200; 1200 MG/30ML; MG/30ML; MG/30ML
30 SUSPENSION ORAL ONCE
Status: COMPLETED | OUTPATIENT
Start: 2024-11-15 | End: 2024-11-15

## 2024-11-15 RX ORDER — LIDOCAINE HYDROCHLORIDE 20 MG/ML
15 SOLUTION OROPHARYNGEAL ONCE
Status: COMPLETED | OUTPATIENT
Start: 2024-11-15 | End: 2024-11-15

## 2024-11-15 RX ORDER — MORPHINE SULFATE 10 MG/ML
6 INJECTION, SOLUTION INTRAMUSCULAR; INTRAVENOUS ONCE
Status: COMPLETED | OUTPATIENT
Start: 2024-11-15 | End: 2024-11-15

## 2024-11-15 RX ORDER — ONDANSETRON 2 MG/ML
4 INJECTION INTRAMUSCULAR; INTRAVENOUS ONCE
Status: COMPLETED | OUTPATIENT
Start: 2024-11-15 | End: 2024-11-15

## 2024-11-15 RX ORDER — ATORVASTATIN CALCIUM 40 MG/1
40 TABLET, FILM COATED ORAL
Status: DISCONTINUED | OUTPATIENT
Start: 2024-11-16 | End: 2024-11-16 | Stop reason: HOSPADM

## 2024-11-15 RX ORDER — ASPIRIN 81 MG/1
81 TABLET, CHEWABLE ORAL DAILY
Status: DISCONTINUED | OUTPATIENT
Start: 2024-11-16 | End: 2024-11-16 | Stop reason: HOSPADM

## 2024-11-15 RX ORDER — PANTOPRAZOLE SODIUM 40 MG/10ML
40 INJECTION, POWDER, LYOPHILIZED, FOR SOLUTION INTRAVENOUS ONCE
Status: COMPLETED | OUTPATIENT
Start: 2024-11-15 | End: 2024-11-15

## 2024-11-15 RX ORDER — FAMOTIDINE 20 MG/1
40 TABLET, FILM COATED ORAL
Status: DISCONTINUED | OUTPATIENT
Start: 2024-11-15 | End: 2024-11-16 | Stop reason: HOSPADM

## 2024-11-15 RX ADMIN — IOHEXOL 100 ML: 350 INJECTION, SOLUTION INTRAVENOUS at 16:08

## 2024-11-15 RX ADMIN — ONDANSETRON 4 MG: 2 INJECTION INTRAMUSCULAR; INTRAVENOUS at 14:45

## 2024-11-15 RX ADMIN — FAMOTIDINE 40 MG: 20 TABLET, FILM COATED ORAL at 22:30

## 2024-11-15 RX ADMIN — ALUMINUM HYDROXIDE, MAGNESIUM HYDROXIDE, AND DIMETHICONE 30 ML: 200; 20; 200 SUSPENSION ORAL at 17:49

## 2024-11-15 RX ADMIN — LIDOCAINE HYDROCHLORIDE 15 ML: 20 SOLUTION ORAL; TOPICAL at 17:49

## 2024-11-15 RX ADMIN — MORPHINE SULFATE 6 MG: 10 INJECTION INTRAVENOUS at 18:19

## 2024-11-15 RX ADMIN — SODIUM CHLORIDE 1000 ML: 0.9 INJECTION, SOLUTION INTRAVENOUS at 14:45

## 2024-11-15 RX ADMIN — MORPHINE SULFATE 2 MG: 2 INJECTION, SOLUTION INTRAMUSCULAR; INTRAVENOUS at 22:48

## 2024-11-15 RX ADMIN — PANTOPRAZOLE SODIUM 40 MG: 40 INJECTION, POWDER, FOR SOLUTION INTRAVENOUS at 14:46

## 2024-11-15 NOTE — TELEPHONE ENCOUNTER
Pt called stating she had an stat U/S of abdomen done this morning and results are in chart. Pt still having a lot of pain. Asking if results can be reviewed and for recommendations as to whether she should be seen at the ED.    Please advise

## 2024-11-15 NOTE — ASSESSMENT & PLAN NOTE
Patient initially presenting for epigastric abdominal pain for which she underwent outpatient ultrasound that was normal.  CT scan here also did not show any acute abnormalities.  EKG however did show T wave inversions that were new in the inferior leads and deeper TWI in anterior leads (that were there before)  Trop so far negative x 3  Monitor on tele   Echo   Cardiology consult for evaluation of her EKG abn to see if ischemic workup is indicated

## 2024-11-15 NOTE — ED PROVIDER NOTES
Time reflects when diagnosis was documented in both MDM as applicable and the Disposition within this note       Time User Action Codes Description Comment    11/15/2024  6:12 PM Mandi Taveras Add [R10.13] Epigastric abdominal pain     11/15/2024  6:12 PM Mandi Taveras Add [R94.31] Abnormal EKG           ED Disposition       ED Disposition   Admit    Condition   Stable    Date/Time   Fri Nov 15, 2024  6:12 PM    Comment   Case was discussed with SIMON and the patient's admission status was agreed to be Admission Status: observation status to the service of Dr. White .               Assessment & Plan       Medical Decision Making  60 y/o female with epigastric abd pain - will get labs, trop /EKG, lactic acid, UA, and ct scan.     Patient with epigastic abd pain and abnormal EKG - will admit     Amount and/or Complexity of Data Reviewed  Labs: ordered.  Radiology: ordered.    Risk  OTC drugs.  Prescription drug management.  Decision regarding hospitalization.             Medications   sodium chloride 0.9 % bolus 1,000 mL (0 mL Intravenous Stopped 11/15/24 1646)   pantoprazole (PROTONIX) injection 40 mg (40 mg Intravenous Given 11/15/24 1446)   ondansetron (ZOFRAN) injection 4 mg (4 mg Intravenous Given 11/15/24 1445)   iohexol (OMNIPAQUE) 350 MG/ML injection (MULTI-DOSE) 100 mL (100 mL Intravenous Given 11/15/24 1608)   aluminum-magnesium hydroxide-simethicone (MAALOX) oral suspension 30 mL (30 mL Oral Given 11/15/24 1749)   Lidocaine Viscous HCl (XYLOCAINE) 2 % mucosal solution 15 mL (15 mL Swish & Swallow Given 11/15/24 1749)   morphine injection 6 mg (6 mg Intravenous Given 11/15/24 1819)       ED Risk Strat Scores                           SBIRT 22yo+      Flowsheet Row Most Recent Value   Initial Alcohol Screen: US AUDIT-C     1. How often do you have a drink containing alcohol? 0 Filed at: 11/15/2024 6527   2. How many drinks containing alcohol do you have on a typical day you are drinking?  0 Filed at:  11/15/2024 1425   3b. FEMALE Any Age, or MALE 65+: How often do you have 4 or more drinks on one occassion? 0 Filed at: 11/15/2024 1425   Audit-C Score 0 Filed at: 11/15/2024 1425   ABIGAIL: How many times in the past year have you...    Used an illegal drug or used a prescription medication for non-medical reasons? Never Filed at: 11/15/2024 1425                            History of Present Illness       Chief Complaint   Patient presents with    Abdominal Pain     Pt reports upper abd pain that began 2 weeks ago, seen at PCP yesterday. US ordered and done out pt. Pain persists. +nausea       Past Medical History:   Diagnosis Date    Allergic     Anxiety 06/20/2016    Last assessed    Chronic infective otitis externa of left ear 09/18/2017    Last assessed    Depression     GERD (gastroesophageal reflux disease)     Headache(784.0)     HL (hearing loss)     Right lumbar radiculopathy 05/05/2016    Last assessed    Sciatic leg pain       Past Surgical History:   Procedure Laterality Date    APPENDECTOMY      CHOLECYSTECTOMY      KNEE ARTHROSCOPY      torn menicus    ND LAMNOTMY INCL W/DCMPRSN NRV ROOT 1 INTRSPC LUMBR Right 7/6/2016    Procedure: L3/4 FORAMINOTOMY;  Surgeon: Merary Lugo MD;  Location: [facility];  Service: Neurosurgery    TONSILLECTOMY        Family History   Problem Relation Age of Onset    No Known Problems Mother     Alcohol abuse Father     Diabetes Paternal Grandmother     Hypertension Paternal Grandmother     Breast cancer Maternal Aunt     Cancer Family       Social History     Tobacco Use    Smoking status: Former     Types: Cigarettes    Smokeless tobacco: Never    Tobacco comments:     Quit   Vaping Use    Vaping status: Never Used   Substance Use Topics    Alcohol use: Not Currently     Alcohol/week: 1.0 standard drink of alcohol     Types: 1 Standard drinks or equivalent per week    Drug use: No      E-Cigarette/Vaping    E-Cigarette Use Never User       E-Cigarette/Vaping Substances     Nicotine No     THC No     CBD No     Flavoring No     Other No     Unknown No       I have reviewed and agree with the history as documented.     58 y/o female presents to the ED for epigastric abd pain x 1 week. States that it has been constant achy pain. Nothing worsens or improves the pain. Not associated with eating. She has been nauseous but denies vomiting. Denies any fever, cp, back pain, sob, urinary symptoms, or d/c. States that she saw her PCP yesterday and had an outpatient US done today which was neg. She denies any hx of similar. No other complaints.       History provided by:  Patient      Review of Systems   Constitutional:  Negative for chills and fever.   HENT:  Negative for congestion, ear pain and sore throat.    Eyes:  Negative for pain and visual disturbance.   Respiratory:  Negative for cough, shortness of breath and wheezing.    Cardiovascular:  Negative for chest pain and leg swelling.   Gastrointestinal:  Positive for abdominal pain and nausea. Negative for diarrhea and vomiting.   Genitourinary:  Negative for dysuria, frequency, hematuria and urgency.   Musculoskeletal:  Negative for neck pain and neck stiffness.   Skin:  Negative for rash and wound.   Neurological:  Negative for weakness, numbness and headaches.   Psychiatric/Behavioral:  Negative for agitation and confusion.    All other systems reviewed and are negative.          Objective       ED Triage Vitals   Temperature Pulse Blood Pressure Respirations SpO2 Patient Position - Orthostatic VS   11/15/24 1353 11/15/24 1353 11/15/24 1353 11/15/24 1353 11/15/24 1353 11/15/24 1353   97.8 °F (36.6 °C) 78 122/62 18 99 % Sitting      Temp Source Heart Rate Source BP Location FiO2 (%) Pain Score    11/15/24 1353 11/15/24 1353 11/15/24 1353 -- 11/15/24 1819    Tympanic Monitor Left arm  9      Vitals      Date and Time Temp Pulse SpO2 Resp BP Pain Score FACES Pain Rating User   11/16/24 1059 -- 65 91 % -- 119/48 -- -- DII   11/16/24 0735 --  -- -- -- -- 6 -- MB   11/16/24 0728 -- 77 96 % 16 121/70 -- -- DII   11/16/24 0337 98.1 °F (36.7 °C) 59 94 % -- 129/68 -- -- DII   11/15/24 2233 -- -- -- -- -- 6 -- MSD   11/15/24 2214 -- -- -- 20 -- -- -- REJI   11/15/24 2214 97.3 °F (36.3 °C) 59 96 % -- 124/56 -- -- DII   11/15/24 2033 97.8 °F (36.6 °C) 58 93 % 20 128/58 -- -- TO   11/15/24 1819 -- -- -- -- -- 9 -- CA   11/15/24 1353 97.8 °F (36.6 °C) 78 99 % 18 122/62 -- -- LA            Physical Exam  Vitals and nursing note reviewed.   Constitutional:       Appearance: She is well-developed.   HENT:      Head: Normocephalic and atraumatic.   Eyes:      Pupils: Pupils are equal, round, and reactive to light.   Cardiovascular:      Rate and Rhythm: Normal rate and regular rhythm.   Pulmonary:      Effort: Pulmonary effort is normal.      Breath sounds: Normal breath sounds.   Abdominal:      General: Bowel sounds are normal.      Palpations: Abdomen is soft.      Tenderness: There is abdominal tenderness in the epigastric area. There is no guarding or rebound.   Musculoskeletal:         General: Normal range of motion.      Cervical back: Normal range of motion and neck supple.   Skin:     General: Skin is warm and dry.   Neurological:      General: No focal deficit present.      Mental Status: She is alert and oriented to person, place, and time.      Comments: No focal deficits         Results Reviewed       Procedure Component Value Units Date/Time    HS Troponin I 4hr [[id number]]  (Normal) Collected: 11/15/24 1819    Lab Status: Final result Specimen: Blood from Arm, Right Updated: 11/15/24 1846     hs TnI 4hr 3 ng/L      Delta 4hr hsTnI >1 ng/L     HS Troponin I 2hr [[id number]]  (Normal) Collected: 11/15/24 1646    Lab Status: Final result Specimen: Blood from Arm, Right Updated: 11/15/24 1724     hs TnI 2hr 3 ng/L      Delta 2hr hsTnI >1 ng/L     HS Troponin 0hr (reflex protocol) [[id number]]  (Normal) Collected: 11/15/24 1446    Lab Status: Final result  Specimen: Blood from Arm, Right Updated: 11/15/24 1520     hs TnI 0hr <2 ng/L     Lactic acid, plasma (w/reflex if result > 2.0) [[id number]]  (Normal) Collected: 11/15/24 1446    Lab Status: Final result Specimen: Blood from Arm, Right Updated: 11/15/24 1510     LACTIC ACID 0.6 mmol/L     Narrative:      Result may be elevated if tourniquet was used during collection.    Comprehensive metabolic panel [[id number]] Collected: 11/15/24 1431    Lab Status: Final result Specimen: Blood from Arm, Right Updated: 11/15/24 1450     Sodium 139 mmol/L      Potassium 4.1 mmol/L      Chloride 105 mmol/L      CO2 26 mmol/L      ANION GAP 8 mmol/L      BUN 14 mg/dL      Creatinine 0.82 mg/dL      Glucose 105 mg/dL      Calcium 9.7 mg/dL      AST 17 U/L      ALT 12 U/L      Alkaline Phosphatase 97 U/L      Total Protein 6.6 g/dL      Albumin 4.5 g/dL      Total Bilirubin 0.51 mg/dL      eGFR 77 ml/min/1.73sq m     Narrative:      [employer] guidelines for Chronic Kidney Disease (CKD):     Stage 1 with normal or high GFR (GFR > 90 mL/min/1.73 square meters)    Stage 2 Mild CKD (GFR = 60-89 mL/min/1.73 square meters)    Stage 3A Moderate CKD (GFR = 45-59 mL/min/1.73 square meters)    Stage 3B Moderate CKD (GFR = 30-44 mL/min/1.73 square meters)    Stage 4 Severe CKD (GFR = 15-29 mL/min/1.73 square meters)    Stage 5 End Stage CKD (GFR <15 mL/min/1.73 square meters)  Note: GFR calculation is accurate only with a steady state creatinine    Lipase [[id number]]  (Normal) Collected: 11/15/24 1431    Lab Status: Final result Specimen: Blood from Arm, Right Updated: 11/15/24 1450     Lipase 15 u/L     CBC and differential [[id number]] Collected: 11/15/24 1431    Lab Status: Final result Specimen: Blood from Arm, Right Updated: 11/15/24 1435     WBC 6.16 Thousand/uL      RBC 4.46 Million/uL      Hemoglobin 13.5 g/dL      Hematocrit 40.4 %      MCV 91 fL      MCH 30.3 pg      MCHC 33.4 g/dL      RDW 12.2 %      MPV 10.5  fL      Platelets 270 Thousands/uL      nRBC 0 /100 WBCs      Segmented % 61 %      Immature Grans % 0 %      Lymphocytes % 29 %      Monocytes % 9 %      Eosinophils Relative 1 %      Basophils Relative 0 %      Absolute Neutrophils 3.76 Thousands/µL      Absolute Immature Grans 0.01 Thousand/uL      Absolute Lymphocytes 1.77 Thousands/µL      Absolute Monocytes 0.56 Thousand/µL      Eosinophils Absolute 0.04 Thousand/µL      Basophils Absolute 0.02 Thousands/µL             CT abdomen pelvis with contrast   Final Interpretation by Reji Stone MD (11/15 1639)      No acute abnormality identified in the lower chest, abdomen or pelvis.      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         Workstation performed: [id number]             ECG 12 Lead Documentation Only    Date/Time: 11/15/2024 6:16 PM    Performed by: Mandi Taveras DO  Authorized by: Mandi Taveras DO    Indications / Diagnosis:  Epigastric abd pain  Patient location:  ED  Rate:     ECG rate:  60  Rhythm:     Rhythm: sinus rhythm    Ectopy:     Ectopy: none    QRS:     QRS axis:  Left    QRS intervals:  Normal  ST segments:     ST segments:  Normal  T waves:     T waves: inverted      Inverted:  III, aVF, V2, V3 and V4      ED Medication and Procedure Management   Prior to Admission Medications   Prescriptions Last Dose Informant Patient Reported? Taking?   FLUoxetine 20 MG tablet 11/15/2024  No Yes   Sig: TAKE 3 TABLETS BY MOUTH EVERY DAY   aspirin 81 mg chewable tablet 11/15/2024 Self No Yes   Sig: Chew 1 tablet (81 mg total) daily Do not start before September 15, 2023.   atorvastatin (LIPITOR) 40 mg tablet 11/15/2024  No Yes   Sig: TAKE 1 TABLET BY MOUTH DAILY WITH DINNER   famotidine (PEPCID) 40 MG tablet 11/15/2024  No Yes   Sig: Take 1 tablet (40 mg total) by mouth daily at bedtime   omeprazole (PriLOSEC) 20 mg delayed release capsule 11/15/2024  Yes Yes   Sig: Take 20 mg by mouth daily       Facility-Administered Medications: None     Discharge Medication List as of 11/16/2024  1:36 PM        START taking these medications    Details   ondansetron (ZOFRAN) 4 mg tablet Take 1 tablet (4 mg total) by mouth every 8 (eight) hours as needed for nausea or vomiting, Starting Sat 11/16/2024, Normal      sucralfate (CARAFATE) 1 g tablet Take 1 tablet (1 g total) by mouth 4 (four) times a day (before meals and at bedtime), Starting Sat 11/16/2024, Normal           CONTINUE these medications which have NOT CHANGED    Details   aspirin 81 mg chewable tablet Chew 1 tablet (81 mg total) daily Do not start before September 15, 2023., Starting Fri 9/15/2023, Normal      atorvastatin (LIPITOR) 40 mg tablet TAKE 1 TABLET BY MOUTH DAILY WITH DINNER, Starting Wed 10/2/2024, Normal      famotidine (PEPCID) 40 MG tablet Take 1 tablet (40 mg total) by mouth daily at bedtime, Starting Thu 11/14/2024, Until Sun 11/9/2025, Normal      FLUoxetine 20 MG tablet TAKE 3 TABLETS BY MOUTH EVERY DAY, Normal      omeprazole (PriLOSEC) 20 mg delayed release capsule Take 20 mg by mouth daily, Historical Med           Outpatient Discharge Orders   Ambulatory referral to Gastroenterology   Standing Status: Future Number of Occurrences: 1 Standing Exp. Date: 11/16/25      Discharge Diet     Activity as tolerated     Call provider for:  persistent nausea or vomiting     Call provider for:  severe uncontrolled pain     ED SEPSIS DOCUMENTATION   Time reflects when diagnosis was documented in both MDM as applicable and the Disposition within this note       Time User Action Codes Description Comment    11/15/2024  6:12 PM Mandi Taveras Add [R10.13] Epigastric abdominal pain     11/15/2024  6:12 PM Mandi Taveras Add [R94.31] Abnormal EKG                  Mandi Taveras, DO  11/18/24 1431

## 2024-11-16 VITALS
OXYGEN SATURATION: 91 % | DIASTOLIC BLOOD PRESSURE: 48 MMHG | HEIGHT: 61 IN | HEART RATE: 65 BPM | RESPIRATION RATE: 16 BRPM | BODY MASS INDEX: 27.14 KG/M2 | WEIGHT: 143.74 LBS | SYSTOLIC BLOOD PRESSURE: 119 MMHG | TEMPERATURE: 98.1 F

## 2024-11-16 LAB
ATRIAL RATE: 60 BPM
P AXIS: -16 DEGREES
PR INTERVAL: 144 MS
QRS AXIS: -28 DEGREES
QRSD INTERVAL: 88 MS
QT INTERVAL: 438 MS
QTC INTERVAL: 438 MS
T WAVE AXIS: -38 DEGREES
VENTRICULAR RATE: 60 BPM

## 2024-11-16 PROCEDURE — NC001 PR NO CHARGE: Performed by: PHYSICIAN ASSISTANT

## 2024-11-16 PROCEDURE — 93010 ELECTROCARDIOGRAM REPORT: CPT | Performed by: INTERNAL MEDICINE

## 2024-11-16 PROCEDURE — 99244 OFF/OP CNSLTJ NEW/EST MOD 40: CPT | Performed by: INTERNAL MEDICINE

## 2024-11-16 PROCEDURE — 99239 HOSP IP/OBS DSCHRG MGMT >30: CPT | Performed by: PHYSICIAN ASSISTANT

## 2024-11-16 RX ORDER — SUCRALFATE 1 G/1
1 TABLET ORAL
Status: DISCONTINUED | OUTPATIENT
Start: 2024-11-16 | End: 2024-11-16 | Stop reason: HOSPADM

## 2024-11-16 RX ORDER — ONDANSETRON 2 MG/ML
4 INJECTION INTRAMUSCULAR; INTRAVENOUS EVERY 4 HOURS PRN
Status: DISCONTINUED | OUTPATIENT
Start: 2024-11-16 | End: 2024-11-16 | Stop reason: HOSPADM

## 2024-11-16 RX ORDER — ONDANSETRON 4 MG/1
4 TABLET, FILM COATED ORAL EVERY 8 HOURS PRN
Qty: 20 TABLET | Refills: 0 | Status: SHIPPED | OUTPATIENT
Start: 2024-11-16

## 2024-11-16 RX ORDER — MAGNESIUM HYDROXIDE/ALUMINUM HYDROXICE/SIMETHICONE 120; 1200; 1200 MG/30ML; MG/30ML; MG/30ML
30 SUSPENSION ORAL EVERY 4 HOURS PRN
Status: DISCONTINUED | OUTPATIENT
Start: 2024-11-16 | End: 2024-11-16 | Stop reason: HOSPADM

## 2024-11-16 RX ORDER — ACETAMINOPHEN 325 MG/1
650 TABLET ORAL EVERY 6 HOURS PRN
Status: DISCONTINUED | OUTPATIENT
Start: 2024-11-16 | End: 2024-11-16 | Stop reason: HOSPADM

## 2024-11-16 RX ORDER — SUCRALFATE 1 G/1
1 TABLET ORAL
Qty: 120 TABLET | Refills: 0 | Status: SHIPPED | OUTPATIENT
Start: 2024-11-16

## 2024-11-16 RX ADMIN — MORPHINE SULFATE 2 MG: 2 INJECTION, SOLUTION INTRAMUSCULAR; INTRAVENOUS at 07:35

## 2024-11-16 RX ADMIN — ASPIRIN 81 MG: 81 TABLET, CHEWABLE ORAL at 08:38

## 2024-11-16 RX ADMIN — SUCRALFATE 1 G: 1 TABLET ORAL at 11:32

## 2024-11-16 RX ADMIN — ENOXAPARIN SODIUM 40 MG: 40 INJECTION SUBCUTANEOUS at 08:38

## 2024-11-16 RX ADMIN — ONDANSETRON 4 MG: 2 INJECTION INTRAMUSCULAR; INTRAVENOUS at 08:38

## 2024-11-16 NOTE — DISCHARGE SUMMARY
Discharge Summary - Hospitalist   Name: Linda Celeste 59 y.o. female I MRN: [id number]  Unit/Bed#: -01 I Date of Admission: 11/15/2024   Date of Service: 11/16/2024 I Hospital Day: 0     Assessment & Plan  Abdominal pain  Uncertain etiology   CT negative did show redemonstration of mesenteric panniculitis?  She does report constipation but no findings on CT scan  Felt actually worse after GI cocktail in ER   Continue carafate on discharge; continue pepcid/prilosec   Keep trigger diary   Referral provided for outpatient GI evaluation  EKG abnormality  Patient initially presenting for epigastric abdominal pain for which she underwent outpatient ultrasound that was normal.  CT scan here also did not show any acute abnormalities.  EKG however did show T wave inversions that were new in the inferior leads and deeper TWI in anterior leads (that were there before)  hsTnI normal  Tele monitor overnight normal   Cardiology consulted,  Recommending outpatient stress test, OK for discharge from their standpoint  Hyperlipidemia, mixed        Discharging Physician / Practitioner: Avril Wynne PA-C  PCP: [facility]  Admission Date:   Admission Orders (From admission, onward)       Ordered        11/15/24 1811  Place in Observation  Once                          Discharge Date: 11/16/24    Consultations During Hospital Stay:  IP CONSULT TO CARDIOLOGY    Procedures Performed:   None     Significant Findings / Test Results:   CT abdomen pelvis with contrast  Result Date: 11/15/2024  Impression: No acute abnormality identified in the lower chest, abdomen or pelvis. 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 Workstation performed: [id number]     US abdomen complete  Result Date: 11/15/2024  Impression: 2.2 x 1.7 x 2.3 cm subcapsular hemangioma in the right lobe of the liver. Study otherwise unremarkable. Workstation performed: [id number]       No Chest XR results available for this patient.     No  "results found for this or any previous visit.      No results for input(s): \"BLOODCX\", \"SPUTUMCULTUR\", \"GRAMSTAIN\", \"URINECX\", \"WOUNDCULT\", \"BODYFLUIDCUL\", \"MRSACULTURE\", \"INFLUAPCR\", \"INFLUBPCR\", \"RSVPCR\", \"LEGIONELLAUR\", \"STPU\", \"CDIFFTOXINB\" in the last 72 hours.    Incidental Findings:   None other than noted above; I reviewed the above mentioned incidental findings with the patient and/or family and they expressed understanding.    Test Results Pending at Discharge (will require follow up):   None      Outpatient Tests Requested:  Stress  Echo   GI referral     Complications:  None    Reason for Admission:   Chief Complaint   Patient presents with    Abdominal Pain     Pt reports upper abd pain that began 2 weeks ago, seen at PCP yesterday. US ordered and done out pt. Pain persists. +nausea         Hospital Course:   Linda Celeste is a 59 y.o. female with a PMH of chronic back pain, HLD who presents with abdominal pain that she reported started a few days ago without any inciting factors. She also reported constipation during this time but said that it was not abnormal for her. She had outpatient ultrasound and given consistent pain was instructed to go to the ED. CT scan in the ED did not show any acute abnormalities did show some redemonstrated mesenteric panniculitis but EKG showed new T wave inversions for which she is being admitted for cardiac consultation and echocardiogram. She denied any chest pain, sob. No hx MI or stroke in the past.     Cardiology did see the patient and is recommending outpatient stress test and echo given benign workup otherwise.    Patient reports symptoms actually worsened after GI cocktail in the ER, seems to have responded well to Zofran and Carafate.  Still having some discomfort, though tolerable at this time.  Able to keep down oral intake.  Will be recommending GI referral on discharge, can continue Carafate and Pepcid and/or omeprazole.  At this time she is hemodynamically " stable for discharged home and all questions answered.  Patient agreeable to discharge plan.        Please see above list of diagnoses and related plan for additional information.     Condition at Discharge: fair    Discharge Day Visit / Exam:   See progress note same day    Discussion with Family: Patient declined call to .     Discharge instructions/Information to patient and family:   See after visit summary for information provided to patient and family.      Provisions for Follow-Up Care:  See after visit summary for information related to follow-up care and any pertinent home health orders.       Mobility at time of Discharge:   Basic Mobility Inpatient Raw Score: 24  JH-HLM Goal: 8: Walk 250 feet or more  JH-HLM Achieved: 6: Walk 10 steps or more  HLM Goal NOT achieved. Continue to encourage mobility in post discharge setting.    Disposition:   Home    Planned Readmission: none     Discharge Statement:  I spent 35 minutes discharging the patient. This time was spent on the day of discharge. I had direct contact with the patient on the day of discharge. Greater than 50% of the total time was spent examining patient, answering all patient questions, arranging and discussing plan of care with patient as well as directly providing post-discharge instructions.  Additional time then spent on discharge activities.    Discharge Medications:  See after visit summary for reconciled discharge medications provided to patient and/or family.      **Please Note: This note may have been constructed using a voice recognition system**

## 2024-11-16 NOTE — ASSESSMENT & PLAN NOTE
Uncertain etiology   CT negative did show redemonstration of mesenteric panniculitis?  Given EKG, concern whether anginal equivalent awaiting cardiology evaluation  She does report constipation but deferred laxative at this time

## 2024-11-16 NOTE — PROGRESS NOTES
Progress Note - Hospitalist   Name: Linda Celeste 59 y.o. female I MRN: [id number]  Unit/Bed#: -01 I Date of Admission: 11/15/2024   Date of Service: 11/16/2024 I Hospital Day: 0    Assessment & Plan  Abdominal pain  Uncertain etiology   CT negative did show redemonstration of mesenteric panniculitis?  She does report constipation but no findings on CT scan  Felt actually worse after GI cocktail in ER   Trial carafate   Consider GI inpatient vs outpatient for EGD  EKG abnormality  Patient initially presenting for epigastric abdominal pain for which she underwent outpatient ultrasound that was normal.  CT scan here also did not show any acute abnormalities.  EKG however did show T wave inversions that were new in the inferior leads and deeper TWI in anterior leads (that were there before)  hsTnI normal  Tele monitor overnight normal   Cardiology consulted,  Recommending outpatient stress test, OK for discharge from their standpoint    VTE Pharmacologic Prophylaxis: VTE Score: 3 Moderate Risk (Score 3-4) - Pharmacological DVT Prophylaxis Ordered: enoxaparin (Lovenox).    Mobility:   Basic Mobility Inpatient Raw Score: 24  -HLM Goal: 8: Walk 250 feet or more  JH-HLM Achieved: 3: Sit at edge of bed  JH-HLM Goal NOT achieved. Continue with multidisciplinary rounding and encourage appropriate mobility to improve upon JH-HLM goals.    Patient Centered Rounds: I performed bedside rounds with nursing staff today.   Discussions with Specialists or Other Care Team Provider: cards     Education and Discussions with Family / Patient: Patient declined call to .     Current Length of Stay: 0 day(s)  Current Patient Status: Observation   Certification Statement: The patient, admitted on an observation basis, will now require > 2 midnight hospital stay due to awaiting response to carafate   Discharge Plan: Anticipate discharge later today or tomorrow to home.    Code Status: Level 1 - Full Code    Subjective    Patient seen this morning, she is doing okay, but still having epigastric discomfort.  She was experiencing nausea earlier.  Reports that she actually felt worse yesterday in the ER after the GI cocktail of Maalox with viscous lidocaine. She thought she might be getting an upper GI series/scope today    Objective :  Temp:  [97.3 °F (36.3 °C)-98.1 °F (36.7 °C)] 98.1 °F (36.7 °C)  HR:  [58-78] 77  BP: (121-129)/(56-70) 121/70  Resp:  [16-20] 16  SpO2:  [93 %-99 %] 96 %  O2 Device: None (Room air)    Body mass index is 27.16 kg/m².     Input and Output Summary (last 24 hours):     Intake/Output Summary (Last 24 hours) at 11/16/2024 0938  Last data filed at 11/16/2024 0331  Gross per 24 hour   Intake 1730 ml   Output --   Net 1730 ml       Physical Exam  Vitals and nursing note reviewed.   Constitutional:       General: She is awake. She is not in acute distress.     Appearance: Normal appearance. She is well-developed, well-groomed and overweight. She is not ill-appearing or toxic-appearing.   HENT:      Head: Normocephalic and atraumatic.   Cardiovascular:      Rate and Rhythm: Normal rate and regular rhythm.      Heart sounds: Normal heart sounds. No murmur heard.  Pulmonary:      Effort: Pulmonary effort is normal. No respiratory distress.      Breath sounds: Normal breath sounds. No wheezing.   Abdominal:      General: Abdomen is flat. Bowel sounds are normal. There is no distension.      Palpations: Abdomen is soft.      Tenderness: There is abdominal tenderness in the epigastric area.   Skin:     General: Skin is warm and dry.      Coloration: Skin is not pale.   Neurological:      Mental Status: She is alert and oriented to person, place, and time.   Psychiatric:         Mood and Affect: Mood normal.         Behavior: Behavior normal. Behavior is cooperative.           Lines/Drains:        Telemetry:  Telemetry Orders (From admission, onward)               24 Hour Telemetry Monitoring  Continuous x 24 Hours  (Telem)        Question:  Reason for 24 Hour Telemetry  Answer:  PCI/EP study (including pacer and ICD implementation), Cardiac surgery, MI, abnormal cardiac cath, and chest pain- rule out MI                     Telemetry Reviewed: Normal Sinus Rhythm  Indication for Continued Telemetry Use: No indication for continued use. Will discontinue.                Lab Results: I have reviewed the following results:   Results from last 7 days   Lab Units 11/15/24  1431   WBC Thousand/uL 6.16   HEMOGLOBIN g/dL 13.5   HEMATOCRIT % 40.4   PLATELETS Thousands/uL 270   SEGS PCT % 61   LYMPHO PCT % 29   MONO PCT % 9   EOS PCT % 1     Results from last 7 days   Lab Units 11/15/24  1431   SODIUM mmol/L 139   POTASSIUM mmol/L 4.1   CHLORIDE mmol/L 105   CO2 mmol/L 26   BUN mg/dL 14   CREATININE mg/dL 0.82   ANION GAP mmol/L 8   CALCIUM mg/dL 9.7   ALBUMIN g/dL 4.5   TOTAL BILIRUBIN mg/dL 0.51   ALK PHOS U/L 97   ALT U/L 12   AST U/L 17   GLUCOSE RANDOM mg/dL 105                 Results from last 7 days   Lab Units 11/15/24  1446   LACTIC ACID mmol/L 0.6       Recent Cultures (last 7 days):         Imaging Results Review: No pertinent imaging studies reviewed.  Other Study Results Review: No additional pertinent studies reviewed.    Last 24 Hours Medication List:     Current Facility-Administered Medications:     acetaminophen (TYLENOL) tablet 650 mg, Q6H PRN    aluminum-magnesium hydroxide-simethicone (MAALOX) oral suspension 30 mL, Q4H PRN    aspirin chewable tablet 81 mg, Daily    atorvastatin (LIPITOR) tablet 40 mg, Daily With Dinner    enoxaparin (LOVENOX) subcutaneous injection 40 mg, Daily    famotidine (PEPCID) tablet 40 mg, HS    morphine injection 2 mg, Q4H PRN    ondansetron (ZOFRAN) injection 4 mg, Q4H PRN    Administrative Statements   Today, Patient Was Seen By: Avril Wynne PA-C      **Please Note: This note may have been constructed using a voice recognition system.**

## 2024-11-16 NOTE — CONSULTS
Consultation - Cardiology   Name: Linda Celeste 59 y.o. female I MRN: [id number]  Unit/Bed#: -01 I Date of Admission: 11/15/2024   Date of Service: 11/16/2024 I Hospital Day: 0   Inpatient consult to Cardiology  Consult performed by: AMAURI Branch  Consult ordered by: Alfredito White MD        Physician Requesting Evaluation: Hasmukh Maier DO   Reason for Evaluation / Principal Problem: Worsening T wave inversion     Assessment & Plan  EKG abnormality  -EKG yesterday showed worsening T wave inversions  - Plan for outpatient TTE and stress testing with follow-up afterward  Abdominal pain  -Management per primary team  Hyperlipidemia, mixed  -Continue atorvastatin      History of Present Illness   Linda Celeste is a 59 y.o. female with a past medical history of chronic back pain and hyperlipidemia who presents to the emergency room for abdominal pain.  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      High-sensitivity troponins have been negative x 3.  Review of current and past EKG shows inverted T waves on prior EKGs but noted to be a little worse on yesterday's EKG.  Plan is for outpatient TTE and stress testing with follow-up with cardiology afterward.    Review of Systems   Constitutional:  Negative for chills and fever.   HENT:  Negative for ear pain and sore throat.    Eyes:  Negative for pain and visual disturbance.   Respiratory:  Negative for cough and shortness of breath.    Cardiovascular:  Negative for chest pain and palpitations.   Gastrointestinal:  Positive for abdominal pain. Negative for vomiting.   Genitourinary:  Negative for dysuria and hematuria.   Musculoskeletal:  Negative for arthralgias and back pain.   Skin:  Negative for color change and rash.   Neurological:  Negative for seizures and syncope.   All other systems reviewed and are negative.    I have reviewed the patient's PMH, PSH, Social  History, Family History, Meds, and Allergies    Objective :  Temp:  [97.3 °F (36.3 °C)-98.1 °F (36.7 °C)] 98.1 °F (36.7 °C)  HR:  [58-78] 77  BP: (121-129)/(56-70) 121/70  Resp:  [16-20] 16  SpO2:  [93 %-99 %] 96 %  O2 Device: None (Room air)  Orthostatic Blood Pressures      Flowsheet Row Most Recent Value   Blood Pressure 121/70 filed at 11/16/2024 0728   Patient Position - Orthostatic VS Lying filed at 11/15/2024 2214          First Weight: Weight - Scale: 65.2 kg (143 lb 11.8 oz) (11/15/24 1353)  Vitals:    11/15/24 1353 11/15/24 2233   Weight: 65.2 kg (143 lb 11.8 oz) 65.2 kg (143 lb 11.8 oz)       Physical Exam  Vitals and nursing note reviewed.   Constitutional:       General: She is not in acute distress.     Appearance: She is well-developed.   HENT:      Head: Normocephalic and atraumatic.   Eyes:      Conjunctiva/sclera: Conjunctivae normal.   Cardiovascular:      Rate and Rhythm: Normal rate and regular rhythm.      Heart sounds: No murmur heard.  Pulmonary:      Effort: Pulmonary effort is normal. No respiratory distress.      Breath sounds: Normal breath sounds.   Abdominal:      Palpations: Abdomen is soft.      Tenderness: There is no abdominal tenderness.   Musculoskeletal:         General: No swelling.      Cervical back: Neck supple.   Skin:     General: Skin is warm and dry.      Capillary Refill: Capillary refill takes less than 2 seconds.   Neurological:      Mental Status: She is alert.   Psychiatric:         Mood and Affect: Mood normal.           Lab Results: I have reviewed the following results:  Results from last 7 days   Lab Units 11/15/24  1431   WBC Thousand/uL 6.16   HEMOGLOBIN g/dL 13.5   HEMATOCRIT % 40.4   PLATELETS Thousands/uL 270     Results from last 7 days   Lab Units 11/15/24  1431   POTASSIUM mmol/L 4.1   CHLORIDE mmol/L 105   CO2 mmol/L 26   BUN mg/dL 14   CREATININE mg/dL 0.82   CALCIUM mg/dL 9.7         Lab Results   Component Value Date    HGBA1C 5.3 09/13/2023     Lab  Results   Component Value Date    TROPONINI <0.02 09/15/2021

## 2024-11-16 NOTE — HOSPITAL COURSE
Linda Celeste is a 59 y.o. female with a PMH of chronic back pain, HLD who presents with abdominal pain that she reported started a few days ago without any inciting factors. She also reported constipation during this time but said that it was not abnormal for her. She had outpatient ultrasound and given consistent pain was instructed to go to the ED. CT scan in the ED did not show any acute abnormalities did show some redemonstrated mesenteric panniculitis but EKG showed new T wave inversions for which she is being admitted for cardiac consultation and echocardiogram. She denied any chest pain, sob. No hx MI or stroke in the past.     Cardiology did see the patient and is recommending outpatient stress test and echo given benign workup otherwise.    Patient reports symptoms actually worsened after GI cocktail in the ER, seems to have responded well to Zofran and Carafate.  Still having some discomfort, though tolerable at this time.  Able to keep down oral intake.  Will be recommending GI referral on discharge, can continue Carafate and Pepcid and/or omeprazole.  At this time she is hemodynamically stable for discharged home and all questions answered.  Patient agreeable to discharge plan.

## 2024-11-16 NOTE — ASSESSMENT & PLAN NOTE
Patient initially presenting for epigastric abdominal pain for which she underwent outpatient ultrasound that was normal.  CT scan here also did not show any acute abnormalities.  EKG however did show T wave inversions that were new in the inferior leads and deeper TWI in anterior leads (that were there before)  hsTnI normal  Tele monitor overnight normal   Cardiology consulted,  Recommending outpatient stress test, [place] for discharge from their standpoint

## 2024-11-16 NOTE — PLAN OF CARE
Problem: PAIN - ADULT  Goal: Verbalizes/displays adequate comfort level or baseline comfort level  Description: Interventions:  - Encourage patient to monitor pain and request assistance  - Assess pain using appropriate pain scale  - Administer analgesics based on type and severity of pain and evaluate response  - Implement non-pharmacological measures as appropriate and evaluate response  - Consider cultural and social influences on pain and pain management  - Notify physician/advanced practitioner if interventions unsuccessful or patient reports new pain  11/16/2024 1809 by Jammie Olmedo  Outcome: Adequate for Discharge  11/16/2024 1012 by Jammie Olmedo  Outcome: Progressing     Problem: INFECTION - ADULT  Goal: Absence or prevention of progression during hospitalization  Description: INTERVENTIONS:  - Assess and monitor for signs and symptoms of infection  - Monitor lab/diagnostic results  - Monitor all insertion sites, i.e. indwelling lines, tubes, and drains  - Monitor endotracheal if appropriate and nasal secretions for changes in amount and color  - [place] appropriate cooling/warming therapies per order  - Administer medications as ordered  - Instruct and encourage patient and family to use good hand hygiene technique  - Identify and instruct in appropriate isolation precautions for identified infection/condition  11/16/2024 1809 by Jammie Olmedo  Outcome: Adequate for Discharge  11/16/2024 1012 by Jammie Olmedo  Outcome: Progressing  Goal: Absence of fever/infection during neutropenic period  Description: INTERVENTIONS:  - Monitor WBC    11/16/2024 1809 by Jammie Olmedo  Outcome: Adequate for Discharge  11/16/2024 1012 by Jammie Olmedo  Outcome: Progressing     Problem: SAFETY ADULT  Goal: Patient will remain free of falls  Description: INTERVENTIONS:  - Educate patient/family on patient safety including physical limitations  - Instruct patient to call for assistance with  activity   - Consult OT/PT to assist with strengthening/mobility   - Keep Call bell within reach  - Keep bed low and locked with side rails adjusted as appropriate  - Keep care items and personal belongings within reach  - Initiate and maintain comfort rounds  - Make Fall Risk Sign visible to staff  - Offer Toileting every  Hours, in advance of need  - Initiate/Maintain alarm  - Obtain necessary fall risk management equipment:   - Apply yellow socks and bracelet for high fall risk patients  - Consider moving patient to room near nurses station  11/16/2024 1809 by Jammie Olmedo  Outcome: Adequate for Discharge  11/16/2024 1012 by Jammie Olmedo  Outcome: Progressing  Goal: Maintain or return to baseline ADL function  Description: INTERVENTIONS:  -  Assess patient's ability to carry out ADLs; assess patient's baseline for ADL function and identify physical deficits which impact ability to perform ADLs (bathing, care of mouth/teeth, toileting, grooming, dressing, etc.)  - Assess/evaluate cause of self-care deficits   - Assess range of motion  - Assess patient's mobility; develop plan if impaired  - Assess patient's need for assistive devices and provide as appropriate  - Encourage maximum independence but intervene and supervise when necessary  - Involve family in performance of ADLs  - Assess for home care needs following discharge   - Consider OT consult to assist with ADL evaluation and planning for discharge  - Provide patient education as appropriate  11/16/2024 1809 by Jammie Olmedo  Outcome: Adequate for Discharge  11/16/2024 1012 by Jammie Olmedo  Outcome: Progressing  Goal: Maintains/Returns to pre admission functional level  Description: INTERVENTIONS:  - Perform AM-PAC 6 Click Basic Mobility/ Daily Activity assessment daily.  - Set and communicate daily mobility goal to care team and patient/family/caregiver.   - Collaborate with rehabilitation services on mobility goals if consulted  -  Perform Range of Motion  times a day.  - Reposition patient every  hours.  - Dangle patient  times a day  - Stand patient  times a day  - Ambulate patient  times a day  - Out of bed to chair  times a day   - Out of bed for meals  times a day  - Out of bed for toileting  - Record patient progress and toleration of activity level   11/16/2024 1809 by Jammie Olmedo  Outcome: Adequate for Discharge  11/16/2024 1012 by Jammie Olmedo  Outcome: Progressing

## 2024-11-16 NOTE — UTILIZATION REVIEW
Initial Clinical Review    Admission: Date/Time/Statement:   Admission Orders (From admission, onward)       Ordered        11/15/24 1811  Place in Observation  Once                          Orders Placed This Encounter   Procedures    Place in Observation     Standing Status:   Standing     Number of Occurrences:   1     Level of Care:   Med Surg [16]     ED Arrival Information       Expected   -    Arrival   11/15/2024 13:33    Acuity   Urgent              Means of arrival   Walk-In    Escorted by   Self    Service   Hospitalist    Admission type   Emergency              Arrival complaint   ABD PAIN             Chief Complaint   Patient presents with    Abdominal Pain     Pt reports upper abd pain that began 2 weeks ago, seen at PCP yesterday. US ordered and done out pt. Pain persists. +nausea       Initial Presentation: 59 y.o. female presents to ED  from home  with abdominal pain  for past  few days, no inciting factor.  Has constipation,  not abnormal for her.   Had  US  Op and  instructed to  ED.  CT scan shows mesenteric  panniculitis.  EKG  shows new T wave  inversions.  PMH is chronic back pain  and  HLD.  Admit  Observation with  EKG abnormality, Abdominal pain  and plan is  tele, 2 DE, cardiology consult  and observe.      Anticipated Length of Stay/Certification Statement: Patient will be admitted on an observation basis with an anticipated length of stay of less than 2 midnights secondary to EKG abnormality.         ED Treatment-Medication Administration from 11/15/2024 1333 to 11/15/2024 2207         Date/Time Order Dose Route Action     11/15/2024 1445 sodium chloride 0.9 % bolus 1,000 mL 1,000 mL Intravenous New Bag     11/15/2024 1446 pantoprazole (PROTONIX) injection 40 mg 40 mg Intravenous Given     11/15/2024 1445 ondansetron (ZOFRAN) injection 4 mg 4 mg Intravenous Given     11/15/2024 1608 iohexol (OMNIPAQUE) 350 MG/ML injection (MULTI-DOSE) 100 mL 100 mL Intravenous Given     11/15/2024 4280  aluminum-magnesium hydroxide-simethicone (MAALOX) oral suspension 30 mL 30 mL Oral Given     11/15/2024 1749 Lidocaine Viscous HCl (XYLOCAINE) 2 % mucosal solution 15 mL 15 mL Swish & Swallow Given     11/15/2024 1819 morphine injection 6 mg 6 mg Intravenous Given            Scheduled Medications:  aspirin, 81 mg, Oral, Daily  atorvastatin, 40 mg, Oral, Daily With Dinner  enoxaparin, 40 mg, Subcutaneous, Daily  famotidine, 40 mg, Oral, HS      Continuous IV Infusions:     PRN Meds:  acetaminophen, 650 mg, Oral, Q6H PRN  aluminum-magnesium hydroxide-simethicone, 30 mL, Oral, Q4H PRN  morphine injection, 2 mg, Intravenous, Q4H PRN  ondansetron, 4 mg, Intravenous, Q4H PRN      ED Triage Vitals   Temperature Pulse Respirations Blood Pressure SpO2 Pain Score   11/15/24 1353 11/15/24 1353 11/15/24 1353 11/15/24 1353 11/15/24 1353 11/15/24 1819   97.8 °F (36.6 °C) 78 18 122/62 99 % 9     Weight (last 2 days)       Date/Time Weight    11/15/24 2233 65.2 (143.74)    11/15/24 1353 65.2 (143.74)            Vital Signs (last 3 days)       Date/Time Temp Pulse Resp BP MAP (mmHg) SpO2 O2 Device Patient Position - Orthostatic VS Pain    11/16/24 0735 -- -- -- -- -- -- -- -- 6 11/16/24 07:28:56 -- 77 16 121/70 87 96 % -- -- --    11/16/24 03:37:47 98.1 °F (36.7 °C) 59 -- 129/68 88 94 % -- -- --    11/15/24 2233 -- -- -- -- -- -- -- -- 6    11/15/24 22:14:18 97.3 °F (36.3 °C) 59 20 124/56 79 96 % None (Room air) Lying --    11/15/24 2033 97.8 °F (36.6 °C) 58 20 128/58 84 93 % None (Room air) Lying --    11/15/24 1819 -- -- -- -- -- -- -- -- 9    11/15/24 1425 -- -- -- -- -- -- None (Room air) -- --    11/15/24 1353 97.8 °F (36.6 °C) 78 18 122/62 85 99 % None (Room air) Sitting --              Pertinent Labs/Diagnostic Test Results:   Radiology:  CT abdomen pelvis with contrast   Final Interpretation by Reji Stone MD (11/15 4339)      No acute abnormality identified in the lower chest, abdomen or pelvis.       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         Workstation performed: [id number]             EKG   T wave  inversions,  new  in inferior leads, deeper  TWI  in anterior leads  ( previously seen)   Results from last 7 days   Lab Units 11/15/24  1431   WBC Thousand/uL 6.16   HEMOGLOBIN g/dL 13.5   HEMATOCRIT % 40.4   PLATELETS Thousands/uL 270   TOTAL NEUT ABS Thousands/µL 3.76         Results from last 7 days   Lab Units 11/15/24  1431   SODIUM mmol/L 139   POTASSIUM mmol/L 4.1   CHLORIDE mmol/L 105   CO2 mmol/L 26   ANION GAP mmol/L 8   BUN mg/dL 14   CREATININE mg/dL 0.82   EGFR ml/min/1.73sq m 77   CALCIUM mg/dL 9.7     Results from last 7 days   Lab Units 11/15/24  1431   AST U/L 17   ALT U/L 12   ALK PHOS U/L 97   TOTAL PROTEIN g/dL 6.6   ALBUMIN g/dL 4.5   TOTAL BILIRUBIN mg/dL 0.51         Results from last 7 days   Lab Units 11/15/24  1431   GLUCOSE RANDOM mg/dL 105               Results from last 7 days   Lab Units 11/15/24  1819 11/15/24  1646 11/15/24  1446   HS TNI 0HR ng/L  --   --  <2   HS TNI 2HR ng/L  --  3  --    HSTNI D2 ng/L  --  >1  --    HS TNI 4HR ng/L 3  --   --    HSTNI D4 ng/L >1  --   --                      Results from last 7 days   Lab Units 11/15/24  1446   LACTIC ACID mmol/L 0.6               Results from last 7 days   Lab Units 11/15/24  1431   LIPASE u/L 15                   Admitting Diagnosis: Epigastric abdominal pain [R10.13]  Abdominal pain [R10.9]  Abnormal EKG [R94.31]  Age/Sex: 59 y.o. female    Network Utilization Review Department  ATTENTION: Please call with any questions or concerns to [phone] and carefully listen to the prompts so that you are directed to the right person. All voicemails are confidential.   For Discharge needs, contact Care Management DC Support Team at [phone] opt. 2  Send all requests for admission clinical reviews, approved or denied determinations and any other requests to dedicated fax number below belonging to  the [place] where the patient is receiving treatment. List of dedicated fax numbers for the Facilities:  FACILITY NAME UR FAX NUMBER   ADMISSION DENIALS (Administrative/Medical Necessity) [phone]   DISCHARGE SUPPORT TEAM (NETWORK) [phone]   PARENT CHILD HEALTH (Maternity/NICU/Pediatrics) [phone]   [facility] [phone]   [facility] [phone]   [facility] [phone]   [facility] [phone]   [facility] [phone]   [facility] [phone]   [facility] [phone]   [facility] [phone]   [facility] [phone]   [facility] [phone]   [facility] [phone]   [facility] [phone]

## 2024-11-16 NOTE — H&P
H&P - Hospitalist   Name: Linda Celeste 59 y.o. female I MRN: [id number]  Unit/Bed#: Z2H6 I Date of Admission: 11/15/2024   Date of Service: 11/15/2024 I Hospital Day: 0     Assessment & Plan  EKG abnormality  Patient initially presenting for epigastric abdominal pain for which she underwent outpatient ultrasound that was normal.  CT scan here also did not show any acute abnormalities.  EKG however did show T wave inversions that were new in the inferior leads and deeper TWI in anterior leads (that were there before)  Trop so far negative x 3  Monitor on tele   Echo   Cardiology consult for evaluation of her EKG abn to see if ischemic workup is indicated   Abdominal pain  Uncertain etiology   CT negative did show redemonstration of mesenteric panniculitis?  Given EKG, concern whether anginal equivalent awaiting cardiology evaluation  She does report constipation but deferred laxative at this time      VTE Pharmacologic Prophylaxis: VTE Score: 3 Moderate Risk (Score 3-4) - Pharmacological DVT Prophylaxis Ordered: enoxaparin (Lovenox).  Code Status: Level 1 - Full Code dw pt  Discussion with family: Patient declined call to .     Anticipated Length of Stay: Patient will be admitted on an observation basis with an anticipated length of stay of less than 2 midnights secondary to EKG abnormality.    History of Present Illness   Chief Complaint: abd pain    Linda Celeste is a 59 y.o. female with a PMH of chronic back pain, HLD who presents with abdominal pain that she reported started a few days ago without any inciting factors. She also reported constipation during this time but said that it was not abnormal for her. She had outpatient ultrasound and given consistent pain was instructed to go to the ED.  CT scan in the ED did not show any acute abnormalities did show some redemonstrated mesenteric panniculitis but EKG showed new T wave inversions for which she is being admitted for cardiac consultation and  echocardiogram. She denied any chest pain, sob. No hx MI or stroke in the past.    Review of Systems  10 pt review of systems reviewed with patient and pertinent positive/negatives as per HPI.    Historical Information   Past Medical History:   Diagnosis Date    Anxiety 06/20/2016    Last assessed    Chronic infective otitis externa of left ear 09/18/2017    Last assessed    GERD (gastroesophageal reflux disease)     Right lumbar radiculopathy 05/05/2016    Last assessed    Sciatic leg pain      Past Surgical History:   Procedure Laterality Date    APPENDECTOMY      CHOLECYSTECTOMY      KNEE ARTHROSCOPY      torn menicus    HI LAMNOTMY INCL W/DCMPRSN NRV ROOT 1 INTRSPC LUMBR Right 7/6/2016    Procedure: L3/4 FORAMINOTOMY;  Surgeon: Merary Lugo MD;  Location: [facility];  Service: Neurosurgery    TONSILLECTOMY       Social History     Tobacco Use    Smoking status: Never    Smokeless tobacco: Never    Tobacco comments:     Per allscripts - former smoker   Vaping Use    Vaping status: Never Used   Substance and Sexual Activity    Alcohol use: Yes    Drug use: No    Sexual activity: Not Currently     Partners: Male     E-Cigarette/Vaping    E-Cigarette Use Never User      E-Cigarette/Vaping Substances    Nicotine No     THC No     CBD No     Flavoring No     Other No     Unknown No      Family History   Problem Relation Age of Onset    No Known Problems Mother     Alcohol abuse Father     Diabetes Paternal Grandmother     Hypertension Paternal Grandmother     Breast cancer Maternal Aunt     Cancer Family      Social History:  Marital Status: /Civil Union   Occupation: unknown  Patient Pre-hospital Living Situation: Home  Patient Pre-hospital Level of Mobility: walks  Patient Pre-hospital Diet Restrictions: none    Meds/Allergies   I have reviewed home medications with patient personally.  Prior to Admission medications    Medication Sig Start Date End Date Taking? Authorizing Provider   aspirin 81 mg chewable  tablet Chew 1 tablet (81 mg total) daily Do not start before September 15, 2023. 9/15/23   Fredy Moctezuma MD   atorvastatin (LIPITOR) 40 mg tablet TAKE 1 TABLET BY MOUTH DAILY WITH DINNER 10/2/24   AMAURI Lowe   famotidine (PEPCID) 40 MG tablet Take 1 tablet (40 mg total) by mouth daily at bedtime 11/14/24 11/9/25  AMAURI Markham   FLUoxetine 20 MG tablet TAKE 3 TABLETS BY MOUTH EVERY DAY 10/2/24   AMAURI Markham   omeprazole (PriLOSEC) 20 mg delayed release capsule Take 20 mg by mouth daily    Historical Provider, MD     Allergies   Allergen Reactions    Prednisone Other (See Comments)     Annotation - [id number]: reaction within 2 hours: shaking, sweats, near syncope    Erythromycin Vomiting       Objective :  Temp:  [97.8 °F (36.6 °C)] 97.8 °F (36.6 °C)  HR:  [58-78] 58  BP: (122-128)/(58-62) 128/58  Resp:  [18-20] 20  SpO2:  [93 %-99 %] 93 %  O2 Device: None (Room air)    Physical Exam   NAD, looks mildly uncomfortable due to abd pain  Nonlabored resp  RRR  Epigastric ttp  No pititng edema   Aaox3  Lines/Drains:            Lab Results: I have reviewed the following results:  Results from last 7 days   Lab Units 11/15/24  1431   WBC Thousand/uL 6.16   HEMOGLOBIN g/dL 13.5   HEMATOCRIT % 40.4   PLATELETS Thousands/uL 270   SEGS PCT % 61   LYMPHO PCT % 29   MONO PCT % 9   EOS PCT % 1     Results from last 7 days   Lab Units 11/15/24  1431   SODIUM mmol/L 139   POTASSIUM mmol/L 4.1   CHLORIDE mmol/L 105   CO2 mmol/L 26   BUN mg/dL 14   CREATININE mg/dL 0.82   ANION GAP mmol/L 8   CALCIUM mg/dL 9.7   ALBUMIN g/dL 4.5   TOTAL BILIRUBIN mg/dL 0.51   ALK PHOS U/L 97   ALT U/L 12   AST U/L 17   GLUCOSE RANDOM mg/dL 105             Lab Results   Component Value Date    HGBA1C 5.3 09/13/2023    HGBA1C 5.2 04/24/2021     Results from last 7 days   Lab Units 11/15/24  1446   LACTIC ACID mmol/L 0.6       Imaging Results Review: I reviewed radiology reports from this admission including: CT  abdomen/pelvis.  Other Study Results Review: EKG was reviewed.           ** Please Note: This note has been constructed using a voice recognition system. **

## 2024-11-16 NOTE — ASSESSMENT & PLAN NOTE
-EKG yesterday showed worsening T wave inversions  - Plan for outpatient TTE and stress testing with follow-up afterward

## 2024-11-16 NOTE — PLAN OF CARE
Problem: PAIN - ADULT  Goal: Verbalizes/displays adequate comfort level or baseline comfort level  Description: Interventions:  - Encourage patient to monitor pain and request assistance  - Assess pain using appropriate pain scale  - Administer analgesics based on type and severity of pain and evaluate response  - Implement non-pharmacological measures as appropriate and evaluate response  - Consider cultural and social influences on pain and pain management  - Notify physician/advanced practitioner if interventions unsuccessful or patient reports new pain  Outcome: Progressing     Problem: INFECTION - ADULT  Goal: Absence or prevention of progression during hospitalization  Description: INTERVENTIONS:  - Assess and monitor for signs and symptoms of infection  - Monitor lab/diagnostic results  - Monitor all insertion sites, i.e. indwelling lines, tubes, and drains  - Monitor endotracheal if appropriate and nasal secretions for changes in amount and color  - [place] appropriate cooling/warming therapies per order  - Administer medications as ordered  - Instruct and encourage patient and family to use good hand hygiene technique  - Identify and instruct in appropriate isolation precautions for identified infection/condition  Outcome: Progressing  Goal: Absence of fever/infection during neutropenic period  Description: INTERVENTIONS:  - Monitor WBC    Outcome: Progressing     Problem: SAFETY ADULT  Goal: Patient will remain free of falls  Description: INTERVENTIONS:  - Educate patient/family on patient safety including physical limitations  - Instruct patient to call for assistance with activity   - Consult OT/PT to assist with strengthening/mobility   - Keep Call bell within reach  - Keep bed low and locked with side rails adjusted as appropriate  - Keep care items and personal belongings within reach  - Initiate and maintain comfort rounds  - Make Fall Risk Sign visible to staff  - Offer Toileting every 2 Hours,  in advance of need  - Initiate/Maintain bed alarm  - Obtain necessary fall risk management equipment: walker  - Apply yellow socks and bracelet for high fall risk patients  - Consider moving patient to room near nurses station  Outcome: Progressing  Goal: Maintain or return to baseline ADL function  Description: INTERVENTIONS:  -  Assess patient's ability to carry out ADLs; assess patient's baseline for ADL function and identify physical deficits which impact ability to perform ADLs (bathing, care of mouth/teeth, toileting, grooming, dressing, etc.)  - Assess/evaluate cause of self-care deficits   - Assess range of motion  - Assess patient's mobility; develop plan if impaired  - Assess patient's need for assistive devices and provide as appropriate  - Encourage maximum independence but intervene and supervise when necessary  - Involve family in performance of ADLs  - Assess for home care needs following discharge   - Consider OT consult to assist with ADL evaluation and planning for discharge  - Provide patient education as appropriate  Outcome: Progressing  Goal: Maintains/Returns to pre admission functional level  Description: INTERVENTIONS:  - Perform AM-PAC 6 Click Basic Mobility/ Daily Activity assessment daily.  - Set and communicate daily mobility goal to care team and patient/family/caregiver.   - Collaborate with rehabilitation services on mobility goals if consulted  - Perform Range of Motion 2 times a day.  - Reposition patient every 2 hours.  - Dangle patient 2 times a day  - Stand patient 2 times a day  - Ambulate patient 2 times a day  - Out of bed to chair 2 times a day   - Out of bed for meals 3 times a day  - Out of bed for toileting  - Record patient progress and toleration of activity level   Outcome: Progressing

## 2024-11-16 NOTE — DISCHARGE INSTR - AVS FIRST PAGE
Dear Linda Celeste,     It was our pleasure to care for you here at [facility].  It is our hope that we were always able to exceed the expected standards for your care during your stay.  You were hospitalized due to epigastric pain, abnormal EKG.  You were cared for on the 4th floor by Avril Wynne PA-C under the service of Hasmukh Maier DO with the [facility] who covers for your primary care physician (PCP), AMAURI Markham, while you were hospitalized.  If you have any questions or concerns related to this hospitalization, you may contact us at .  For follow up as well as any medication refills, we recommend that you follow up with your primary care physician.  A registered nurse will reach out to you by phone within a few days after your discharge to answer any additional questions that you may have after going home.  However, at this time we provide for you here, the most important instructions / recommendations at discharge:     Notable Medication Adjustments -   Carafate 1g before meals and at bedtime (if needed)   Continue famotidine (Pepcid) and/or omeprazole (Prilosec) for acid reduction  Testing Required after Discharge -   Stress test  Echo  Important follow up information -   Referral to gastroenterology   Follow up with cardiology   Other Instructions -   Monitor for triggers of the epigastric pain (certain foods, beverages, medications, movements, etc)   Please review this entire after visit summary as additional general instructions including medication list, appointments, activity, diet, any pertinent wound care, and other additional recommendations from your care team that may be provided for you.      Sincerely,     Avril Wynne PA-C

## 2024-11-16 NOTE — PLAN OF CARE
Problem: PAIN - ADULT  Goal: Verbalizes/displays adequate comfort level or baseline comfort level  Description: Interventions:  - Encourage patient to monitor pain and request assistance  - Assess pain using appropriate pain scale  - Administer analgesics based on type and severity of pain and evaluate response  - Implement non-pharmacological measures as appropriate and evaluate response  - Consider cultural and social influences on pain and pain management  - Notify physician/advanced practitioner if interventions unsuccessful or patient reports new pain  Outcome: Progressing     Problem: INFECTION - ADULT  Goal: Absence or prevention of progression during hospitalization  Description: INTERVENTIONS:  - Assess and monitor for signs and symptoms of infection  - Monitor lab/diagnostic results  - Monitor all insertion sites, i.e. indwelling lines, tubes, and drains  - Monitor endotracheal if appropriate and nasal secretions for changes in amount and color  - [place] appropriate cooling/warming therapies per order  - Administer medications as ordered  - Instruct and encourage patient and family to use good hand hygiene technique  - Identify and instruct in appropriate isolation precautions for identified infection/condition  Outcome: Progressing  Goal: Absence of fever/infection during neutropenic period  Description: INTERVENTIONS:  - Monitor WBC    Outcome: Progressing     Problem: SAFETY ADULT  Goal: Patient will remain free of falls  Description: INTERVENTIONS:  - Educate patient/family on patient safety including physical limitations  - Instruct patient to call for assistance with activity   - Consult OT/PT to assist with strengthening/mobility   - Keep Call bell within reach  - Keep bed low and locked with side rails adjusted as appropriate  - Keep care items and personal belongings within reach  - Initiate and maintain comfort rounds  - Make Fall Risk Sign visible to staff  - Offer Toileting every 3 Hours,  in advance of need  - Initiate/Maintain bed alarm  - Obtain necessary fall risk management equipment:   - Apply yellow socks and bracelet for high fall risk patients  - Consider moving patient to room near nurses station  Outcome: Progressing  Goal: Maintain or return to baseline ADL function  Description: INTERVENTIONS:  -  Assess patient's ability to carry out ADLs; assess patient's baseline for ADL function and identify physical deficits which impact ability to perform ADLs (bathing, care of mouth/teeth, toileting, grooming, dressing, etc.)  - Assess/evaluate cause of self-care deficits   - Assess range of motion  - Assess patient's mobility; develop plan if impaired  - Assess patient's need for assistive devices and provide as appropriate  - Encourage maximum independence but intervene and supervise when necessary  - Involve family in performance of ADLs  - Assess for home care needs following discharge   - Consider OT consult to assist with ADL evaluation and planning for discharge  - Provide patient education as appropriate  Outcome: Progressing  Goal: Maintains/Returns to pre admission functional level  Description: INTERVENTIONS:  - Perform AM-PAC 6 Click Basic Mobility/ Daily Activity assessment daily.  - Set and communicate daily mobility goal to care team and patient/family/caregiver.   - Collaborate with rehabilitation services on mobility goals if consulted  - Perform Range of Motion 3 times a day.  - Reposition patient every 3 hours.  - Dangle patient 3 times a day  - Stand patient 3 times a day  - Ambulate patient 3 times a day  - Out of bed to chair 3 times a day   - Out of bed for meals 3 times a day  - Out of bed for toileting  - Record patient progress and toleration of activity level   Outcome: Progressing

## 2024-11-16 NOTE — ASSESSMENT & PLAN NOTE
Uncertain etiology   CT negative did show redemonstration of mesenteric panniculitis?  She does report constipation but no findings on CT scan  Felt actually worse after GI cocktail in ER   Continue carafate on discharge; continue pepcid/prilosec   Keep trigger diary   Referral provided for outpatient GI evaluation

## 2024-11-16 NOTE — ASSESSMENT & PLAN NOTE
Uncertain etiology   CT negative did show redemonstration of mesenteric panniculitis?  She does report constipation but no findings on CT scan  Felt actually worse after GI cocktail in ER   Trial carafate   Consider GI inpatient vs outpatient for EGD

## 2024-11-17 DIAGNOSIS — R94.31 EKG ABNORMALITY: Primary | ICD-10-CM

## 2024-11-18 ENCOUNTER — OFFICE VISIT (OUTPATIENT)
Dept: GASTROENTEROLOGY | Facility: CLINIC | Age: 59
End: 2024-11-18
Payer: COMMERCIAL

## 2024-11-18 ENCOUNTER — TELEPHONE (OUTPATIENT)
Dept: CARDIOLOGY CLINIC | Facility: CLINIC | Age: 59
End: 2024-11-18

## 2024-11-18 ENCOUNTER — HOSPITAL ENCOUNTER (INPATIENT)
Facility: HOSPITAL | Age: 59
LOS: 4 days | Discharge: HOME | DRG: 394 | End: 2024-11-22
Admitting: STUDENT IN AN ORGANIZED HEALTH CARE EDUCATION/TRAINING PROGRAM
Payer: COMMERCIAL

## 2024-11-18 ENCOUNTER — TRANSITIONAL CARE MANAGEMENT (OUTPATIENT)
Dept: FAMILY MEDICINE CLINIC | Facility: CLINIC | Age: 59
End: 2024-11-18

## 2024-11-18 VITALS
WEIGHT: 139 LBS | OXYGEN SATURATION: 96 % | HEART RATE: 80 BPM | TEMPERATURE: 98.1 F | HEIGHT: 61 IN | DIASTOLIC BLOOD PRESSURE: 82 MMHG | RESPIRATION RATE: 18 BRPM | BODY MASS INDEX: 26.24 KG/M2 | SYSTOLIC BLOOD PRESSURE: 122 MMHG

## 2024-11-18 DIAGNOSIS — R94.31 EKG ABNORMALITY: ICD-10-CM

## 2024-11-18 DIAGNOSIS — R10.13 EPIGASTRIC PAIN: ICD-10-CM

## 2024-11-18 DIAGNOSIS — R10.9 ABDOMINAL PAIN: Primary | ICD-10-CM

## 2024-11-18 DIAGNOSIS — K65.4 SCLEROSING MESENTERITIS (HCC): ICD-10-CM

## 2024-11-18 DIAGNOSIS — R10.13 EPIGASTRIC ABDOMINAL PAIN: ICD-10-CM

## 2024-11-18 DIAGNOSIS — K65.4 MESENTERIC PANNICULITIS (HCC): Primary | ICD-10-CM

## 2024-11-18 LAB
ALBUMIN SERPL BCG-MCNC: 4.3 G/DL (ref 3.5–5)
ALP SERPL-CCNC: 99 U/L (ref 34–104)
ALT SERPL W P-5'-P-CCNC: 10 U/L (ref 7–52)
ANION GAP SERPL CALCULATED.3IONS-SCNC: 8 MMOL/L (ref 4–13)
AST SERPL W P-5'-P-CCNC: 17 U/L (ref 13–39)
ATRIAL RATE: 62 BPM
BASOPHILS # BLD AUTO: 0.03 THOUSANDS/ÂΜL (ref 0–0.1)
BASOPHILS NFR BLD AUTO: 0 % (ref 0–1)
BILIRUB SERPL-MCNC: 0.28 MG/DL (ref 0.2–1)
BUN SERPL-MCNC: 16 MG/DL (ref 5–25)
CALCIUM SERPL-MCNC: 9.6 MG/DL (ref 8.4–10.2)
CARDIAC TROPONIN I PNL SERPL HS: <2 NG/L (ref ?–50)
CARDIAC TROPONIN I PNL SERPL HS: <2 NG/L (ref ?–50)
CHLORIDE SERPL-SCNC: 105 MMOL/L (ref 96–108)
CO2 SERPL-SCNC: 26 MMOL/L (ref 21–32)
CREAT SERPL-MCNC: 0.85 MG/DL (ref 0.6–1.3)
EOSINOPHIL # BLD AUTO: 0.06 THOUSAND/ÂΜL (ref 0–0.61)
EOSINOPHIL NFR BLD AUTO: 1 % (ref 0–6)
ERYTHROCYTE [DISTWIDTH] IN BLOOD BY AUTOMATED COUNT: 12.2 % (ref 11.6–15.1)
GFR SERPL CREATININE-BSD FRML MDRD: 74 ML/MIN/1.73SQ M
GLUCOSE SERPL-MCNC: 112 MG/DL (ref 65–140)
HCT VFR BLD AUTO: 40 % (ref 34.8–46.1)
HGB BLD-MCNC: 13.1 G/DL (ref 11.5–15.4)
IMM GRANULOCYTES # BLD AUTO: 0.06 THOUSAND/UL (ref 0–0.2)
IMM GRANULOCYTES NFR BLD AUTO: 1 % (ref 0–2)
LIPASE SERPL-CCNC: 20 U/L (ref 11–82)
LYMPHOCYTES # BLD AUTO: 2.08 THOUSANDS/ÂΜL (ref 0.6–4.47)
LYMPHOCYTES NFR BLD AUTO: 31 % (ref 14–44)
MCH RBC QN AUTO: 30.2 PG (ref 26.8–34.3)
MCHC RBC AUTO-ENTMCNC: 32.8 G/DL (ref 31.4–37.4)
MCV RBC AUTO: 92 FL (ref 82–98)
MONOCYTES # BLD AUTO: 0.82 THOUSAND/ÂΜL (ref 0.17–1.22)
MONOCYTES NFR BLD AUTO: 12 % (ref 4–12)
NEUTROPHILS # BLD AUTO: 3.62 THOUSANDS/ÂΜL (ref 1.85–7.62)
NEUTS SEG NFR BLD AUTO: 55 % (ref 43–75)
NRBC BLD AUTO-RTO: 0 /100 WBCS
P AXIS: -4 DEGREES
PLATELET # BLD AUTO: 263 THOUSANDS/UL (ref 149–390)
PMV BLD AUTO: 10.6 FL (ref 8.9–12.7)
POTASSIUM SERPL-SCNC: 4 MMOL/L (ref 3.5–5.3)
PR INTERVAL: 144 MS
PROT SERPL-MCNC: 6.5 G/DL (ref 6.4–8.4)
QRS AXIS: -23 DEGREES
QRSD INTERVAL: 88 MS
QT INTERVAL: 438 MS
QTC INTERVAL: 445 MS
RBC # BLD AUTO: 4.34 MILLION/UL (ref 3.81–5.12)
SODIUM SERPL-SCNC: 139 MMOL/L (ref 135–147)
T WAVE AXIS: -37 DEGREES
VENTRICULAR RATE: 62 BPM
WBC # BLD AUTO: 6.67 THOUSAND/UL (ref 4.31–10.16)

## 2024-11-18 PROCEDURE — 96374 THER/PROPH/DIAG INJ IV PUSH: CPT

## 2024-11-18 PROCEDURE — 99285 EMERGENCY DEPT VISIT HI MDM: CPT

## 2024-11-18 PROCEDURE — 99223 1ST HOSP IP/OBS HIGH 75: CPT | Performed by: STUDENT IN AN ORGANIZED HEALTH CARE EDUCATION/TRAINING PROGRAM

## 2024-11-18 PROCEDURE — 93010 ELECTROCARDIOGRAM REPORT: CPT | Performed by: INTERNAL MEDICINE

## 2024-11-18 PROCEDURE — 85025 COMPLETE CBC W/AUTO DIFF WBC: CPT

## 2024-11-18 PROCEDURE — 96361 HYDRATE IV INFUSION ADD-ON: CPT

## 2024-11-18 PROCEDURE — 36415 COLL VENOUS BLD VENIPUNCTURE: CPT

## 2024-11-18 PROCEDURE — 83690 ASSAY OF LIPASE: CPT

## 2024-11-18 PROCEDURE — 93005 ELECTROCARDIOGRAM TRACING: CPT

## 2024-11-18 PROCEDURE — 84484 ASSAY OF TROPONIN QUANT: CPT

## 2024-11-18 PROCEDURE — 99203 OFFICE O/P NEW LOW 30 MIN: CPT | Performed by: PHYSICIAN ASSISTANT

## 2024-11-18 PROCEDURE — 80053 COMPREHEN METABOLIC PANEL: CPT

## 2024-11-18 RX ORDER — PANTOPRAZOLE SODIUM 20 MG/1
20 TABLET, DELAYED RELEASE ORAL
Status: DISCONTINUED | OUTPATIENT
Start: 2024-11-19 | End: 2024-11-22 | Stop reason: HOSPADM

## 2024-11-18 RX ORDER — SUCRALFATE 1 G/1
1 TABLET ORAL
Status: DISCONTINUED | OUTPATIENT
Start: 2024-11-18 | End: 2024-11-22 | Stop reason: HOSPADM

## 2024-11-18 RX ORDER — FENTANYL CITRATE 50 UG/ML
50 INJECTION, SOLUTION INTRAMUSCULAR; INTRAVENOUS ONCE
Refills: 0 | Status: COMPLETED | OUTPATIENT
Start: 2024-11-18 | End: 2024-11-18

## 2024-11-18 RX ORDER — OXYCODONE HYDROCHLORIDE 5 MG/1
5 TABLET ORAL EVERY 6 HOURS PRN
Refills: 0 | Status: DISCONTINUED | OUTPATIENT
Start: 2024-11-18 | End: 2024-11-22 | Stop reason: HOSPADM

## 2024-11-18 RX ORDER — ASPIRIN 81 MG/1
81 TABLET, CHEWABLE ORAL DAILY
Status: DISCONTINUED | OUTPATIENT
Start: 2024-11-19 | End: 2024-11-22 | Stop reason: HOSPADM

## 2024-11-18 RX ORDER — HYDROMORPHONE HCL/PF 1 MG/ML
0.5 SYRINGE (ML) INJECTION EVERY 6 HOURS PRN
Status: DISCONTINUED | OUTPATIENT
Start: 2024-11-18 | End: 2024-11-22 | Stop reason: HOSPADM

## 2024-11-18 RX ORDER — FAMOTIDINE 20 MG/1
40 TABLET, FILM COATED ORAL
Status: DISCONTINUED | OUTPATIENT
Start: 2024-11-18 | End: 2024-11-22 | Stop reason: HOSPADM

## 2024-11-18 RX ORDER — ATORVASTATIN CALCIUM 40 MG/1
40 TABLET, FILM COATED ORAL
Status: DISCONTINUED | OUTPATIENT
Start: 2024-11-19 | End: 2024-11-22 | Stop reason: HOSPADM

## 2024-11-18 RX ORDER — ENOXAPARIN SODIUM 100 MG/ML
40 INJECTION SUBCUTANEOUS DAILY
Status: DISCONTINUED | OUTPATIENT
Start: 2024-11-19 | End: 2024-11-22 | Stop reason: HOSPADM

## 2024-11-18 RX ADMIN — OXYCODONE HYDROCHLORIDE 5 MG: 5 TABLET ORAL at 22:23

## 2024-11-18 RX ADMIN — SUCRALFATE 1 G: 1 TABLET ORAL at 22:17

## 2024-11-18 RX ADMIN — FENTANYL CITRATE 50 MCG: 50 INJECTION INTRAMUSCULAR; INTRAVENOUS at 17:52

## 2024-11-18 RX ADMIN — SODIUM CHLORIDE 1000 ML: 0.9 INJECTION, SOLUTION INTRAVENOUS at 17:50

## 2024-11-18 RX ADMIN — FAMOTIDINE 40 MG: 20 TABLET, FILM COATED ORAL at 22:17

## 2024-11-18 NOTE — PROGRESS NOTES
[facility]  Linda Celeste 59 y.o. 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  CT revealing no acute abnormality in the lower chest, abdomen pelvis according to report.  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  It was compared to a CT abdomen that was done in January 2024.  In addition during her hospital stay, she was also seen by cardiology who noted worsening T wave inversions.  There is plan for outpatient TTE and stress testing.    Patient reports that her abdominal pain is still a major concern for her.  She does not think she will be able to make it to her stress test on Friday.  Especially, if she has to go on a treadmill, she reports this will be impossible for her.  She reports constipation, has not had a bowel movement in 4 days.  However, this is not abnormal for baseline.  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  Denies signs or GI bleeding.    Last colonoscopy was performed in 2015, which was normal according to the report.  Dr. Pavon recommended a 10-year recall.    Review of Systems:    CONSTITUTIONAL: Denies any fever, chills, or rigors. Good appetite, and no recent weight loss.  HEENT: No earache or tinnitus. Denies hearing loss or visual disturbances.  CARDIOVASCULAR: No chest pain or palpitations.   RESPIRATORY: Denies any cough, hemoptysis, shortness of breath or dyspnea on exertion.  GASTROINTESTINAL: As noted in the History of Present Illness.   GENITOURINARY: No problems with urination. Denies any hematuria or dysuria.  NEUROLOGIC: No dizziness or vertigo, denies headaches.   MUSCULOSKELETAL: Denies any muscle or joint pain.   SKIN: Denies skin  rashes or itching.   ENDOCRINE: Denies excessive thirst. Denies intolerance to heat or cold.  PSYCHOSOCIAL: Denies depression or anxiety. Denies any recent memory loss.       Current Outpatient Medications   Medication Sig Dispense Refill    aspirin 81 mg chewable tablet Chew 1 tablet (81 mg total) daily Do not start before September 15, 2023. 30 tablet 0    atorvastatin (LIPITOR) 40 mg tablet TAKE 1 TABLET BY MOUTH DAILY WITH DINNER 90 tablet 1    famotidine (PEPCID) 40 MG tablet Take 1 tablet (40 mg total) by mouth daily at bedtime 30 tablet 1    FLUoxetine 20 MG tablet TAKE 3 TABLETS BY MOUTH EVERY  tablet 1    omeprazole (PriLOSEC) 20 mg delayed release capsule Take 20 mg by mouth daily      ondansetron (ZOFRAN) 4 mg tablet Take 1 tablet (4 mg total) by mouth every 8 (eight) hours as needed for nausea or vomiting 20 tablet 0    sucralfate (CARAFATE) 1 g tablet Take 1 tablet (1 g total) by mouth 4 (four) times a day (before meals and at bedtime) 120 tablet 0     No current facility-administered medications for this visit.     Past Medical History:   Diagnosis Date    Anxiety 06/20/2016    Last assessed    Chronic infective otitis externa of left ear 09/18/2017    Last assessed    GERD (gastroesophageal reflux disease)     Right lumbar radiculopathy 05/05/2016    Last assessed    Sciatic leg pain      Past Surgical History:   Procedure Laterality Date    APPENDECTOMY      CHOLECYSTECTOMY      KNEE ARTHROSCOPY      torn menicus    NE LAMNOTMY INCL W/DCMPRSN NRV ROOT 1 INTRSPC LUMBR Right 7/6/2016    Procedure: L3/4 FORAMINOTOMY;  Surgeon: Merary Lugo MD;  Location: [facility];  Service: Neurosurgery    TONSILLECTOMY       Social History     Socioeconomic History    Marital status: /Civil Union     Spouse name: Not on file    Number of children: Not on file    Years of education: Not on file    Highest education level: Not on file   Occupational History    Not on file   Tobacco Use    Smoking status:  Never    Smokeless tobacco: Never    Tobacco comments:     Per allscripts - former smoker   Vaping Use    Vaping status: Never Used   Substance and Sexual Activity    Alcohol use: Yes    Drug use: No    Sexual activity: Not Currently     Partners: Male   Other Topics Concern    Not on file   Social History Narrative    Dental care, reg    Lives with spouse     Social Drivers of Health     Financial Resource Strain: Not on file   Food Insecurity: No Food Insecurity (11/15/2024)    Nursing - Inadequate Food Risk Classification     Worried About Running Out of Food in the Last Year: Not on file     Ran Out of Food in the Last Year: Not on file     Ran Out of Food in the Last Year: 1   Transportation Needs: No Transportation Needs (11/15/2024)    Nursing - Transportation Risk Classification     Lack of Transportation: Not on file     Lack of Transportation: 2   Physical Activity: Not on file   Stress: Not on file   Social Connections: Unknown (2024)    Received from [facility]    Social [place]     How often do you feel lonely or isolated from those around you? (Adult - for ages 18 years and over): Not on file   Intimate Partner Violence: Unknown (11/15/2024)    Nursing IPS     Feels Physically and Emotionally Safe: Not on file     Physically Hurt by Someone: Not on file     Humiliated or Emotionally Abused by Someone: Not on file     Physically Hurt by Someone: 2     Hurt or Threatened by Someone: 2   Housing Stability: Unknown (11/15/2024)    Nursing: Inadequate Housing Risk Classification     Has Housing: Not on file     Worried About Losing Housing: Not on file     Unable to Get Utilities: Not on file     Unable to Pay for Housing in the Last Year: 2     Has Housin     Family History   Problem Relation Age of Onset    No Known Problems Mother     Alcohol abuse Father     Diabetes Paternal Grandmother     Hypertension Paternal Grandmother     Breast cancer Maternal Aunt     Cancer Family              PHYSICAL EXAM:    There were no vitals filed for this visit.  General Appearance:   Alert and oriented x 3. Cooperative, and in no respiratory distress   HEENT:   Normocephalic, atraumatic, anicteric.     Neck:  Supple, symmetrical, trachea midline   Lungs:   Clear to auscultation bilaterally    Heart::   Regular rate and rhythm   Abdomen:   Soft, non-tender, non-distended; normal bowel sounds; no masses, no organomegaly    Genitalia:   Deferred    Rectal:   Deferred    Extremities:  No cyanosis, clubbing or edema    Pulses:  2+ and symmetric all extremities    Skin:  Skin color, texture, turgor normal, no rashes or lesions    Lymph nodes:  No palpable cervical or supraclavicular lymphadenopathy        Lab Results:   Results from last 6 Months   Lab Units 11/15/24  1431   WBC Thousand/uL 6.16   HEMOGLOBIN g/dL 13.5   HEMATOCRIT % 40.4   PLATELETS Thousands/uL 270   SEGS PCT % 61   LYMPHO PCT % 29   MONO PCT % 9   EOS PCT % 1     Results from last 6 Months   Lab Units 11/15/24  1431   POTASSIUM mmol/L 4.1   CHLORIDE mmol/L 105   CO2 mmol/L 26   BUN mg/dL 14   CREATININE mg/dL 0.82   CALCIUM mg/dL 9.7   ALK PHOS U/L 97   ALT U/L 12   AST U/L 17         Results from last 6 Months   Lab Units 11/15/24  1431   LIPASE u/L 15       Imaging Studies:   CT abdomen pelvis with contrast  Result Date: 11/15/2024  Impression: No acute abnormality identified in the lower chest, abdomen or pelvis. 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 Workstation performed: [id number]     US abdomen complete  Result Date: 11/15/2024  Impression: 2.2 x 1.7 x 2.3 cm subcapsular hemangioma in the right lobe of the liver. Study otherwise unremarkable. 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  As we discussed, mesenteric panniculitis can be  "secondary to viral etiology, autoimmune, or malignancy.  Unclear if this is what is causing her symptoms now or a completely different etiology that is not being seen on imaging.  Unfortunately, she requires cardiac workup, which precludes me from scheduling outpatient EGD and colonoscopy.  Patient reports that her pain is a 7 out of 10.  She is tearful during our encounter due to her abdominal pain.  Feels well enough to drive.  Would like to go to the emergency room anyway.  I do think  if she is admitted it would help facilitate her workup.  If she could have stress testing done to ensure that there is no cardiac etiology for her symptoms, then we can readily plan for EGD and colonoscopy.  She agrees with this plan, but of course I told her to go to the emergency room and this would be up to the discretion of the emergency room provider.  I placed an ADT order also explaining patient will be coming today for above reasons.    Follow up after emergency room evaluation.      Portions of the record may have been created with voice recognition software.  Occasional wrong word or \"sound a like\" substitutions may have occurred due to the inherent limitations of voice recognition software.  Read the chart carefully and recognize, using context, where substitutions have occurred.  "

## 2024-11-18 NOTE — TELEPHONE ENCOUNTER
----- Message from AMAURI Choi sent at 11/17/2024  8:35 AM EST -----  Regarding: Hospital follow ups  Cardiology Follow-up:    Patient clinical visit in 1 month at the Cardio location: [place] office. .    Schedule visit with Card[facility] Providers: first available provider.    Type of Visit: VISIT TYPE: in-person office visit.    Test ordered: Cardiac tests: echocardiogram and stress test.    Additional Details: to be scheduled after stress and echo done , in about 1 month

## 2024-11-18 NOTE — TELEPHONE ENCOUNTER
SPOKE TO PT & SHE THAT SHE ISNT READY TO MAKE THE HOSP F/UP APPT JUST YET  SHE WENT INTO THE ER FOR STOMACH PX & THEN GOT ADMITTED FOR THE HEART & PT IS STILL HAVING STOMACH PX & SHE WANTS TO GET THAT FIGURED OUT FIRST BEFORE SHE COMES IN FOR HER HOSP F/UP  PT HAS AN APPT W/ GI TODAY & SHE MIGHT END UP BACK IN THE ER IF THEY CAN'T HELP HER  PT SAID TO CALL NEXT WK & SEE IF SHE IS READY FOR THE HOSP F/UP

## 2024-11-18 NOTE — LETTER
November 20, 2024     Avril Wynne PA-C  [address] [place] [id number]    Patient: Linda Celeste   YOB: 1965   Date of Visit: 11/18/2024       Dear Dr. Wynne:    Thank you for referring Linda Celeste to me for evaluation. Below are my notes for this consultation.    If you have questions, please do not hesitate to call me. I look forward to following your patient along with you.         Sincerely,        Vanda Esparza PA-C        CC: No Recipients    Vanda Esparza PA-C  11/18/2024  4:46 PM  Signed  [facility]  Linda Celeste 59 y.o. 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  CT revealing no acute abnormality in the lower chest, abdomen pelvis according to report.  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  It was compared to a CT abdomen that was done in January 2024.  In addition during her hospital stay, she was also seen by cardiology who noted worsening T wave inversions.  There is plan for outpatient TTE and stress testing.    Patient reports that her abdominal pain is still a major concern for her.  She does not think she will be able to make it to her stress test on Friday.  Especially, if she has to go on a treadmill, she reports this will be impossible for her.  She reports constipation, has not had a bowel movement in 4 days.  However, this is not abnormal for baseline.  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  Denies signs or GI bleeding.    Last colonoscopy was performed in 2015, which was normal according to the report.  Dr. Pavon recommended a 10-year recall.    Review of Systems:    CONSTITUTIONAL: Denies any fever, chills,  or rigors. Good appetite, and no recent weight loss.  HEENT: No earache or tinnitus. Denies hearing loss or visual disturbances.  CARDIOVASCULAR: No chest pain or palpitations.   RESPIRATORY: Denies any cough, hemoptysis, shortness of breath or dyspnea on exertion.  GASTROINTESTINAL: As noted in the History of Present Illness.   GENITOURINARY: No problems with urination. Denies any hematuria or dysuria.  NEUROLOGIC: No dizziness or vertigo, denies headaches.   MUSCULOSKELETAL: Denies any muscle or joint pain.   SKIN: Denies skin rashes or itching.   ENDOCRINE: Denies excessive thirst. Denies intolerance to heat or cold.  PSYCHOSOCIAL: Denies depression or anxiety. Denies any recent memory loss.       Current Outpatient Medications   Medication Sig Dispense Refill   • aspirin 81 mg chewable tablet Chew 1 tablet (81 mg total) daily Do not start before September 15, 2023. 30 tablet 0   • atorvastatin (LIPITOR) 40 mg tablet TAKE 1 TABLET BY MOUTH DAILY WITH DINNER 90 tablet 1   • famotidine (PEPCID) 40 MG tablet Take 1 tablet (40 mg total) by mouth daily at bedtime 30 tablet 1   • FLUoxetine 20 MG tablet TAKE 3 TABLETS BY MOUTH EVERY  tablet 1   • omeprazole (PriLOSEC) 20 mg delayed release capsule Take 20 mg by mouth daily     • ondansetron (ZOFRAN) 4 mg tablet Take 1 tablet (4 mg total) by mouth every 8 (eight) hours as needed for nausea or vomiting 20 tablet 0   • sucralfate (CARAFATE) 1 g tablet Take 1 tablet (1 g total) by mouth 4 (four) times a day (before meals and at bedtime) 120 tablet 0     No current facility-administered medications for this visit.     Past Medical History:   Diagnosis Date   • Anxiety 06/20/2016    Last assessed   • Chronic infective otitis externa of left ear 09/18/2017    Last assessed   • GERD (gastroesophageal reflux disease)    • Right lumbar radiculopathy 05/05/2016    Last assessed   • Sciatic leg pain      Past Surgical History:   Procedure Laterality Date   • APPENDECTOMY      • CHOLECYSTECTOMY     • KNEE ARTHROSCOPY      torn menicus   • AL LAMNOTMY INCL W/DCMPRSN NRV ROOT 1 INTRSPC LUMBR Right 7/6/2016    Procedure: L3/4 FORAMINOTOMY;  Surgeon: Merary Lugo MD;  Location: [facility];  Service: Neurosurgery   • TONSILLECTOMY       Social History     Socioeconomic History   • Marital status: /Civil Union     Spouse name: Not on file   • Number of children: Not on file   • Years of education: Not on file   • Highest education level: Not on file   Occupational History   • Not on file   Tobacco Use   • Smoking status: Never   • Smokeless tobacco: Never   • Tobacco comments:     Per allscripts - former smoker   Vaping Use   • Vaping status: Never Used   Substance and Sexual Activity   • Alcohol use: Yes   • Drug use: No   • Sexual activity: Not Currently     Partners: Male   Other Topics Concern   • Not on file   Social History Narrative    Dental care, reg    Lives with spouse     Social Drivers of Health     Financial Resource Strain: Not on file   Food Insecurity: No Food Insecurity (11/15/2024)    Nursing - Inadequate Food Risk Classification    • Worried About Running Out of Food in the Last Year: Not on file    • Ran Out of Food in the Last Year: Not on file    • Ran Out of Food in the Last Year: 1   Transportation Needs: No Transportation Needs (11/15/2024)    Nursing - Transportation Risk Classification    • Lack of Transportation: Not on file    • Lack of Transportation: 2   Physical Activity: Not on file   Stress: Not on file   Social Connections: Unknown (6/18/2024)    Received from [facility]    Social Connections    • How often do you feel lonely or isolated from those around you? (Adult - for ages 18 years and over): Not on file   Intimate Partner Violence: Unknown (11/15/2024)    Nursing IPS    • Feels Physically and Emotionally Safe: Not on file    • Physically Hurt by Someone: Not on file    • Humiliated or Emotionally Abused by Someone: Not on file    • Physically  Hurt by Someone: 2    • Hurt or Threatened by Someone: 2   Housing Stability: Unknown (11/15/2024)    Nursing: Inadequate Housing Risk Classification    • Has Housing: Not on file    • Worried About Losing Housing: Not on file    • Unable to Get Utilities: Not on file    • Unable to Pay for Housing in the Last Year: 2    • Has Housin     Family History   Problem Relation Age of Onset   • No Known Problems Mother    • Alcohol abuse Father    • Diabetes Paternal Grandmother    • Hypertension Paternal Grandmother    • Breast cancer Maternal Aunt    • Cancer Family             PHYSICAL EXAM:    There were no vitals filed for this visit.  General Appearance:   Alert and oriented x 3. Cooperative, and in no respiratory distress   HEENT:   Normocephalic, atraumatic, anicteric.     Neck:  Supple, symmetrical, trachea midline   Lungs:   Clear to auscultation bilaterally    Heart::   Regular rate and rhythm   Abdomen:   Soft, non-tender, non-distended; normal bowel sounds; no masses, no organomegaly    Genitalia:   Deferred    Rectal:   Deferred    Extremities:  No cyanosis, clubbing or edema    Pulses:  2+ and symmetric all extremities    Skin:  Skin color, texture, turgor normal, no rashes or lesions    Lymph nodes:  No palpable cervical or supraclavicular lymphadenopathy        Lab Results:   Results from last 6 Months   Lab Units 11/15/24  1431   WBC Thousand/uL 6.16   HEMOGLOBIN g/dL 13.5   HEMATOCRIT % 40.4   PLATELETS Thousands/uL 270   SEGS PCT % 61   LYMPHO PCT % 29   MONO PCT % 9   EOS PCT % 1     Results from last 6 Months   Lab Units 11/15/24  1431   POTASSIUM mmol/L 4.1   CHLORIDE mmol/L 105   CO2 mmol/L 26   BUN mg/dL 14   CREATININE mg/dL 0.82   CALCIUM mg/dL 9.7   ALK PHOS U/L 97   ALT U/L 12   AST U/L 17         Results from last 6 Months   Lab Units 11/15/24  1431   LIPASE u/L 15       Imaging Studies:   CT abdomen pelvis with contrast  Result Date: 11/15/2024  Impression: No acute abnormality identified  "in the lower chest, abdomen or pelvis. 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 Workstation performed: [id number]     US abdomen complete  Result Date: 11/15/2024  Impression: 2.2 x 1.7 x 2.3 cm subcapsular hemangioma in the right lobe of the liver. Study otherwise unremarkable. 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  As we discussed, mesenteric panniculitis can be secondary to viral etiology, autoimmune, or malignancy.  Unclear if this is what is causing her symptoms now or a completely different etiology that is not being seen on imaging.  Unfortunately, she requires cardiac workup, which precludes me from scheduling outpatient EGD and colonoscopy.  Patient reports that her pain is a 7 out of 10.  She is tearful during our encounter due to her abdominal pain.  Feels well enough to drive.  Would like to go to the emergency room anyway.  I do think  if she is admitted it would help facilitate her workup.  If she could have stress testing done to ensure that there is no cardiac etiology for her symptoms, then we can readily plan for EGD and colonoscopy.  She agrees with this plan, but of course I told her to go to the emergency room and this would be up to the discretion of the emergency room provider.  I placed an ADT order also explaining patient will be coming today for above reasons.    Follow up after emergency room evaluation.      Portions of the record may have been created with voice recognition software.  Occasional wrong word or \"sound a like\" substitutions may have occurred due to the inherent limitations of voice recognition software.  Read the chart carefully and recognize, using context, where substitutions have occurred.  "

## 2024-11-18 NOTE — ED PROVIDER NOTES
Time reflects when diagnosis was documented in both MDM as applicable and the Disposition within this note       Time User Action Codes Description Comment    11/18/2024  6:59 PM Alison Vega Add [R10.9] Abdominal pain     11/18/2024  9:55 PM Alfredito White Add [R94.31] EKG abnormality           ED Disposition       ED Disposition   Admit    Condition   Stable    Date/Time   Mon Nov 18, 2024  6:59 PM    Comment   Case was discussed with slim and the patient's admission status was agreed to be Admission Status: inpatient status to the service of Dr. White .               Assessment & Plan       Medical Decision Making  59-year-old female presenting for evaluation of epigastric pain    Recent imaging, no change of discomfort from imaging date.  Do not feel further imaging is warranted at this time.    Differential is broad though reassuring that no change in discomfort since discharge.    Plan blood work, IV fluids, analgesia    Evaluation unchanged from prior.  Patient notes improvement with fentanyl.  Discussed discharge home with patient who declined, stating she is concerned her abdominal pain will return.  She requests admission for abdominal discomfort and for inpatient evaluation by cardiology and gastroenterology.  Patient admitted in good condition.  She remained hemodynamically stable in the emergency department.    Amount and/or Complexity of Data Reviewed  Labs: ordered.    Risk  Prescription drug management.  Decision regarding hospitalization.        ED Course as of 11/19/24 0203   Mon Nov 18, 2024   1836 ECG interpretation by me.  Sinus bradycardia at 58.  Normal intervals.  Normal axis.  T wave inversions in inferior leads now resolved.  T wave inversion in V3-V4 improved.  Ongoing T wave inversion in V1, V2.  No new acute ST changes.       Medications   fentaNYL injection 50 mcg (50 mcg Intravenous Given 11/18/24 0265)   sodium chloride 0.9 % bolus 1,000 mL (1,000 mL Intravenous New Bag 11/18/24 1750)        ED Risk Strat Scores                           SBIRT 22yo+      Flowsheet Row Most Recent Value   ABIGAIL: How many times in the past year have you...    Used an illegal drug or used a prescription medication for non-medical reasons? Never Filed at: 11/18/2024 1437                            History of Present Illness       Chief Complaint   Patient presents with    Abdominal Pain     Pt reports she was here last week and is still in pain, epigastric pain.        Past Medical History:   Diagnosis Date    Allergic     Anxiety 06/20/2016    Last assessed    Chronic infective otitis externa of left ear 09/18/2017    Last assessed    Depression     GERD (gastroesophageal reflux disease)     Headache(784.0)     HL (hearing loss)     Right lumbar radiculopathy 05/05/2016    Last assessed    Sciatic leg pain       Past Surgical History:   Procedure Laterality Date    APPENDECTOMY      CHOLECYSTECTOMY      KNEE ARTHROSCOPY      torn menicus    [place] LAMNOTMY INCL W/DCMPRSN NRV ROOT 1 INTRSPC LUMBR Right 7/6/2016    Procedure: L3/4 FORAMINOTOMY;  Surgeon: Merary Lugo MD;  Location: [facility];  Service: Neurosurgery    TONSILLECTOMY        Family History   Problem Relation Age of Onset    No Known Problems Mother     Alcohol abuse Father     Diabetes Paternal Grandmother     Hypertension Paternal Grandmother     Breast cancer Maternal Aunt     Cancer Family       Social History     Tobacco Use    Smoking status: Former     Types: Cigarettes    Smokeless tobacco: Never    Tobacco comments:     Quit   Vaping Use    Vaping status: Never Used   Substance Use Topics    Alcohol use: Not Currently     Alcohol/week: 1.0 standard drink of alcohol     Types: 1 Standard drinks or equivalent per week    Drug use: No      E-Cigarette/Vaping    E-Cigarette Use Never User       E-Cigarette/Vaping Substances    Nicotine No     THC No     CBD No     Flavoring No     Other No     Unknown No       I have reviewed and agree with the history as  documented.     Patient is a 59-year-old female with a past medical history significant for anxiety, GERD presenting to the emergency department for evaluation of abdominal pain.  Notably patient was just discharged 2 days ago after admission for abdominal discomfort and new T wave inversions.  During her admission her symptoms were exacerbated by a GI cocktail.  Patient was discharged home with instruction to follow-up with GI and cardiology.  She was seen by GI who requires cardiac clearance and she has a stress test scheduled for Friday however notes her pain is intolerable, rated 7 out of 10.  She was seen at the gastroenterology office earlier today and was tearful.  Her gastroenterology office recommended she present to the emergency department for further evaluation.  Chart review reveals the gastroenterology note suggests patient may benefit from inpatient admission, inpatient stress test, inpatient imaging.  Patient notes a gnawing character to her pain rated at 7 out of 10 in her epigastric/left upper quadrant region that has been constant since discharge and unchanged.  She notes exacerbation with standing and notes it is made better by lying still.  She endorses ongoing nausea but no vomiting.  She endorses constipation for 4 days.  She denies any chest pain or shortness of breath.  She denies dysuria, frequency, blood in her stool.        Review of Systems   Constitutional:  Negative for chills and fever.   HENT:  Negative for ear pain and sore throat.    Eyes:  Negative for pain and visual disturbance.   Respiratory:  Negative for cough and shortness of breath.    Cardiovascular:  Negative for chest pain and palpitations.   Gastrointestinal:  Positive for abdominal pain and nausea. Negative for vomiting.   Genitourinary:  Negative for dysuria and hematuria.   Musculoskeletal:  Negative for arthralgias and back pain.   Skin:  Negative for color change and rash.   Neurological:  Negative for seizures and  syncope.   All other systems reviewed and are negative.          Objective       ED Triage Vitals   Temperature Pulse Blood Pressure Respirations SpO2 Patient Position - Orthostatic VS   11/18/24 1433 11/18/24 1433 11/18/24 1433 11/18/24 1433 11/18/24 1433 11/18/24 1433   98.2 °F (36.8 °C) 78 133/68 18 99 % Sitting      Temp Source Heart Rate Source BP Location FiO2 (%) Pain Score    11/18/24 1433 11/18/24 1433 11/18/24 1433 -- 11/18/24 1752    Tympanic Monitor Left arm  7      Vitals      Date and Time Temp Pulse SpO2 Resp BP Pain Score FACES Pain Rating User   11/18/24 2324 98 °F (36.7 °C) 65 94 % 16 114/63 -- -- DII   11/18/24 2223 -- -- -- -- -- 10 - Worst Possible Pain -- DT   11/18/24 2028 97.8 °F (36.6 °C) 63 97 % 17 146/61 -- -- DII   11/18/24 2000 -- 56 97 % 20 132/61 -- -- KG   11/18/24 1930 -- 56 96 % 18 138/63 -- -- KG   11/18/24 1900 -- 59 95 % 18 146/66 -- -- KG   11/18/24 1830 -- 54 97 % 18 139/65 -- -- LM   11/18/24 1752 -- -- -- -- -- 7 -- LM   11/18/24 1730 -- 60 99 % 16 139/62 -- -- LM   11/18/24 1433 98.2 °F (36.8 °C) 78 99 % 18 133/68 -- -- LA            Physical Exam  Vitals and nursing note reviewed.   Constitutional:       General: She is not in acute distress.     Appearance: Normal appearance. She is not ill-appearing, toxic-appearing or diaphoretic.      Comments: Patient sitting in bed in no acute distress, answering questions, speaking quietly   HENT:      Head: Normocephalic and atraumatic.   Eyes:      General: No scleral icterus.        Right eye: No discharge.         Left eye: No discharge.      Extraocular Movements: Extraocular movements intact.      Conjunctiva/sclera: Conjunctivae normal.   Cardiovascular:      Rate and Rhythm: Normal rate.      Pulses: Normal pulses.      Heart sounds: Normal heart sounds. No murmur heard.     No friction rub. No gallop.   Pulmonary:      Effort: Pulmonary effort is normal. No respiratory distress.      Breath sounds: Normal breath sounds. No  stridor. No wheezing, rhonchi or rales.      Comments: No wheezing, rales, rhonchi  Abdominal:      General: Abdomen is flat. Bowel sounds are normal. There is no distension.      Palpations: Abdomen is soft.      Tenderness: There is abdominal tenderness. There is no guarding or rebound.      Comments: Tenderness to palpation in epigastric, left upper quadrant regions   Musculoskeletal:         General: No swelling. Normal range of motion.      Cervical back: Normal range of motion. No rigidity.      Right lower leg: No edema.      Left lower leg: No edema.   Skin:     General: Skin is warm and dry.      Capillary Refill: Capillary refill takes less than 2 seconds.      Coloration: Skin is not jaundiced.      Findings: No bruising or lesion.   Neurological:      General: No focal deficit present.      Mental Status: She is alert and oriented to person, place, and time. Mental status is at baseline.   Psychiatric:         Mood and Affect: Mood normal.         Behavior: Behavior normal.         Thought Content: Thought content normal.         Judgment: Judgment normal.         Results Reviewed       Procedure Component Value Units Date/Time    HS Troponin I 2hr [[id number]] Collected: 11/18/24 1912    Lab Status: Final result Specimen: Blood from Arm, Right Updated: 11/18/24 1938     hs TnI 2hr <2 ng/L      Delta 2hr hsTnI --    HS Troponin 0hr (reflex protocol) [[id number]]  (Normal) Collected: 11/18/24 1712    Lab Status: Final result Specimen: Blood from Arm, Right Updated: 11/18/24 1839     hs TnI 0hr <2 ng/L     Comprehensive metabolic panel [[id number]] Collected: 11/18/24 1712    Lab Status: Final result Specimen: Blood from Arm, Right Updated: 11/18/24 1743     Sodium 139 mmol/L      Potassium 4.0 mmol/L      Chloride 105 mmol/L      CO2 26 mmol/L      ANION GAP 8 mmol/L      BUN 16 mg/dL      Creatinine 0.85 mg/dL      Glucose 112 mg/dL      Calcium 9.6 mg/dL      AST 17 U/L      ALT 10 U/L      Alkaline  Phosphatase 99 U/L      Total Protein 6.5 g/dL      Albumin 4.3 g/dL      Total Bilirubin 0.28 mg/dL      eGFR 74 ml/min/1.73sq m     Narrative:      National Kidney Disease Foundation guidelines for Chronic Kidney Disease (CKD):     Stage 1 with normal or high GFR (GFR > 90 mL/min/1.73 square meters)    Stage 2 Mild CKD (GFR = 60-89 mL/min/1.73 square meters)    Stage 3A Moderate CKD (GFR = 45-59 mL/min/1.73 square meters)    Stage 3B Moderate CKD (GFR = 30-44 mL/min/1.73 square meters)    Stage 4 Severe CKD (GFR = 15-29 mL/min/1.73 square meters)    Stage 5 End Stage CKD (GFR <15 mL/min/1.73 square meters)  Note: GFR calculation is accurate only with a steady state creatinine    Lipase [[id number]]  (Normal) Collected: 11/18/24 1712    Lab Status: Final result Specimen: Blood from Arm, Right Updated: 11/18/24 1743     Lipase 20 u/L     CBC and differential [[id number]] Collected: 11/18/24 1712    Lab Status: Final result Specimen: Blood from Arm, Right Updated: 11/18/24 1723     WBC 6.67 Thousand/uL      RBC 4.34 Million/uL      Hemoglobin 13.1 g/dL      Hematocrit 40.0 %      MCV 92 fL      MCH 30.2 pg      MCHC 32.8 g/dL      RDW 12.2 %      MPV 10.6 fL      Platelets 263 Thousands/uL      nRBC 0 /100 WBCs      Segmented % 55 %      Immature Grans % 1 %      Lymphocytes % 31 %      Monocytes % 12 %      Eosinophils Relative 1 %      Basophils Relative 0 %      Absolute Neutrophils 3.62 Thousands/µL      Absolute Immature Grans 0.06 Thousand/uL      Absolute Lymphocytes 2.08 Thousands/µL      Absolute Monocytes 0.82 Thousand/µL      Eosinophils Absolute 0.06 Thousand/µL      Basophils Absolute 0.03 Thousands/µL             No orders to display       Procedures    ED Medication and Procedure Management   Prior to Admission Medications   Prescriptions Last Dose Informant Patient Reported? Taking?   FLUoxetine 20 MG tablet 11/18/2024  No Yes   Sig: TAKE 3 TABLETS BY MOUTH EVERY DAY   aspirin 81 mg chewable tablet  11/18/2024 Self No Yes   Sig: Chew 1 tablet (81 mg total) daily Do not start before September 15, 2023.   atorvastatin (LIPITOR) 40 mg tablet 11/18/2024  No Yes   Sig: TAKE 1 TABLET BY MOUTH DAILY WITH DINNER   famotidine (PEPCID) 40 MG tablet 11/18/2024  No Yes   Sig: Take 1 tablet (40 mg total) by mouth daily at bedtime   omeprazole (PriLOSEC) 20 mg delayed release capsule 11/18/2024  Yes Yes   Sig: Take 20 mg by mouth daily   ondansetron (ZOFRAN) 4 mg tablet 11/18/2024  No Yes   Sig: Take 1 tablet (4 mg total) by mouth every 8 (eight) hours as needed for nausea or vomiting   sucralfate (CARAFATE) 1 g tablet 11/18/2024  No Yes   Sig: Take 1 tablet (1 g total) by mouth 4 (four) times a day (before meals and at bedtime)      Facility-Administered Medications: None     Current Discharge Medication List        CONTINUE these medications which have NOT CHANGED    Details   aspirin 81 mg chewable tablet Chew 1 tablet (81 mg total) daily Do not start before September 15, 2023.  Qty: 30 tablet, Refills: 0    Associated Diagnoses: Stenosis of left vertebral artery      atorvastatin (LIPITOR) 40 mg tablet TAKE 1 TABLET BY MOUTH DAILY WITH DINNER  Qty: 90 tablet, Refills: 1    Associated Diagnoses: Stenosis of left vertebral artery      famotidine (PEPCID) 40 MG tablet Take 1 tablet (40 mg total) by mouth daily at bedtime  Qty: 30 tablet, Refills: 1    Associated Diagnoses: Epigastric pain      FLUoxetine 20 MG tablet TAKE 3 TABLETS BY MOUTH EVERY DAY  Qty: 270 tablet, Refills: 1    Associated Diagnoses: Moderate episode of recurrent major depressive disorder (HCC)      omeprazole (PriLOSEC) 20 mg delayed release capsule Take 20 mg by mouth daily      ondansetron (ZOFRAN) 4 mg tablet Take 1 tablet (4 mg total) by mouth every 8 (eight) hours as needed for nausea or vomiting  Qty: 20 tablet, Refills: 0    Associated Diagnoses: Epigastric abdominal pain      sucralfate (CARAFATE) 1 g tablet Take 1 tablet (1 g total) by mouth 4  (four) times a day (before meals and at bedtime)  Qty: 120 tablet, Refills: 0    Associated Diagnoses: Epigastric abdominal pain           No discharge procedures on file.  ED SEPSIS DOCUMENTATION   Time reflects when diagnosis was documented in both MDM as applicable and the Disposition within this note       Time User Action Codes Description Comment    11/18/2024  6:59 PM Alison Vega Add [R10.9] Abdominal pain     11/18/2024  9:55 PM Alfredito White Add [R94.31] EKG abnormality                  Alison Vega DO  11/19/24 0203

## 2024-11-18 NOTE — TELEPHONE ENCOUNTER
----- Message from AMAURI Choi sent at 11/17/2024  8:35 AM EST -----  Regarding: Hospital follow ups  Cardiology Follow-up:    Patient clinical visit in 1 month at the Cardio location: [place] office. .    Schedule visit with Cardio Zhu Providers: first available provider.    Type of Visit: VISIT TYPE: in-person office visit.    Test ordered: Cardiac tests: echocardiogram and stress test.    Additional Details: to be scheduled after stress and echo done , in about 1 month

## 2024-11-18 NOTE — TELEPHONE ENCOUNTER
Offered patient appt for after echo she declined. She stated that she would like to first find out what is going on with her pains that she is experiencing in her stomach.

## 2024-11-18 NOTE — LETTER
[facility] 2ND FLOOR MED SURG UNIT  [address]  [place] [address]  Dept: [phone]    November 22, 2024     Patient: Linda Celeste   YOB: 1965   Date of Visit: 11/18/2024       To Whom it May Concern:    Linda Celeste is under my professional care. She was seen in the hospital from 11/18/2024 to 11/22/24. She may return to work on December, 2, 2024 without limitations.    If you have any questions or concerns, please don't hesitate to call.         Sincerely,          Yanira Levine MD

## 2024-11-19 ENCOUNTER — APPOINTMENT (INPATIENT)
Dept: NON INVASIVE DIAGNOSTICS | Facility: HOSPITAL | Age: 59
DRG: 394 | End: 2024-11-19
Payer: COMMERCIAL

## 2024-11-19 PROBLEM — Z01.810 PREOPERATIVE CARDIOVASCULAR EXAMINATION: Status: ACTIVE | Noted: 2024-11-19

## 2024-11-19 LAB
AORTIC ROOT: 3 CM
APICAL FOUR CHAMBER EJECTION FRACTION: 62 %
ASCENDING AORTA: 2.9 CM
ATRIAL RATE: 58 BPM
AV LVOT MEAN GRADIENT: 2 MMHG
AV LVOT PEAK GRADIENT: 4 MMHG
BSA FOR ECHO PROCEDURE: 1.64 M2
DOP CALC LVOT PEAK VEL VTI: 22.7 CM
DOP CALC LVOT PEAK VEL: 1 M/S
E WAVE DECELERATION TIME: 155 MS
E/A RATIO: 1.05
FRACTIONAL SHORTENING: 30 (ref 28–44)
INTERVENTRICULAR SEPTUM IN DIASTOLE (PARASTERNAL SHORT AXIS VIEW): 0.7 CM
INTERVENTRICULAR SEPTUM: 0.7 CM (ref 0.6–1.1)
LA/AORTA RATIO 2D: 1.27
LAAS-AP2: 14.1 CM2
LAAS-AP4: 11.7 CM2
LEFT ATRIUM SIZE: 3.8 CM
LEFT ATRIUM VOLUME (MOD BIPLANE): 26 ML
LEFT ATRIUM VOLUME INDEX (MOD BIPLANE): 15.9 ML/M2
LEFT INTERNAL DIMENSION IN SYSTOLE: 3.3 CM (ref 2.1–4)
LEFT VENTRICULAR INTERNAL DIMENSION IN DIASTOLE: 4.7 CM (ref 3.5–6)
LEFT VENTRICULAR POSTERIOR WALL IN END DIASTOLE: 1 CM
LEFT VENTRICULAR STROKE VOLUME: 60 ML
LVSV (TEICH): 60 ML
MV E'TISSUE VEL-SEP: 10 CM/S
MV PEAK A VEL: 0.91 M/S
MV PEAK E VEL: 96 CM/S
MV STENOSIS PRESSURE HALF TIME: 45 MS
MV VALVE AREA P 1/2 METHOD: 4.89
P AXIS: 55 DEGREES
PR INTERVAL: 138 MS
QRS AXIS: 81 DEGREES
QRSD INTERVAL: 90 MS
QT INTERVAL: 466 MS
QTC INTERVAL: 457 MS
RIGHT VENTRICLE ID DIMENSION: 3.2 CM
SL CV LV EF: 55
SL CV PED ECHO LEFT VENTRICLE DIASTOLIC VOLUME (MOD BIPLANE) 2D: 104 ML
SL CV PED ECHO LEFT VENTRICLE SYSTOLIC VOLUME (MOD BIPLANE) 2D: 44 ML
T WAVE AXIS: 91 DEGREES
TRICUSPID ANNULAR PLANE SYSTOLIC EXCURSION: 2.1 CM
VENTRICULAR RATE: 58 BPM

## 2024-11-19 PROCEDURE — 93010 ELECTROCARDIOGRAM REPORT: CPT | Performed by: STUDENT IN AN ORGANIZED HEALTH CARE EDUCATION/TRAINING PROGRAM

## 2024-11-19 PROCEDURE — 93306 TTE W/DOPPLER COMPLETE: CPT

## 2024-11-19 PROCEDURE — 99223 1ST HOSP IP/OBS HIGH 75: CPT | Performed by: INTERNAL MEDICINE

## 2024-11-19 PROCEDURE — 93306 TTE W/DOPPLER COMPLETE: CPT | Performed by: INTERNAL MEDICINE

## 2024-11-19 PROCEDURE — 99232 SBSQ HOSP IP/OBS MODERATE 35: CPT

## 2024-11-19 PROCEDURE — 99222 1ST HOSP IP/OBS MODERATE 55: CPT | Performed by: STUDENT IN AN ORGANIZED HEALTH CARE EDUCATION/TRAINING PROGRAM

## 2024-11-19 RX ORDER — SODIUM CHLORIDE, SODIUM LACTATE, POTASSIUM CHLORIDE, CALCIUM CHLORIDE 600; 310; 30; 20 MG/100ML; MG/100ML; MG/100ML; MG/100ML
125 INJECTION, SOLUTION INTRAVENOUS CONTINUOUS
Status: CANCELLED | OUTPATIENT
Start: 2024-11-19

## 2024-11-19 RX ADMIN — FAMOTIDINE 40 MG: 20 TABLET, FILM COATED ORAL at 21:19

## 2024-11-19 RX ADMIN — ASPIRIN 81 MG: 81 TABLET, CHEWABLE ORAL at 08:18

## 2024-11-19 RX ADMIN — SUCRALFATE 1 G: 1 TABLET ORAL at 16:39

## 2024-11-19 RX ADMIN — SUCRALFATE 1 G: 1 TABLET ORAL at 12:28

## 2024-11-19 RX ADMIN — SUCRALFATE 1 G: 1 TABLET ORAL at 08:18

## 2024-11-19 RX ADMIN — OXYCODONE HYDROCHLORIDE 5 MG: 5 TABLET ORAL at 19:11

## 2024-11-19 RX ADMIN — PANTOPRAZOLE SODIUM 20 MG: 20 TABLET, DELAYED RELEASE ORAL at 06:05

## 2024-11-19 RX ADMIN — ENOXAPARIN SODIUM 40 MG: 40 INJECTION SUBCUTANEOUS at 08:18

## 2024-11-19 RX ADMIN — FLUOXETINE HYDROCHLORIDE 60 MG: 20 CAPSULE ORAL at 08:18

## 2024-11-19 RX ADMIN — SUCRALFATE 1 G: 1 TABLET ORAL at 21:19

## 2024-11-19 RX ADMIN — ATORVASTATIN CALCIUM 40 MG: 40 TABLET, FILM COATED ORAL at 16:38

## 2024-11-19 RX ADMIN — OXYCODONE HYDROCHLORIDE 5 MG: 5 TABLET ORAL at 11:39

## 2024-11-19 NOTE — H&P
H&P - Hospitalist   Name: Linda Celeste 59 y.o. 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  Followed with GI outpatient and given her persistent pain she was sent in for expedited inpatient workup and consideration of EGD  Continue famotidine, PPI and sucralfate  Consult GI  She reported Fentanyl really helped in the ED with her pain but is scared that it will come back  Continue PPI and pain control with oxy/Dilaudid  Depression, recurrent (HCC)  Continue fluoxetine  EKG abnormality  She was just seen here in the hospital a few days ago and was admitted for EKG abnormality (nonspecific T wave inversions in the inferior leads) which have interestingly normalized  She was supposed to have outpatient stress test however given severe consistent abdominal pain was sent here to the ED for expedited workup  She denied any anginal symptoms  Echo ordered  We will order stress test diet and consult cardiology for clearance for EGD (requested by GI)       VTE Pharmacologic Prophylaxis: VTE Score: 3 Moderate Risk (Score 3-4) - Pharmacological DVT Prophylaxis Ordered: enoxaparin (Lovenox).  Code Status: Level 1 - Full Code dw pt  Discussion with family: Patient declined call to .     Anticipated Length of Stay: Patient will be admitted on an inpatient basis with an anticipated length of stay of greater than 2 midnights secondary to EGD, stress test.    History of Present Illness   Chief Complaint: abd pain    Linda Celeste is a 59 y.o. female with a PMH of deprsesion who presents from GI clinic for ongoing severe upper abdominal pain.  She is supposed to have outpatient stress test prior to EGD however given her severe symptoms was sent into the ED for expedited workup and admission.  She still has the same  pain in her epigastric area which she reported was much better after fentanyl that she received in the ED however she is afraid it is going to come back    Review of Systems  10 pt review of systems reviewed with patient and pertinent positive/negatives as per HPI.    Historical Information   Past Medical History:   Diagnosis Date    Allergic     Anxiety 06/20/2016    Last assessed    Chronic infective otitis externa of left ear 09/18/2017    Last assessed    Depression     GERD (gastroesophageal reflux disease)     Headache(784.0)     HL (hearing loss)     Right lumbar radiculopathy 05/05/2016    Last assessed    Sciatic leg pain      Past Surgical History:   Procedure Laterality Date    APPENDECTOMY      CHOLECYSTECTOMY      KNEE ARTHROSCOPY      torn menicus    [place] LAMNOTMY INCL W/DCMPRSN NRV ROOT 1 INTRSPC LUMBR Right 7/6/2016    Procedure: L3/4 FORAMINOTOMY;  Surgeon: Merary Lugo MD;  Location: [facility];  Service: Neurosurgery    TONSILLECTOMY       Social History     Tobacco Use    Smoking status: Former     Types: Cigarettes    Smokeless tobacco: Never    Tobacco comments:     Quit   Vaping Use    Vaping status: Never Used   Substance and Sexual Activity    Alcohol use: Not Currently     Alcohol/week: 1.0 standard drink of alcohol     Types: 1 Standard drinks or equivalent per week    Drug use: No    Sexual activity: Not Currently     Partners: Male     Birth control/protection: Other, None     E-Cigarette/Vaping    E-Cigarette Use Never User      E-Cigarette/Vaping Substances    Nicotine No     THC No     CBD No     Flavoring No     Other No     Unknown No      Family History   Problem Relation Age of Onset    No Known Problems Mother     Alcohol abuse Father     Diabetes Paternal Grandmother     Hypertension Paternal Grandmother     Breast cancer Maternal Aunt     Cancer Family      Social History:  Marital Status: /Civil Union   Occupation: none  Patient Pre-hospital Living Situation: Home  Patient  Pre-hospital Level of Mobility: walks  Patient Pre-hospital Diet Restrictions: none    Meds/Allergies   I have reviewed home medications with patient personally.  Prior to Admission medications    Medication Sig Start Date End Date Taking? Authorizing Provider   aspirin 81 mg chewable tablet Chew 1 tablet (81 mg total) daily Do not start before September 15, 2023. 9/15/23   Fredy Moctezuma MD   atorvastatin (LIPITOR) 40 mg tablet TAKE 1 TABLET BY MOUTH DAILY WITH DINNER 10/2/24   AMAURI Lowe   famotidine (PEPCID) 40 MG tablet Take 1 tablet (40 mg total) by mouth daily at bedtime 11/14/24 11/9/25  AMAURI Markham   FLUoxetine 20 MG tablet TAKE 3 TABLETS BY MOUTH EVERY DAY 10/2/24   AMAURI Markham   omeprazole (PriLOSEC) 20 mg delayed release capsule Take 20 mg by mouth daily    Historical Provider, MD   ondansetron (ZOFRAN) 4 mg tablet Take 1 tablet (4 mg total) by mouth every 8 (eight) hours as needed for nausea or vomiting 11/16/24   Avril Wynne PA-C   sucralfate (CARAFATE) 1 g tablet Take 1 tablet (1 g total) by mouth 4 (four) times a day (before meals and at bedtime) 11/16/24   Avril Wynne PA-C     Allergies   Allergen Reactions    Erythromycin Vomiting       Objective :  Temp:  [97.8 °F (36.6 °C)-98.2 °F (36.8 °C)] 97.8 °F (36.6 °C)  HR:  [54-80] 63  BP: (122-146)/(61-82) 146/61  Resp:  [16-20] 17  SpO2:  [95 %-99 %] 97 %  O2 Device: None (Room air)    Physical Exam   NAD  Nonlabored resp  RRR  Epigastric TTP, no guarding/rebound  AAox3    Lines/Drains:            Lab Results: I have reviewed the following results:  Results from last 7 days   Lab Units 11/18/24  1712   WBC Thousand/uL 6.67   HEMOGLOBIN g/dL 13.1   HEMATOCRIT % 40.0   PLATELETS Thousands/uL 263   SEGS PCT % 55   LYMPHO PCT % 31   MONO PCT % 12   EOS PCT % 1     Results from last 7 days   Lab Units 11/18/24  1712   SODIUM mmol/L 139   POTASSIUM mmol/L 4.0   CHLORIDE mmol/L 105   CO2 mmol/L 26   BUN mg/dL 16    CREATININE mg/dL 0.85   ANION GAP mmol/L 8   CALCIUM mg/dL 9.6   ALBUMIN g/dL 4.3   TOTAL BILIRUBIN mg/dL 0.28   ALK PHOS U/L 99   ALT U/L 10   AST U/L 17   GLUCOSE RANDOM mg/dL 112             Lab Results   Component Value Date    HGBA1C 5.3 09/13/2023    HGBA1C 5.2 04/24/2021     Results from last 7 days   Lab Units 11/15/24  1446   LACTIC ACID mmol/L 0.6       Imaging Results Review: No pertinent imaging studies reviewed.  Other Study Results Review: EKG was reviewed.     Administrative Statements   I have spent a total time of 40+ minutes in caring for this patient on the day of the visit/encounter including Patient and family education, Counseling / Coordination of care, Documenting in the medical record, Reviewing / ordering tests, medicine, procedures  , and Obtaining or reviewing history  .    ** Please Note: This note has been constructed using a voice recognition system. **

## 2024-11-19 NOTE — CONSULTS
Consultation - Cardiology   Linda Celeste 59 y.o. female MRN: [id number]  Unit/Bed#: [facility] Encounter: [id number]  11/19/24  12:17 PM    Assessment/ Plan:  Assessment & Plan  Abdominal pain  Reports symptoms have been going on for 1.5 weeks.  Tender to palpation.  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  EKG abnormality  Patient with sinus rhythm, RSR prime pattern, nonspecific T wave abnormalities which have been present for many years.  On her more recent admission she had some more significant T wave inversions in inferior leads which have since resolved.  She reports that she has no symptoms of chest pain, palpitations, lower extremity edema, dizziness, or lightheadedness.  Preoperative cardiovascular examination  Patient has no signs of ACS, decompensated heart failure, severe valvular disease, or unstable arrhythmia.  She reports that she is able to go up 4 flights of stairs without any issues. She has no cardiac contraindication to any EGD/colonoscopy, given her EKG abnormalities we will obtain an echocardiogram.         History of Present Illness   Physician Requesting Consult: Yanira Levine MD    Reason for Consult / Principal Problem: abnormal EKG     HPI: 59-year-old female with history of depression who presented from GI clinic with severe abdominal pain.  Here she was found to have mesenteric panniculitis.    Vitals are stable, BMP shows no abnormalities, troponin within normal limits x 2, CBC within normal notes.    EKG shows sinus rhythm, RSR prime pattern, nonspecific T wave abnormalities which have been present since 9/2021.  1 EKG showed T wave inversions on the 15th, which have resolved.     She was previously seen recently in the ED for EKG abnormalities as well, with plan for outpatient echo/stress test.    The patient denies chest pain, palpitations, dizziness, lightheadedness, change in exertional tolerance, dyspnea at rest or with exertion.  Does not report any bleeding.  No  "history of any cardiac disease.  She is a remote social smoker but quit over 20 years ago.  Does not drink or use any drugs.  Has not had any issues with anesthesia in the past.  Inpatient consult to Cardiology  Consult performed by: Jesse Marsh MD  Consult ordered by: Alfredito White MD          EKG: See above      Review of Systems see HPI    Historical Information   Past Medical History:   Diagnosis Date    Allergic     Anxiety 06/20/2016    Last assessed    Chronic infective otitis externa of left ear 09/18/2017    Last assessed    Depression     GERD (gastroesophageal reflux disease)     Headache(784.0)     HL (hearing loss)     Right lumbar radiculopathy 05/05/2016    Last assessed    Sciatic leg pain      Past Surgical History:   Procedure Laterality Date    APPENDECTOMY      CHOLECYSTECTOMY      KNEE ARTHROSCOPY      torn menicus    WI LAMNOTMY INCL W/DCMPRSN NRV ROOT 1 INTRSPC LUMBR Right 7/6/2016    Procedure: L3/4 FORAMINOTOMY;  Surgeon: Merary Lugo MD;  Location: [facility];  Service: Neurosurgery    TONSILLECTOMY       Social History     Substance and Sexual Activity   Alcohol Use Not Currently    Alcohol/week: 1.0 standard drink of alcohol    Types: 1 Standard drinks or equivalent per week     Social History     Substance and Sexual Activity   Drug Use No     Social History     Tobacco Use   Smoking Status Former    Types: Cigarettes   Smokeless Tobacco Never   Tobacco Comments    Quit       Family History:   Family History   Problem Relation Age of Onset    No Known Problems Mother     Alcohol abuse Father     Diabetes Paternal Grandmother     Hypertension Paternal Grandmother     Breast cancer Maternal Aunt     Cancer Family        Meds/Allergies   all current active meds have been reviewed  Allergies   Allergen Reactions    Erythromycin Vomiting       Objective   Vitals: Blood pressure 117/52, pulse 68, temperature 98 °F (36.7 °C), resp. rate 16, height 5' 1\" (1.549 m), weight 64.9 kg (143 lb), SpO2 " 96%., Body mass index is 27.02 kg/m².,   Orthostatic Blood Pressures      Flowsheet Row Most Recent Value   Blood Pressure 117/52 filed at 2024 1115   Patient Position - Orthostatic VS Sitting filed at 2024 1433            Systolic (24hrs), Av , Min:114 , Max:146     Diastolic (24hrs), Av, Min:52, Max:82        Intake/Output Summary (Last 24 hours) at 2024 1217  Last data filed at 2024 2223  Gross per 24 hour   Intake --   Output 300 ml   Net -300 ml       Invasive Devices       Peripheral Intravenous Line  Duration             Peripheral IV 24 Right;Ventral (anterior) Forearm <1 day                        Physical Exam:  Physical Exam   Gen: Mild distress  Neck: No elevated JVP  Cardiac: RRR   Lung: CTAB   LE: no edema  Abdomen: Tender when palpitating   Lab Results:     Cardiac Profile:   Results from last 7 days   Lab Units 24  19124  1712 11/15/24  1819 11/15/24  1646 11/15/24  1446   HS TNI 0HR ng/L  --  <2  --   --  <2   HS TNI 2HR ng/L <2  --   --  3  --    HSTNI D2 ng/L  --   --   --  >1  --    HS TNI 4HR ng/L  --   --  3  --   --    HSTNI D4 ng/L  --   --  >1  --   --        CBC with diff:   Results from last 7 days   Lab Units 24  1712 11/15/24  1431   WBC Thousand/uL 6.67 6.16   HEMOGLOBIN g/dL 13.1 13.5   HEMATOCRIT % 40.0 40.4   MCV fL 92 91   PLATELETS Thousands/uL 263 270   RBC Million/uL 4.34 4.46   MCH pg 30.2 30.3   MCHC g/dL 32.8 33.4   RDW % 12.2 12.2   MPV fL 10.6 10.5   NRBC AUTO /100 WBCs 0 0         CMP:   Results from last 7 days   Lab Units 24  1712 11/15/24  1431   POTASSIUM mmol/L 4.0 4.1   CHLORIDE mmol/L 105 105   CO2 mmol/L 26 26   BUN mg/dL 16 14   CREATININE mg/dL 0.85 0.82   CALCIUM mg/dL 9.6 9.7   AST U/L 17 17   ALT U/L 10 12   ALK PHOS U/L 99 97   EGFR ml/min/1.73sq m 74 77       Jesse Marsh M.D.

## 2024-11-19 NOTE — ASSESSMENT & PLAN NOTE
Per cardiology team patient does not have any contraindication for EGD and colonoscopy.  Echo was done showing EF of 55% with systolic function normal and diastolic function normal.  Regional wall abnormalities were not identified however cannot completely excluded.

## 2024-11-19 NOTE — UTILIZATION REVIEW
Initial Clinical Review    Admission: Date/Time/Statement:   Admission Orders (From admission, onward)       Ordered        11/18/24 1859  INPATIENT ADMISSION  Once                          Orders Placed This Encounter   Procedures    INPATIENT ADMISSION     Standing Status:   Standing     Number of Occurrences:   1     Level of Care:   Med Surg [16]     Estimated length of stay:   More than 2 Midnights     Certification:   I certify that inpatient services are medically necessary for this patient for a duration of greater than two midnights. See H&P and MD Progress Notes for additional information about the patient's course of treatment.     ED Arrival Information       Expected   11/18/2024 14:07    Arrival   11/18/2024 14:24    Acuity   Urgent              Means of arrival   -    Escorted by   -    Service   Hospitalist    Admission type   Emergency              Arrival complaint   chest pain             Chief Complaint   Patient presents with    Abdominal Pain     Pt reports she was here last week and is still in pain, epigastric pain.        Initial Presentation: 59 y.o. female to the ED from home with complaints of abdominal pain.   Admitted to inpatient for abdominal pain.  H/O depression,anxiety, GERD.  Arrives with abdominal pain. Cardiology, GI consult.  Started on IV fluids. Start Carafate.     Anticipated Length of Stay/Certification Statement: Patient will be admitted on an inpatient basis with an anticipated length of stay of greater than 2 midnights secondary to EGD, stress test.     Date: 11/19   Day 2: As per GI team, plan for EGD, biopsy tomorrow. Continue PPI, famotidine, carafate. Continues with abdominal pain.     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 aHas abdominal tenderness. Clear lung sounds.     GI consult: Patient with mesenteric panniculitis noted on CT scan. She was actually just seen in the GI clinic yesterday to establish care for this. Plan for EGD with  biopsy tomorrow.   ED Treatment-Medication Administration from 11/18/2024 1406 to 11/18/2024 2021         Date/Time Order Dose Route Action     11/18/2024 1752 fentaNYL injection 50 mcg 50 mcg Intravenous Given     11/18/2024 1750 sodium chloride 0.9 % bolus 1,000 mL 1,000 mL Intravenous New Bag            Scheduled Medications:  aspirin, 81 mg, Oral, Daily  atorvastatin, 40 mg, Oral, Daily With Dinner  enoxaparin, 40 mg, Subcutaneous, Daily  famotidine, 40 mg, Oral, HS  FLUoxetine, 60 mg, Oral, Daily  pantoprazole, 20 mg, Oral, Early Morning  sucralfate, 1 g, Oral, 4x Daily (AC & HS)      Continuous IV Infusions:     PRN Meds:  HYDROmorphone, 0.5 mg, Intravenous, Q6H PRN  oxyCODONE, 5 mg, Oral, Q6H PRN x1 11/18, 11/19 x 1      ED Triage Vitals   Temperature Pulse Respirations Blood Pressure SpO2 Pain Score   11/18/24 1433 11/18/24 1433 11/18/24 1433 11/18/24 1433 11/18/24 1433 11/18/24 1752   98.2 °F (36.8 °C) 78 18 133/68 99 % 7     Weight (last 2 days)       Date/Time Weight    11/19/24 1115 64.9 (143)    11/18/24 2200 65.3 (143.96)    11/18/24 1433 64.8 (142.8)            Vital Signs (last 3 days)       Date/Time Temp Pulse Resp BP MAP (mmHg) SpO2 O2 Device Patient Position - Orthostatic VS Pain    11/19/24 1139 -- -- -- -- -- -- -- -- 7    11/19/24 1115 -- 68 -- 117/52 -- -- -- -- --    11/19/24 0815 -- -- -- -- -- 96 % None (Room air) -- 3    11/19/24 07:36:42 -- 68 -- 117/52 74 96 % -- -- --    11/18/24 23:24:19 98 °F (36.7 °C) 65 16 114/63 80 94 % -- -- --    11/18/24 2223 -- -- -- -- -- -- -- -- 10 - Worst Possible Pain    11/18/24 20:28:13 97.8 °F (36.6 °C) 63 17 146/61 89 97 % -- -- --    11/18/24 2000 -- 56 20 132/61 88 97 % -- -- --    11/18/24 1930 -- 56 18 138/63 90 96 % -- -- --    11/18/24 1900 -- 59 18 146/66 95 95 % -- -- --    11/18/24 1830 -- 54 18 139/65 93 97 % None (Room air) -- --    11/18/24 1752 -- -- -- -- -- -- -- -- 7    11/18/24 1730 -- 60 16 139/62 89 99 % None (Room air) -- --     11/18/24 1433 98.2 °F (36.8 °C) 78 18 133/68 89 99 % None (Room air) Sitting --            Pertinent Labs/Diagnostic Test Results:   Radiology:    11/18:   ECHO  Interpretation Summary         Left Ventricle: Left ventricular cavity size is normal. Wall thickness is normal. The left ventricular ejection fraction is 55%. Systolic function is normal. Although no diagnostic regional wall motion abnormality was identified, this possibility cannot be completely excluded on the basis of this study. Diastolic function is normal.    Right Ventricle: Right ventricular cavity size is normal. Systolic function is normal.    Aortic Valve: There is mild regurgitation.    Mitral Valve: There is trace regurgitation.    Tricuspid Valve: There is trace regurgitation. The right ventricular systolic pressure is normal.    Pulmonic Valve: There is trace regurgitation.      Results from last 7 days   Lab Units 11/18/24  1712 11/15/24  1431   WBC Thousand/uL 6.67 6.16   HEMOGLOBIN g/dL 13.1 13.5   HEMATOCRIT % 40.0 40.4   PLATELETS Thousands/uL 263 270   TOTAL NEUT ABS Thousands/µL 3.62 3.76         Results from last 7 days   Lab Units 11/18/24  1712 11/15/24  1431   SODIUM mmol/L 139 139   POTASSIUM mmol/L 4.0 4.1   CHLORIDE mmol/L 105 105   CO2 mmol/L 26 26   ANION GAP mmol/L 8 8   BUN mg/dL 16 14   CREATININE mg/dL 0.85 0.82   EGFR ml/min/1.73sq m 74 77   CALCIUM mg/dL 9.6 9.7     Results from last 7 days   Lab Units 11/18/24  1712 11/15/24  1431   AST U/L 17 17   ALT U/L 10 12   ALK PHOS U/L 99 97   TOTAL PROTEIN g/dL 6.5 6.6   ALBUMIN g/dL 4.3 4.5   TOTAL BILIRUBIN mg/dL 0.28 0.51         Results from last 7 days   Lab Units 11/18/24  1712 11/15/24  1431   GLUCOSE RANDOM mg/dL 112 105       Results from last 7 days   Lab Units 11/18/24  1912 11/18/24  1712 11/15/24  1819 11/15/24  1646 11/15/24  1446   HS TNI 0HR ng/L  --  <2  --   --  <2   HS TNI 2HR ng/L <2  --   --  3  --    HSTNI D2 ng/L  --   --   --  >1  --    HS TNI 4HR ng/L   --   --  3  --   --    HSTNI D4 ng/L  --   --  >1  --   --            Results from last 7 days   Lab Units 11/15/24  1446   LACTIC ACID mmol/L 0.6       Results from last 7 days   Lab Units 11/18/24  1712 11/15/24  1431   LIPASE u/L 20 15         Past Medical History:   Diagnosis Date    Allergic     Anxiety 06/20/2016    Last assessed    Chronic infective otitis externa of left ear 09/18/2017    Last assessed    Depression     GERD (gastroesophageal reflux disease)     Headache(784.0)     HL (hearing loss)     Right lumbar radiculopathy 05/05/2016    Last assessed    Sciatic leg pain        Admitting Diagnosis: Abdominal pain [R10.9]  Age/Sex: 59 y.o. female    Network Utilization Review Department  ATTENTION: Please call with any questions or concerns to [phone] and carefully listen to the prompts so that you are directed to the right person. All voicemails are confidential.   For Discharge needs, contact Care Management DC Support Team at [phone] opt. 2  Send all requests for admission clinical reviews, approved or denied determinations and any other requests to dedicated fax number below belonging to the campus where the patient is receiving treatment. List of dedicated fax numbers for the Facilities:  FACILITY NAME UR FAX NUMBER   ADMISSION DENIALS (Administrative/Medical Necessity) [phone]   DISCHARGE SUPPORT TEAM (NETWORK) [phone]   PARENT CHILD HEALTH (Maternity/NICU/Pediatrics) [phone]   [facility] [phone]   [facility] [phone]   [facility] [phone]   [facility] [phone]   Count includes the [facility] [phone]   [facility] [phone]   [facility] [phone]   [facility] [phone]   [facility] [phone]   [facility]  [facility] [phone]   [facility] [phone]   [facility] [phone]

## 2024-11-19 NOTE — ASSESSMENT & PLAN NOTE
She has ongoing epigastric abdominal pain CT scan on 11/15 showing mesenteric panniculitis  Followed with GI outpatient and given her persistent pain she was sent in for expedited inpatient workup and consideration of EGD  Continue famotidine, PPI and sucralfate  Consult GI  She reported Fentanyl really helped in the ED with her pain but is scared that it will come back  Continue PPI and pain control with oxy/Dilaudid

## 2024-11-19 NOTE — ASSESSMENT & PLAN NOTE
Patient with sinus rhythm, RSR prime pattern, nonspecific T wave abnormalities which have been present for many years.  On her more recent admission she had some more significant T wave inversions in inferior leads which have since resolved.  She reports that she has no symptoms of chest pain, palpitations, lower extremity edema, dizziness, or lightheadedness.

## 2024-11-19 NOTE — ASSESSMENT & PLAN NOTE
-GI consulted today due to epigastric abdominal pain.    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  She was actually just seen in the GI clinic yesterday to establish care for this.  -If patient is cleared by cardiology will plan EGD with biopsy tomorrow.  -N.p.o. midnight.  -PPI twice daily.  -Dicyclomine 10 mg 3 times a day as needed.  -Serial abdominal exams.  -Diet as tolerated.  -Patient will need close outpatient GI follow-up after EGD is complete.    Patient will be seen and examined by Dr. Brush.  All figueroa medical decisions made by .

## 2024-11-19 NOTE — PROGRESS NOTES
Progress Note - Hospitalist   Name: Linda Celeste 59 y.o. female I MRN: [id number]  Unit/Bed#: [facility] I Date of Admission: 11/18/2024   Date of Service: 11/19/2024 I Hospital Day: 1    Assessment & Plan  Abdominal pain  She has ongoing epigastric abdominal pain CT scan on 11/15 showing mesenteric panniculitis  Followed with GI outpatient and given her persistent pain she was sent in for expedited inpatient workup and consideration of EGD  GI team recommended EGD with biopsy.  Tentative for tomorrow, patient will be n.p.o. after midnight  Continue PPI and famotidine and pain control with oxy/Dilaudid  Continue with sucralfate 1 g 4 times a day.  Depression, recurrent (HCC)  Continue fluoxetine  EKG abnormality  She was just seen here in the hospital a few days ago and was admitted for EKG abnormality (nonspecific T wave inversions in the inferior leads) which have interestingly normalized  She was supposed to have outpatient stress test however given severe consistent abdominal pain was sent here to the ED for expedited workup  She denied any anginal symptoms  Per cardiology team patient does not have any contraindication for EGD and colonoscopy.  Echo was done showing EF of 55% with systolic function normal and diastolic function normal.  Regional wall abnormalities were not identified however cannot completely excluded.  Preoperative cardiovascular examination  Per cardiology team patient does not have any contraindication for EGD and colonoscopy.  Echo was done showing EF of 55% with systolic function normal and diastolic function normal.  Regional wall abnormalities were not identified however cannot completely excluded.    VTE Pharmacologic Prophylaxis: VTE Score: 3 Moderate Risk (Score 3-4) - Pharmacological DVT Prophylaxis Ordered: enoxaparin (Lovenox).    Mobility:   Basic Mobility Inpatient Raw Score: 23  JH-HLM Goal: 7: Walk 25 feet or more  JH-HLM Achieved: 1: Laying in bed  JH-HLM Goal NOT achieved.  Continue with multidisciplinary rounding and encourage appropriate mobility to improve upon [facility] goals.    Patient Centered Rounds: I performed bedside rounds with nursing staff today.   Discussions with Specialists or Other Care Team Provider: Discussed case with cardiology and GI team    Education and Discussions with Family / Patient: Patient declined call to .     Current Length of Stay: 1 day(s)  Current Patient Status: Inpatient   Certification Statement: The patient will continue to require additional inpatient hospital stay due to abdominal pain requiring endoscopy by GI team  Discharge Plan: Anticipate discharge in 24-48 hrs to home.    Code Status: Level 1 - Full Code    Subjective   Patient reports epigastric pain persistent for the past week.    Objective :  Temp:  [97.8 °F (36.6 °C)-98 °F (36.7 °C)] 98 °F (36.7 °C)  HR:  [54-68] 68  BP: (114-146)/(52-66) 117/53  Resp:  [16-20] 16  SpO2:  [94 %-99 %] 94 %  O2 Device: None (Room air)    Body mass index is 27.02 kg/m².     Input and Output Summary (last 24 hours):     Intake/Output Summary (Last 24 hours) at 11/19/2024 1659  Last data filed at 11/18/2024 2223  Gross per 24 hour   Intake --   Output 300 ml   Net -300 ml       Physical Exam  Vitals and nursing note reviewed.   Constitutional:       General: She is not in acute distress.     Appearance: She is well-developed.   HENT:      Head: Normocephalic and atraumatic.   Eyes:      Conjunctiva/sclera: Conjunctivae normal.   Cardiovascular:      Rate and Rhythm: Normal rate and regular rhythm.      Heart sounds: No murmur heard.  Pulmonary:      Effort: Pulmonary effort is normal. No respiratory distress.      Breath sounds: Normal breath sounds.   Abdominal:      Palpations: Abdomen is soft.      Tenderness: There is abdominal tenderness (Epigastric area).   Musculoskeletal:         General: No swelling.      Cervical back: Neck supple.      Right lower leg: No edema.      Left lower leg:  No edema.   Skin:     General: Skin is warm and dry.      Capillary Refill: Capillary refill takes less than 2 seconds.   Neurological:      Mental Status: She is alert.   Psychiatric:         Mood and Affect: Mood normal.           Lines/Drains:              Lab Results: I have reviewed the following results:   Results from last 7 days   Lab Units 11/18/24  1712   WBC Thousand/uL 6.67   HEMOGLOBIN g/dL 13.1   HEMATOCRIT % 40.0   PLATELETS Thousands/uL 263   SEGS PCT % 55   LYMPHO PCT % 31   MONO PCT % 12   EOS PCT % 1     Results from last 7 days   Lab Units 11/18/24  1712   SODIUM mmol/L 139   POTASSIUM mmol/L 4.0   CHLORIDE mmol/L 105   CO2 mmol/L 26   BUN mg/dL 16   CREATININE mg/dL 0.85   ANION GAP mmol/L 8   CALCIUM mg/dL 9.6   ALBUMIN g/dL 4.3   TOTAL BILIRUBIN mg/dL 0.28   ALK PHOS U/L 99   ALT U/L 10   AST U/L 17   GLUCOSE RANDOM mg/dL 112                 Results from last 7 days   Lab Units 11/15/24  1446   LACTIC ACID mmol/L 0.6       Recent Cultures (last 7 days):         Imaging Results Review: I reviewed radiology reports from this admission including: CT abdomen/pelvis.  Other Study Results Review: EKG was reviewed.     Last 24 Hours Medication List:     Current Facility-Administered Medications:     aspirin chewable tablet 81 mg, Daily    atorvastatin (LIPITOR) tablet 40 mg, Daily With Dinner    enoxaparin (LOVENOX) subcutaneous injection 40 mg, Daily    famotidine (PEPCID) tablet 40 mg, HS    FLUoxetine (PROzac) capsule 60 mg, Daily    HYDROmorphone (DILAUDID) injection 0.5 mg, Q6H PRN    oxyCODONE (ROXICODONE) IR tablet 5 mg, Q6H PRN    pantoprazole (PROTONIX) EC tablet 20 mg, Early Morning    sucralfate (CARAFATE) tablet 1 g, 4x Daily (AC & HS)        **Please Note: This note may have been constructed using a voice recognition system.**

## 2024-11-19 NOTE — ASSESSMENT & PLAN NOTE
She was just seen here in the hospital a few days ago and was admitted for EKG abnormality (nonspecific T wave inversions in the inferior leads) which have interestingly normalized  She was supposed to have outpatient stress test however given severe consistent abdominal pain was sent here to the ED for expedited workup  She denied any anginal symptoms  Echo ordered  We will order stress test diet and consult cardiology for clearance for EGD (requested by GI)

## 2024-11-19 NOTE — CONSULTS
Consultation - Gastroenterology   Name: Linda Celeste 59 y.o. female I MRN: [id number]  Unit/Bed#: -01 I Date of Admission: 11/18/2024   Date of Service: 11/19/2024 I Hospital Day: 1   Inpatient consult to gastroenterology  Consult performed by: Kate Bronson PA-C  Consult ordered by: Alfredito White MD        Physician Requesting Evaluation: Yanira Levine MD   Reason for Evaluation / Principal Problem: Epigastric pain    Assessment & Plan  Abdominal pain  -GI consulted today due to epigastric abdominal pain.    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  She was actually just seen in the GI clinic yesterday to establish care for this.  -If patient is cleared by cardiology will plan EGD with biopsy tomorrow.  -N.p.o. midnight.  -PPI twice daily.  -Dicyclomine 10 mg 3 times a day as needed.  -Serial abdominal exams.  -Diet as tolerated.  -Patient will need close outpatient GI follow-up after EGD is complete.    Patient will be seen and examined by Dr. Brush.  All figueroa medical decisions made by .   I have discussed the above management plan in detail with the primary service.   Gastroenterology service will follow.    History of Present Illness   HPI:  Linda Celeste is a 59 y.o. female who presents with a past medical history significant for hyperlipidemia.    Patient was actually seen in the GI clinic yesterday with a chief complaint of mesenteric panniculitis seen on CT scan.  Patient also however has had epigastric abdominal pain.  Patient was scheduled to undergo further cardiology workup due to abnormal findings on EKG this coming Friday but unfortunately her abdominal pain worsened to the point where she came to the emergency room.  Patient reports today that her pain is in her epigastrium.  She does report nausea but there has been no vomiting.  Patient did have a colonoscopy in 2015 that was a normal examination.  Patient does report significant stressors as she takes care of her  .  Patient reports a decreased appetite but she reports that her weight seems to be fairly stable.  Patient denies any melena or rectal bleeding.  Patient denies any hematemesis.  If patient's cardiology workup is negative will proceed with EGD tomorrow.    CTAP from 11/15/2024 shows mesenteric panniculitis.  All of patient's laboratories remain unremarkable.    Review of Systems   Constitutional:  Negative for chills and fever.   HENT:  Negative for ear pain and sore throat.    Eyes:  Negative for pain and visual disturbance.   Respiratory:  Negative for cough and shortness of breath.    Cardiovascular:  Positive for chest pain. Negative for palpitations.   Gastrointestinal:  Positive for abdominal pain. Negative for vomiting.   Genitourinary:  Negative for dysuria and hematuria.   Musculoskeletal:  Negative for arthralgias and back pain.   Skin:  Negative for color change and rash.   Neurological:  Negative for seizures and syncope.   All other systems reviewed and are negative.    I have reviewed the patient's PMH, PSH, Social History, Family History, Meds, and Allergies    Objective :  Temp:  [97.8 °F (36.6 °C)-98.2 °F (36.8 °C)] 98 °F (36.7 °C)  HR:  [54-78] 68  BP: (114-146)/(52-68) 117/52  Resp:  [16-20] 16  SpO2:  [94 %-99 %] 94 %  O2 Device: None (Room air)    Physical Exam  Vitals and nursing note reviewed.   Constitutional:       General: She is not in acute distress.     Appearance: She is well-developed.   HENT:      Head: Normocephalic and atraumatic.   Eyes:      Conjunctiva/sclera: Conjunctivae normal.   Cardiovascular:      Rate and Rhythm: Normal rate and regular rhythm.      Heart sounds: No murmur heard.  Pulmonary:      Effort: Pulmonary effort is normal. No respiratory distress.      Breath sounds: Normal breath sounds.   Abdominal:      Palpations: Abdomen is soft.      Tenderness: There is no abdominal tenderness.   Musculoskeletal:         General: No swelling.      Cervical back:  Neck supple.   Skin:     General: Skin is warm and dry.      Capillary Refill: Capillary refill takes less than 2 seconds.   Neurological:      Mental Status: She is alert.   Psychiatric:         Mood and Affect: Mood normal.           Lab Results: I have reviewed the following results:CBC/BMP:   .     11/18/24  1712   WBC 6.67   HGB 13.1   HCT 40.0      SODIUM 139   K 4.0      CO2 26   BUN 16   CREATININE 0.85   GLUC 112    , LFTs:   .     11/18/24  1712   AST 17   ALT 10   ALB 4.3   TBILI 0.28   ALKPHOS 99    , PTT/INR:No new results in last 24 hours.     Imaging Results Review: No pertinent imaging studies reviewed.  Other Study Results Review: No additional pertinent studies reviewed.

## 2024-11-19 NOTE — CASE MANAGEMENT
Case Management Progress Note    Patient name Linda Celeste  Location /-01 MRN [id number]  : 1965 Date 2024       LOS (days): 1  Geometric Mean LOS (GMLOS) (days):   Days to GMLOS:        OBJECTIVE:        Current admission status: Inpatient  Preferred Pharmacy:   [id number] - [place] - [address]  [address]  [place] [address]  Phone: [phone] Fax: [phone]    Primary Care Provider: AMAURI Markham    Primary Insurance: BLUE CROSS  Secondary Insurance:     PROGRESS NOTE:    Per rounding with SLIM, GI and cardiology are consulted. Patient has a stress test and EGD pending. Anticipated to be discharged in 24-48 hours. CM assessment pending. CM department will continue to follow patient through discharge.

## 2024-11-19 NOTE — ASSESSMENT & PLAN NOTE
Reports symptoms have been going on for 1.5 weeks.  Tender to palpation.  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

## 2024-11-19 NOTE — ASSESSMENT & PLAN NOTE
Patient has no signs of ACS, decompensated heart failure, severe valvular disease, or unstable arrhythmia.  She reports that she is able to go up 4 flights of stairs without any issues. She has no cardiac contraindication to any EGD/colonoscopy, given her EKG abnormalities we will obtain an echocardiogram.

## 2024-11-19 NOTE — PLAN OF CARE
Problem: PAIN - ADULT  Goal: Verbalizes/displays adequate comfort level or baseline comfort level  Description: Interventions:  - Encourage patient to monitor pain and request assistance  - Assess pain using appropriate pain scale  - Administer analgesics based on type and severity of pain and evaluate response  - Implement non-pharmacological measures as appropriate and evaluate response  - Consider cultural and social influences on pain and pain management  - Notify physician/advanced practitioner if interventions unsuccessful or patient reports new pain  Outcome: Progressing     Problem: INFECTION - ADULT  Goal: Absence or prevention of progression during hospitalization  Description: INTERVENTIONS:  - Assess and monitor for signs and symptoms of infection  - Monitor lab/diagnostic results  - Monitor all insertion sites, i.e. indwelling lines, tubes, and drains  - Monitor endotracheal if appropriate and nasal secretions for changes in amount and color  - [place] appropriate cooling/warming therapies per order  - Administer medications as ordered  - Instruct and encourage patient and family to use good hand hygiene technique  - Identify and instruct in appropriate isolation precautions for identified infection/condition  Outcome: Progressing  Goal: Absence of fever/infection during neutropenic period  Description: INTERVENTIONS:  - Monitor WBC    Outcome: Progressing     Problem: SAFETY ADULT  Goal: Patient will remain free of falls  Description: INTERVENTIONS:  - Educate patient/family on patient safety including physical limitations  - Instruct patient to call for assistance with activity   - Consult OT/PT to assist with strengthening/mobility   - Keep Call bell within reach  - Keep bed low and locked with side rails adjusted as appropriate  - Keep care items and personal belongings within reach  - Initiate and maintain comfort rounds  - Apply yellow socks and bracelet for high fall risk patients  - Consider  Mariela Chris is a 43 y o  female returns in follow-up with history of migraine headache    Assessment:  1  Migraine without aura and without status migrainosus, not intractable    2  Cervicalgia    3  Low back pain without sciatica, unspecified back pain laterality, unspecified chronicity        Plan:  Continue Maxalt as needed   physical therapy  follow-up 6 months    Discussion:  Savannah Matamoros reports that her migraine headaches remain under good control with a frequency of about twice a month  When the headache is severe she does fine Maxalt very effective in stopping the headache  Recently she has noted some issues of neck pain and back pain without radicular features  She will initiate physical therapy and if her symptoms not improve she will notify me otherwise I will see her back in 6 months      Subjective:    HPI  Savannah Matamoros returns in follow-up today  She states that since here last she has been doing well  She states that her headaches have been under good control having about twice a month  She typically gets them around her menses and then sometime about 2 weeks later which may be again or hormonal a related issue  She finds that stress and not eating regularly also may precipitate a headache  When she gets a headache she finds that the Maxalt is very effective in taking it if it is severe  She states many times she does not take anything  She states in the last few months she has been having some issues with neck pain and low back pain  She denies any pain radiating to the upper lower extremities and notes no numbness or weakness        Past Medical History:   Diagnosis Date    Abnormal Pap smear of cervix     , spontaneous complete 2017    Transitioned From: Spontaneous , incomplete    HPV (human papilloma virus) infection     UTI (urinary tract infection) 2016    Varicella     vaccine       Family History:  Family History   Problem Relation Age of Onset    Hyperlipidemia Father     Heart disease Father         defect    Heart disease Maternal Grandmother         defect    Cancer Maternal Grandfather         lung    Heart disease Maternal Grandfather         defect    COPD Maternal Grandfather         emphysema    Cancer Paternal Grandmother         lung    Hyperlipidemia Paternal Grandfather     Heart disease Paternal Grandfather         defect    No Known Problems Mother     No Known Problems Brother     No Known Problems Son     No Known Problems Son     Migraines Maternal Aunt     Migraines Paternal Aunt        Past Surgical History:  Past Surgical History:   Procedure Laterality Date     SECTION      2014 CPD?  COLPOSCOPY      2016    VT  DELIVERY ONLY N/A 2018    Procedure:  SECTION () REPEAT;  Surgeon: Shelia Elder MD;  Location: [facility];  Service: Obstetrics    VT  DELIVERY ONLY N/A 2021    Procedure:  SECTION () REPEAT;  Surgeon: William Gomes DO;  Location: [facility];  Service: Obstetrics    WISDOM TOOTH EXTRACTION Bilateral        Social History:   reports that she has never smoked  She has never used smokeless tobacco  She reports previous alcohol use of about 2 0 standard drinks of alcohol per week  She reports that she does not use drugs  Allergies:  Patient has no known allergies  Current Outpatient Medications:     rizatriptan (MAXALT) 10 MG tablet, One p o  At headache onset, may repeat 1 after 2 hours p r n   Maximum 2/24 hours, Disp: 9 tablet, Rfl: 5    calcium carbonate (TUMS) 500 mg chewable tablet, Chew 2 tablets daily after dinner (Patient not taking: Reported on 10/25/2021), Disp: , Rfl:     Ferrous Sulfate (IRON PO), Take 100 mg of iron by mouth daily (Patient not taking: Reported on 10/25/2021), Disp: , Rfl:     MAGNESIUM PO, Take by mouth (Patient not taking: Reported on 10/25/2021), Disp: , Rfl:     Prenat w/o [id number] moving patient to room near nurses station  Outcome: Progressing  Goal: Maintain or return to baseline ADL function  Description: INTERVENTIONS:  -  Assess patient's ability to carry out ADLs; assess patient's baseline for ADL function and identify physical deficits which impact ability to perform ADLs (bathing, care of mouth/teeth, toileting, grooming, dressing, etc.)  - Assess/evaluate cause of self-care deficits   - Assess range of motion  - Assess patient's mobility; develop plan if impaired  - Assess patient's need for assistive devices and provide as appropriate  - Encourage maximum independence but intervene and supervise when necessary  - Involve family in performance of ADLs  - Assess for home care needs following discharge   - Consider OT consult to assist with ADL evaluation and planning for discharge  - Provide patient education as appropriate  Outcome: Progressing  Goal: Maintains/Returns to pre admission functional level  Description: INTERVENTIONS:  - Perform AM-PAC 6 Click Basic Mobility/ Daily Activity assessment daily.  - Set and communicate daily mobility goal to care team and patient/family/caregiver.   - Collaborate with rehabilitation services on mobility goals if consulted  - Record patient progress and toleration of activity level   Outcome: Progressing     Problem: CARDIOVASCULAR - ADULT  Goal: Maintains optimal cardiac output and hemodynamic stability  Description: INTERVENTIONS:  - Monitor I/O, vital signs and rhythm  - Monitor for S/S and trends of decreased cardiac output  - Administer and titrate ordered vasoactive medications to optimize hemodynamic stability  - Assess quality of pulses, skin color and temperature  - Assess for signs of decreased coronary artery perfusion  - Instruct patient to report change in severity of symptoms  Outcome: Progressing  Goal: Absence of cardiac dysrhythmias or at baseline rhythm  Description: INTERVENTIONS:  - Continuous cardiac monitoring, vital  (PNV-DHA PO), Take by mouth (Patient not taking: Reported on 10/25/2021), Disp: , Rfl:     I have reviewed the past medical, social and family history, current medications, allergies, vitals, review of systems and updated this information as appropriate today     Objective:    Vitals:  Blood pressure 104/78, height 5' 3" (1 6 m), weight 67 1 kg (148 lb), currently breastfeeding  Physical Exam    Neurological Exam  GENERAL:  Well-developed well-nourished woman in no acute distress  HEENT/NECK: Head is atraumatic normocephalic, neck is supple with good range of motion  No tenderness is noted with palpation  Back is straight and nontender  She is able to flex to touch to the distal 1/3 of the shin  Lateral rotation is full  NEUROLOGIC:  Mental Status: Awake and alert without aphasia  Cranial Nerves: Extraocular movements are full  Face is symmetrical  Motor:  No focal weakness is noted  Coordination:  Gait is stable            ROS:    Review of Systems   Constitutional: Negative  Negative for appetite change and fever  HENT: Negative  Negative for hearing loss, tinnitus, trouble swallowing and voice change  Eyes: Negative  Negative for photophobia and pain  Respiratory: Negative  Negative for shortness of breath  Cardiovascular: Negative  Negative for palpitations  Gastrointestinal: Negative  Negative for nausea and vomiting  Endocrine: Negative  Negative for cold intolerance  Genitourinary: Negative  Negative for dysuria, frequency and urgency  Musculoskeletal: Positive for back pain and neck pain  Negative for myalgias  Skin: Negative  Negative for rash  Neurological: Positive for headaches  Negative for dizziness, tremors, seizures, syncope, facial asymmetry, speech difficulty, weakness, light-headedness and numbness  Patient states headaches every other week   Hematological: Negative  Does not bruise/bleed easily  Psychiatric/Behavioral: Negative    Negative for signs, obtain 12 lead EKG if ordered  - Administer antiarrhythmic and heart rate control medications as ordered  - Monitor electrolytes and administer replacement therapy as ordered  Outcome: Progressing     Problem: GASTROINTESTINAL - ADULT  Goal: Minimal or absence of nausea and/or vomiting  Description: INTERVENTIONS:  - Administer IV fluids if ordered to ensure adequate hydration  - Maintain NPO status until nausea and vomiting are resolved  - Nasogastric tube if ordered  - Administer ordered antiemetic medications as needed  - Provide nonpharmacologic comfort measures as appropriate  - Advance diet as tolerated, if ordered  - Consider nutrition services referral to assist patient with adequate nutrition and appropriate food choices  Outcome: Progressing  Goal: Maintains or returns to baseline bowel function  Description: INTERVENTIONS:  - Assess bowel function  - Encourage oral fluids to ensure adequate hydration  - Administer IV fluids if ordered to ensure adequate hydration  - Administer ordered medications as needed  - Encourage mobilization and activity  - Consider nutritional services referral to assist patient with adequate nutrition and appropriate food choices  Outcome: Progressing  Goal: Maintains adequate nutritional intake  Description: INTERVENTIONS:  - Monitor percentage of each meal consumed  - Identify factors contributing to decreased intake, treat as appropriate  - Assist with meals as needed  - Monitor I&O, weight, and lab values if indicated  - Obtain nutrition services referral as needed  Outcome: Progressing  Goal: Oral mucous membranes remain intact  Description: INTERVENTIONS  - Assess oral mucosa and hygiene practices  - Implement preventative oral hygiene regimen  - Implement oral medicated treatments as ordered  - Initiate Nutrition services referral as needed  Outcome: Progressing      confusion, hallucinations and sleep disturbance

## 2024-11-19 NOTE — ASSESSMENT & PLAN NOTE
She was just seen here in the hospital a few days ago and was admitted for EKG abnormality (nonspecific T wave inversions in the inferior leads) which have interestingly normalized  She was supposed to have outpatient stress test however given severe consistent abdominal pain was sent here to the ED for expedited workup  She denied any anginal symptoms  Per cardiology team patient does not have any contraindication for EGD and colonoscopy.  Echo was done showing EF of 55% with systolic function normal and diastolic function normal.  Regional wall abnormalities were not identified however cannot completely excluded.

## 2024-11-19 NOTE — ASSESSMENT & PLAN NOTE
She has ongoing epigastric abdominal pain CT scan on 11/15 showing mesenteric panniculitis  Followed with GI outpatient and given her persistent pain she was sent in for expedited inpatient workup and consideration of EGD  GI team recommended EGD with biopsy.  Tentative for tomorrow, patient will be n.p.o. after midnight  Continue PPI and famotidine and pain control with oxy/Dilaudid  Continue with sucralfate 1 g 4 times a day.

## 2024-11-20 ENCOUNTER — APPOINTMENT (INPATIENT)
Dept: GASTROENTEROLOGY | Facility: HOSPITAL | Age: 59
DRG: 394 | End: 2024-11-20
Payer: COMMERCIAL

## 2024-11-20 ENCOUNTER — ANESTHESIA EVENT (INPATIENT)
Dept: GASTROENTEROLOGY | Facility: HOSPITAL | Age: 59
DRG: 394 | End: 2024-11-20
Payer: COMMERCIAL

## 2024-11-20 ENCOUNTER — ANESTHESIA (INPATIENT)
Dept: GASTROENTEROLOGY | Facility: HOSPITAL | Age: 59
DRG: 394 | End: 2024-11-20
Payer: COMMERCIAL

## 2024-11-20 LAB
ANION GAP SERPL CALCULATED.3IONS-SCNC: 5 MMOL/L (ref 4–13)
BASOPHILS # BLD AUTO: 0.03 THOUSANDS/ÂΜL (ref 0–0.1)
BASOPHILS NFR BLD AUTO: 1 % (ref 0–1)
BUN SERPL-MCNC: 12 MG/DL (ref 5–25)
CALCIUM SERPL-MCNC: 9.4 MG/DL (ref 8.4–10.2)
CHLORIDE SERPL-SCNC: 105 MMOL/L (ref 96–108)
CO2 SERPL-SCNC: 30 MMOL/L (ref 21–32)
CREAT SERPL-MCNC: 0.77 MG/DL (ref 0.6–1.3)
EOSINOPHIL # BLD AUTO: 0.14 THOUSAND/ÂΜL (ref 0–0.61)
EOSINOPHIL NFR BLD AUTO: 2 % (ref 0–6)
ERYTHROCYTE [DISTWIDTH] IN BLOOD BY AUTOMATED COUNT: 12.3 % (ref 11.6–15.1)
GFR SERPL CREATININE-BSD FRML MDRD: 83 ML/MIN/1.73SQ M
GLUCOSE SERPL-MCNC: 86 MG/DL (ref 65–140)
HCT VFR BLD AUTO: 38 % (ref 34.8–46.1)
HGB BLD-MCNC: 12.3 G/DL (ref 11.5–15.4)
IMM GRANULOCYTES # BLD AUTO: 0.02 THOUSAND/UL (ref 0–0.2)
IMM GRANULOCYTES NFR BLD AUTO: 0 % (ref 0–2)
LYMPHOCYTES # BLD AUTO: 1.87 THOUSANDS/ÂΜL (ref 0.6–4.47)
LYMPHOCYTES NFR BLD AUTO: 30 % (ref 14–44)
MCH RBC QN AUTO: 29.6 PG (ref 26.8–34.3)
MCHC RBC AUTO-ENTMCNC: 32.4 G/DL (ref 31.4–37.4)
MCV RBC AUTO: 92 FL (ref 82–98)
MONOCYTES # BLD AUTO: 0.77 THOUSAND/ÂΜL (ref 0.17–1.22)
MONOCYTES NFR BLD AUTO: 12 % (ref 4–12)
NEUTROPHILS # BLD AUTO: 3.41 THOUSANDS/ÂΜL (ref 1.85–7.62)
NEUTS SEG NFR BLD AUTO: 55 % (ref 43–75)
NRBC BLD AUTO-RTO: 0 /100 WBCS
PLATELET # BLD AUTO: 228 THOUSANDS/UL (ref 149–390)
PMV BLD AUTO: 10.8 FL (ref 8.9–12.7)
POTASSIUM SERPL-SCNC: 4 MMOL/L (ref 3.5–5.3)
RBC # BLD AUTO: 4.15 MILLION/UL (ref 3.81–5.12)
SODIUM SERPL-SCNC: 140 MMOL/L (ref 135–147)
WBC # BLD AUTO: 6.24 THOUSAND/UL (ref 4.31–10.16)

## 2024-11-20 PROCEDURE — 43239 EGD BIOPSY SINGLE/MULTIPLE: CPT | Performed by: INTERNAL MEDICINE

## 2024-11-20 PROCEDURE — 0DB98ZX EXCISION OF DUODENUM, VIA NATURAL OR ARTIFICIAL OPENING ENDOSCOPIC, DIAGNOSTIC: ICD-10-PCS | Performed by: INTERNAL MEDICINE

## 2024-11-20 PROCEDURE — 85025 COMPLETE CBC W/AUTO DIFF WBC: CPT

## 2024-11-20 PROCEDURE — 99232 SBSQ HOSP IP/OBS MODERATE 35: CPT | Performed by: STUDENT IN AN ORGANIZED HEALTH CARE EDUCATION/TRAINING PROGRAM

## 2024-11-20 PROCEDURE — 0DB68ZX EXCISION OF STOMACH, VIA NATURAL OR ARTIFICIAL OPENING ENDOSCOPIC, DIAGNOSTIC: ICD-10-PCS | Performed by: INTERNAL MEDICINE

## 2024-11-20 PROCEDURE — 99232 SBSQ HOSP IP/OBS MODERATE 35: CPT

## 2024-11-20 PROCEDURE — 80048 BASIC METABOLIC PNL TOTAL CA: CPT

## 2024-11-20 PROCEDURE — 88305 TISSUE EXAM BY PATHOLOGIST: CPT | Performed by: SPECIALIST

## 2024-11-20 RX ORDER — ONDANSETRON 2 MG/ML
4 INJECTION INTRAMUSCULAR; INTRAVENOUS EVERY 6 HOURS PRN
Status: DISCONTINUED | OUTPATIENT
Start: 2024-11-20 | End: 2024-11-22 | Stop reason: HOSPADM

## 2024-11-20 RX ORDER — MAGNESIUM CARB/ALUMINUM HYDROX 105-160MG
296 TABLET,CHEWABLE ORAL ONCE
Status: COMPLETED | OUTPATIENT
Start: 2024-11-20 | End: 2024-11-20

## 2024-11-20 RX ORDER — MAGNESIUM CARB/ALUMINUM HYDROX 105-160MG
296 TABLET,CHEWABLE ORAL ONCE
Status: DISCONTINUED | OUTPATIENT
Start: 2024-11-21 | End: 2024-11-22 | Stop reason: HOSPADM

## 2024-11-20 RX ORDER — SODIUM CHLORIDE, SODIUM LACTATE, POTASSIUM CHLORIDE, CALCIUM CHLORIDE 600; 310; 30; 20 MG/100ML; MG/100ML; MG/100ML; MG/100ML
75 INJECTION, SOLUTION INTRAVENOUS CONTINUOUS
Status: DISCONTINUED | OUTPATIENT
Start: 2024-11-20 | End: 2024-11-20

## 2024-11-20 RX ORDER — PROPOFOL 10 MG/ML
INJECTION, EMULSION INTRAVENOUS AS NEEDED
Status: DISCONTINUED | OUTPATIENT
Start: 2024-11-20 | End: 2024-11-20

## 2024-11-20 RX ORDER — LIDOCAINE HYDROCHLORIDE 20 MG/ML
INJECTION, SOLUTION EPIDURAL; INFILTRATION; INTRACAUDAL; PERINEURAL AS NEEDED
Status: DISCONTINUED | OUTPATIENT
Start: 2024-11-20 | End: 2024-11-20

## 2024-11-20 RX ORDER — POLYETHYLENE GLYCOL 3350 17 G/17G
238 POWDER, FOR SOLUTION ORAL ONCE
Status: COMPLETED | OUTPATIENT
Start: 2024-11-20 | End: 2024-11-20

## 2024-11-20 RX ADMIN — SUCRALFATE 1 G: 1 TABLET ORAL at 21:33

## 2024-11-20 RX ADMIN — PROPOFOL 150 MG: 10 INJECTION, EMULSION INTRAVENOUS at 09:35

## 2024-11-20 RX ADMIN — PROPOFOL 50 MG: 10 INJECTION, EMULSION INTRAVENOUS at 09:39

## 2024-11-20 RX ADMIN — ONDANSETRON 4 MG: 2 INJECTION INTRAMUSCULAR; INTRAVENOUS at 18:16

## 2024-11-20 RX ADMIN — MAGNESIUM CITRATE 296 ML: 1.75 LIQUID ORAL at 16:02

## 2024-11-20 RX ADMIN — POLYETHYLENE GLYCOL 3350 238 G: 17 POWDER, FOR SOLUTION ORAL at 16:03

## 2024-11-20 RX ADMIN — SODIUM CHLORIDE, SODIUM LACTATE, POTASSIUM CHLORIDE, AND CALCIUM CHLORIDE 75 ML/HR: .6; .31; .03; .02 INJECTION, SOLUTION INTRAVENOUS at 09:26

## 2024-11-20 RX ADMIN — SUCRALFATE 1 G: 1 TABLET ORAL at 12:51

## 2024-11-20 RX ADMIN — FAMOTIDINE 40 MG: 20 TABLET, FILM COATED ORAL at 21:33

## 2024-11-20 RX ADMIN — LIDOCAINE HYDROCHLORIDE 100 MG: 20 INJECTION, SOLUTION EPIDURAL; INFILTRATION; INTRACAUDAL; PERINEURAL at 09:35

## 2024-11-20 RX ADMIN — PANTOPRAZOLE SODIUM 20 MG: 20 TABLET, DELAYED RELEASE ORAL at 05:11

## 2024-11-20 NOTE — ANESTHESIA PREPROCEDURE EVALUATION
"Procedure:  EGD    Relevant Problems   CARDIO   (+) Hyperlipidemia, mixed   (+) Stenosis of left vertebral artery      MUSCULOSKELETAL   (+) Degenerative disc disease, lumbar   (+) Midline low back pain without sciatica      NEURO/PSYCH   (+) Depression, recurrent (HCC)      Rheumatology   (+) Mesenteric panniculitis (HCC)      Orthopedic/Musculoskeletal   (+) Cervical radiculopathy   (+) Cervical spinal stenosis        Physical Exam    Airway    Mallampati score: III  TM Distance: >3 FB  Neck ROM: full     Dental       Cardiovascular      Pulmonary   No wheezes    Other Findings  post-pubertal.      Anesthesia Plan  ASA Score- 2     Anesthesia Type- IV sedation with anesthesia with ASA Monitors.         Additional Monitors:     Airway Plan:            Plan Factors-    Chart reviewed. EKG reviewed. Imaging results reviewed. Existing labs reviewed. Patient summary reviewed.    Patient is not a current smoker.  Patient did not smoke on day of surgery.            Induction- intravenous.    Postoperative Plan-     Perioperative Resuscitation Plan - Level 1 - Full Code.       Informed Consent- Anesthetic plan and risks discussed with patient.  I personally reviewed this patient with the CRNA. Discussed and agreed on the Anesthesia Plan with the CRNA..      VITALS  /62   Pulse 63   Temp 97.7 °F (36.5 °C) (Temporal)   Resp 18   Ht 5' 1\" (1.549 m)   Wt 64.9 kg (143 lb)   SpO2 98%   BMI 27.02 kg/m²  BP Readings from Last 3 Encounters:   11/20/24 134/62   11/18/24 122/82   11/16/24 (!) 119/48        LABS  Results from Last 12 Months   Lab Units 11/20/24  0513 11/18/24  1712   WBC Thousand/uL 6.24 6.67   HEMOGLOBIN g/dL 12.3 13.1   HEMATOCRIT % 38.0 40.0   PLATELETS Thousands/uL 228 263     Results from Last 12 Months   Lab Units 11/20/24  0513 11/18/24  1712   SODIUM mmol/L 140 139   POTASSIUM mmol/L 4.0 4.0   CHLORIDE mmol/L 105 105   CO2 mmol/L 30 26   ANION GAP mmol/L 5 8   BUN mg/dL 12 16   CREATININE mg/dL " "0.77 0.85   CALCIUM mg/dL 9.4 9.6   GLUCOSE RANDOM mg/dL 86 112     No results for input(s): \"APTT\", \"INR\", \"PTT\" in the last 8784 hours.    ECG      ECHOCARDIOGRAPHY OR OTHER TESTING/IMAGING  Encounter Date: 11/18/24   ECG 12 lead   Result Value    Ventricular Rate 58    Atrial Rate 58    LA Interval 138    QRSD Interval 90    QT Interval 466    QTC Interval 457    P Axis 55    QRS Axis 81    T Wave Axis 91    Narrative    Sinus bradycardia  Nonspecific T wave abnormality  Abnormal ECG  When compared with ECG of 15-Nov-2024 16:33,  T wave inversion no longer evident in Inferior leads  Confirmed by Jesse Marsh ([id number]) on 11/19/2024 1:58:12 PM     No results found for this or any previous visit.   History    Abnormal EKG- Pre-operative clearance, Abdominal pain.     Interpretation Summary       •  Left Ventricle: Left ventricular cavity size is normal. Wall thickness is normal. The left ventricular ejection fraction is 55%. Systolic function is normal. Although no diagnostic regional wall motion abnormality was identified, this possibility cannot be completely excluded on the basis of this study. Diastolic function is normal.  •  Right Ventricle: Right ventricular cavity size is normal. Systolic function is normal.  •  Aortic Valve: There is mild regurgitation.  •  Mitral Valve: There is trace regurgitation.  •  Tricuspid Valve: There is trace regurgitation. The right ventricular systolic pressure is normal.  •  Pulmonic Valve: There is trace regurgitation.     ------- ANESTHESIA RISK-BENEFIT DISCUSSION -------  BENEFITS OF A SPECIALIZED ANESTHESIA TEAM INCLUDE (NBK [id number], PMID [id number]):  (1) Reduce mortality and morbidity for major surgeries/procedures. (2) The team provides analgesia/sedation/amnesia/akinesia as safely as possible. (3) The team strives to reduce discomfort as safely as possible.    RISKS, AND PLANS TO MITIGATE RISKS, INCLUDE:    - Neurologic system: IntraOp awareness (Risk is ~1:1,000 - " 1:14,000; PMID 75996319), Stroke (Risk ~<0.1-2% for most cases; PMID 66494473), nerve injury, vision loss, and POCD.     - Airway and Pulmonary system: Dental or mouth injury, throat pain, critical hypoxia, pneumothorax, prolonged intubation, post-op respiratory compromise.  Airway/Intubation risks and prior data:  Mask Ventilation: Ventilated by mask (1); Technique: Direct laryngoscopy; Type: Cuffed, Oral; Tube Size: 7 mm; Laryngoscope: Mac; Blade Size: 3; Location: Oral; Grade View: 1; Insertion Attempts: 1; Placement Verification: End tidal CO2;  Major ARISCAT risk factors for pulmonary complications include: none, yielding a score of 0-1= Low risk, 1.6%.  - Cardiovascular system: Hypotension, arrhythmias, cardiac injury or arrest, blood clots, bleeding, infection, vascular injuries.  Emerson's RCRI score items: none, yielding an RCRI Score of 0= 0.4% risk of MACE  Are washington-op or intra-op beta blockers indicated? (PMID 45699725): no  - FEN/GI system: Aspiration risk (~0.5% per PMC 1110588) and PONV (10-80% per Apfel score) especially if the patient has not fasted.  ASA NPO guideline compliance?: Yes  - Medication risk assessment: Allergic reactions, excessive bleeding with anticoagulant use, overdoses, drug-drug interactions, injury to a fetus or  in pregnant or breastfeeding patients, washington-procedural sedation including while driving/operating machinery.  Recent relevant medications: See MAR or Med Review  Personal or family history of anesthesia complications: no  Pregnancy Status: N/A  - Estimate mortality risks associated with anesthesia based on ASA-PS (PMID 76951251): ASA-PS II: risk 1:20,000

## 2024-11-20 NOTE — ANESTHESIA POSTPROCEDURE EVALUATION
Post-Op Assessment Note    CV Status:  Stable  Pain Score: 0         Mental Status:  Sleepy   Hydration Status:  Stable   PONV Controlled:  None   Airway Patency:  Patent  Two or more mitigation strategies used for obstructive sleep apnea   Post Op Vitals Reviewed: Yes    No anethesia notable event occurred.            Last Filed PACU Vitals:  Vitals Value Taken Time   Temp     Pulse     BP     Resp     SpO2         Modified Rajesh:  Activity: 2 (11/20/2024  9:10 AM)  Respiration: 2 (11/20/2024  9:10 AM)  Circulation: 2 (11/20/2024  9:10 AM)  Consciousness: 2 (11/20/2024  9:10 AM)  Oxygen Saturation: 2 (11/20/2024  9:10 AM)  Modified Rajesh Score: 10 (11/20/2024  9:10 AM)

## 2024-11-20 NOTE — UTILIZATION REVIEW
NOTIFICATION OF INPATIENT ADMISSION   AUTHORIZATION REQUEST   SERVICING FACILITY:   [place] [address]  Tax ID: [id number]  NPI: [id number] ATTENDING PROVIDER:  Attending Name and NPI#: Yanira Levine Md [[id number]]  Address: [address] [place] [address]  Phone: [phone]     ADMISSION INFORMATION:  Place of Service: Inpatient [facility]  Place of Service Code: 21  Inpatient Admission Date/Time: 11/18/24  6:59 PM  Discharge Date/Time: No discharge date for patient encounter.  Admitting Diagnosis Code/Description:  Abdominal pain [R10.9]     UTILIZATION REVIEW CONTACT:  Charmaine Leigh Utilization   Network Utilization Review Department  Phone: [phone]  Fax [phone]  Email: [email or web address]  Contact for approvals/pending authorizations, clinical reviews, and discharge.     PHYSICIAN ADVISORY SERVICES:  Medical Necessity Denial & Tdrb-pb-Cuid Review  Phone: [phone]  Fax: [phone]  Email: [email or web address]     DISCHARGE SUPPORT TEAM:  For Patients Discharge Needs & Updates  Phone: [phone] opt. 2 Fax: [phone]  Email: [email or web address]

## 2024-11-20 NOTE — CASE MANAGEMENT
Case Management Assessment & Discharge Planning Note    Patient name Linda Celeste  Location /-01 MRN [id number]  : 1965 Date 2024       Current Admission Date: 2024  Current Admission Diagnosis:Abdominal pain   Patient Active Problem List    Diagnosis Date Noted Date Diagnosed    Preoperative cardiovascular examination 2024     EKG abnormality 11/15/2024     Abdominal pain 11/15/2024     Herniated thoracic disc without myelopathy 2024     Vertigo 10/02/2023     Stenosis of left vertebral artery 2023     Cervical radiculopathy 2023     Arthralgia of right temporomandibular joint 2022     Episode of dizziness with headache 09/15/2021     Elevated alkaline phosphatase level 09/15/2021     Mesenteric panniculitis (HCC) 07/15/2019     Elevated liver enzymes 2019     Cervical spinal stenosis 2016     Sacroiliitis (HCC) 2016     Degenerative disc disease, lumbar 2016     Midline low back pain without sciatica 2016     Depression, recurrent (HCC) 03/15/2016     Hyperlipidemia, mixed 2015       LOS (days): 2  Geometric Mean LOS (GMLOS) (days):   Days to GMLOS:     OBJECTIVE:    Risk of Unplanned Readmission Score: 9.78         Current admission status: Inpatient       Preferred Pharmacy:   [id number] - [place] - [address]  [address]  [place] [address]  Phone: [phone] Fax: [phone]    Primary Care Provider: AMAURI Markham    Primary Insurance: BLUE CROSS  Secondary Insurance:     ASSESSMENT:  Active Health Care Proxies       Casper Celeste Health Care Agent - Spouse   Primary Phone: [phone] (Home)                 Advance Directives  Does patient have a Health Care POA?: Yes  Does patient have Advance Directives?: Yes  Advance Directives: Living will, Power of  for health care, Power of  for finance  Primary Contact:          Readmission Root Cause  30 Day Readmission:  No    Patient Information  Admitted from:: Home  Mental Status: Alert  During Assessment patient was accompanied by: Not accompanied during assessment  Assessment information provided by:: Patient  Primary Caregiver: Self  Support Systems: Spouse/significant other  County of Residence: [place]  What city do you live in?: [place]  Home entry access options. Select all that apply.: Ramp  Type of Current Residence: [facility]  Living Arrangements: Lives w/ Spouse/significant other  Is patient a ?: No    Activities of Daily Living Prior to Admission  Functional Status: Independent  Completes ADLs independently?: Yes  Ambulates independently?: Yes  Does patient use assisted devices?: No  Does patient currently own DME?: No  Does patient have a history of Outpatient Therapy (PT/OT)?: Yes  Does the patient have a history of Short-Term Rehab?: No  Does patient have a history of HHC?: No  Does patient currently have HHC?: No         Patient Information Continued  Income Source: Employed  Does patient have prescription coverage?: Yes  Does patient receive dialysis treatments?: No  Does patient have a history of substance abuse?: No  Does patient have a history of Mental Health Diagnosis?: Yes (depression)  Is patient receiving treatment for mental health?: Yes  Has patient received inpatient treatment related to mental health in the last 2 years?: No         Means of Transportation  Means of Transport to Appts:: Drives Self          DISCHARGE DETAILS:    Discharge planning discussed with:: patient  Freedom of Choice: Yes  Comments - Freedom of Choice: No anticipated DC needs  CM contacted family/caregiver?: No- see comments                  Requested Home Health Care         Is the patient interested in HHC at discharge?: No    DME Referral Provided  Referral made for DME?: No    Other Referral/Resources/Interventions Provided:  Referral Comments: No anticipated DC needs         Treatment Team Recommendation:  Home  Discharge Destination Plan:: Home  Transport at Discharge : Family

## 2024-11-20 NOTE — ASSESSMENT & PLAN NOTE
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  S/p EGD on 11/20/2024 that showed mild edematous and erythematous mucosa of the antrum.  No ulcers noted.  Biopsy taken for H. pylori and celiac disease.  Patient agreeable to proceed with  colonoscopy done inpatient and bowel prep will be done overnight.

## 2024-11-20 NOTE — PLAN OF CARE
Problem: PAIN - ADULT  Goal: Verbalizes/displays adequate comfort level or baseline comfort level  Description: Interventions:  - Encourage patient to monitor pain and request assistance  - Assess pain using appropriate pain scale  - Administer analgesics based on type and severity of pain and evaluate response  - Implement non-pharmacological measures as appropriate and evaluate response  - Consider cultural and social influences on pain and pain management  - Notify physician/advanced practitioner if interventions unsuccessful or patient reports new pain  Outcome: Progressing     Problem: SAFETY ADULT  Goal: Patient will remain free of falls  Description: INTERVENTIONS:  - Educate patient/family on patient safety including physical limitations  - Instruct patient to call for assistance with activity   - Consult OT/PT to assist with strengthening/mobility   - Keep Call bell within reach  - Keep bed low and locked with side rails adjusted as appropriate  - Keep care items and personal belongings within reach  - Initiate and maintain comfort rounds  - Make Fall Risk Sign visible to staff  - Offer Toileting every 2  Hours, in advance of need  - Initiate/Maintain bed alarm  - Obtain necessary fall risk management equipment:   - Apply yellow socks and bracelet for high fall risk patients  - Consider moving patient to room near nurses station  Outcome: Progressing     Problem: GASTROINTESTINAL - ADULT  Goal: Minimal or absence of nausea and/or vomiting  Description: INTERVENTIONS:  - Administer IV fluids if ordered to ensure adequate hydration  - Maintain NPO status until nausea and vomiting are resolved  - Nasogastric tube if ordered  - Administer ordered antiemetic medications as needed  - Provide nonpharmacologic comfort measures as appropriate  - Advance diet as tolerated, if ordered  - Consider nutrition services referral to assist patient with adequate nutrition and appropriate food choices  Outcome:  Progressing

## 2024-11-20 NOTE — PROGRESS NOTES
Progress Note - Hospitalist   Name: Linda Celeste 59 y.o. female I MRN: [id number]  Unit/Bed#: [facility] Date of Admission: 11/18/2024   Date of Service: 11/20/2024 I Hospital Day: 2    Assessment & Plan  Abdominal pain  She has ongoing epigastric abdominal pain CT scan on 11/15 showing mesenteric panniculitis  Followed with GI outpatient and given her persistent pain she was sent in for expedited inpatient workup and consideration of EGD  Continue PPI and famotidine and pain control with oxy/Dilaudid  Continue with sucralfate 1 g 4 times a day.  S/p EGD on 11/20/2024 that showed mild edematous and erythematous mucosa of the antrum.  No ulcers noted.  Biopsy taken for H. pylori and celiac disease.  Patient agreeable to proceed with  colonoscopy done inpatient and bowel prep will be done overnight.  Depression, recurrent (HCC)  Continue fluoxetine  EKG abnormality  She was just seen here in the hospital a few days ago and was admitted for EKG abnormality (nonspecific T wave inversions in the inferior leads) which have interestingly normalized  She was supposed to have outpatient stress test however given severe consistent abdominal pain was sent here to the ED for expedited workup  She denied any anginal symptoms  Per cardiology team patient does not have any contraindication for EGD and colonoscopy.  Echo was done showing EF of 55% with systolic function normal and diastolic function normal.  Regional wall abnormalities were not identified however cannot completely excluded.  Preoperative cardiovascular examination  Per cardiology team patient does not have any contraindication for EGD and colonoscopy.  Echo was done showing EF of 55% with systolic function normal and diastolic function normal.  Regional wall abnormalities were not identified however cannot completely excluded.    VTE Pharmacologic Prophylaxis: VTE Score: 3 Moderate Risk (Score 3-4) - Pharmacological DVT Prophylaxis Ordered: enoxaparin  (Lovenox).    Mobility:   Basic Mobility Inpatient Raw Score: 23  JH-HLM Goal: 7: Walk 25 feet or more  JH-HLM Achieved: 6: Walk 10 steps or more  JH-HLM Goal NOT achieved. Continue with multidisciplinary rounding and encourage appropriate mobility to improve upon JH-HLM goals.    Patient Centered Rounds: I performed bedside rounds with nursing staff today.   Discussions with Specialists or Other Care Team Provider: Discussed case with cardiology and GI team    Education and Discussions with Family / Patient: Patient declined call to .     Current Length of Stay: 2 day(s)  Current Patient Status: Inpatient   Certification Statement: The patient will continue to require additional inpatient hospital stay due to abdominal pain requiring endoscopy by GI team  Discharge Plan: Anticipate discharge in 24-48 hrs to home.    Code Status: Level 1 - Full Code    Subjective   Patient reports epigastric pain persistent for the past week.  Continues to have mild discomfort    Objective :  Temp:  [97.4 °F (36.3 °C)-98.2 °F (36.8 °C)] 97.4 °F (36.3 °C)  HR:  [55-78] 60  BP: (111-144)/(53-68) 117/66  Resp:  [15-18] 15  SpO2:  [93 %-99 %] 96 %  O2 Device: None (Room air)    Body mass index is 27.02 kg/m².     Input and Output Summary (last 24 hours):     Intake/Output Summary (Last 24 hours) at 11/20/2024 1445  Last data filed at 11/20/2024 0856  Gross per 24 hour   Intake --   Output 225 ml   Net -225 ml       Physical Exam  Vitals and nursing note reviewed.   Constitutional:       General: She is not in acute distress.     Appearance: She is well-developed.   HENT:      Head: Normocephalic and atraumatic.   Eyes:      Conjunctiva/sclera: Conjunctivae normal.   Cardiovascular:      Rate and Rhythm: Normal rate and regular rhythm.      Heart sounds: No murmur heard.  Pulmonary:      Effort: Pulmonary effort is normal. No respiratory distress.      Breath sounds: Normal breath sounds.   Abdominal:      Palpations:  Abdomen is soft.      Tenderness: There is abdominal tenderness (Epigastric area).   Musculoskeletal:         General: No swelling.      Cervical back: Neck supple.      Right lower leg: No edema.      Left lower leg: No edema.   Skin:     General: Skin is warm and dry.      Capillary Refill: Capillary refill takes less than 2 seconds.   Neurological:      Mental Status: She is alert.   Psychiatric:         Mood and Affect: Mood normal.         Lines/Drains:              Lab Results: I have reviewed the following results:   Results from last 7 days   Lab Units 11/20/24  0513   WBC Thousand/uL 6.24   HEMOGLOBIN g/dL 12.3   HEMATOCRIT % 38.0   PLATELETS Thousands/uL 228   SEGS PCT % 55   LYMPHO PCT % 30   MONO PCT % 12   EOS PCT % 2     Results from last 7 days   Lab Units 11/20/24  0513 11/18/24  1712   SODIUM mmol/L 140 139   POTASSIUM mmol/L 4.0 4.0   CHLORIDE mmol/L 105 105   CO2 mmol/L 30 26   BUN mg/dL 12 16   CREATININE mg/dL 0.77 0.85   ANION GAP mmol/L 5 8   CALCIUM mg/dL 9.4 9.6   ALBUMIN g/dL  --  4.3   TOTAL BILIRUBIN mg/dL  --  0.28   ALK PHOS U/L  --  99   ALT U/L  --  10   AST U/L  --  17   GLUCOSE RANDOM mg/dL 86 112                 Results from last 7 days   Lab Units 11/15/24  1446   LACTIC ACID mmol/L 0.6       Recent Cultures (last 7 days):         Imaging Results Review: I reviewed radiology reports from this admission including: CT abdomen/pelvis.  Other Study Results Review: EKG was reviewed.     Last 24 Hours Medication List:     Current Facility-Administered Medications:     aspirin chewable tablet 81 mg, Daily    atorvastatin (LIPITOR) tablet 40 mg, Daily With Dinner    enoxaparin (LOVENOX) subcutaneous injection 40 mg, Daily    famotidine (PEPCID) tablet 40 mg, HS    FLUoxetine (PROzac) capsule 60 mg, Daily    HYDROmorphone (DILAUDID) injection 0.5 mg, Q6H PRN    magnesium citrate (CITROMA) oral solution 296 mL, Once    [START ON 11/21/2024] magnesium citrate (CITROMA) oral solution 296 mL,  Once    oxyCODONE (ROXICODONE) IR tablet 5 mg, Q6H PRN    pantoprazole (PROTONIX) EC tablet 20 mg, Early Morning    polyethylene glycol (GLYCOLAX) bowel prep 238 g, Once    sucralfate (CARAFATE) tablet 1 g, 4x Daily (AC & HS)        **Please Note: This note may have been constructed using a voice recognition system.**

## 2024-11-20 NOTE — ANESTHESIA POSTPROCEDURE EVALUATION
Post-Op Assessment Note    CV Status:  Stable    Pain management: adequate       Mental Status:  Alert and awake   Hydration Status:  Euvolemic   PONV Controlled:  Controlled   Airway Patency:  Patent     Post Op Vitals Reviewed: Yes    No anethesia notable event occurred.    Staff: Anesthesiologist           Last Filed PACU Vitals:  Vitals Value Taken Time   Temp 97.4 °F (36.3 °C) 11/20/24 0947   Pulse 69 11/20/24 1007   /59 11/20/24 1007   Resp 15 11/20/24 1007   SpO2 99 % 11/20/24 1007       Modified Rajesh:  Activity: 2 (11/20/2024 10:07 AM)  Respiration: 2 (11/20/2024 10:07 AM)  Circulation: 2 (11/20/2024 10:07 AM)  Consciousness: 2 (11/20/2024 10:07 AM)  Oxygen Saturation: 2 (11/20/2024 10:07 AM)  Modified Rajesh Score: 10 (11/20/2024 10:07 AM)

## 2024-11-20 NOTE — DISCHARGE INSTRUCTIONS
Upper Endoscopy   WHAT YOU NEED TO KNOW:   An upper endoscopy is also called an upper gastrointestinal (GI) endoscopy, or an esophagogastroduodenoscopy (EGD). It is a procedure to examine the inside of your esophagus, stomach, and duodenum (first part of the small intestine) with a scope. You may feel bloated, gassy, or have some abdominal discomfort after your procedure. Your throat may be sore for 24 to 36 hours. You may burp or pass gas from air that is still inside your body.         DISCHARGE INSTRUCTIONS:   Seek care immediately if:   You have sudden, severe abdominal pain.     You have problems swallowing.     You have a large amount of black, sticky bowel movements or blood in your bowel movements.     You have sudden trouble breathing.     You feel weak, lightheaded, or faint or your heart beats faster than normal for you.     Contact your healthcare provider if:   You have a fever and chills.      You have nausea or are vomiting.      Your abdomen is bloated or feels full and hard.     You have abdominal pain.   You have black, sticky bowel movements or blood in your bowel movements.  You have not had a bowel movement for 3 days after your procedure.  You have rash or hives.  You have questions or concerns about your procedure.    Activity:   Do not lift, strain, or run for 24 hours after your procedure.     Rest after your procedure. You have been given medicine to relax you. Do not drive or make important decisions until the day after your procedure. Return to your normal activity as directed.     Relieve gas and discomfort from bloating by lying on your right side with a heating pad on your abdomen. You may need to take short walks to help the gas move out. Eat small meals until bloating is relieved.  Follow up with your healthcare provider as directed: Write down your questions so you remember to ask them during your visits.     If you take a “blood thinner”, please review the specific instructions  from your endoscopist about when you should resume it. These can be found in the “Recommendation” and “Your Medication list” sections of this After Visit Summary.  \

## 2024-11-20 NOTE — PLAN OF CARE
Problem: PAIN - ADULT  Goal: Verbalizes/displays adequate comfort level or baseline comfort level  Description: Interventions:  - Encourage patient to monitor pain and request assistance  - Assess pain using appropriate pain scale  - Administer analgesics based on type and severity of pain and evaluate response  - Implement non-pharmacological measures as appropriate and evaluate response  - Consider cultural and social influences on pain and pain management  - Notify physician/advanced practitioner if interventions unsuccessful or patient reports new pain  Outcome: Progressing     Problem: INFECTION - ADULT  Goal: Absence or prevention of progression during hospitalization  Description: INTERVENTIONS:  - Assess and monitor for signs and symptoms of infection  - Monitor lab/diagnostic results  - Monitor all insertion sites, i.e. indwelling lines, tubes, and drains  - Monitor endotracheal if appropriate and nasal secretions for changes in amount and color  - [place] appropriate cooling/warming therapies per order  - Administer medications as ordered  - Instruct and encourage patient and family to use good hand hygiene technique  - Identify and instruct in appropriate isolation precautions for identified infection/condition  Outcome: Progressing  Goal: Absence of fever/infection during neutropenic period  Description: INTERVENTIONS:  - Monitor WBC    Outcome: Progressing     Problem: SAFETY ADULT  Goal: Patient will remain free of falls  Description: INTERVENTIONS:  - Educate patient/family on patient safety including physical limitations  - Instruct patient to call for assistance with activity   - Consult OT/PT to assist with strengthening/mobility   - Keep Call bell within reach  - Keep bed low and locked with side rails adjusted as appropriate  - Keep care items and personal belongings within reach  - Initiate and maintain comfort rounds  - Make Fall Risk Sign visible to staff  - Apply yellow socks and bracelet  for high fall risk patients  - Consider moving patient to room near nurses station  Outcome: Progressing  Goal: Maintain or return to baseline ADL function  Description: INTERVENTIONS:  -  Assess patient's ability to carry out ADLs; assess patient's baseline for ADL function and identify physical deficits which impact ability to perform ADLs (bathing, care of mouth/teeth, toileting, grooming, dressing, etc.)  - Assess/evaluate cause of self-care deficits   - Assess range of motion  - Assess patient's mobility; develop plan if impaired  - Assess patient's need for assistive devices and provide as appropriate  - Encourage maximum independence but intervene and supervise when necessary  - Involve family in performance of ADLs  - Assess for home care needs following discharge   - Consider OT consult to assist with ADL evaluation and planning for discharge  - Provide patient education as appropriate  Outcome: Progressing  Goal: Maintains/Returns to pre admission functional level  Description: INTERVENTIONS:  - Perform AM-PAC 6 Click Basic Mobility/ Daily Activity assessment daily.  - Set and communicate daily mobility goal to care team and patient/family/caregiver.   - Collaborate with rehabilitation services on mobility goals if consulted  - Out of bed for toileting  - Record patient progress and toleration of activity level   Outcome: Progressing     Problem: CARDIOVASCULAR - ADULT  Goal: Maintains optimal cardiac output and hemodynamic stability  Description: INTERVENTIONS:  - Monitor I/O, vital signs and rhythm  - Monitor for S/S and trends of decreased cardiac output  - Administer and titrate ordered vasoactive medications to optimize hemodynamic stability  - Assess quality of pulses, skin color and temperature  - Assess for signs of decreased coronary artery perfusion  - Instruct patient to report change in severity of symptoms  Outcome: Progressing  Goal: Absence of cardiac dysrhythmias or at baseline  rhythm  Description: INTERVENTIONS:  - Continuous cardiac monitoring, vital signs, obtain 12 lead EKG if ordered  - Administer antiarrhythmic and heart rate control medications as ordered  - Monitor electrolytes and administer replacement therapy as ordered  Outcome: Progressing     Problem: GASTROINTESTINAL - ADULT  Goal: Minimal or absence of nausea and/or vomiting  Description: INTERVENTIONS:  - Administer IV fluids if ordered to ensure adequate hydration  - Maintain NPO status until nausea and vomiting are resolved  - Nasogastric tube if ordered  - Administer ordered antiemetic medications as needed  - Provide nonpharmacologic comfort measures as appropriate  - Advance diet as tolerated, if ordered  - Consider nutrition services referral to assist patient with adequate nutrition and appropriate food choices  Outcome: Progressing  Goal: Maintains or returns to baseline bowel function  Description: INTERVENTIONS:  - Assess bowel function  - Encourage oral fluids to ensure adequate hydration  - Administer IV fluids if ordered to ensure adequate hydration  - Administer ordered medications as needed  - Encourage mobilization and activity  - Consider nutritional services referral to assist patient with adequate nutrition and appropriate food choices  Outcome: Progressing  Goal: Maintains adequate nutritional intake  Description: INTERVENTIONS:  - Monitor percentage of each meal consumed  - Identify factors contributing to decreased intake, treat as appropriate  - Assist with meals as needed  - Monitor I&O, weight, and lab values if indicated  - Obtain nutrition services referral as needed  Outcome: Progressing  Goal: Oral mucous membranes remain intact  Description: INTERVENTIONS  - Assess oral mucosa and hygiene practices  - Implement preventative oral hygiene regimen  - Implement oral medicated treatments as ordered  - Initiate Nutrition services referral as needed  Outcome: Progressing

## 2024-11-21 ENCOUNTER — APPOINTMENT (INPATIENT)
Dept: GASTROENTEROLOGY | Facility: HOSPITAL | Age: 59
DRG: 394 | End: 2024-11-21
Payer: COMMERCIAL

## 2024-11-21 ENCOUNTER — ANESTHESIA EVENT (INPATIENT)
Dept: GASTROENTEROLOGY | Facility: HOSPITAL | Age: 59
DRG: 394 | End: 2024-11-21
Payer: COMMERCIAL

## 2024-11-21 ENCOUNTER — ANESTHESIA (INPATIENT)
Dept: GASTROENTEROLOGY | Facility: HOSPITAL | Age: 59
DRG: 394 | End: 2024-11-21
Payer: COMMERCIAL

## 2024-11-21 LAB
ANION GAP SERPL CALCULATED.3IONS-SCNC: 7 MMOL/L (ref 4–13)
BASOPHILS # BLD AUTO: 0.03 THOUSANDS/ÂΜL (ref 0–0.1)
BASOPHILS NFR BLD AUTO: 1 % (ref 0–1)
BUN SERPL-MCNC: 10 MG/DL (ref 5–25)
CALCIUM SERPL-MCNC: 9.4 MG/DL (ref 8.4–10.2)
CHLORIDE SERPL-SCNC: 107 MMOL/L (ref 96–108)
CO2 SERPL-SCNC: 27 MMOL/L (ref 21–32)
CREAT SERPL-MCNC: 0.77 MG/DL (ref 0.6–1.3)
EOSINOPHIL # BLD AUTO: 0.04 THOUSAND/ÂΜL (ref 0–0.61)
EOSINOPHIL NFR BLD AUTO: 1 % (ref 0–6)
ERYTHROCYTE [DISTWIDTH] IN BLOOD BY AUTOMATED COUNT: 12.2 % (ref 11.6–15.1)
GFR SERPL CREATININE-BSD FRML MDRD: 83 ML/MIN/1.73SQ M
GLUCOSE SERPL-MCNC: 95 MG/DL (ref 65–140)
HCT VFR BLD AUTO: 38 % (ref 34.8–46.1)
HGB BLD-MCNC: 12.6 G/DL (ref 11.5–15.4)
IMM GRANULOCYTES # BLD AUTO: 0.01 THOUSAND/UL (ref 0–0.2)
IMM GRANULOCYTES NFR BLD AUTO: 0 % (ref 0–2)
LYMPHOCYTES # BLD AUTO: 1.3 THOUSANDS/ÂΜL (ref 0.6–4.47)
LYMPHOCYTES NFR BLD AUTO: 21 % (ref 14–44)
MCH RBC QN AUTO: 30.5 PG (ref 26.8–34.3)
MCHC RBC AUTO-ENTMCNC: 33.2 G/DL (ref 31.4–37.4)
MCV RBC AUTO: 92 FL (ref 82–98)
MONOCYTES # BLD AUTO: 0.9 THOUSAND/ÂΜL (ref 0.17–1.22)
MONOCYTES NFR BLD AUTO: 15 % (ref 4–12)
NEUTROPHILS # BLD AUTO: 3.81 THOUSANDS/ÂΜL (ref 1.85–7.62)
NEUTS SEG NFR BLD AUTO: 62 % (ref 43–75)
NRBC BLD AUTO-RTO: 0 /100 WBCS
PLATELET # BLD AUTO: 243 THOUSANDS/UL (ref 149–390)
PMV BLD AUTO: 10.7 FL (ref 8.9–12.7)
POTASSIUM SERPL-SCNC: 3.8 MMOL/L (ref 3.5–5.3)
RBC # BLD AUTO: 4.13 MILLION/UL (ref 3.81–5.12)
SODIUM SERPL-SCNC: 141 MMOL/L (ref 135–147)
WBC # BLD AUTO: 6.09 THOUSAND/UL (ref 4.31–10.16)

## 2024-11-21 PROCEDURE — 45385 COLONOSCOPY W/LESION REMOVAL: CPT | Performed by: INTERNAL MEDICINE

## 2024-11-21 PROCEDURE — 0DBK8ZX EXCISION OF ASCENDING COLON, VIA NATURAL OR ARTIFICIAL OPENING ENDOSCOPIC, DIAGNOSTIC: ICD-10-PCS | Performed by: INTERNAL MEDICINE

## 2024-11-21 PROCEDURE — 88305 TISSUE EXAM BY PATHOLOGIST: CPT | Performed by: STUDENT IN AN ORGANIZED HEALTH CARE EDUCATION/TRAINING PROGRAM

## 2024-11-21 PROCEDURE — 45380 COLONOSCOPY AND BIOPSY: CPT | Performed by: INTERNAL MEDICINE

## 2024-11-21 PROCEDURE — 0DBM8ZX EXCISION OF DESCENDING COLON, VIA NATURAL OR ARTIFICIAL OPENING ENDOSCOPIC, DIAGNOSTIC: ICD-10-PCS | Performed by: INTERNAL MEDICINE

## 2024-11-21 PROCEDURE — 85025 COMPLETE CBC W/AUTO DIFF WBC: CPT

## 2024-11-21 PROCEDURE — 0DBH8ZX EXCISION OF CECUM, VIA NATURAL OR ARTIFICIAL OPENING ENDOSCOPIC, DIAGNOSTIC: ICD-10-PCS | Performed by: INTERNAL MEDICINE

## 2024-11-21 PROCEDURE — 0DBL8ZX EXCISION OF TRANSVERSE COLON, VIA NATURAL OR ARTIFICIAL OPENING ENDOSCOPIC, DIAGNOSTIC: ICD-10-PCS | Performed by: INTERNAL MEDICINE

## 2024-11-21 PROCEDURE — 99232 SBSQ HOSP IP/OBS MODERATE 35: CPT

## 2024-11-21 PROCEDURE — 80048 BASIC METABOLIC PNL TOTAL CA: CPT

## 2024-11-21 RX ORDER — PROPOFOL 10 MG/ML
INJECTION, EMULSION INTRAVENOUS AS NEEDED
Status: DISCONTINUED | OUTPATIENT
Start: 2024-11-21 | End: 2024-11-21

## 2024-11-21 RX ORDER — SODIUM CHLORIDE, SODIUM LACTATE, POTASSIUM CHLORIDE, CALCIUM CHLORIDE 600; 310; 30; 20 MG/100ML; MG/100ML; MG/100ML; MG/100ML
INJECTION, SOLUTION INTRAVENOUS CONTINUOUS PRN
Status: DISCONTINUED | OUTPATIENT
Start: 2024-11-21 | End: 2024-11-21

## 2024-11-21 RX ORDER — LIDOCAINE HYDROCHLORIDE 20 MG/ML
INJECTION, SOLUTION EPIDURAL; INFILTRATION; INTRACAUDAL; PERINEURAL AS NEEDED
Status: DISCONTINUED | OUTPATIENT
Start: 2024-11-21 | End: 2024-11-21

## 2024-11-21 RX ORDER — PREDNISONE 10 MG/1
TABLET ORAL
Qty: 120 TABLET | Refills: 0 | Status: SHIPPED | OUTPATIENT
Start: 2024-11-21

## 2024-11-21 RX ORDER — SODIUM CHLORIDE, SODIUM LACTATE, POTASSIUM CHLORIDE, CALCIUM CHLORIDE 600; 310; 30; 20 MG/100ML; MG/100ML; MG/100ML; MG/100ML
125 INJECTION, SOLUTION INTRAVENOUS CONTINUOUS
Status: DISCONTINUED | OUTPATIENT
Start: 2024-11-21 | End: 2024-11-22 | Stop reason: HOSPADM

## 2024-11-21 RX ORDER — ACETAMINOPHEN 325 MG/1
975 TABLET ORAL EVERY 6 HOURS PRN
Status: DISCONTINUED | OUTPATIENT
Start: 2024-11-21 | End: 2024-11-22 | Stop reason: HOSPADM

## 2024-11-21 RX ORDER — METHYLPREDNISOLONE SODIUM SUCCINATE 40 MG/ML
40 INJECTION, POWDER, LYOPHILIZED, FOR SOLUTION INTRAMUSCULAR; INTRAVENOUS DAILY
Status: DISCONTINUED | OUTPATIENT
Start: 2024-11-21 | End: 2024-11-22 | Stop reason: HOSPADM

## 2024-11-21 RX ADMIN — METHYLPREDNISOLONE SODIUM SUCCINATE 40 MG: 40 INJECTION, POWDER, FOR SOLUTION INTRAMUSCULAR; INTRAVENOUS at 18:06

## 2024-11-21 RX ADMIN — PROPOFOL 100 MG: 10 INJECTION, EMULSION INTRAVENOUS at 15:38

## 2024-11-21 RX ADMIN — PROPOFOL 50 MG: 10 INJECTION, EMULSION INTRAVENOUS at 15:48

## 2024-11-21 RX ADMIN — SUCRALFATE 1 G: 1 TABLET ORAL at 22:33

## 2024-11-21 RX ADMIN — PROPOFOL 50 MG: 10 INJECTION, EMULSION INTRAVENOUS at 15:41

## 2024-11-21 RX ADMIN — PROPOFOL 50 MG: 10 INJECTION, EMULSION INTRAVENOUS at 15:44

## 2024-11-21 RX ADMIN — FAMOTIDINE 40 MG: 20 TABLET, FILM COATED ORAL at 22:33

## 2024-11-21 RX ADMIN — SUCRALFATE 1 G: 1 TABLET ORAL at 17:28

## 2024-11-21 RX ADMIN — PANTOPRAZOLE SODIUM 20 MG: 20 TABLET, DELAYED RELEASE ORAL at 05:32

## 2024-11-21 RX ADMIN — ATORVASTATIN CALCIUM 40 MG: 40 TABLET, FILM COATED ORAL at 17:28

## 2024-11-21 RX ADMIN — ONDANSETRON 4 MG: 2 INJECTION INTRAMUSCULAR; INTRAVENOUS at 07:46

## 2024-11-21 RX ADMIN — SODIUM CHLORIDE, SODIUM LACTATE, POTASSIUM CHLORIDE, AND CALCIUM CHLORIDE: .6; .31; .03; .02 INJECTION, SOLUTION INTRAVENOUS at 15:38

## 2024-11-21 RX ADMIN — LIDOCAINE HYDROCHLORIDE 50 MG: 20 INJECTION, SOLUTION EPIDURAL; INFILTRATION; INTRACAUDAL; PERINEURAL at 15:38

## 2024-11-21 RX ADMIN — ONDANSETRON 4 MG: 2 INJECTION INTRAMUSCULAR; INTRAVENOUS at 15:00

## 2024-11-21 RX ADMIN — SODIUM CHLORIDE, SODIUM LACTATE, POTASSIUM CHLORIDE, AND CALCIUM CHLORIDE 125 ML/HR: .6; .31; .03; .02 INJECTION, SOLUTION INTRAVENOUS at 15:32

## 2024-11-21 NOTE — PLAN OF CARE
Problem: PAIN - ADULT  Goal: Verbalizes/displays adequate comfort level or baseline comfort level  Description: Interventions:  - Encourage patient to monitor pain and request assistance  - Assess pain using appropriate pain scale  - Administer analgesics based on type and severity of pain and evaluate response  - Implement non-pharmacological measures as appropriate and evaluate response  - Consider cultural and social influences on pain and pain management  - Notify physician/advanced practitioner if interventions unsuccessful or patient reports new pain  Outcome: Progressing     Problem: CARDIOVASCULAR - ADULT  Goal: Maintains optimal cardiac output and hemodynamic stability  Description: INTERVENTIONS:  - Monitor I/O, vital signs and rhythm  - Monitor for S/S and trends of decreased cardiac output  - Administer and titrate ordered vasoactive medications to optimize hemodynamic stability  - Assess quality of pulses, skin color and temperature  - Assess for signs of decreased coronary artery perfusion  - Instruct patient to report change in severity of symptoms  Outcome: Progressing     Problem: GASTROINTESTINAL - ADULT  Goal: Minimal or absence of nausea and/or vomiting  Description: INTERVENTIONS:  - Administer IV fluids if ordered to ensure adequate hydration  - Maintain NPO status until nausea and vomiting are resolved  - Nasogastric tube if ordered  - Administer ordered antiemetic medications as needed  - Provide nonpharmacologic comfort measures as appropriate  - Advance diet as tolerated, if ordered  - Consider nutrition services referral to assist patient with adequate nutrition and appropriate food choices  Outcome: Progressing

## 2024-11-21 NOTE — PLAN OF CARE
Problem: PAIN - ADULT  Goal: Verbalizes/displays adequate comfort level or baseline comfort level  Description: Interventions:  - Encourage patient to monitor pain and request assistance  - Assess pain using appropriate pain scale  - Administer analgesics based on type and severity of pain and evaluate response  - Implement non-pharmacological measures as appropriate and evaluate response  - Consider cultural and social influences on pain and pain management  - Notify physician/advanced practitioner if interventions unsuccessful or patient reports new pain  Outcome: Progressing     Problem: INFECTION - ADULT  Goal: Absence or prevention of progression during hospitalization  Description: INTERVENTIONS:  - Assess and monitor for signs and symptoms of infection  - Monitor lab/diagnostic results  - Monitor all insertion sites, i.e. indwelling lines, tubes, and drains  - Monitor endotracheal if appropriate and nasal secretions for changes in amount and color  - [place] appropriate cooling/warming therapies per order  - Administer medications as ordered  - Instruct and encourage patient and family to use good hand hygiene technique  - Identify and instruct in appropriate isolation precautions for identified infection/condition  Outcome: Progressing  Goal: Absence of fever/infection during neutropenic period  Description: INTERVENTIONS:  - Monitor WBC    Outcome: Progressing     Problem: SAFETY ADULT  Goal: Patient will remain free of falls  Description: INTERVENTIONS:  - Educate patient/family on patient safety including physical limitations  - Instruct patient to call for assistance with activity   - Consult OT/PT to assist with strengthening/mobility   - Keep Call bell within reach  - Keep bed low and locked with side rails adjusted as appropriate  - Keep care items and personal belongings within reach  - Initiate and maintain comfort rounds  - Make Fall Risk Sign visible to staff  - Offer Toileting every 2 Hours,  in advance of need  - Initiate/Maintain bed alarm  - Obtain necessary fall risk management equipment  - Apply yellow socks and bracelet for high fall risk patients  - Consider moving patient to room near nurses station  Outcome: Progressing  Goal: Maintain or return to baseline ADL function  Description: INTERVENTIONS:  -  Assess patient's ability to carry out ADLs; assess patient's baseline for ADL function and identify physical deficits which impact ability to perform ADLs (bathing, care of mouth/teeth, toileting, grooming, dressing, etc.)  - Assess/evaluate cause of self-care deficits   - Assess range of motion  - Assess patient's mobility; develop plan if impaired  - Assess patient's need for assistive devices and provide as appropriate  - Encourage maximum independence but intervene and supervise when necessary  - Involve family in performance of ADLs  - Assess for home care needs following discharge   - Consider OT consult to assist with ADL evaluation and planning for discharge  - Provide patient education as appropriate  Outcome: Progressing  Goal: Maintains/Returns to pre admission functional level  Description: INTERVENTIONS:  - Perform AM-PAC 6 Click Basic Mobility/ Daily Activity assessment daily.  - Set and communicate daily mobility goal to care team and patient/family/caregiver.   - Collaborate with rehabilitation services on mobility goals if consulted     Problem: CARDIOVASCULAR - ADULT  Goal: Maintains optimal cardiac output and hemodynamic stability  Description: INTERVENTIONS:  - Monitor I/O, vital signs and rhythm  - Monitor for S/S and trends of decreased cardiac output  - Administer and titrate ordered vasoactive medications to optimize hemodynamic stability  - Assess quality of pulses, skin color and temperature  - Assess for signs of decreased coronary artery perfusion  - Instruct patient to report change in severity of symptoms  Outcome: Progressing  Goal: Absence of cardiac dysrhythmias or  at baseline rhythm  Description: INTERVENTIONS:  - Continuous cardiac monitoring, vital signs, obtain 12 lead EKG if ordered  - Administer antiarrhythmic and heart rate control medications as ordered  - Monitor electrolytes and administer replacement therapy as ordered  Outcome: Progressing     Problem: GASTROINTESTINAL - ADULT  Goal: Minimal or absence of nausea and/or vomiting  Description: INTERVENTIONS:  - Administer IV fluids if ordered to ensure adequate hydration  - Maintain NPO status until nausea and vomiting are resolved  - Nasogastric tube if ordered  - Administer ordered antiemetic medications as needed  - Provide nonpharmacologic comfort measures as appropriate  - Advance diet as tolerated, if ordered  - Consider nutrition services referral to assist patient with adequate nutrition and appropriate food choices  Outcome: Progressing  Goal: Maintains or returns to baseline bowel function  Description: INTERVENTIONS:  - Assess bowel function  - Encourage oral fluids to ensure adequate hydration  - Administer IV fluids if ordered to ensure adequate hydration  - Administer ordered medications as needed  - Encourage mobilization and activity  - Consider nutritional services referral to assist patient with adequate nutrition and appropriate food choices  Outcome: Progressing  Goal: Maintains adequate nutritional intake  Description: INTERVENTIONS:  - Monitor percentage of each meal consumed  - Identify factors contributing to decreased intake, treat as appropriate  - Assist with meals as needed  - Monitor I&O, weight, and lab values if indicated  - Obtain nutrition services referral as needed  Outcome: Progressing  Goal: Oral mucous membranes remain intact  Description: INTERVENTIONS  - Assess oral mucosa and hygiene practices  - Implement preventative oral hygiene regimen  - Implement oral medicated treatments as ordered  - Initiate Nutrition services referral as needed  Outcome: Progressing

## 2024-11-21 NOTE — ASSESSMENT & PLAN NOTE
Reports symptoms have been going on for 1.5 weeks.  Tender to palpation.  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

## 2024-11-21 NOTE — ASSESSMENT & PLAN NOTE
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  S/p EGD on 11/20/2024 that showed mild edematous and erythematous mucosa of the antrum.  No ulcers noted.  Biopsy taken for H. pylori and celiac disease.  Patient agreeable to proceed with  colonoscopy done inpatient and bowel prep will be done overnight.  Pending colonoscopy for today.

## 2024-11-21 NOTE — ANESTHESIA PREPROCEDURE EVALUATION
"Procedure:  COLONOSCOPY    Presenting with epigastric abdominal pain, was evaluated with EGD yesterday with no anesthesia complications. Also had some recent EKG abnormalities noted including T wave inversions, TTE was unremarkable and no further cardiac workup recommended per Cardiology     Relevant Problems   CARDIO   (+) Hyperlipidemia, mixed   (+) Stenosis of left vertebral artery      MUSCULOSKELETAL   (+) Degenerative disc disease, lumbar   (+) Midline low back pain without sciatica      NEURO/PSYCH   (+) Depression, recurrent (HCC)        Physical Exam    Airway    Mallampati score: III  TM Distance: >3 FB  Neck ROM: full     Dental       Cardiovascular      Pulmonary   No wheezes    Other Findings  post-pubertal.      Anesthesia Plan  ASA Score- 2     Anesthesia Type- IV sedation with anesthesia with ASA Monitors.         Additional Monitors:     Airway Plan:            Plan Factors-    Chart reviewed. EKG reviewed.  Existing labs reviewed. Patient summary reviewed.    Patient is not a current smoker.  Patient did not smoke on day of surgery.            Induction- intravenous.    Postoperative Plan-     Perioperative Resuscitation Plan - Level 1 - Full Code.       Informed Consent- Anesthetic plan and risks discussed with patient.  I personally reviewed this patient with the CRNA. Discussed and agreed on the Anesthesia Plan with the CRNA..      VITALS  /62   Pulse 70   Temp 97.8 °F (36.6 °C) (Temporal)   Resp 16   Ht 5' 1\" (1.549 m)   Wt 64.9 kg (143 lb)   SpO2 99%   BMI 27.02 kg/m²  BP Readings from Last 3 Encounters:   11/21/24 135/62   11/18/24 122/82   11/16/24 (!) 119/48        LABS  Results from Last 12 Months   Lab Units 11/21/24  0510 11/20/24  0513   WBC Thousand/uL 6.09 6.24   HEMOGLOBIN g/dL 12.6 12.3   HEMATOCRIT % 38.0 38.0   PLATELETS Thousands/uL 243 228     Results from Last 12 Months   Lab Units 11/21/24  0510 11/20/24  0513   SODIUM mmol/L 141 140   POTASSIUM mmol/L 3.8 4.0 " "  CHLORIDE mmol/L 107 105   CO2 mmol/L 27 30   ANION GAP mmol/L 7 5   BUN mg/dL 10 12   CREATININE mg/dL 0.77 0.77   CALCIUM mg/dL 9.4 9.4   GLUCOSE RANDOM mg/dL 95 86     No results for input(s): \"APTT\", \"INR\", \"PTT\" in the last 8784 hours.    ECG      ECHOCARDIOGRAPHY OR OTHER TESTING/IMAGING  Encounter Date: 11/18/24   ECG 12 lead   Result Value    Ventricular Rate 58    Atrial Rate 58    AL Interval 138    QRSD Interval 90    QT Interval 466    QTC Interval 457    P Axis 55    QRS Axis 81    T Wave Axis 91    Narrative    Sinus bradycardia  Nonspecific T wave abnormality  Abnormal ECG  When compared with ECG of 15-Nov-2024 16:33,  T wave inversion no longer evident in Inferior leads  Confirmed by Jesse Marsh ([id number]) on 11/19/2024 1:58:12 PM     No results found for this or any previous visit.   History    Abnormal EKG- Pre-operative clearance, Abdominal pain.     Interpretation Summary       •  Left Ventricle: Left ventricular cavity size is normal. Wall thickness is normal. The left ventricular ejection fraction is 55%. Systolic function is normal. Although no diagnostic regional wall motion abnormality was identified, this possibility cannot be completely excluded on the basis of this study. Diastolic function is normal.  •  Right Ventricle: Right ventricular cavity size is normal. Systolic function is normal.  •  Aortic Valve: There is mild regurgitation.  •  Mitral Valve: There is trace regurgitation.  •  Tricuspid Valve: There is trace regurgitation. The right ventricular systolic pressure is normal.  •  Pulmonic Valve: There is trace regurgitation.     ------- ANESTHESIA RISK-BENEFIT DISCUSSION -------  BENEFITS OF A SPECIALIZED ANESTHESIA TEAM INCLUDE ([id number], PMID 39034995):  (1) Reduce mortality and morbidity for major surgeries/procedures. (2) The team provides analgesia/sedation/amnesia/akinesia as safely as possible. (3) The team strives to reduce discomfort as safely as possible.    RISKS, " AND PLANS TO MITIGATE RISKS, INCLUDE:    - Neurologic system: IntraOp awareness (Risk is ~1:1,000 - 1:14,000; PMID 37001532), Stroke (Risk ~<0.1-2% for most cases; PMID 87670364), nerve injury, vision loss, and POCD.     - Airway and Pulmonary system: Dental or mouth injury, throat pain, critical hypoxia, pneumothorax, prolonged intubation, post-op respiratory compromise.  Airway/Intubation risks and prior data:  Mask Ventilation: Ventilated by mask (1); Technique: Direct laryngoscopy; Type: Cuffed, Oral; Tube Size: 7 mm; Laryngoscope: Mac; Blade Size: 3; Location: Oral; Grade View: 1; Insertion Attempts: 1; Placement Verification: End tidal CO2;  Major ARISCAT risk factors for pulmonary complications include: none, yielding a score of 0-1= Low risk, 1.6%.  - Cardiovascular system: Hypotension, arrhythmias, cardiac injury or arrest, blood clots, bleeding, infection, vascular injuries.  [place]'s RCRI score items: none, yielding an RCRI Score of 0= 0.4% risk of MACE  Are [place]-op or intra-op beta blockers indicated? (PMID 32080717): no  - FEN/GI system: Aspiration risk (~0.5% per PMC [id number]) and PONV (10-80% per Apfel score) especially if the patient has not fasted.  ASA NPO guideline compliance?: Yes  - Medication risk assessment: Allergic reactions, excessive bleeding with anticoagulant use, overdoses, drug-drug interactions, injury to a fetus or  in pregnant or breastfeeding patients, [place]-procedural sedation including while driving/operating machinery.  Recent relevant medications: See MAR or Med Review  Personal or family history of anesthesia complications: no  Pregnancy Status: N/A  - Estimate mortality risks associated with anesthesia based on ASA-PS (PMID 94668543): ASA-PS II: risk 1:20,000

## 2024-11-21 NOTE — PROGRESS NOTES
Progress Note - Cardiology   Name: Linda Celeste 59 y.o. female I MRN: [id number]  Unit/Bed#: [facility] Date of Admission: 11/18/2024   Date of Service: 11/21/2024 I Hospital Day: 3     Assessment & Plan  Abdominal pain  Reports symptoms have been going on for 1.5 weeks.  Tender to palpation.  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  On her more recent admission she had some more significant T wave inversions in inferior leads which have since resolved.  She reports that she has no symptoms of chest pain, palpitations, lower extremity edema, dizziness, or lightheadedness. Echo shows no acute abnormalities. Can follow up outpatient with cardiology on discharge, will sign off. Please call with any questions     Subjective   Chief Complaint: Abdominal pain  Patient reporting continued abdominal pain, no signs of chest pain, dizziness, lightheadedness, palpitations.  No lower extremity edema.    Objective :  Temp:  [97.4 °F (36.3 °C)-98.8 °F (37.1 °C)] 98.8 °F (37.1 °C)  HR:  [55-73] 71  BP: (105-134)/(55-72) 105/64  Resp:  [15-18] 18  SpO2:  [93 %-99 %] 96 %  O2 Device: None (Room air)  Orthostatic Blood Pressures      Flowsheet Row Most Recent Value   Blood Pressure 105/64 filed at 11/21/2024 0739   Patient Position - Orthostatic VS Sitting filed at 11/18/2024 1433          First Weight: Weight - Scale: 64.8 kg (142 lb 12.8 oz) (11/18/24 1433)  Vitals:    11/18/24 2200 11/19/24 1115   Weight: 65.3 kg (143 lb 15.4 oz) 64.9 kg (143 lb)     Physical Exam  Gen: Mild Distress  Cardiac: RRR  Lungs: CTAB  Ext: No edema     Lab Results: I have reviewed the following results:  Results from last 7 days   Lab Units 11/21/24  0510 11/20/24  0513 11/18/24  1712   WBC Thousand/uL 6.09 6.24 6.67   HEMOGLOBIN g/dL 12.6 12.3 13.1   HEMATOCRIT % 38.0 38.0  40.0   PLATELETS Thousands/uL 243 228 263     Results from last 7 days   Lab Units 11/21/24  0510 11/20/24  0513 11/18/24  1712   POTASSIUM mmol/L 3.8 4.0 4.0   CHLORIDE mmol/L 107 105 105   CO2 mmol/L 27 30 26   BUN mg/dL 10 12 16   CREATININE mg/dL 0.77 0.77 0.85   CALCIUM mg/dL 9.4 9.4 9.6         Lab Results   Component Value Date    HGBA1C 5.3 09/13/2023     Lab Results   Component Value Date    TROPONINI <0.02 09/15/2021       Imaging Results Review: No pertinent imaging studies reviewed.  Other Study Results Review: EKG was reviewed.     VTE Pharmacologic Prophylaxis:

## 2024-11-21 NOTE — ANESTHESIA POSTPROCEDURE EVALUATION
Post-Op Assessment Note    CV Status:  Stable    Pain management: adequate       Mental Status:  Alert and awake   Hydration Status:  Euvolemic   PONV Controlled:  Controlled   Airway Patency:  Patent     Post Op Vitals Reviewed: Yes    No anethesia notable event occurred.    Staff: CRNA           Last Filed PACU Vitals:  Vitals Value Taken Time   Temp 98.5 °F (36.9 °C) 11/21/24 1556   Pulse 61 11/21/24 1556   /51 11/21/24 1556   Resp 15 11/21/24 1556   SpO2 97 % 11/21/24 1556       Modified Rajesh:  Activity: 2 (11/21/2024  3:56 PM)  Respiration: 2 (11/21/2024  3:56 PM)  Circulation: 2 (11/21/2024  3:56 PM)  Consciousness: 1 (11/21/2024  3:56 PM)  Oxygen Saturation: 2 (11/21/2024  3:56 PM)  Modified Rajesh Score: 9 (11/21/2024  3:56 PM)

## 2024-11-21 NOTE — PROGRESS NOTES
Progress Note - Hospitalist   Name: Linda Celeste 59 y.o. 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  S/p EGD on 11/20/2024 that showed mild edematous and erythematous mucosa of the antrum.  No ulcers noted.  Biopsy taken for H. pylori and celiac disease.  Patient agreeable to proceed with  colonoscopy done inpatient and bowel prep will be done overnight.  Pending colonoscopy for today.  Depression, recurrent (HCC)  Continue fluoxetine  EKG abnormality  She was just seen here in the hospital a few days ago and was admitted for EKG abnormality (nonspecific T wave inversions in the inferior leads) which have interestingly normalized  She was supposed to have outpatient stress test however given severe consistent abdominal pain was sent here to the ED for expedited workup  She denied any anginal symptoms  Per cardiology team patient does not have any contraindication for EGD and colonoscopy.  Echo was done showing EF of 55% with systolic function normal and diastolic function normal.  Regional wall abnormalities were not identified however cannot completely excluded.  Preoperative cardiovascular examination  Per cardiology team patient does not have any contraindication for EGD and colonoscopy.  Echo was done showing EF of 55% with systolic function normal and diastolic function normal.  Regional wall abnormalities were not identified however cannot completely excluded.    VTE Pharmacologic Prophylaxis: VTE Score: 3 Moderate Risk (Score 3-4) - Pharmacological DVT Prophylaxis  Ordered: enoxaparin (Lovenox).    Mobility:   Basic Mobility Inpatient Raw Score: 23  JH-HLM Goal: 7: Walk 25 feet or more  JH-HLM Achieved: 7: Walk 25 feet or more  JH-HLM Goal NOT achieved. Continue with multidisciplinary rounding and encourage appropriate mobility to improve upon JH-HLM goals.    Patient Centered Rounds: I performed bedside rounds with nursing staff today.   Discussions with Specialists or Other Care Team Provider: Discussed case with cardiology and GI team    Education and Discussions with Family / Patient: Patient declined call to .     Current Length of Stay: 3 day(s)  Current Patient Status: Inpatient   Certification Statement: The patient will continue to require additional inpatient hospital stay due to abdominal pain requiring endoscopy by GI team  Discharge Plan: Anticipate discharge in 24-48 hrs to home.    Code Status: Level 1 - Full Code    Subjective   Patient continues to have mild discomfort.  Tolerated well the bowel prep.    Objective :  Temp:  [97.8 °F (36.6 °C)-98.8 °F (37.1 °C)] 97.8 °F (36.6 °C)  HR:  [70-73] 70  BP: (105-135)/(62-72) 135/62  Resp:  [16-18] 16  SpO2:  [96 %-99 %] 99 %  O2 Device: None (Room air)    Body mass index is 27.02 kg/m².     Input and Output Summary (last 24 hours):     Intake/Output Summary (Last 24 hours) at 11/21/2024 1527  Last data filed at 11/21/2024 1313  Gross per 24 hour   Intake 0 ml   Output 0 ml   Net 0 ml       Physical Exam  Vitals and nursing note reviewed.   Constitutional:       General: She is not in acute distress.     Appearance: She is well-developed.   HENT:      Head: Normocephalic and atraumatic.   Eyes:      Conjunctiva/sclera: Conjunctivae normal.   Cardiovascular:      Rate and Rhythm: Normal rate and regular rhythm.      Heart sounds: No murmur heard.  Pulmonary:      Effort: Pulmonary effort is normal. No respiratory distress.      Breath sounds: Normal breath sounds.   Abdominal:      Palpations: Abdomen is  soft.      Tenderness: There is abdominal tenderness (Epigastric area).   Musculoskeletal:         General: No swelling.      Cervical back: Neck supple.      Right lower leg: No edema.      Left lower leg: No edema.   Skin:     General: Skin is warm and dry.      Capillary Refill: Capillary refill takes less than 2 seconds.   Neurological:      Mental Status: She is alert.   Psychiatric:         Mood and Affect: Mood normal.         Lines/Drains:              Lab Results: I have reviewed the following results:   Results from last 7 days   Lab Units 11/21/24  0510   WBC Thousand/uL 6.09   HEMOGLOBIN g/dL 12.6   HEMATOCRIT % 38.0   PLATELETS Thousands/uL 243   SEGS PCT % 62   LYMPHO PCT % 21   MONO PCT % 15*   EOS PCT % 1     Results from last 7 days   Lab Units 11/21/24  0510 11/20/24  0513 11/18/24  1712   SODIUM mmol/L 141   < > 139   POTASSIUM mmol/L 3.8   < > 4.0   CHLORIDE mmol/L 107   < > 105   CO2 mmol/L 27   < > 26   BUN mg/dL 10   < > 16   CREATININE mg/dL 0.77   < > 0.85   ANION GAP mmol/L 7   < > 8   CALCIUM mg/dL 9.4   < > 9.6   ALBUMIN g/dL  --   --  4.3   TOTAL BILIRUBIN mg/dL  --   --  0.28   ALK PHOS U/L  --   --  99   ALT U/L  --   --  10   AST U/L  --   --  17   GLUCOSE RANDOM mg/dL 95   < > 112    < > = values in this interval not displayed.                 Results from last 7 days   Lab Units 11/15/24  1446   LACTIC ACID mmol/L 0.6       Recent Cultures (last 7 days):         Imaging Results Review: I reviewed radiology reports from this admission including: CT abdomen/pelvis.  Other Study Results Review: EKG was reviewed.     Last 24 Hours Medication List:     Current Facility-Administered Medications:   •  acetaminophen (TYLENOL) tablet 975 mg, Q6H PRN  •  aspirin chewable tablet 81 mg, Daily  •  atorvastatin (LIPITOR) tablet 40 mg, Daily With Dinner  •  enoxaparin (LOVENOX) subcutaneous injection 40 mg, Daily  •  famotidine (PEPCID) tablet 40 mg, HS  •  FLUoxetine (PROzac) capsule 60 mg,  Daily  •  HYDROmorphone (DILAUDID) injection 0.5 mg, Q6H PRN  •  magnesium citrate (CITROMA) oral solution 296 mL, Once  •  ondansetron (ZOFRAN) injection 4 mg, Q6H PRN  •  oxyCODONE (ROXICODONE) IR tablet 5 mg, Q6H PRN  •  pantoprazole (PROTONIX) EC tablet 20 mg, Early Morning  •  sucralfate (CARAFATE) tablet 1 g, 4x Daily (AC & HS)        **Please Note: This note may have been constructed using a voice recognition system.**

## 2024-11-21 NOTE — ASSESSMENT & PLAN NOTE
Patient with sinus rhythm, RSR prime pattern, nonspecific T wave abnormalities which have been present for many years.  On her more recent admission she had some more significant T wave inversions in inferior leads which have since resolved.  She reports that she has no symptoms of chest pain, palpitations, lower extremity edema, dizziness, or lightheadedness. Echo shows no acute abnormalities. Can follow up outpatient with cardiology on discharge, will sign off. Please call with any questions

## 2024-11-22 ENCOUNTER — TELEPHONE (OUTPATIENT)
Dept: CARDIOLOGY CLINIC | Facility: CLINIC | Age: 59
End: 2024-11-22

## 2024-11-22 ENCOUNTER — TRANSITIONAL CARE MANAGEMENT (OUTPATIENT)
Dept: FAMILY MEDICINE CLINIC | Facility: CLINIC | Age: 59
End: 2024-11-22

## 2024-11-22 VITALS
HEIGHT: 61 IN | SYSTOLIC BLOOD PRESSURE: 108 MMHG | RESPIRATION RATE: 17 BRPM | WEIGHT: 143 LBS | TEMPERATURE: 98.4 F | DIASTOLIC BLOOD PRESSURE: 68 MMHG | OXYGEN SATURATION: 94 % | BODY MASS INDEX: 27 KG/M2 | HEART RATE: 73 BPM

## 2024-11-22 PROCEDURE — 99239 HOSP IP/OBS DSCHRG MGMT >30: CPT

## 2024-11-22 RX ADMIN — FLUOXETINE HYDROCHLORIDE 60 MG: 20 CAPSULE ORAL at 08:31

## 2024-11-22 RX ADMIN — ENOXAPARIN SODIUM 40 MG: 40 INJECTION SUBCUTANEOUS at 08:31

## 2024-11-22 RX ADMIN — SUCRALFATE 1 G: 1 TABLET ORAL at 06:11

## 2024-11-22 RX ADMIN — PANTOPRAZOLE SODIUM 20 MG: 20 TABLET, DELAYED RELEASE ORAL at 06:09

## 2024-11-22 RX ADMIN — ASPIRIN 81 MG: 81 TABLET, CHEWABLE ORAL at 08:30

## 2024-11-22 RX ADMIN — METHYLPREDNISOLONE SODIUM SUCCINATE 40 MG: 40 INJECTION, POWDER, FOR SOLUTION INTRAMUSCULAR; INTRAVENOUS at 10:16

## 2024-11-22 NOTE — DISCHARGE SUMMARY
Discharge Summary - Hospitalist   Name: Linda Celeste 59 y.o. 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  S/p EGD on 11/20/2024 that showed mild edematous and erythematous mucosa of the antrum.  No ulcers noted.  Biopsy taken for H. pylori and celiac disease.  S/p colonoscopy on 11/21/2024 showing Diverticulosis of mild severity causing mild luminal narrowing in the sigmoid colon and rectosigmoid   .  GI patient okay to be discharged on longer steroid taper.  Recommended prednisone 40 mg daily and taper by 10 every 2 weeks.    Depression, recurrent (HCC)  Continue fluoxetine  EKG abnormality  She was just seen here in the hospital a few days ago and was admitted for EKG abnormality (nonspecific T wave inversions in the inferior leads) which have interestingly normalized  She was supposed to have outpatient stress test however given severe consistent abdominal pain was sent here to the ED for expedited workup  She denied any anginal symptoms  Per cardiology team patient does not have any contraindication for EGD and colonoscopy.  Echo was done showing EF of 55% with systolic function normal and diastolic function normal.  Regional wall abnormalities were not identified however cannot completely excluded.  Preoperative cardiovascular examination  Per cardiology team patient does not have any contraindication for EGD and colonoscopy.  Echo was done showing EF of 55% with systolic function normal and diastolic function normal.  Regional wall abnormalities were not identified  however cannot completely excluded.     Medical Problems       Resolved Problems  Date Reviewed: 11/20/2024   None       Discharging Physician / Practitioner: Yanira Levine MD  PCP: AMAURI Markham  Admission Date:   Admission Orders (From admission, onward)       Ordered        11/18/24 1859  INPATIENT ADMISSION  Once                          Discharge Date: 11/22/24    Consultations During Hospital Stay:  Gastroenterology    Procedures Performed:   EGD and colonoscopy    Significant Findings / Test Results:   CT abdomen and pelvis showing No acute abnormality identified in the lower chest, abdomen or pelvis. 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  EGD and colonoscopy fairly unremarkable.  Results Reviewed       Procedure Component Value Units Date/Time    HS Troponin I 2hr [[id number]] Collected: 11/18/24 1912    Lab Status: Final result Specimen: Blood from Arm, Right Updated: 11/18/24 1938     hs TnI 2hr <2 ng/L      Delta 2hr hsTnI --    HS Troponin 0hr (reflex protocol) [[id number]]  (Normal) Collected: 11/18/24 1712    Lab Status: Final result Specimen: Blood from Arm, Right Updated: 11/18/24 1839     hs TnI 0hr <2 ng/L     Comprehensive metabolic panel [[id number]] Collected: 11/18/24 1712    Lab Status: Final result Specimen: Blood from Arm, Right Updated: 11/18/24 1743     Sodium 139 mmol/L      Potassium 4.0 mmol/L      Chloride 105 mmol/L      CO2 26 mmol/L      ANION GAP 8 mmol/L      BUN 16 mg/dL      Creatinine 0.85 mg/dL      Glucose 112 mg/dL      Calcium 9.6 mg/dL      AST 17 U/L      ALT 10 U/L      Alkaline Phosphatase 99 U/L      Total Protein 6.5 g/dL      Albumin 4.3 g/dL      Total Bilirubin 0.28 mg/dL      eGFR 74 ml/min/1.73sq m     Narrative:      [employer] Kid[employer] guidelines for Chronic Kidney Disease (CKD):     Stage 1 with normal or high GFR (GFR > 90 mL/min/1.73 square meters)    Stage 2 Mild CKD (GFR = 60-89 mL/min/1.73 square meters)     Stage 3A Moderate CKD (GFR = 45-59 mL/min/1.73 square meters)    Stage 3B Moderate CKD (GFR = 30-44 mL/min/1.73 square meters)    Stage 4 Severe CKD (GFR = 15-29 mL/min/1.73 square meters)    Stage 5 End Stage CKD (GFR <15 mL/min/1.73 square meters)  Note: GFR calculation is accurate only with a steady state creatinine    Lipase [[id number]]  (Normal) Collected: 11/18/24 1712    Lab Status: Final result Specimen: Blood from Arm, Right Updated: 11/18/24 1743     Lipase 20 u/L     CBC and differential [[id number]] Collected: 11/18/24 1712    Lab Status: Final result Specimen: Blood from Arm, Right Updated: 11/18/24 1723     WBC 6.67 Thousand/uL      RBC 4.34 Million/uL      Hemoglobin 13.1 g/dL      Hematocrit 40.0 %      MCV 92 fL      MCH 30.2 pg      MCHC 32.8 g/dL      RDW 12.2 %      MPV 10.6 fL      Platelets 263 Thousands/uL      nRBC 0 /100 WBCs      Segmented % 55 %      Immature Grans % 1 %      Lymphocytes % 31 %      Monocytes % 12 %      Eosinophils Relative 1 %      Basophils Relative 0 %      Absolute Neutrophils 3.62 Thousands/µL      Absolute Immature Grans 0.06 Thousand/uL      Absolute Lymphocytes 2.08 Thousands/µL      Absolute Monocytes 0.82 Thousand/µL      Eosinophils Absolute 0.06 Thousand/µL      Basophils Absolute 0.03 Thousands/µL              Incidental Findings:   none       Test Results Pending at Discharge (will require follow up):   None     Outpatient Tests Requested:  None    Complications: None    Reason for Admission: Abdominal pain    Hospital Course:   Linda Celeste is a 59 y.o. female patient who originally presented to the hospital on 11/18/2024 due to ongoing epigastric abdominal pain.  Patient has been worked up with GI in the outpatient setting however given persistent in symptoms patient was directed to the ED. patient was assessed by GI that recommended EGD and colonoscopy.  Both procedures were unremarkable.  CT scan showed mesenteric panniculitis that could possibly explain  "the abdominal pain.  Given no other findings patient was sent home on a long steroid taper for about 2 months.  Patient was recommended to follow-up with PCP after discharge.          Please see above list of diagnoses and related plan for additional information.     Condition at Discharge: good    Discharge Day Visit / Exam:   Subjective: She reports mild abdominal pain however manageable with Tylenol.  Denies nausea, vomiting, diarrhea  Vitals: Blood Pressure: 108/68 (11/22/24 0731)  Pulse: 73 (11/22/24 0731)  Temperature: 98.4 °F (36.9 °C) (11/22/24 0731)  Temp Source: Temporal (11/21/24 1556)  Respirations: 17 (11/21/24 1607)  Height: 5' 1\" (154.9 cm) (11/19/24 1115)  Weight - Scale: 64.9 kg (143 lb) (11/19/24 1115)  SpO2: 94 % (11/22/24 0753)  Physical Exam  Vitals and nursing note reviewed.   Constitutional:       General: She is not in acute distress.     Appearance: She is well-developed.   HENT:      Head: Normocephalic and atraumatic.   Eyes:      Conjunctiva/sclera: Conjunctivae normal.   Cardiovascular:      Rate and Rhythm: Normal rate and regular rhythm.      Heart sounds: No murmur heard.  Pulmonary:      Effort: Pulmonary effort is normal. No respiratory distress.      Breath sounds: Normal breath sounds.   Abdominal:      Palpations: Abdomen is soft.      Tenderness: There is no abdominal tenderness.   Musculoskeletal:         General: No swelling.      Cervical back: Neck supple.      Right lower leg: No edema.      Left lower leg: No edema.   Skin:     General: Skin is warm and dry.      Capillary Refill: Capillary refill takes less than 2 seconds.   Neurological:      Mental Status: She is alert.   Psychiatric:         Mood and Affect: Mood normal.          Discussion with Family: Patient declined call to .     Discharge instructions/Information to patient and family:   See after visit summary for information provided to patient and family.      Provisions for Follow-Up Care:  See " after visit summary for information related to follow-up care and any pertinent home health orders.      Mobility at time of Discharge:   Basic Mobility Inpatient Raw Score: 23  JH-HLM Goal: 7: Walk 25 feet or more  JH-HLM Achieved: 6: Walk 10 steps or more  HLM Goal achieved. Continue to encourage appropriate mobility.     Disposition:   Home    Planned Readmission: none    Discharge Medications:  See after visit summary for reconciled discharge medications provided to patient and/or family.      Administrative Statements   Discharge Statement:  I have spent a total time of 48 minutes in caring for this patient on the day of the visit/encounter. >30 minutes of time was spent on: Diagnostic results, Prognosis, Risks and benefits of tx options, Instructions for management, Patient and family education, Importance of tx compliance, Impressions, Counseling / Coordination of care, Documenting in the medical record, and Reviewing / ordering tests, medicine, procedures  .    **Please Note: This note may have been constructed using a voice recognition system**

## 2024-11-22 NOTE — ASSESSMENT & PLAN NOTE
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  S/p EGD on 11/20/2024 that showed mild edematous and erythematous mucosa of the antrum.  No ulcers noted.  Biopsy taken for H. pylori and celiac disease.  S/p colonoscopy on 11/21/2024 showing Diverticulosis of mild severity causing mild luminal narrowing in the sigmoid colon and rectosigmoid   .  GI patient okay to be discharged on longer steroid taper.  Recommended prednisone 40 mg daily and taper by 10 every 2 weeks.

## 2024-11-22 NOTE — PLAN OF CARE
Problem: PAIN - ADULT  Goal: Verbalizes/displays adequate comfort level or baseline comfort level  Description: Interventions:  - Encourage patient to monitor pain and request assistance  - Assess pain using appropriate pain scale  - Administer analgesics based on type and severity of pain and evaluate response  - Implement non-pharmacological measures as appropriate and evaluate response  - Consider cultural and social influences on pain and pain management  - Notify physician/advanced practitioner if interventions unsuccessful or patient reports new pain  Outcome: Progressing     Problem: INFECTION - ADULT  Goal: Absence or prevention of progression during hospitalization  Description: INTERVENTIONS:  - Assess and monitor for signs and symptoms of infection  - Monitor lab/diagnostic results  - Monitor all insertion sites, i.e. indwelling lines, tubes, and drains  - Monitor endotracheal if appropriate and nasal secretions for changes in amount and color  - [place] appropriate cooling/warming therapies per order  - Administer medications as ordered  - Instruct and encourage patient and family to use good hand hygiene technique  - Identify and instruct in appropriate isolation precautions for identified infection/condition  Outcome: Progressing  Goal: Absence of fever/infection during neutropenic period  Description: INTERVENTIONS:  - Monitor WBC    Outcome: Progressing     Problem: SAFETY ADULT  Goal: Patient will remain free of falls  Description: INTERVENTIONS:  - Educate patient/family on patient safety including physical limitations  - Instruct patient to call for assistance with activity   - Consult OT/PT to assist with strengthening/mobility   - Keep Call bell within reach  - Keep bed low and locked with side rails adjusted as appropriate  - Keep care items and personal belongings within reach  - Initiate and maintain comfort rounds  - Make Fall Risk Sign visible to staff  - Offer Toileting every  Hours,  in advance of need  - Initiate/Maintain alarm  - Obtain necessary fall risk management equipment:   - Apply yellow socks and bracelet for high fall risk patients  - Consider moving patient to room near nurses station  Outcome: Progressing  Goal: Maintain or return to baseline ADL function  Description: INTERVENTIONS:  -  Assess patient's ability to carry out ADLs; assess patient's baseline for ADL function and identify physical deficits which impact ability to perform ADLs (bathing, care of mouth/teeth, toileting, grooming, dressing, etc.)  - Assess/evaluate cause of self-care deficits   - Assess range of motion  - Assess patient's mobility; develop plan if impaired  - Assess patient's need for assistive devices and provide as appropriate  - Encourage maximum independence but intervene and supervise when necessary  - Involve family in performance of ADLs  - Assess for home care needs following discharge   - Consider OT consult to assist with ADL evaluation and planning for discharge  - Provide patient education as appropriate  Outcome: Progressing  Goal: Maintains/Returns to pre admission functional level  Description: INTERVENTIONS:  - Perform AM-PAC 6 Click Basic Mobility/ Daily Activity assessment daily.  - Set and communicate daily mobility goal to care team and patient/family/caregiver.   - Collaborate with rehabilitation services on mobility goals if consulted  - Perform Range of Motion  times a day.  - Reposition patient every  hours.  - Dangle patient  times a day  - Stand patient  times a day  - Ambulate patient  times a day  - Out of bed to chair  times a day   - Out of bed for meals  times a day  - Out of bed for toileting  - Record patient progress and toleration of activity level   Outcome: Progressing     Problem: CARDIOVASCULAR - ADULT  Goal: Maintains optimal cardiac output and hemodynamic stability  Description: INTERVENTIONS:  - Monitor I/O, vital signs and rhythm  - Monitor for S/S and trends of  decreased cardiac output  - Administer and titrate ordered vasoactive medications to optimize hemodynamic stability  - Assess quality of pulses, skin color and temperature  - Assess for signs of decreased coronary artery perfusion  - Instruct patient to report change in severity of symptoms  Outcome: Progressing  Goal: Absence of cardiac dysrhythmias or at baseline rhythm  Description: INTERVENTIONS:  - Continuous cardiac monitoring, vital signs, obtain 12 lead EKG if ordered  - Administer antiarrhythmic and heart rate control medications as ordered  - Monitor electrolytes and administer replacement therapy as ordered  Outcome: Progressing     Problem: GASTROINTESTINAL - ADULT  Goal: Minimal or absence of nausea and/or vomiting  Description: INTERVENTIONS:  - Administer IV fluids if ordered to ensure adequate hydration  - Maintain NPO status until nausea and vomiting are resolved  - Nasogastric tube if ordered  - Administer ordered antiemetic medications as needed  - Provide nonpharmacologic comfort measures as appropriate  - Advance diet as tolerated, if ordered  - Consider nutrition services referral to assist patient with adequate nutrition and appropriate food choices  Outcome: Progressing  Goal: Maintains or returns to baseline bowel function  Description: INTERVENTIONS:  - Assess bowel function  - Encourage oral fluids to ensure adequate hydration  - Administer IV fluids if ordered to ensure adequate hydration  - Administer ordered medications as needed  - Encourage mobilization and activity  - Consider nutritional services referral to assist patient with adequate nutrition and appropriate food choices  Outcome: Progressing  Goal: Maintains adequate nutritional intake  Description: INTERVENTIONS:  - Monitor percentage of each meal consumed  - Identify factors contributing to decreased intake, treat as appropriate  - Assist with meals as needed  - Monitor I&O, weight, and lab values if indicated  - Obtain  nutrition services referral as needed  Outcome: Progressing  Goal: Oral mucous membranes remain intact  Description: INTERVENTIONS  - Assess oral mucosa and hygiene practices  - Implement preventative oral hygiene regimen  - Implement oral medicated treatments as ordered  - Initiate Nutrition services referral as needed  Outcome: Progressing

## 2024-11-22 NOTE — PLAN OF CARE
Problem: PAIN - ADULT  Goal: Verbalizes/displays adequate comfort level or baseline comfort level  Description: Interventions:  - Encourage patient to monitor pain and request assistance  - Assess pain using appropriate pain scale  - Administer analgesics based on type and severity of pain and evaluate response  - Implement non-pharmacological measures as appropriate and evaluate response  - Consider cultural and social influences on pain and pain management  - Notify physician/advanced practitioner if interventions unsuccessful or patient reports new pain  Outcome: Progressing     Problem: SAFETY ADULT  Goal: Patient will remain free of falls  Description: INTERVENTIONS:  - Educate patient/family on patient safety including physical limitations  - Instruct patient to call for assistance with activity   - Consult OT/PT to assist with strengthening/mobility   - Keep Call bell within reach  - Keep bed low and locked with side rails adjusted as appropriate  - Keep care items and personal belongings within reach  - Initiate and maintain comfort rounds  - Make Fall Risk Sign visible to staff  - Offer Toileting every 2  Hours, in advance of need  - Initiate/Maintain bed alarm  - Obtain necessary fall risk management equipment:   - Apply yellow socks and bracelet for high fall risk patients  - Consider moving patient to room near nurses station  11/22/2024 1121 by Irlanda Messina LPN  Outcome: Progressing  11/22/2024 1121 by Irlanda Messina LPN  Outcome: Progressing     Problem: GASTROINTESTINAL - ADULT  Goal: Minimal or absence of nausea and/or vomiting  Description: INTERVENTIONS:  - Administer IV fluids if ordered to ensure adequate hydration  - Maintain NPO status until nausea and vomiting are resolved  - Nasogastric tube if ordered  - Administer ordered antiemetic medications as needed  - Provide nonpharmacologic comfort measures as appropriate  - Advance diet as tolerated, if ordered  - Consider nutrition services  referral to assist patient with adequate nutrition and appropriate food choices  11/22/2024 1121 by Irlanda Messina LPN  Outcome: Progressing  11/22/2024 1121 by Irlanda Messina LPN  Outcome: Progressing

## 2024-11-22 NOTE — TELEPHONE ENCOUNTER
Spoke with pt relayed Fabiana BAUGH advised, pt verbally understood.    Pt stated that she got the echo done on 11/19/24 while she was in the hospital.    Pt stated that she does not want to do another echo test.    Pt stated that once she is discharged she will schedule the stress test and the office visit.     Central scheduling # given to pt [phone] for the stress test.    Provided our office # as well [phone].    Patient is still having mild abdominal symptoms, pt is still admitted.     LINCOLN

## 2024-11-22 NOTE — TELEPHONE ENCOUNTER
----- Message from Rashid Denise PA-C sent at 11/21/2024  4:33 PM EST -----  Regarding: Hosp f/u  Patient clinical visit in 6 week at the Cardio location: [place] office.    Schedule visit with [facility] Providers: first available provider.    Type of Visit: VISIT TYPE: in-person office visit.    Additional Details: Abnormal EKG

## 2024-11-25 ENCOUNTER — RESULTS FOLLOW-UP (OUTPATIENT)
Dept: GASTROENTEROLOGY | Facility: CLINIC | Age: 59
End: 2024-11-25

## 2024-11-25 PROCEDURE — 88305 TISSUE EXAM BY PATHOLOGIST: CPT | Performed by: STUDENT IN AN ORGANIZED HEALTH CARE EDUCATION/TRAINING PROGRAM

## 2024-11-25 PROCEDURE — 88305 TISSUE EXAM BY PATHOLOGIST: CPT | Performed by: SPECIALIST

## 2024-12-02 ENCOUNTER — TELEPHONE (OUTPATIENT)
Age: 59
End: 2024-12-02

## 2024-12-02 NOTE — TELEPHONE ENCOUNTER
Pt called, was supposed to come in today for a TCM, was cancelled. She needs a note to return back to work tomorrow 12/3. She missed work all of last week. She is wondering if a provider could please write this for her so she can return tomorrow, her work is requiring it. If this could please be uploaded into her mychart. Please advise and notify pt.

## 2024-12-02 NOTE — TELEPHONE ENCOUNTER
Patient called back asking for an update on her letter. Patient states she needs it by today so she can go back to work. Patient asked if someone can call her once the letter is done

## 2024-12-08 DIAGNOSIS — R10.13 EPIGASTRIC PAIN: ICD-10-CM

## 2024-12-10 RX ORDER — FAMOTIDINE 40 MG/1
40 TABLET, FILM COATED ORAL
Qty: 90 TABLET | Refills: 1 | Status: SHIPPED | OUTPATIENT
Start: 2024-12-10

## 2024-12-12 ENCOUNTER — TELEPHONE (OUTPATIENT)
Dept: FAMILY MEDICINE CLINIC | Facility: CLINIC | Age: 59
End: 2024-12-12

## 2024-12-12 ENCOUNTER — OFFICE VISIT (OUTPATIENT)
Dept: FAMILY MEDICINE CLINIC | Facility: CLINIC | Age: 59
End: 2024-12-12
Payer: COMMERCIAL

## 2024-12-12 VITALS
OXYGEN SATURATION: 99 % | SYSTOLIC BLOOD PRESSURE: 136 MMHG | HEART RATE: 78 BPM | BODY MASS INDEX: 27.03 KG/M2 | DIASTOLIC BLOOD PRESSURE: 88 MMHG | WEIGHT: 143.2 LBS | TEMPERATURE: 98.8 F | HEIGHT: 61 IN

## 2024-12-12 DIAGNOSIS — K65.4 MESENTERIC PANNICULITIS (HCC): ICD-10-CM

## 2024-12-12 DIAGNOSIS — E78.2 HYPERLIPIDEMIA, MIXED: ICD-10-CM

## 2024-12-12 DIAGNOSIS — R23.2 HOT FLASHES: ICD-10-CM

## 2024-12-12 DIAGNOSIS — B00.9 HSV INFECTION: ICD-10-CM

## 2024-12-12 DIAGNOSIS — F32.2 SEVERE MAJOR DEPRESSIVE DISORDER (HCC): ICD-10-CM

## 2024-12-12 DIAGNOSIS — Z00.00 ANNUAL PHYSICAL EXAM: Primary | ICD-10-CM

## 2024-12-12 DIAGNOSIS — R00.2 PALPITATIONS: ICD-10-CM

## 2024-12-12 PROCEDURE — 99396 PREV VISIT EST AGE 40-64: CPT | Performed by: NURSE PRACTITIONER

## 2024-12-12 PROCEDURE — 99214 OFFICE O/P EST MOD 30 MIN: CPT | Performed by: NURSE PRACTITIONER

## 2024-12-12 PROCEDURE — 93000 ELECTROCARDIOGRAM COMPLETE: CPT | Performed by: NURSE PRACTITIONER

## 2024-12-12 RX ORDER — VALACYCLOVIR HYDROCHLORIDE 1 G/1
1000 TABLET, FILM COATED ORAL 2 TIMES DAILY
Qty: 14 TABLET | Refills: 0 | Status: SHIPPED | OUTPATIENT
Start: 2024-12-12 | End: 2024-12-19

## 2024-12-12 RX ORDER — CLONIDINE HYDROCHLORIDE 0.1 MG/1
0.1 TABLET ORAL
Qty: 30 TABLET | Refills: 1 | Status: SHIPPED | OUTPATIENT
Start: 2024-12-12

## 2024-12-12 NOTE — PROGRESS NOTES
Adult Annual Physical  Name: Linda Celeste      : 1965      MRN: [id number]  Encounter Provider: AMAURI Markham  Encounter Date: 2024   Encounter department: [facility] [address]    Assessment & Plan  Annual physical exam         Hot flashes  Patient experiences hot flashes throughout the day.  Offered to refer patient to gynecology, declined at this time.  To begin clonidine 0.1 mg daily at bedtime.  To call office for any worsening of lightheadedness or if hot flashes do not improve in the next 1 to 2 weeks.  Orders:    cloNIDine (CATAPRES) 0.1 mg tablet; Take 1 tablet (0.1 mg total) by mouth daily at bedtime    POCT ECG    Severe major depressive disorder (HCC)  Depression Screening Follow-up Plan: Patient's depression screening was positive with a PHQ-9 score of 7. Patient with underlying depression and was advised to continue current medications as prescribed.  Stable.  To continue fluoxetine 60 mg daily.       Hyperlipidemia, mixed  To continue atorvastatin 40 mg daily.  To obtain lipid panel, will call with results.  Orders:    Lipid Panel with Direct LDL reflex; Future    Palpitations  Patient developed palpitations about 3 weeks ago.  During her recent admission she had an abnormal EKG.  EKG obtained today showed no significant changes.  To obtain Holter monitor and stress test that was previously ordered.  Will call with results.    Orders:    Holter monitor; Future    TSH, 3rd generation with Free T4 reflex; Future    POCT ECG    Mesenteric panniculitis (HCC)  Patient admitted to [facility] on 2024 - 2024.  She was diagnosed with mesenteric panniculitis.  She was discharged on a prednisone taper.  Patient was able to take this for about 2 weeks and then developed nausea, vomiting, and constipation.  She attributed this to the prednisone and stopped it.  Her abdominal pain has improved but still present at times.  Advised to follow  back up with GI.       HSV infection  Presence of vesicular rash on right buttocks, patient has had this several times in the past.  To begin valacyclovir as ordered.  To follow-up if no improvement in 1 week or sooner if symptoms worsen.  Orders:    valACYclovir (VALTREX) 1,000 mg tablet; Take 1 tablet (1,000 mg total) by mouth 2 (two) times a day for 7 days    Immunizations and preventive care screenings were discussed with patient today. Appropriate education was printed on patient's after visit summary.    Counseling:  Alcohol/drug use: discussed moderation in alcohol intake, the recommendations for healthy alcohol use, and avoidance of illicit drug use.  Dental Health: discussed importance of regular tooth brushing, flossing, and dental visits.  Injury prevention: discussed safety/seat belts, safety helmets, smoke detectors, carbon monoxide detectors, and smoking near bedding or upholstery.  Sexual health: discussed sexually transmitted diseases, partner selection, use of condoms, avoidance of unintended pregnancy, and contraceptive alternatives.  Exercise: the importance of regular exercise/physical activity was discussed. Recommend exercise 3-5 times per week for at least 30 minutes.       Depression Screening and Follow-up Plan: Patient with underlying depression and was advised to continue current medications as prescribed.         History of Present Illness     Adult Annual Physical:  Patient presents for annual physical.     Diet and Physical Activity:  - Diet/Nutrition: well balanced diet.  - Exercise: no formal exercise.    Depression Screening:    - PHQ-9 Score: 7    General Health:  - Sleep: > 8 hours of sleep on average.  - Hearing: requires use of hearing aids.  - Vision: goes for regular eye exams and wears contacts.  - Dental: regular dental visits.    /GYN Health:  - Follows with GYN: no.   - Menopause: postmenopausal.     Advanced Care Planning:  - Has an advanced directive?: yes    - Has a  "durable medical POA?: yes      Review of Systems   Constitutional:  Positive for fatigue.   HENT: Negative.     Eyes: Negative.    Respiratory: Negative.     Cardiovascular:  Positive for palpitations.   Gastrointestinal:  Positive for abdominal pain (improved) and constipation.   Endocrine:        Hot flashes    Genitourinary: Negative.    Musculoskeletal: Negative.    Skin:  Positive for rash.   Allergic/Immunologic: Negative.    Neurological:  Positive for light-headedness.   Hematological: Negative.    Psychiatric/Behavioral: Negative.       Current Outpatient Medications on File Prior to Visit   Medication Sig Dispense Refill    aspirin 81 mg chewable tablet Chew 1 tablet (81 mg total) daily Do not start before September 15, 2023. 30 tablet 0    atorvastatin (LIPITOR) 40 mg tablet TAKE 1 TABLET BY MOUTH DAILY WITH DINNER 90 tablet 1    famotidine (PEPCID) 40 MG tablet TAKE 1 TABLET BY MOUTH DAILY AT BEDTIME 90 tablet 1    FLUoxetine 20 MG tablet TAKE 3 TABLETS BY MOUTH EVERY  tablet 1    omeprazole (PriLOSEC) 20 mg delayed release capsule Take 20 mg by mouth daily      ondansetron (ZOFRAN) 4 mg tablet Take 1 tablet (4 mg total) by mouth every 8 (eight) hours as needed for nausea or vomiting 20 tablet 0    sucralfate (CARAFATE) 1 g tablet Take 1 tablet (1 g total) by mouth 4 (four) times a day (before meals and at bedtime) 120 tablet 0    [DISCONTINUED] predniSONE 10 mg tablet Take 40 mg (4 tabs) daily for the first two week, 30 mg (three tabs) daily for the next two weeks, 20 mg (two tabs) daily for the next two weeks, and lastly 10 mg (one tab) daily for the final two weeks. 120 tablet 0     No current facility-administered medications on file prior to visit.        Objective   /88 (BP Location: Left arm, Patient Position: Sitting)   Pulse 78   Temp 98.8 °F (37.1 °C)   Ht 5' 1\" (1.549 m)   Wt 65 kg (143 lb 3.2 oz)   SpO2 99%   BMI 27.06 kg/m²     Physical Exam  Vitals and nursing note " reviewed.   Constitutional:       General: She is not in acute distress.     Appearance: Normal appearance. She is well-developed. She is not ill-appearing, toxic-appearing or diaphoretic.   HENT:      Head: Normocephalic and atraumatic.      Right Ear: Tympanic membrane, ear canal and external ear normal.      Left Ear: Tympanic membrane, ear canal and external ear normal.      Nose: Nose normal.      Mouth/Throat:      Mouth: Mucous membranes are moist.      Pharynx: Oropharynx is clear. No oropharyngeal exudate.   Eyes:      Extraocular Movements: Extraocular movements intact.      Conjunctiva/sclera: Conjunctivae normal.      Pupils: Pupils are equal, round, and reactive to light.   Neck:      Thyroid: No thyromegaly.   Cardiovascular:      Rate and Rhythm: Normal rate and regular rhythm.      Heart sounds: Normal heart sounds. No murmur heard.  Pulmonary:      Effort: Pulmonary effort is normal. No respiratory distress.      Breath sounds: Normal breath sounds. No stridor. No wheezing or rales.   Chest:      Chest wall: No tenderness.   Abdominal:      General: Bowel sounds are normal. There is no distension.      Palpations: Abdomen is soft. There is no mass.      Tenderness: There is no abdominal tenderness. There is no guarding or rebound.      Hernia: No hernia is present.   Musculoskeletal:         General: Normal range of motion.      Cervical back: Normal range of motion and neck supple.   Lymphadenopathy:      Cervical: No cervical adenopathy.   Skin:     General: Skin is warm and dry.      Capillary Refill: Capillary refill takes less than 2 seconds.          Neurological:      General: No focal deficit present.      Mental Status: She is alert and oriented to person, place, and time.      Cranial Nerves: No cranial nerve deficit.   Psychiatric:         Mood and Affect: Mood normal.         Behavior: Behavior normal.         Thought Content: Thought content normal.         Judgment: Judgment normal.

## 2024-12-12 NOTE — ASSESSMENT & PLAN NOTE
To continue atorvastatin 40 mg daily.  To obtain lipid panel, will call with results.  Orders:    Lipid Panel with Direct LDL reflex; Future

## 2024-12-12 NOTE — ASSESSMENT & PLAN NOTE
Depression Screening Follow-up Plan: Patient's depression screening was positive with a PHQ-9 score of 7. Patient with underlying depression and was advised to continue current medications as prescribed.  Stable.  To continue fluoxetine 60 mg daily.

## 2024-12-12 NOTE — TELEPHONE ENCOUNTER
Patient called the RX Refill Line. Message is being forwarded to the office.     Patient is stating that valacyclovir 1 g was to be sent to [facility] per conversation today at appointment. Patient went to the pharmacy and it wasn't sent     Please contact patient at [phone]

## 2024-12-12 NOTE — ASSESSMENT & PLAN NOTE
Patient admitted to [facility] on 11/18/2024 - 11/22/2024.  She was diagnosed with mesenteric panniculitis.  She was discharged on a prednisone taper.  Patient was able to take this for about 2 weeks and then developed nausea, vomiting, and constipation.  She attributed this to the prednisone and stopped it.  Her abdominal pain has improved but still present at times.  Advised to follow back up with GI.

## 2024-12-26 ENCOUNTER — HOSPITAL ENCOUNTER (OUTPATIENT)
Dept: NON INVASIVE DIAGNOSTICS | Facility: CLINIC | Age: 59
Discharge: HOME | End: 2024-12-26
Payer: COMMERCIAL

## 2024-12-26 VITALS
WEIGHT: 143 LBS | OXYGEN SATURATION: 99 % | DIASTOLIC BLOOD PRESSURE: 86 MMHG | HEART RATE: 57 BPM | HEIGHT: 61 IN | SYSTOLIC BLOOD PRESSURE: 148 MMHG | BODY MASS INDEX: 27 KG/M2

## 2024-12-26 DIAGNOSIS — R00.2 PALPITATIONS: ICD-10-CM

## 2024-12-26 DIAGNOSIS — R94.31 EKG ABNORMALITY: ICD-10-CM

## 2024-12-26 LAB
CHEST PAIN STATEMENT: NORMAL
MAX DIASTOLIC BP: 66 MMHG
MAX PREDICTED HEART RATE: 159 BPM
NUC STRESS EJECTION FRACTION: 69 %
PROTOCOL NAME: NORMAL
RATE PRESSURE PRODUCT: NORMAL
REASON FOR TERMINATION: NORMAL
SL CV REST NUCLEAR ISOTOPE DOSE: 10.5 MCI
SL CV STRESS NUCLEAR ISOTOPE DOSE: 30.9 MCI
SL CV STRESS RECOVERY BP: NORMAL MMHG
SL CV STRESS RECOVERY HR: 75 BPM
SL CV STRESS RECOVERY O2 SAT: 98 %
STRESS ANGINA INDEX: 0
STRESS BASELINE BP: NORMAL MMHG
STRESS BASELINE HR: 57 BPM
STRESS O2 SAT REST: 99 %
STRESS PEAK HR: 100 BPM
STRESS POST EXERCISE DUR MIN: 3 MIN
STRESS POST EXERCISE DUR SEC: 0 SEC
STRESS POST O2 SAT PEAK: 99 %
STRESS POST PEAK BP: 152 MMHG
STRESS POST PEAK HR: 100 BPM
STRESS POST PEAK SYSTOLIC BP: 152 MMHG
TARGET HR FORMULA: NORMAL
TEST INDICATION: NORMAL

## 2024-12-26 PROCEDURE — 93225 XTRNL ECG REC<48 HRS REC: CPT

## 2024-12-26 PROCEDURE — 93018 CV STRESS TEST I&R ONLY: CPT | Performed by: STUDENT IN AN ORGANIZED HEALTH CARE EDUCATION/TRAINING PROGRAM

## 2024-12-26 PROCEDURE — 78452 HT MUSCLE IMAGE SPECT MULT: CPT

## 2024-12-26 PROCEDURE — A9502 TC99M TETROFOSMIN: HCPCS

## 2024-12-26 PROCEDURE — 93226 XTRNL ECG REC<48 HR SCAN A/R: CPT

## 2024-12-26 PROCEDURE — 93017 CV STRESS TEST TRACING ONLY: CPT

## 2024-12-26 PROCEDURE — 78452 HT MUSCLE IMAGE SPECT MULT: CPT | Performed by: STUDENT IN AN ORGANIZED HEALTH CARE EDUCATION/TRAINING PROGRAM

## 2024-12-26 PROCEDURE — 93016 CV STRESS TEST SUPVJ ONLY: CPT | Performed by: STUDENT IN AN ORGANIZED HEALTH CARE EDUCATION/TRAINING PROGRAM

## 2024-12-26 RX ORDER — REGADENOSON 0.08 MG/ML
0.4 INJECTION, SOLUTION INTRAVENOUS ONCE
Status: COMPLETED | OUTPATIENT
Start: 2024-12-26 | End: 2024-12-26

## 2024-12-26 RX ADMIN — REGADENOSON 0.4 MG: 0.08 INJECTION, SOLUTION INTRAVENOUS at 08:51

## 2024-12-30 ENCOUNTER — RESULTS FOLLOW-UP (OUTPATIENT)
Dept: NON INVASIVE DIAGNOSTICS | Facility: HOSPITAL | Age: 59
End: 2024-12-30

## 2024-12-31 PROCEDURE — 93227 XTRNL ECG REC<48 HR R&I: CPT | Performed by: INTERNAL MEDICINE

## 2025-01-02 ENCOUNTER — RESULTS FOLLOW-UP (OUTPATIENT)
Dept: FAMILY MEDICINE CLINIC | Facility: CLINIC | Age: 60
End: 2025-01-02

## 2025-01-03 DIAGNOSIS — R23.2 HOT FLASHES: ICD-10-CM

## 2025-01-06 RX ORDER — CLONIDINE HYDROCHLORIDE 0.1 MG/1
0.1 TABLET ORAL
Qty: 90 TABLET | Refills: 1 | Status: SHIPPED | OUTPATIENT
Start: 2025-01-06

## 2025-01-10 ENCOUNTER — HOSPITAL ENCOUNTER (EMERGENCY)
Facility: HOSPITAL | Age: 60
Discharge: HOME | End: 2025-01-10
Attending: EMERGENCY MEDICINE
Payer: COMMERCIAL

## 2025-01-10 VITALS
TEMPERATURE: 98.3 F | RESPIRATION RATE: 20 BRPM | DIASTOLIC BLOOD PRESSURE: 68 MMHG | OXYGEN SATURATION: 96 % | SYSTOLIC BLOOD PRESSURE: 132 MMHG | HEART RATE: 91 BPM

## 2025-01-10 DIAGNOSIS — J10.1 INFLUENZA A: Primary | ICD-10-CM

## 2025-01-10 LAB
FLUAV AG UPPER RESP QL IA.RAPID: POSITIVE
FLUBV AG UPPER RESP QL IA.RAPID: NEGATIVE
SARS-COV+SARS-COV-2 AG RESP QL IA.RAPID: NEGATIVE

## 2025-01-10 PROCEDURE — 99284 EMERGENCY DEPT VISIT MOD MDM: CPT | Performed by: EMERGENCY MEDICINE

## 2025-01-10 PROCEDURE — 87811 SARS-COV-2 COVID19 W/OPTIC: CPT | Performed by: EMERGENCY MEDICINE

## 2025-01-10 PROCEDURE — 87804 INFLUENZA ASSAY W/OPTIC: CPT | Performed by: EMERGENCY MEDICINE

## 2025-01-10 RX ORDER — KETOROLAC TROMETHAMINE 30 MG/ML
30 INJECTION, SOLUTION INTRAMUSCULAR; INTRAVENOUS ONCE
Status: COMPLETED | OUTPATIENT
Start: 2025-01-10 | End: 2025-01-10

## 2025-01-10 RX ORDER — OSELTAMIVIR PHOSPHATE 75 MG/1
75 CAPSULE ORAL EVERY 12 HOURS
Qty: 10 CAPSULE | Refills: 0 | Status: SHIPPED | OUTPATIENT
Start: 2025-01-10 | End: 2025-01-15

## 2025-01-10 RX ORDER — ACETAMINOPHEN 10 MG/ML
1000 INJECTION, SOLUTION INTRAVENOUS ONCE
Status: COMPLETED | OUTPATIENT
Start: 2025-01-10 | End: 2025-01-10

## 2025-01-10 RX ORDER — ONDANSETRON 2 MG/ML
4 INJECTION INTRAMUSCULAR; INTRAVENOUS ONCE
Status: COMPLETED | OUTPATIENT
Start: 2025-01-10 | End: 2025-01-10

## 2025-01-10 RX ORDER — IBUPROFEN 600 MG/1
600 TABLET, FILM COATED ORAL EVERY 6 HOURS PRN
Qty: 30 TABLET | Refills: 0 | Status: SHIPPED | OUTPATIENT
Start: 2025-01-10 | End: 2025-01-20

## 2025-01-10 RX ORDER — ONDANSETRON 4 MG/1
4 TABLET, ORALLY DISINTEGRATING ORAL EVERY 8 HOURS PRN
Qty: 12 TABLET | Refills: 0 | Status: SHIPPED | OUTPATIENT
Start: 2025-01-10 | End: 2025-02-09

## 2025-01-10 RX ORDER — ACETAMINOPHEN 500 MG
1000 TABLET ORAL EVERY 6 HOURS PRN
Qty: 30 TABLET | Refills: 0 | Status: SHIPPED | OUTPATIENT
Start: 2025-01-10

## 2025-01-10 RX ADMIN — ACETAMINOPHEN 1000 MG: 1000 INJECTION INTRAVENOUS at 16:30

## 2025-01-10 RX ADMIN — ONDANSETRON 4 MG: 2 INJECTION INTRAMUSCULAR; INTRAVENOUS at 16:30

## 2025-01-10 RX ADMIN — KETOROLAC TROMETHAMINE 30 MG: 30 INJECTION, SOLUTION INTRAMUSCULAR; INTRAVENOUS at 16:31

## 2025-01-10 RX ADMIN — SODIUM CHLORIDE 1000 ML: 0.9 INJECTION, SOLUTION INTRAVENOUS at 16:31

## 2025-01-10 NOTE — ED PROVIDER NOTES
Time reflects when diagnosis was documented in both MDM as applicable and the Disposition within this note       Time User Action Codes Description Comment    1/10/2025  4:57 PM Nayana Quezada Add [J10.1] Influenza A           ED Disposition       ED Disposition   Discharge    Condition   Stable    Date/Time   Fri Frank 10, 2025  4:57 PM    Comment   Linda L Otonielmason discharge to home/self care.                   Assessment & Plan       Medical Decision Making  59 females, with fevers chills along with diffuse myalgias and arthralgias and general fatigue complaint of dry cough and congestion and had episode of vomiting.  Symptoms started within the last 2 days.  Took a dose of Tylenol yesterday but took nothing today.  Patient had viral swab done in triage and already came back positive for flu A.  Patient symptoms for less than 2 days and sat is okay.  Symptoms likely related to flu.  Patient states she feels dehydrated and aches and requesting fluids and something for pain.  Will give IV and IV fluids along with IV Tylenol and Toradol.  Discussed with her potential benefits but also side effects of Tamiflu that if she wants she can try starting today within 48 hours of symptoms.  Discussed with her worsening signs and symptoms return emergency department for.    Amount and/or Complexity of Data Reviewed  Labs: ordered.    Risk  OTC drugs.  Prescription drug management.             Medications   sodium chloride 0.9 % bolus 1,000 mL (0 mL Intravenous Stopped 1/10/25 1725)   ketorolac (TORADOL) injection 30 mg (30 mg Intravenous Given 1/10/25 1631)   acetaminophen (Ofirmev) injection 1,000 mg (0 mg Intravenous Stopped 1/10/25 1725)   ondansetron (ZOFRAN) injection 4 mg (4 mg Intravenous Given 1/10/25 1630)       ED Risk Strat Scores                          SBIRT 22yo+      Flowsheet Row Most Recent Value   Initial Alcohol Screen: US AUDIT-C     1. How often do you have a drink containing alcohol? 0 Filed at: 01/10/2025  1257   2. How many drinks containing alcohol do you have on a typical day you are drinking?  0 Filed at: 01/10/2025 1257   3a. Male UNDER 65: How often do you have five or more drinks on one occasion? 0 Filed at: 01/10/2025 1257   3b. FEMALE Any Age, or MALE 65+: How often do you have 4 or more drinks on one occassion? 0 Filed at: 01/10/2025 1257   Audit-C Score 0 Filed at: 01/10/2025 1257   ABIGAIL: How many times in the past year have you...    Used an illegal drug or used a prescription medication for non-medical reasons? Never Filed at: 01/10/2025 1257                            History of Present Illness       Chief Complaint   Patient presents with    Fever     Fever and chills x 2 days, cough and sore throat        Past Medical History:   Diagnosis Date    Allergic     Anxiety 06/20/2016    Last assessed    Chronic infective otitis externa of left ear 09/18/2017    Last assessed    Depression     GERD (gastroesophageal reflux disease)     Headache(784.0)     HL (hearing loss)     Right lumbar radiculopathy 05/05/2016    Last assessed    Sciatic leg pain       Past Surgical History:   Procedure Laterality Date    APPENDECTOMY      CHOLECYSTECTOMY      KNEE ARTHROSCOPY      torn menicus    MO LAMNOTMY INCL W/DCMPRSN NRV ROOT 1 INTRSPC LUMBR Right 7/6/2016    Procedure: L3/4 FORAMINOTOMY;  Surgeon: Merary Lugo MD;  Location: [facility];  Service: Neurosurgery    TONSILLECTOMY        Family History   Problem Relation Age of Onset    No Known Problems Mother     Alcohol abuse Father     Diabetes Paternal Grandmother     Hypertension Paternal Grandmother     Breast cancer Maternal Aunt     Cancer Family       Social History     Tobacco Use    Smoking status: Former     Types: Cigarettes    Smokeless tobacco: Never    Tobacco comments:     Quit   Vaping Use    Vaping status: Never Used   Substance Use Topics    Alcohol use: Not Currently     Alcohol/week: 1.0 standard drink of alcohol     Types: 1 Standard drinks or  equivalent per week    Drug use: No      E-Cigarette/Vaping    E-Cigarette Use Never User       E-Cigarette/Vaping Substances    Nicotine No     THC No     CBD No     Flavoring No     Other No     Unknown No       I have reviewed and agree with the history as documented.       History provided by:  Patient  Fever  Location:  Past 24 hours  Quality:  Diffuse myalgias and subjective fever  Severity:  Moderate  Onset quality:  Gradual  Duration:  2 days  Timing:  Constant  Progression:  Unchanged  Chronicity:  New  Context:  PT c/o dry cough, sore throat, myalgias and subjective fever and chills for the past 2 days. Occasional vomiting  Relieved by:  Took a tylenol yesterday, nothing today.  Worsened by:  Nothing  Ineffective treatments:  None other tried.  Associated symptoms: cough, fatigue, fever, myalgias and vomiting    Associated symptoms: no abdominal pain, no chest pain and no shortness of breath        Review of Systems   Constitutional:  Positive for fatigue and fever.   Respiratory:  Positive for cough. Negative for shortness of breath.    Cardiovascular:  Negative for chest pain.   Gastrointestinal:  Positive for vomiting. Negative for abdominal pain.   Musculoskeletal:  Positive for myalgias.   All other systems reviewed and are negative.          Objective       ED Triage Vitals [01/10/25 1258]   Temperature Pulse Blood Pressure Respirations SpO2 Patient Position - Orthostatic VS   (!) 100.7 °F (38.2 °C) 91 132/68 20 96 % Sitting      Temp Source Heart Rate Source BP Location FiO2 (%) Pain Score    Oral Monitor Left arm -- --      Vitals      Date and Time Temp Pulse SpO2 Resp BP Pain Score FACES Pain Rating User   01/10/25 1724 98.3 °F (36.8 °C) -- -- -- -- -- -- LM   01/10/25 1258 100.7 °F (38.2 °C) 91 96 % 20 132/68 -- -- SG            Physical Exam  Constitutional:       General: She is not in acute distress.     Appearance: She is ill-appearing. She is not toxic-appearing or diaphoretic.   HENT:       Head: Normocephalic and atraumatic.   Cardiovascular:      Rate and Rhythm: Normal rate.   Pulmonary:      Effort: Pulmonary effort is normal. No respiratory distress.      Breath sounds: No wheezing.   Musculoskeletal:      Cervical back: Neck supple.   Skin:     General: Skin is warm.      Findings: No rash.   Neurological:      General: No focal deficit present.      Mental Status: She is alert.         Results Reviewed       Procedure Component Value Units Date/Time    FLU/COVID Rapid Antigen (30 min. TAT) - Preferred screening test in ED [[id number]]  (Abnormal) Collected: 01/10/25 1258    Lab Status: Final result Specimen: Nares from Nose Updated: 01/10/25 1318     SARS COV Rapid Antigen Negative     Influenza A Rapid Antigen Positive     Influenza B Rapid Antigen Negative    Narrative:      This test has been performed using the [employer] Etelv[employer] 2 FLU+SARS Antigen test under the Emergency Use Authorization (EUA). This test has been validated by the  and verified by the performing laboratory. The Etelvina uses lateral flow immunofluorescent sandwich assay to detect SARS-COV, Influenza A and Influenza B Antigen.     The [employer] Etelvina 2 SARS Antigen test does not differentiate between SARS-CoV and SARS-CoV-2.     Negative results are presumptive and may be confirmed with a molecular assay, if necessary, for patient management. Negative results do not rule out SARS-CoV-2 or influenza infection and should not be used as the sole basis for treatment or patient management decisions. A negative test result may occur if the level of antigen in a sample is below the limit of detection of this test.     Positive results are indicative of the presence of viral antigens, but do not rule out bacterial infection or co-infection with other viruses.     All test results should be used as an adjunct to clinical observations and other information available to the provider.    FOR PEDIATRIC PATIENTS - copy/paste COVID  Guidelines URL to browser: [email or web address]            No orders to display       Procedures    ED Medication and Procedure Management   Prior to Admission Medications   Prescriptions Last Dose Informant Patient Reported? Taking?   FLUoxetine 20 MG tablet   No No   Sig: TAKE 3 TABLETS BY MOUTH EVERY DAY   aspirin 81 mg chewable tablet  Self No No   Sig: Chew 1 tablet (81 mg total) daily Do not start before September 15, 2023.   atorvastatin (LIPITOR) 40 mg tablet   No No   Sig: TAKE 1 TABLET BY MOUTH DAILY WITH DINNER   cloNIDine (CATAPRES) 0.1 mg tablet   No No   Sig: TAKE 1 TABLET (0.1 MG TOTAL) BY MOUTH DAILY AT BEDTIME   famotidine (PEPCID) 40 MG tablet   No No   Sig: TAKE 1 TABLET BY MOUTH DAILY AT BEDTIME   omeprazole (PriLOSEC) 20 mg delayed release capsule   Yes No   Sig: Take 20 mg by mouth daily   ondansetron (ZOFRAN) 4 mg tablet   No No   Sig: Take 1 tablet (4 mg total) by mouth every 8 (eight) hours as needed for nausea or vomiting   sucralfate (CARAFATE) 1 g tablet   No No   Sig: Take 1 tablet (1 g total) by mouth 4 (four) times a day (before meals and at bedtime)   valACYclovir (VALTREX) 1,000 mg tablet   No No   Sig: Take 1 tablet (1,000 mg total) by mouth 2 (two) times a day for 7 days      Facility-Administered Medications: None     Discharge Medication List as of 1/10/2025  4:59 PM        START taking these medications    Details   acetaminophen (TYLENOL) 500 mg tablet Take 2 tablets (1,000 mg total) by mouth every 6 (six) hours as needed for mild pain, Starting Fri 1/10/2025, Normal      ibuprofen (MOTRIN) 600 mg tablet Take 1 tablet (600 mg total) by mouth every 6 (six) hours as needed for mild pain for up to 10 days, Starting Fri 1/10/2025, Until Mon 1/20/2025 at 2359, Normal      ondansetron (ZOFRAN-ODT) 4 mg disintegrating tablet Take 1 tablet (4 mg total) by mouth every 8 (eight) hours as needed for nausea or vomiting, Starting Fri  1/10/2025, Until Sun 2/9/2025 at 2359, Normal      oseltamivir (TAMIFLU) 75 mg capsule Take 1 capsule (75 mg total) by mouth every 12 (twelve) hours for 5 days, Starting Fri 1/10/2025, Until Wed 1/15/2025, Normal           CONTINUE these medications which have NOT CHANGED    Details   aspirin 81 mg chewable tablet Chew 1 tablet (81 mg total) daily Do not start before September 15, 2023., Starting Fri 9/15/2023, Normal      atorvastatin (LIPITOR) 40 mg tablet TAKE 1 TABLET BY MOUTH DAILY WITH DINNER, Starting Wed 10/2/2024, Normal      cloNIDine (CATAPRES) 0.1 mg tablet TAKE 1 TABLET (0.1 MG TOTAL) BY MOUTH DAILY AT BEDTIME, Starting Mon 1/6/2025, Normal      famotidine (PEPCID) 40 MG tablet TAKE 1 TABLET BY MOUTH DAILY AT BEDTIME, Starting Tue 12/10/2024, Normal      FLUoxetine 20 MG tablet TAKE 3 TABLETS BY MOUTH EVERY DAY, Normal      omeprazole (PriLOSEC) 20 mg delayed release capsule Take 20 mg by mouth daily, Historical Med      ondansetron (ZOFRAN) 4 mg tablet Take 1 tablet (4 mg total) by mouth every 8 (eight) hours as needed for nausea or vomiting, Starting Sat 11/16/2024, Normal      sucralfate (CARAFATE) 1 g tablet Take 1 tablet (1 g total) by mouth 4 (four) times a day (before meals and at bedtime), Starting Sat 11/16/2024, Normal      valACYclovir (VALTREX) 1,000 mg tablet Take 1 tablet (1,000 mg total) by mouth 2 (two) times a day for 7 days, Starting Thu 12/12/2024, Until Thu 12/19/2024, Normal           No discharge procedures on file.  ED SEPSIS DOCUMENTATION   Time reflects when diagnosis was documented in both MDM as applicable and the Disposition within this note       Time User Action Codes Description Comment    1/10/2025  4:57 PM Nayana Quezada Add [J10.1] Influenza A                  Nayana Quezada MD  01/10/25 2369

## 2025-01-16 ENCOUNTER — OFFICE VISIT (OUTPATIENT)
Dept: FAMILY MEDICINE CLINIC | Facility: CLINIC | Age: 60
End: 2025-01-16
Payer: COMMERCIAL

## 2025-01-16 VITALS
RESPIRATION RATE: 17 BRPM | HEIGHT: 61 IN | WEIGHT: 145.6 LBS | TEMPERATURE: 98.2 F | OXYGEN SATURATION: 98 % | BODY MASS INDEX: 27.49 KG/M2 | DIASTOLIC BLOOD PRESSURE: 70 MMHG | SYSTOLIC BLOOD PRESSURE: 130 MMHG | HEART RATE: 80 BPM

## 2025-01-16 DIAGNOSIS — F32.2 SEVERE MAJOR DEPRESSIVE DISORDER (HCC): ICD-10-CM

## 2025-01-16 DIAGNOSIS — J10.1 INFLUENZA A: Primary | ICD-10-CM

## 2025-01-16 PROCEDURE — 99214 OFFICE O/P EST MOD 30 MIN: CPT | Performed by: NURSE PRACTITIONER

## 2025-01-16 RX ORDER — DEXTROMETHORPHAN HYDROBROMIDE AND PROMETHAZINE HYDROCHLORIDE 15; 6.25 MG/5ML; MG/5ML
5 SYRUP ORAL 4 TIMES DAILY PRN
Qty: 118 ML | Refills: 0 | Status: SHIPPED | OUTPATIENT
Start: 2025-01-16

## 2025-01-16 RX ORDER — ALBUTEROL SULFATE 90 UG/1
2 INHALANT RESPIRATORY (INHALATION) EVERY 6 HOURS PRN
Qty: 8 G | Refills: 0 | Status: SHIPPED | OUTPATIENT
Start: 2025-01-16

## 2025-01-16 NOTE — PROGRESS NOTES
Name: Linda Celeste      : 1965      MRN: [id number]  Encounter Provider: AMAURI Markham  Encounter Date: 2025   Encounter department: [facility] [address] [facility]  :  Assessment & Plan  Influenza A  Patient diagnosed with influenza A on 1/10/2025.  She was given fluids and fever reducer.  Tamiflu was prescribed as well however patient discontinue it as she had difficulty tolerating.  She is feeling significantly better but continues to have some nasal congestion, wheezing and a cough.  Her fever has resolved.  To begin Promethazine DM as needed for cough as well as albuterol every 4-6 hours for shortness of breath/wheezing.  Counseled on the importance of resting, increasing fluid intake, beginning sinus rinses, and using a cool-mist humidifier at nighttime.  To take Tylenol or Motrin as needed for fever or discomfort.  To follow-up if no improvement in 1 week or sooner if symptoms worsen.   Orders:    promethazine-dextromethorphan (PHENERGAN-DM) 6.25-15 mg/5 mL oral syrup; Take 5 mL by mouth 4 (four) times a day as needed for cough    albuterol (PROVENTIL HFA,VENTOLIN HFA) 90 mcg/act inhaler; Inhale 2 puffs every 6 (six) hours as needed for wheezing or shortness of breath    Severe major depressive disorder (HCC)  Stable.  To continue fluoxetine 60 mg daily.                  History of Present Illness     Linda presents for an ER follow-up.  Diagnosed with influenza A on 1/10/2025.  Symptoms are improving.      Review of Systems   Constitutional: Negative.    HENT:  Positive for congestion. Negative for sinus pressure and sinus pain.    Eyes: Negative.    Respiratory:  Positive for cough, chest tightness and wheezing.    Cardiovascular: Negative.    Gastrointestinal: Negative.    Endocrine: Negative.    Genitourinary: Negative.    Musculoskeletal: Negative.    Skin: Negative.    Allergic/Immunologic: Negative.    Neurological:  Positive for headaches.   Hematological:  "Negative.    Psychiatric/Behavioral: Negative.         Objective   /70 (BP Location: Left arm, Patient Position: Sitting)   Pulse 80   Temp 98.2 °F (36.8 °C)   Resp 17   Ht 5' 1\" (1.549 m)   Wt 66 kg (145 lb 9.6 oz)   SpO2 98%   BMI 27.51 kg/m²      Physical Exam  Vitals and nursing note reviewed.   Constitutional:       General: She is not in acute distress.     Appearance: Normal appearance. She is well-developed. She is not ill-appearing, toxic-appearing or diaphoretic.   HENT:      Head: Normocephalic and atraumatic.      Right Ear: Ear canal and external ear normal.      Left Ear: Ear canal and external ear normal.      Ears:      Comments: Presence of fluid behind TMs bilaterally, no erythema.     Nose: Congestion present. No rhinorrhea.      Right Sinus: No maxillary sinus tenderness or frontal sinus tenderness.      Left Sinus: No maxillary sinus tenderness or frontal sinus tenderness.      Mouth/Throat:      Mouth: Mucous membranes are moist.      Pharynx: Oropharynx is clear.   Eyes:      Conjunctiva/sclera: Conjunctivae normal.   Cardiovascular:      Rate and Rhythm: Normal rate and regular rhythm.      Heart sounds: Normal heart sounds. No murmur heard.  Pulmonary:      Effort: Pulmonary effort is normal. No respiratory distress.      Breath sounds: Normal breath sounds. No stridor. No wheezing, rhonchi or rales.      Comments: Occasional tight cough on exam.  Chest:      Chest wall: No tenderness.   Musculoskeletal:      Cervical back: Neck supple.   Lymphadenopathy:      Cervical: No cervical adenopathy.   Skin:     General: Skin is warm and dry.      Capillary Refill: Capillary refill takes less than 2 seconds.   Neurological:      General: No focal deficit present.      Mental Status: She is alert and oriented to person, place, and time.   Psychiatric:         Mood and Affect: Mood normal.         Behavior: Behavior normal.         Thought Content: Thought content normal.         " Judgment: Judgment normal.

## 2025-01-28 ENCOUNTER — HOSPITAL ENCOUNTER (EMERGENCY)
Facility: HOSPITAL | Age: 60
Discharge: HOME | End: 2025-01-28
Attending: EMERGENCY MEDICINE
Payer: COMMERCIAL

## 2025-01-28 ENCOUNTER — APPOINTMENT (EMERGENCY)
Dept: CT IMAGING | Facility: HOSPITAL | Age: 60
End: 2025-01-28
Payer: COMMERCIAL

## 2025-01-28 VITALS
WEIGHT: 149.69 LBS | RESPIRATION RATE: 15 BRPM | DIASTOLIC BLOOD PRESSURE: 74 MMHG | BODY MASS INDEX: 28.28 KG/M2 | OXYGEN SATURATION: 93 % | HEART RATE: 78 BPM | SYSTOLIC BLOOD PRESSURE: 156 MMHG | TEMPERATURE: 98.1 F

## 2025-01-28 DIAGNOSIS — R10.9 ABDOMINAL PAIN: ICD-10-CM

## 2025-01-28 DIAGNOSIS — R11.2 NAUSEA AND VOMITING: Primary | ICD-10-CM

## 2025-01-28 LAB
ALBUMIN SERPL BCG-MCNC: 4.1 G/DL (ref 3.5–5)
ALP SERPL-CCNC: 84 U/L (ref 34–104)
ALT SERPL W P-5'-P-CCNC: 14 U/L (ref 7–52)
ANION GAP SERPL CALCULATED.3IONS-SCNC: 9 MMOL/L (ref 4–13)
AST SERPL W P-5'-P-CCNC: 18 U/L (ref 13–39)
ATRIAL RATE: 77 BPM
BASOPHILS # BLD AUTO: 0 THOUSANDS/ΜL (ref 0–0.1)
BASOPHILS NFR BLD AUTO: 0 % (ref 0–1)
BILIRUB DIRECT SERPL-MCNC: 0.03 MG/DL (ref 0–0.2)
BILIRUB SERPL-MCNC: 0.37 MG/DL (ref 0.2–1)
BUN SERPL-MCNC: 23 MG/DL (ref 5–25)
CALCIUM SERPL-MCNC: 9.4 MG/DL (ref 8.4–10.2)
CARDIAC TROPONIN I PNL SERPL HS: 4 NG/L (ref ?–50)
CHLORIDE SERPL-SCNC: 107 MMOL/L (ref 96–108)
CO2 SERPL-SCNC: 25 MMOL/L (ref 21–32)
CREAT SERPL-MCNC: 0.97 MG/DL (ref 0.6–1.3)
EOSINOPHIL # BLD AUTO: 0.03 THOUSAND/ΜL (ref 0–0.61)
EOSINOPHIL NFR BLD AUTO: 1 % (ref 0–6)
ERYTHROCYTE [DISTWIDTH] IN BLOOD BY AUTOMATED COUNT: 12.6 % (ref 11.6–15.1)
FLUAV AG UPPER RESP QL IA.RAPID: NEGATIVE
FLUBV AG UPPER RESP QL IA.RAPID: NEGATIVE
GFR SERPL CREATININE-BSD FRML MDRD: 63 ML/MIN/1.73SQ M
GLUCOSE SERPL-MCNC: 90 MG/DL (ref 65–140)
HCT VFR BLD AUTO: 40.6 % (ref 34.8–46.1)
HGB BLD-MCNC: 12.9 G/DL (ref 11.5–15.4)
IMM GRANULOCYTES # BLD AUTO: 0.01 THOUSAND/UL (ref 0–0.2)
IMM GRANULOCYTES NFR BLD AUTO: 0 % (ref 0–2)
LIPASE SERPL-CCNC: 8 U/L (ref 11–82)
LYMPHOCYTES # BLD AUTO: 0.86 THOUSANDS/ΜL (ref 0.6–4.47)
LYMPHOCYTES NFR BLD AUTO: 22 % (ref 14–44)
MCH RBC QN AUTO: 29.2 PG (ref 26.8–34.3)
MCHC RBC AUTO-ENTMCNC: 31.8 G/DL (ref 31.4–37.4)
MCV RBC AUTO: 92 FL (ref 82–98)
MONOCYTES # BLD AUTO: 0.8 THOUSAND/ΜL (ref 0.17–1.22)
MONOCYTES NFR BLD AUTO: 21 % (ref 4–12)
NEUTROPHILS # BLD AUTO: 2.17 THOUSANDS/ΜL (ref 1.85–7.62)
NEUTS SEG NFR BLD AUTO: 56 % (ref 43–75)
NRBC BLD AUTO-RTO: 0 /100 WBCS
P AXIS: 50 DEGREES
PLATELET # BLD AUTO: 273 THOUSANDS/UL (ref 149–390)
PMV BLD AUTO: 11.2 FL (ref 8.9–12.7)
POTASSIUM SERPL-SCNC: 3.3 MMOL/L (ref 3.5–5.3)
PR INTERVAL: 128 MS
PROT SERPL-MCNC: 6.3 G/DL (ref 6.4–8.4)
QRS AXIS: 82 DEGREES
QRSD INTERVAL: 84 MS
QT INTERVAL: 396 MS
QTC INTERVAL: 448 MS
RBC # BLD AUTO: 4.42 MILLION/UL (ref 3.81–5.12)
SARS-COV+SARS-COV-2 AG RESP QL IA.RAPID: NEGATIVE
SODIUM SERPL-SCNC: 141 MMOL/L (ref 135–147)
T WAVE AXIS: 84 DEGREES
VENTRICULAR RATE: 77 BPM
WBC # BLD AUTO: 3.87 THOUSAND/UL (ref 4.31–10.16)

## 2025-01-28 PROCEDURE — 99285 EMERGENCY DEPT VISIT HI MDM: CPT | Performed by: EMERGENCY MEDICINE

## 2025-01-28 PROCEDURE — 74177 CT ABD & PELVIS W/CONTRAST: CPT

## 2025-01-28 PROCEDURE — 96361 HYDRATE IV INFUSION ADD-ON: CPT

## 2025-01-28 PROCEDURE — 96374 THER/PROPH/DIAG INJ IV PUSH: CPT

## 2025-01-28 PROCEDURE — 80076 HEPATIC FUNCTION PANEL: CPT | Performed by: EMERGENCY MEDICINE

## 2025-01-28 PROCEDURE — 85025 COMPLETE CBC W/AUTO DIFF WBC: CPT | Performed by: EMERGENCY MEDICINE

## 2025-01-28 PROCEDURE — 93005 ELECTROCARDIOGRAM TRACING: CPT

## 2025-01-28 PROCEDURE — 84484 ASSAY OF TROPONIN QUANT: CPT | Performed by: EMERGENCY MEDICINE

## 2025-01-28 PROCEDURE — 87804 INFLUENZA ASSAY W/OPTIC: CPT | Performed by: EMERGENCY MEDICINE

## 2025-01-28 PROCEDURE — 99285 EMERGENCY DEPT VISIT HI MDM: CPT

## 2025-01-28 PROCEDURE — 80048 BASIC METABOLIC PNL TOTAL CA: CPT | Performed by: EMERGENCY MEDICINE

## 2025-01-28 PROCEDURE — 83690 ASSAY OF LIPASE: CPT | Performed by: EMERGENCY MEDICINE

## 2025-01-28 PROCEDURE — 36415 COLL VENOUS BLD VENIPUNCTURE: CPT | Performed by: EMERGENCY MEDICINE

## 2025-01-28 PROCEDURE — 93010 ELECTROCARDIOGRAM REPORT: CPT | Performed by: INTERNAL MEDICINE

## 2025-01-28 PROCEDURE — 96375 TX/PRO/DX INJ NEW DRUG ADDON: CPT

## 2025-01-28 PROCEDURE — 87811 SARS-COV-2 COVID19 W/OPTIC: CPT | Performed by: EMERGENCY MEDICINE

## 2025-01-28 RX ORDER — ONDANSETRON 2 MG/ML
4 INJECTION INTRAMUSCULAR; INTRAVENOUS ONCE
Status: COMPLETED | OUTPATIENT
Start: 2025-01-28 | End: 2025-01-28

## 2025-01-28 RX ORDER — MORPHINE SULFATE 10 MG/ML
6 INJECTION, SOLUTION INTRAMUSCULAR; INTRAVENOUS ONCE
Status: COMPLETED | OUTPATIENT
Start: 2025-01-28 | End: 2025-01-28

## 2025-01-28 RX ORDER — HYDROMORPHONE HCL/PF 1 MG/ML
0.5 SYRINGE (ML) INJECTION ONCE
Refills: 0 | Status: COMPLETED | OUTPATIENT
Start: 2025-01-28 | End: 2025-01-28

## 2025-01-28 RX ORDER — DIPHENHYDRAMINE HYDROCHLORIDE 50 MG/ML
25 INJECTION INTRAMUSCULAR; INTRAVENOUS ONCE
Status: COMPLETED | OUTPATIENT
Start: 2025-01-28 | End: 2025-01-28

## 2025-01-28 RX ORDER — METOCLOPRAMIDE HYDROCHLORIDE 5 MG/ML
10 INJECTION INTRAMUSCULAR; INTRAVENOUS ONCE
Status: COMPLETED | OUTPATIENT
Start: 2025-01-28 | End: 2025-01-28

## 2025-01-28 RX ORDER — ONDANSETRON 4 MG/1
4 TABLET, FILM COATED ORAL EVERY 6 HOURS PRN
Qty: 10 TABLET | Refills: 0 | Status: SHIPPED | OUTPATIENT
Start: 2025-01-28

## 2025-01-28 RX ADMIN — DIPHENHYDRAMINE HYDROCHLORIDE 25 MG: 50 INJECTION, SOLUTION INTRAMUSCULAR; INTRAVENOUS at 12:22

## 2025-01-28 RX ADMIN — METOCLOPRAMIDE 10 MG: 5 INJECTION, SOLUTION INTRAMUSCULAR; INTRAVENOUS at 12:22

## 2025-01-28 RX ADMIN — IOHEXOL 100 ML: 350 INJECTION, SOLUTION INTRAVENOUS at 11:13

## 2025-01-28 RX ADMIN — SODIUM CHLORIDE 1000 ML: 0.9 INJECTION, SOLUTION INTRAVENOUS at 12:25

## 2025-01-28 RX ADMIN — HYDROMORPHONE HYDROCHLORIDE 0.5 MG: 1 INJECTION, SOLUTION INTRAMUSCULAR; INTRAVENOUS; SUBCUTANEOUS at 10:55

## 2025-01-28 RX ADMIN — MORPHINE SULFATE 6 MG: 10 INJECTION INTRAVENOUS at 10:08

## 2025-01-28 RX ADMIN — ONDANSETRON 4 MG: 2 INJECTION INTRAMUSCULAR; INTRAVENOUS at 10:08

## 2025-01-28 NOTE — DISCHARGE INSTRUCTIONS
Zofran every 6-8 hours as needed for nausea  [place] diet  Follow-up with your doctor return worsening symptoms or any problems

## 2025-01-28 NOTE — ED PROVIDER NOTES
Time reflects when diagnosis was documented in both MDM as applicable and the Disposition within this note       Time User Action Codes Description Comment    1/28/2025  1:35 PM Rogelio Carrasco [R11.2] Nausea and vomiting     1/28/2025  1:36 PM Rogelio Carrasco [R10.9] Abdominal pain           ED Disposition       ED Disposition   Discharge    Condition   Stable    Date/Time   Tue Jan 28, 2025  1:35 PM    Comment   Linda Celeste discharge to home/self care.                   Assessment & Plan       Medical Decision Making  Amount and/or Complexity of Data Reviewed  Labs: ordered.  Radiology: ordered.    Risk  Prescription drug management.    Medical decision making 59-year-old female presents emergency department nausea vomiting diarrhea diffuse abdominal pain.  CT showed no acute pathology most consistent with viral gastroenteritis.  Discussed outpatient treatment and follow-up with The patient discussed indications to return.  Patient was markedly improved after IV hydration and antiemetics and analgesics.         Medications   ondansetron (ZOFRAN) injection 4 mg (4 mg Intravenous Given 1/28/25 1008)   morphine injection 6 mg (6 mg Intravenous Given 1/28/25 1008)   HYDROmorphone (DILAUDID) injection 0.5 mg (0.5 mg Intravenous Given 1/28/25 1055)   iohexol (OMNIPAQUE) 350 MG/ML injection (MULTI-DOSE) 100 mL (100 mL Intravenous Given 1/28/25 1113)   metoclopramide (REGLAN) injection 10 mg (10 mg Intravenous Given 1/28/25 1222)   diphenhydrAMINE (BENADRYL) injection 25 mg (25 mg Intravenous Given 1/28/25 1222)   sodium chloride 0.9 % bolus 1,000 mL (0 mL Intravenous Stopped 1/28/25 1325)       ED Risk Strat Scores        Diagnostic testing showed normal electrolytes  Normal cardiac troponin no sign of cardiac ischemia liver functions were normal no sign of hepatitis  Electrolytes within normal limits  COVID testing flu testing were negative  White count was actually low at 3.8 nonspecific finding, hemoglobin was 12  no sign of anemia.  CT scan of the abdomen pelvis performed because patient complained of abdominal pain nausea vomiting diarrhea showed moderate amount of fluid in the distal small bowel and colon consistent with enteritis.                  SBIRT 20yo+      Flowsheet Row Most Recent Value   Initial Alcohol Screen: US AUDIT-C     1. How often do you have a drink containing alcohol? 0 Filed at: 01/28/2025 1008   2. How many drinks containing alcohol do you have on a typical day you are drinking?  0 Filed at: 01/28/2025 1008   3b. FEMALE Any Age, or MALE 65+: How often do you have 4 or more drinks on one occassion? 0 Filed at: 01/28/2025 1008   Audit-C Score 0 Filed at: 01/28/2025 1008   ABIGAIL: How many times in the past year have you...    Used an illegal drug or used a prescription medication for non-medical reasons? Never Filed at: 01/28/2025 1008                            History of Present Illness       Chief Complaint   Patient presents with    Abdominal Pain     N/V/D for the past three days        Past Medical History:   Diagnosis Date    Allergic     Anxiety 06/20/2016    Last assessed    Chronic infective otitis externa of left ear 09/18/2017    Last assessed    Depression     GERD (gastroesophageal reflux disease)     Headache(784.0)     HL (hearing loss)     Right lumbar radiculopathy 05/05/2016    Last assessed    Sciatic leg pain       Past Surgical History:   Procedure Laterality Date    APPENDECTOMY      CHOLECYSTECTOMY      KNEE ARTHROSCOPY      torn menicus    AR LAMNOTMY INCL W/DCMPRSN NRV ROOT 1 INTRSPC LUMBR Right 7/6/2016    Procedure: L3/4 FORAMINOTOMY;  Surgeon: Merary Lugo MD;  Location: [facility];  Service: Neurosurgery    TONSILLECTOMY        Family History   Problem Relation Age of Onset    No Known Problems Mother     Alcohol abuse Father     Diabetes Paternal Grandmother     Hypertension Paternal Grandmother     Breast cancer Maternal Aunt     Cancer Family       Social History      Tobacco Use    Smoking status: Former     Types: Cigarettes    Smokeless tobacco: Never    Tobacco comments:     Quit   Vaping Use    Vaping status: Never Used   Substance Use Topics    Alcohol use: Not Currently     Alcohol/week: 1.0 standard drink of alcohol     Types: 1 Standard drinks or equivalent per week    Drug use: No      E-Cigarette/Vaping    E-Cigarette Use Never User       E-Cigarette/Vaping Substances    Nicotine No     THC No     CBD No     Flavoring No     Other No     Unknown No       I have reviewed and agree with the history as documented.     HPI patient is a 59-year-old female she presents to the emergency department complaining of 3 days of nausea vomiting diarrhea.  Patient reports fairly loose stool.  She reports some diffuse abdominal pain.  Denies any fever or chills.  She denies any chest pain.  There is no shortness of breath.  She denies any diaphoresis.  Denies any trauma.  She reports pain has been intermittent and crampy in nature.  Past medical history of anxiety, ear infections in the past, headache, lumbar radiculopathy  Family history  noncontributory   social history, former smoker no history of drug abuse    Review of Systems   Constitutional:  Negative for fever.   HENT:  Negative for congestion.    Eyes:  Negative for pain and redness.   Respiratory:  Negative for cough and shortness of breath.    Cardiovascular:  Negative for chest pain.   Gastrointestinal:  Positive for abdominal pain, diarrhea, nausea and vomiting.           Objective       ED Triage Vitals   Temperature Pulse Blood Pressure Respirations SpO2 Patient Position - Orthostatic VS   01/28/25 0956 01/28/25 0956 01/28/25 0956 01/28/25 0956 01/28/25 0956 01/28/25 0956   98.1 °F (36.7 °C) 84 154/67 18 99 % Lying      Temp Source Heart Rate Source BP Location FiO2 (%) Pain Score    01/28/25 0956 01/28/25 0956 01/28/25 0956 -- 01/28/25 1008    Oral Monitor Right arm  10 - Worst Possible Pain      Vitals      Date  and Time Temp Pulse SpO2 Resp BP Pain Score FACES Pain Rating User   01/28/25 1300 -- 78 93 % 15 156/74 -- --    01/28/25 1230 -- 92 94 % 20 156/97 -- --    01/28/25 1124 -- -- -- -- -- 4 --    01/28/25 1100 -- 74 100 % 16 150/67 -- --    01/28/25 1055 -- -- -- -- -- 9 -- AM   01/28/25 1008 -- -- -- -- -- 10 - Worst Possible Pain --    01/28/25 0956 98.1 °F (36.7 °C) 84 99 % 18 154/67 -- -- JLG            Physical Exam  Vitals and nursing note reviewed.   Constitutional:       Appearance: She is well-developed.   HENT:      Head: Normocephalic.      Right Ear: External ear normal.      Left Ear: External ear normal.      Nose: Nose normal.      Mouth/Throat:      Mouth: Mucous membranes are moist.      Pharynx: Oropharynx is clear.   Eyes:      General: Lids are normal.      Extraocular Movements: Extraocular movements intact.      Pupils: Pupils are equal, round, and reactive to light.   Cardiovascular:      Rate and Rhythm: Normal rate and regular rhythm.      Pulses: Normal pulses.      Heart sounds: Normal heart sounds.   Pulmonary:      Effort: Pulmonary effort is normal. No respiratory distress.   Abdominal:      General: Abdomen is flat. Bowel sounds are normal.      Tenderness: There is abdominal tenderness.      Comments: There is mild diffuse abdominal pain without rebound or guarding   Musculoskeletal:         General: No deformity. Normal range of motion.      Cervical back: Normal range of motion and neck supple.   Skin:     General: Skin is warm and dry.   Neurological:      Mental Status: She is alert and oriented to person, place, and time.   Psychiatric:         Mood and Affect: Mood normal.         Results Reviewed       Procedure Component Value Units Date/Time    HS Troponin 0hr (reflex protocol) [[id number]]  (Normal) Collected: 01/28/25 1008    Lab Status: Final result Specimen: Blood from Arm, Left Updated: 01/28/25 1043     hs TnI 0hr 4 ng/L     Basic metabolic panel [[id number]]   (Abnormal) Collected: 01/28/25 1008    Lab Status: Final result Specimen: Blood from Arm, Left Updated: 01/28/25 1043     Sodium 141 mmol/L      Potassium 3.3 mmol/L      Chloride 107 mmol/L      CO2 25 mmol/L      ANION GAP 9 mmol/L      BUN 23 mg/dL      Creatinine 0.97 mg/dL      Glucose 90 mg/dL      Calcium 9.4 mg/dL      eGFR 63 ml/min/1.73sq m     Narrative:      National Kid[employer] Disease Foundation guidelines for Chronic Kidney Disease (CKD):     Stage 1 with normal or high GFR (GFR > 90 mL/min/1.73 square meters)    Stage 2 Mild CKD (GFR = 60-89 mL/min/1.73 square meters)    Stage 3A Moderate CKD (GFR = 45-59 mL/min/1.73 square meters)    Stage 3B Moderate CKD (GFR = 30-44 mL/min/1.73 square meters)    Stage 4 Severe CKD (GFR = 15-29 mL/min/1.73 square meters)    Stage 5 End Stage CKD (GFR <15 mL/min/1.73 square meters)  Note: GFR calculation is accurate only with a steady state creatinine    Hepatic function panel [[id number]]  (Abnormal) Collected: 01/28/25 1008    Lab Status: Final result Specimen: Blood from Arm, Left Updated: 01/28/25 1043     Total Bilirubin 0.37 mg/dL      Bilirubin, Direct 0.03 mg/dL      Alkaline Phosphatase 84 U/L      AST 18 U/L      ALT 14 U/L      Total Protein 6.3 g/dL      Albumin 4.1 g/dL     Lipase [[id number]]  (Abnormal) Collected: 01/28/25 1008    Lab Status: Final result Specimen: Blood from Arm, Left Updated: 01/28/25 1043     Lipase 8 u/L     FLU/COVID Rapid Antigen (30 min. TAT) - Preferred screening test in ED [[id number]]  (Normal) Collected: 01/28/25 1008    Lab Status: Final result Specimen: Nares from Nose Updated: 01/28/25 1035     SARS COV Rapid Antigen Negative     Influenza A Rapid Antigen Negative     Influenza B Rapid Antigen Negative    Narrative:      This test has been performed using the [employer] Etelvina 2 FLU+SARS Antigen test under the Emergency Use Authorization (EUA). This test has been validated by the  and verified by the performing  laboratory. The Etelvina uses lateral flow immunofluorescent sandwich assay to detect SARS-COV, Influenza A and Influenza B Antigen.     The [employer] Etelvina 2 SARS Antigen test does not differentiate between SARS-CoV and SARS-CoV-2.     Negative results are presumptive and may be confirmed with a molecular assay, if necessary, for patient management. Negative results do not rule out SARS-CoV-2 or influenza infection and should not be used as the sole basis for treatment or patient management decisions. A negative test result may occur if the level of antigen in a sample is below the limit of detection of this test.     Positive results are indicative of the presence of viral antigens, but do not rule out bacterial infection or co-infection with other viruses.     All test results should be used as an adjunct to clinical observations and other information available to the provider.    FOR PEDIATRIC PATIENTS - copy/paste COVID Guidelines URL to browser: [email or web address]    CBC and differential [[id number]]  (Abnormal) Collected: 01/28/25 1008    Lab Status: Final result Specimen: Blood from Arm, Left Updated: 01/28/25 1020     WBC 3.87 Thousand/uL      RBC 4.42 Million/uL      Hemoglobin 12.9 g/dL      Hematocrit 40.6 %      MCV 92 fL      MCH 29.2 pg      MCHC 31.8 g/dL      RDW 12.6 %      MPV 11.2 fL      Platelets 273 Thousands/uL      nRBC 0 /100 WBCs      Segmented % 56 %      Immature Grans % 0 %      Lymphocytes % 22 %      Monocytes % 21 %      Eosinophils Relative 1 %      Basophils Relative 0 %      Absolute Neutrophils 2.17 Thousands/µL      Absolute Immature Grans 0.01 Thousand/uL      Absolute Lymphocytes 0.86 Thousands/µL      Absolute Monocytes 0.80 Thousand/µL      Eosinophils Absolute 0.03 Thousand/µL      Basophils Absolute 0.00 Thousands/µL             CT abdomen pelvis with contrast   Final Interpretation by Gale Velazquez MD (01/28 4140)          1. Moderate amount of fluid in distal small bowel loops and ascending colon which can be seen in association with mild diarrheal disease or enterocolitis.         Workstation performed: [id number]             ECG 12 Lead Documentation Only    Date/Time: 1/28/2025 5:13 PM    Performed by: Rogelio Carrasco MD  Authorized by: Rogelio Carrasco MD    Indications / Diagnosis:  Abdominal pain  ECG reviewed by me, the ED Provider: yes    Patient location:  ED  Previous ECG:     Previous ECG:  Compared to current    Comparison ECG info:  November 18, 2024    Similarity:  Changes noted  Interpretation:     Interpretation: non-specific    Rate:     ECG rate:  77    ECG rate assessment: normal    Rhythm:     Rhythm: sinus rhythm    Comments:      Sinus rhythm, nonspecific ST-T wave changes, somewhat similar to the prior EKG showing T wave inversions in V1 V2 V3 V4, eventually ST flattening in V5, no ST elevations.      ED Medication and Procedure Management   Prior to Admission Medications   Prescriptions Last Dose Informant Patient Reported? Taking?   FLUoxetine 20 MG tablet   No No   Sig: TAKE 3 TABLETS BY MOUTH EVERY DAY   acetaminophen (TYLENOL) 500 mg tablet   No No   Sig: Take 2 tablets (1,000 mg total) by mouth every 6 (six) hours as needed for mild pain   albuterol (PROVENTIL HFA,VENTOLIN HFA) 90 mcg/act inhaler   No No   Sig: Inhale 2 puffs every 6 (six) hours as needed for wheezing or shortness of breath   aspirin 81 mg chewable tablet  Self No No   Sig: Chew 1 tablet (81 mg total) daily Do not start before September 15, 2023.   atorvastatin (LIPITOR) 40 mg tablet   No No   Sig: TAKE 1 TABLET BY MOUTH DAILY WITH DINNER   cloNIDine (CATAPRES) 0.1 mg tablet   No No   Sig: TAKE 1 TABLET (0.1 MG TOTAL) BY MOUTH DAILY AT BEDTIME   famotidine (PEPCID) 40 MG tablet   No No   Sig: TAKE 1 TABLET BY MOUTH DAILY AT BEDTIME   ibuprofen (MOTRIN) 600 mg tablet   No No   Sig: Take 1 tablet (600 mg total) by mouth every 6 (six)  hours as needed for mild pain for up to 10 days   omeprazole (PriLOSEC) 20 mg delayed release capsule   Yes No   Sig: Take 20 mg by mouth daily   ondansetron (ZOFRAN) 4 mg tablet   No No   Sig: Take 1 tablet (4 mg total) by mouth every 8 (eight) hours as needed for nausea or vomiting   ondansetron (ZOFRAN-ODT) 4 mg disintegrating tablet   No No   Sig: Take 1 tablet (4 mg total) by mouth every 8 (eight) hours as needed for nausea or vomiting   promethazine-dextromethorphan (PHENERGAN-DM) 6.25-15 mg/5 mL oral syrup   No No   Sig: Take 5 mL by mouth 4 (four) times a day as needed for cough   sucralfate (CARAFATE) 1 g tablet   No No   Sig: Take 1 tablet (1 g total) by mouth 4 (four) times a day (before meals and at bedtime)   valACYclovir (VALTREX) 1,000 mg tablet   No No   Sig: Take 1 tablet (1,000 mg total) by mouth 2 (two) times a day for 7 days      Facility-Administered Medications: None     Discharge Medication List as of 1/28/2025  1:36 PM        START taking these medications    Details   !! ondansetron (ZOFRAN) 4 mg tablet Take 1 tablet (4 mg total) by mouth every 6 (six) hours as needed for nausea or vomiting for up to 10 doses, Starting Tue 1/28/2025, Normal       !! - Potential duplicate medications found. Please discuss with provider.        CONTINUE these medications which have NOT CHANGED    Details   acetaminophen (TYLENOL) 500 mg tablet Take 2 tablets (1,000 mg total) by mouth every 6 (six) hours as needed for mild pain, Starting Fri 1/10/2025, Normal      albuterol (PROVENTIL HFA,VENTOLIN HFA) 90 mcg/act inhaler Inhale 2 puffs every 6 (six) hours as needed for wheezing or shortness of breath, Starting Thu 1/16/2025, Normal      aspirin 81 mg chewable tablet Chew 1 tablet (81 mg total) daily Do not start before September 15, 2023., Starting Fri 9/15/2023, Normal      atorvastatin (LIPITOR) 40 mg tablet TAKE 1 TABLET BY MOUTH DAILY WITH DINNER, Starting Wed 10/2/2024, Normal      cloNIDine (CATAPRES) 0.1  mg tablet TAKE 1 TABLET (0.1 MG TOTAL) BY MOUTH DAILY AT BEDTIME, Starting Mon 1/6/2025, Normal      famotidine (PEPCID) 40 MG tablet TAKE 1 TABLET BY MOUTH DAILY AT BEDTIME, Starting Tue 12/10/2024, Normal      FLUoxetine 20 MG tablet TAKE 3 TABLETS BY MOUTH EVERY DAY, Normal      ibuprofen (MOTRIN) 600 mg tablet Take 1 tablet (600 mg total) by mouth every 6 (six) hours as needed for mild pain for up to 10 days, Starting Fri 1/10/2025, Until Mon 1/20/2025 at 2359, Normal      omeprazole (PriLOSEC) 20 mg delayed release capsule Take 20 mg by mouth daily, Historical Med      !! ondansetron (ZOFRAN) 4 mg tablet Take 1 tablet (4 mg total) by mouth every 8 (eight) hours as needed for nausea or vomiting, Starting Sat 11/16/2024, Normal      ondansetron (ZOFRAN-ODT) 4 mg disintegrating tablet Take 1 tablet (4 mg total) by mouth every 8 (eight) hours as needed for nausea or vomiting, Starting Fri 1/10/2025, Until Sun 2/9/2025 at 2359, Normal      promethazine-dextromethorphan (PHENERGAN-DM) 6.25-15 mg/5 mL oral syrup Take 5 mL by mouth 4 (four) times a day as needed for cough, Starting Thu 1/16/2025, Normal      sucralfate (CARAFATE) 1 g tablet Take 1 tablet (1 g total) by mouth 4 (four) times a day (before meals and at bedtime), Starting Sat 11/16/2024, Normal      valACYclovir (VALTREX) 1,000 mg tablet Take 1 tablet (1,000 mg total) by mouth 2 (two) times a day for 7 days, Starting Thu 12/12/2024, Until Thu 12/19/2024, Normal       !! - Potential duplicate medications found. Please discuss with provider.        No discharge procedures on file.  ED SEPSIS DOCUMENTATION   Time reflects when diagnosis was documented in both MDM as applicable and the Disposition within this note       Time User Action Codes Description Comment    1/28/2025  1:35 PM Rogelio Carrasco Add [R11.2] Nausea and vomiting     1/28/2025  1:36 PM Rogelio Carrasco Add [R10.9] Abdominal pain                  Rogelio Carrasco MD  01/29/25 0904

## 2025-03-09 DIAGNOSIS — B00.9 HSV INFECTION: ICD-10-CM

## 2025-03-10 RX ORDER — VALACYCLOVIR HYDROCHLORIDE 1 G/1
TABLET, FILM COATED ORAL
Qty: 14 TABLET | Refills: 3 | Status: SHIPPED | OUTPATIENT
Start: 2025-03-10 | End: 2025-03-17

## 2025-04-05 DIAGNOSIS — I65.02 STENOSIS OF LEFT VERTEBRAL ARTERY: ICD-10-CM

## 2025-04-07 RX ORDER — ATORVASTATIN CALCIUM 40 MG/1
40 TABLET, FILM COATED ORAL
Qty: 30 TABLET | Refills: 0 | Status: SHIPPED | OUTPATIENT
Start: 2025-04-07

## 2025-05-07 DIAGNOSIS — I65.02 STENOSIS OF LEFT VERTEBRAL ARTERY: ICD-10-CM

## 2025-05-07 NOTE — TELEPHONE ENCOUNTER
Unable to process refill. Patient overdue for labs. Courtesy refill previously given     Failing:    Cardiovascular:  Antilipid - Statins [id number]/07/2025 10:32 AM   Protocol Details Total Cholesterol within 360 days    LDL within 360 days    HDL within 360 days    Triglycerides within 360 days

## 2025-05-08 RX ORDER — ATORVASTATIN CALCIUM 40 MG/1
40 TABLET, FILM COATED ORAL
Qty: 30 TABLET | Refills: 0 | Status: SHIPPED | OUTPATIENT
Start: 2025-05-08

## 2025-06-05 DIAGNOSIS — I65.02 STENOSIS OF LEFT VERTEBRAL ARTERY: ICD-10-CM

## 2025-06-05 DIAGNOSIS — R10.13 EPIGASTRIC PAIN: ICD-10-CM

## 2025-06-05 RX ORDER — FAMOTIDINE 40 MG/1
40 TABLET, FILM COATED ORAL
Qty: 90 TABLET | Refills: 1 | Status: SHIPPED | OUTPATIENT
Start: 2025-06-05

## 2025-06-09 DIAGNOSIS — I65.02 STENOSIS OF LEFT VERTEBRAL ARTERY: ICD-10-CM

## 2025-06-09 RX ORDER — ATORVASTATIN CALCIUM 40 MG/1
40 TABLET, FILM COATED ORAL
Qty: 7 TABLET | Refills: 0 | OUTPATIENT
Start: 2025-06-09

## 2025-06-09 RX ORDER — ATORVASTATIN CALCIUM 40 MG/1
40 TABLET, FILM COATED ORAL
Qty: 7 TABLET | Refills: 0 | Status: SHIPPED | OUTPATIENT
Start: 2025-06-09

## 2025-06-09 NOTE — TELEPHONE ENCOUNTER
As per provider: Needs to obtain lipid panel, has not had a lipid panel since 2023. #7 day courtesy supply given. Will not refill again until lipid panel obtained. Thank you

## 2025-07-03 ENCOUNTER — APPOINTMENT (OUTPATIENT)
Age: 60
End: 2025-07-03
Payer: COMMERCIAL

## 2025-07-03 DIAGNOSIS — R00.2 PALPITATIONS: ICD-10-CM

## 2025-07-03 DIAGNOSIS — E78.2 HYPERLIPIDEMIA, MIXED: ICD-10-CM

## 2025-07-03 LAB
CHOLEST SERPL-MCNC: 185 MG/DL (ref ?–200)
HDLC SERPL-MCNC: 59 MG/DL
LDLC SERPL CALC-MCNC: 99 MG/DL (ref 0–100)
TRIGL SERPL-MCNC: 136 MG/DL (ref ?–150)
TSH SERPL DL<=0.05 MIU/L-ACNC: 2.8 UIU/ML (ref 0.45–4.5)

## 2025-07-03 PROCEDURE — 36415 COLL VENOUS BLD VENIPUNCTURE: CPT

## 2025-07-03 PROCEDURE — 80061 LIPID PANEL: CPT

## 2025-07-03 PROCEDURE — 84443 ASSAY THYROID STIM HORMONE: CPT

## 2025-07-10 DIAGNOSIS — R23.2 HOT FLASHES: ICD-10-CM

## 2025-07-10 RX ORDER — CLONIDINE HYDROCHLORIDE 0.1 MG/1
0.1 TABLET ORAL
Qty: 90 TABLET | Refills: 1 | Status: SHIPPED | OUTPATIENT
Start: 2025-07-10